# Patient Record
Sex: FEMALE | Race: WHITE | NOT HISPANIC OR LATINO | Employment: OTHER | URBAN - METROPOLITAN AREA
[De-identification: names, ages, dates, MRNs, and addresses within clinical notes are randomized per-mention and may not be internally consistent; named-entity substitution may affect disease eponyms.]

---

## 2017-01-13 ENCOUNTER — HOSPITAL ENCOUNTER (OUTPATIENT)
Dept: LAB | Facility: MEDICAL CENTER | Age: 57
End: 2017-01-13
Attending: NURSE PRACTITIONER
Payer: MEDICARE

## 2017-01-13 DIAGNOSIS — E83.119 HEMOCHROMATOSIS: ICD-10-CM

## 2017-01-13 DIAGNOSIS — I10 ESSENTIAL HYPERTENSION: ICD-10-CM

## 2017-01-13 LAB
ALBUMIN SERPL BCP-MCNC: 4.3 G/DL (ref 3.2–4.9)
ALBUMIN/GLOB SERPL: 1.6 G/DL
ALP SERPL-CCNC: 46 U/L (ref 30–99)
ALT SERPL-CCNC: 13 U/L (ref 2–50)
ANION GAP SERPL CALC-SCNC: 8 MMOL/L (ref 0–11.9)
AST SERPL-CCNC: 15 U/L (ref 12–45)
BASOPHILS # BLD AUTO: 0.08 K/UL (ref 0–0.12)
BASOPHILS NFR BLD AUTO: 1.2 % (ref 0–1.8)
BILIRUB SERPL-MCNC: 0.3 MG/DL (ref 0.1–1.5)
BUN SERPL-MCNC: 17 MG/DL (ref 8–22)
CALCIUM SERPL-MCNC: 9.7 MG/DL (ref 8.5–10.5)
CHLORIDE SERPL-SCNC: 105 MMOL/L (ref 96–112)
CHOLEST SERPL-MCNC: 229 MG/DL (ref 100–199)
CO2 SERPL-SCNC: 25 MMOL/L (ref 20–33)
CREAT SERPL-MCNC: 0.96 MG/DL (ref 0.5–1.4)
EOSINOPHIL # BLD: 0.17 K/UL (ref 0–0.51)
EOSINOPHIL NFR BLD AUTO: 2.4 % (ref 0–6.9)
ERYTHROCYTE [DISTWIDTH] IN BLOOD BY AUTOMATED COUNT: 43.3 FL (ref 35.9–50)
GLOBULIN SER CALC-MCNC: 2.7 G/DL (ref 1.9–3.5)
GLUCOSE SERPL-MCNC: 88 MG/DL (ref 65–99)
HCT VFR BLD AUTO: 44.4 % (ref 37–47)
HDLC SERPL-MCNC: 67 MG/DL
HGB BLD-MCNC: 14.7 G/DL (ref 12–16)
IMM GRANULOCYTES # BLD AUTO: 0.02 K/UL (ref 0–0.11)
IMM GRANULOCYTES NFR BLD AUTO: 0.3 % (ref 0–0.9)
LDLC SERPL CALC-MCNC: 146 MG/DL
LYMPHOCYTES # BLD: 2.03 K/UL (ref 1–4.8)
LYMPHOCYTES NFR BLD AUTO: 29.3 % (ref 22–41)
MCH RBC QN AUTO: 30.1 PG (ref 27–33)
MCHC RBC AUTO-ENTMCNC: 33.1 G/DL (ref 33.6–35)
MCV RBC AUTO: 91 FL (ref 81.4–97.8)
MONOCYTES # BLD: 0.47 K/UL (ref 0–0.85)
MONOCYTES NFR BLD AUTO: 6.8 % (ref 0–13.4)
NEUTROPHILS # BLD: 4.17 K/UL (ref 2–7.15)
NEUTROPHILS NFR BLD AUTO: 60 % (ref 44–72)
NRBC # BLD AUTO: 0 K/UL
NRBC BLD-RTO: 0 /100 WBC
PLATELET # BLD AUTO: 411 K/UL (ref 164–446)
PMV BLD AUTO: 8.7 FL (ref 9–12.9)
POTASSIUM SERPL-SCNC: 3.5 MMOL/L (ref 3.6–5.5)
PROT SERPL-MCNC: 7 G/DL (ref 6–8.2)
RBC # BLD AUTO: 4.88 M/UL (ref 4.2–5.4)
SODIUM SERPL-SCNC: 138 MMOL/L (ref 135–145)
TRIGL SERPL-MCNC: 78 MG/DL (ref 0–149)
TSH SERPL DL<=0.005 MIU/L-ACNC: 1.69 UIU/ML (ref 0.3–3.7)
WBC # BLD AUTO: 6.9 K/UL (ref 4.8–10.8)

## 2017-01-13 PROCEDURE — 80053 COMPREHEN METABOLIC PANEL: CPT

## 2017-01-13 PROCEDURE — 80061 LIPID PANEL: CPT

## 2017-01-13 PROCEDURE — 36415 COLL VENOUS BLD VENIPUNCTURE: CPT

## 2017-01-13 PROCEDURE — 84443 ASSAY THYROID STIM HORMONE: CPT

## 2017-01-13 PROCEDURE — 85025 COMPLETE CBC W/AUTO DIFF WBC: CPT

## 2017-01-17 ENCOUNTER — OFFICE VISIT (OUTPATIENT)
Dept: MEDICAL GROUP | Facility: MEDICAL CENTER | Age: 57
End: 2017-01-17
Payer: MEDICARE

## 2017-01-17 VITALS
TEMPERATURE: 97.6 F | OXYGEN SATURATION: 96 % | DIASTOLIC BLOOD PRESSURE: 70 MMHG | HEIGHT: 63 IN | BODY MASS INDEX: 27.82 KG/M2 | HEART RATE: 75 BPM | SYSTOLIC BLOOD PRESSURE: 128 MMHG | RESPIRATION RATE: 16 BRPM | WEIGHT: 157 LBS

## 2017-01-17 DIAGNOSIS — Z12.39 SCREENING FOR BREAST CANCER: ICD-10-CM

## 2017-01-17 DIAGNOSIS — M50.30 DDD (DEGENERATIVE DISC DISEASE), CERVICAL: ICD-10-CM

## 2017-01-17 DIAGNOSIS — G54.0 TOS (THORACIC OUTLET SYNDROME): ICD-10-CM

## 2017-01-17 DIAGNOSIS — I10 ESSENTIAL HYPERTENSION: ICD-10-CM

## 2017-01-17 DIAGNOSIS — E78.5 DYSLIPIDEMIA: ICD-10-CM

## 2017-01-17 DIAGNOSIS — Z98.1 S/P CERVICAL SPINAL FUSION: ICD-10-CM

## 2017-01-17 DIAGNOSIS — G47.00 INSOMNIA, UNSPECIFIED TYPE: ICD-10-CM

## 2017-01-17 DIAGNOSIS — F11.20 OPIOID TYPE DEPENDENCE, CONTINUOUS (HCC): ICD-10-CM

## 2017-01-17 DIAGNOSIS — M54.12 CERVICAL RADICULOPATHY: ICD-10-CM

## 2017-01-17 PROCEDURE — 99214 OFFICE O/P EST MOD 30 MIN: CPT | Performed by: NURSE PRACTITIONER

## 2017-01-17 RX ORDER — ZOLPIDEM TARTRATE 5 MG/1
5 TABLET ORAL NIGHTLY PRN
Qty: 30 TAB | Refills: 5 | Status: SHIPPED | OUTPATIENT
Start: 2017-01-17 | End: 2017-12-15

## 2017-01-17 RX ORDER — VALSARTAN AND HYDROCHLOROTHIAZIDE 160; 25 MG/1; MG/1
1 TABLET ORAL DAILY
Qty: 90 TAB | Refills: 2 | Status: SHIPPED | OUTPATIENT
Start: 2017-01-17 | End: 2017-09-18 | Stop reason: SDUPTHER

## 2017-01-17 RX ORDER — HYDROCODONE BITARTRATE AND ACETAMINOPHEN 10; 325 MG/1; MG/1
1 TABLET ORAL EVERY 6 HOURS PRN
Qty: 120 TAB | Refills: 0 | Status: SHIPPED | OUTPATIENT
Start: 2017-01-17 | End: 2017-04-21 | Stop reason: SDUPTHER

## 2017-01-17 RX ORDER — TRAMADOL HYDROCHLORIDE 50 MG/1
50-100 TABLET ORAL EVERY 12 HOURS
Qty: 60 TAB | Refills: 5 | Status: SHIPPED | OUTPATIENT
Start: 2017-01-17 | End: 2018-08-13 | Stop reason: SDUPTHER

## 2017-01-17 ASSESSMENT — ENCOUNTER SYMPTOMS
NECK PAIN: 1
TINGLING: 1
BACK PAIN: 1
MYALGIAS: 1
INSOMNIA: 1

## 2017-01-17 NOTE — MR AVS SNAPSHOT
"Yuki Jack Ajshakeeltraci   2017 10:40 AM   Office Visit   MRN: [id number]    Department:  [address] [place] [address]   Dept Phone:  [phone]    Description:  Female : 1960   Provider:  ANDREWS Paredes           Reason for Visit     Medication Refill           Allergies as of 2017     Allergen Noted Reactions    Penicillins 10/05/2009   Hives    Sulfa Drugs 10/05/2009   Hives      You were diagnosed with     TOS (thoracic outlet syndrome)   [845247]       S/P cervical spinal fusion   [270233]       Cervical radiculopathy   [153537]       Dyslipidemia   [435580]       Opioid type dependence, continuous (CMS-HCC)   [304.01.ICD-9-CM]       DDD (degenerative disc disease), cervical   [955488]       Insomnia, unspecified type   [1842737]       Essential hypertension   [5752269]       Screening for breast cancer   [032996]         Vital Signs     Blood Pressure Pulse Temperature Respirations Height Weight    128/70 mmHg 75 36.4 °C (97.6 °F) 16 1.6 m (5' 3\") 71.215 kg (157 lb)    Body Mass Index Oxygen Saturation Last Menstrual Period Smoking Status          27.82 kg/m2 96% 2012 Former Smoker        Basic Information     Date Of Birth Sex Race Ethnicity Preferred Language    1960 Female White Non- English      Problem List              ICD-10-CM Priority Class Noted - Resolved    DDD (degenerative disc disease), cervical M50.30   8/3/2011 - Present    Cervical radiculopathy M54.12   2011 - Present    Arm pain M79.603   2011 - Present    S/P cervical spinal fusion Z98.1   2011 - Present    Aortic regurgitation (Chronic) I35.1 High  2012 - Present    Sleep apnea G47.30   Unknown - Present    PVD (peripheral vascular disease) (CMS-HCC) (Chronic) I73.9 High  2012 - Present    Fibromyalgia M79.7   2012 - Present    Knee pain M25.569   3/11/2013 - Present    Opioid type dependence, continuous (CMS-HCC) F11.20 High  2015 - Present    TOS (thoracic outlet " syndrome) G54.0   4/14/2015 - Present    Long term prescription opiate use Z79.891   4/20/2016 - Present    Essential hypertension I10   5/20/2016 - Present    Dyslipidemia E78.5   1/17/2017 - Present      Health Maintenance        Date Due Completion Dates    IMM INFLUENZA (1) 9/1/2016 10/10/2013    MAMMOGRAM 12/1/2016 12/1/2015, 3/3/2014, 11/21/2012, 11/21/2012, 9/15/2011, 9/8/2011, 9/15/2010, 3/15/2010, 3/15/2010, 6/19/2009, 6/19/2009, 11/26/2008, 11/26/2008    PAP SMEAR 10/20/2018 10/20/2015 (Postponed)    Override on 10/20/2015: Postponed    IMM DTaP/Tdap/Td Vaccine (2 - Td) 9/24/2023 9/24/2013    COLONOSCOPY 6/23/2025 6/23/2015, 6/23/2015            Current Immunizations     Influenza TIV (IM) 10/10/2013    Tdap Vaccine 9/24/2013      Below and/or attached are the medications your provider expects you to take. Review all of your home medications and newly ordered medications with your provider and/or pharmacist. Follow medication instructions as directed by your provider and/or pharmacist. Please keep your medication list with you and share with your provider. Update the information when medications are discontinued, doses are changed, or new medications (including over-the-counter products) are added; and carry medication information at all times in the event of emergency situations     Allergies:  PENICILLINS - Hives     SULFA DRUGS - Hives               Medications  Valid as of: January 17, 2017 - 12:50 PM    Generic Name Brand Name Tablet Size Instructions for use    Acyclovir (Tab) ZOVIRAX 800 MG Take 1 Tab by mouth 2 times a day.        Hydrocodone-Acetaminophen (Tab) NORCO  MG Take 1 Tab by mouth every 6 hours as needed.        Topiramate (Tab) TOPAMAX 50 MG Take 2 Tabs by mouth 2 times a day.        TraMADol HCl (Tab) ULTRAM 50 MG Take 1-2 Tabs by mouth every 12 hours.        Valsartan-Hydrochlorothiazide (Tab) DIOVAN--25 MG Take 1 Tab by mouth every day.        Zolpidem Tartrate (Tab)  AMBIEN 5 MG Take 1 Tab by mouth at bedtime as needed for Sleep.        .                 Medicines prescribed today were sent to:     [id number] - [place], [place] - [address] [facility]. [address]    [address] [facility]. [address] [place] [address]    Phone: [phone] Fax: [phone]    Open 24 Hours?: No      Medication refill instructions:       If your prescription bottle indicates you have medication refills left, it is not necessary to call your provider’s office. Please contact your pharmacy and they will refill your medication.    If your prescription bottle indicates you do not have any refills left, you may request refills at any time through one of the following ways: The online [employer] system (except Urgent Care), by calling your provider’s office, or by asking your pharmacy to contact your provider’s office with a refill request. Medication refills are processed only during regular business hours and may not be available until the next business day. Your provider may request additional information or to have a follow-up visit with you prior to refilling your medication.   *Please Note: Medication refills are assigned a new Rx number when refilled electronically. Your pharmacy may indicate that no refills were authorized even though a new prescription for the same medication is available at the pharmacy. Please request the medicine by name with the pharmacy before contacting your provider for a refill.        Referral     A referral request has been sent to our patient care coordination department. Please allow 3-5 business days for us to process this request and contact you either by phone or mail. If you do not hear from us by the 5th business day, please call us at [phone].        Other Notes About Your Plan     This appointment is 4-11-16. It is the beginning of the year and will require all chronic conditions to be assessed and documented in conjunction with any other identified concerns during the  visit:    I73.9 PVD  F11.20 Opioid Dependency  D69.9 Thrombocytopenia    Query: Hx of depression, chronic use of anti-depressants, in your medical opinion could this be Major (Chronic) Depression, recurrent, mild F33.0                 MyChart Access Code: Activation code not generated  Current Avingert Status: Active

## 2017-01-17 NOTE — PROGRESS NOTES
Subjective:      Yuki Angelo is a 56 y.o. female who presents with Medication Refill            HPI Yuki Angelo is here today to discuss a number of issues and for refill on medicines and to review lab work.      1. TOS (thoracic outlet syndrome)  Patient gives history of thoracic outlet syndrome and is wondering what more can be done for this. She has previously been to pain management for this as well as cervical radiculopathy. Pain starts in her left shoulder area and radiates down her arm to her hands. She has been to Dr. Lee in the past for cervical spine surgery and would like to go back to him.    2. S/P cervical spinal fusion  Patient had previous surgery but still has neck issues.    3. Cervical radiculopathy  Patient reports pain that radiates into the left arm on occasion and is unsure if this is related to her cervical spine or thoracic outlet syndrome.    4. Dyslipidemia  Patient's recent cholesterol comes back showing elevated LDL at 146 with a good HDL of 67. She is not on a statin.    5. Opioid type dependence, continuous (CMS-HCC)  Patient would like refills on her tramadol which she uses twice a day for pain. She will occasionally take Norco for breakthrough pain and a prescription typically last for 6 months.    6. DDD (degenerative disc disease), cervical  Patient does not currently follow with any specialist.    7. Insomnia, unspecified type  Patient was using Xanax for sleep at night and has been using this for years. She would like to continue on something for sleep if she can no longer use Xanax because of the recent black box warnings.    8. Essential hypertension  Blood pressure remains well controlled on her Diovan with HCT and lab work was normal except for slightly low potassium.    9. Screening for breast cancer  Patient due for yearly screening.      Current Outpatient Prescriptions   Medication Sig Dispense Refill   • tramadol (ULTRAM) 50 MG Tab Take 1-2 Tabs by  "mouth every 12 hours. 60 Tab 5   • hydrocodone/acetaminophen (NORCO)  MG Tab Take 1 Tab by mouth every 6 hours as needed. 120 Tab 0   • zolpidem (AMBIEN) 5 MG Tab Take 1 Tab by mouth at bedtime as needed for Sleep. 30 Tab 5   • valsartan-hydrochlorothiazide (DIOVAN-HCT) 160-25 MG per tablet Take 1 Tab by mouth every day. 90 Tab 2   • topiramate (TOPAMAX) 50 MG tablet Take 2 Tabs by mouth 2 times a day. 120 Tab 11   • acyclovir (ZOVIRAX) 800 MG Tab Take 1 Tab by mouth 2 times a day. 20 Tab 12     No current facility-administered medications for this visit.     Social History   Substance Use Topics   • Smoking status: Former Smoker -- 35 years     Types: Cigarettes     Quit date: 03/04/2013   • Smokeless tobacco: Former User      Comment: .5 ppd for 30 years    • Alcohol Use: 0.6 oz/week     1 Glasses of wine, 0 Standard drinks or equivalent per week      Comment: rarely     Past Medical History   Diagnosis Date   • Snoring    • DDD (degenerative disc disease), cervical 8/3/2011   • History of echocardiogram 2/21/2012   • Fatigue 2/21/2012   • PVD (peripheral vascular disease) (CMS-[facility]) 2/21/2012   • Aortic regurgitation 2/21/2012     Dr. Gutiérrez, cardiologist   • Polycystic kidney disease      \"told as child\"   • Sleep apnea      Uses CPAP at [facility]   • Other specified disorder of intestines      constipation   • Hypertension 2015     well controlled on meds   • Arthritis      Generalized   • Fibromyalgia    • Pain      left arm and neck, thoracic area \"allover\"   • CHEST PAIN 2/21/2012   • Backpain    • Psychiatric problem      depression, anxiety   • S/P tooth extraction 10/27/2015   • Multilevel degenerative disc disease    • Thoracic outlet syndrome      Family History   Problem Relation Age of Onset   • Adopted: Yes   • Hypertension Maternal Aunt        Review of Systems   Musculoskeletal: Positive for myalgias, back pain and neck pain.   Neurological: Positive for tingling.   Psychiatric/Behavioral: The " "patient has insomnia.    All other systems reviewed and are negative.         Objective:     /70 mmHg  Pulse 75  Temp(Src) 36.4 °C (97.6 °F)  Resp 16  Ht 1.6 m (5' 3\")  Wt 71.215 kg (157 lb)  BMI 27.82 kg/m2  SpO2 96%  LMP 06/01/2012     Physical Exam   Constitutional: She is oriented to person, place, and time. She appears well-developed and well-nourished. No distress.   HENT:   Head: Normocephalic and atraumatic.   Right Ear: External ear normal.   Left Ear: External ear normal.   Nose: Nose normal.   Eyes: Right eye exhibits no discharge. Left eye exhibits no discharge.   Neck: Normal range of motion. Neck supple. No thyromegaly present.   Cardiovascular: Normal rate, regular rhythm and normal heart sounds.  Exam reveals no gallop and no friction rub.    No murmur heard.  Pulmonary/Chest: Effort normal and breath sounds normal. She has no wheezes. She has no rales.   Musculoskeletal: She exhibits no edema or tenderness.   Pain in the left neck, shoulder and arm area. Reports of tingling and pain of the fingers of the left hand.   Neurological: She is alert and oriented to person, place, and time. She displays normal reflexes.   Skin: Skin is warm and dry. No rash noted. She is not diaphoretic.   Psychiatric: She has a normal mood and affect. Her behavior is normal. Judgment and thought content normal.   Nursing note and vitals reviewed.         Component      Latest Ref Rng 1/13/2017 1/13/2017 1/13/2017 1/13/2017          10:21 AM 10:21 AM 10:21 AM 10:21 AM   WBC      4.8 - 10.8 K/uL  6.9     RBC      4.20 - 5.40 M/uL  4.88     Hemoglobin      12.0 - 16.0 g/dL  14.7     Hematocrit      37.0 - 47.0 %  44.4     MCV      81.4 - 97.8 fL  91.0     MCH      27.0 - 33.0 pg  30.1     MCHC      33.6 - 35.0 g/dL  33.1 (L)     RDW      35.9 - 50.0 fL  43.3     Platelet Count      164 - 446 K/uL  411     MPV      9.0 - 12.9 fL  8.7 (L)     Neutrophils-Polys      44.00 - 72.00 %  60.00     Lymphocytes      " 22.00 - 41.00 %  29.30     Monocytes      0.00 - 13.40 %  6.80     Eosinophils      0.00 - 6.90 %  2.40     Basophils      0.00 - 1.80 %  1.20     Immature Granulocytes      0.00 - 0.90 %  0.30     Nucleated RBC        0.00     Neutrophils (Absolute)      2.00 - 7.15 K/uL  4.17     Lymphs (Absolute)      1.00 - 4.80 K/uL  2.03     Monos (Absolute)      0.00 - 0.85 K/uL  0.47     Eos (Absolute)      0.00 - 0.51 K/uL  0.17     Baso (Absolute)      0.00 - 0.12 K/uL  0.08     Immature Granulocytes (abs)      0.00 - 0.11 K/uL  0.02     NRBC (Absolute)        0.00     Sodium      135 - 145 mmol/L       Potassium      3.6 - 5.5 mmol/L       Chloride      96 - 112 mmol/L       Co2      20 - 33 mmol/L       Anion Gap      0.0 - 11.9       Glucose      65 - 99 mg/dL       Bun      8 - 22 mg/dL       Creatinine      0.50 - 1.40 mg/dL       Calcium      8.5 - 10.5 mg/dL       AST(SGOT)      12 - 45 U/L       ALT(SGPT)      2 - 50 U/L       Alkaline Phosphatase      30 - 99 U/L       Total Bilirubin      0.1 - 1.5 mg/dL       Albumin      3.2 - 4.9 g/dL       Total Protein      6.0 - 8.2 g/dL       Globulin      1.9 - 3.5 g/dL       A-G Ratio             Cholesterol,Tot      100 - 199 mg/dL    229 (H)   Triglycerides      0 - 149 mg/dL    78   HDL      >=40 mg/dL    67   LDL      <100 mg/dL    146 (H)   GFR If African American      >60 mL/min/1.73 m 2 >60      GFR If Non African American      >60 mL/min/1.73 m 2 60      TSH      0.300 - 3.700 uIU/mL   1.690      Component      Latest Ref Rn 1/13/2017          10:21 AM   WBC      4.8 - 10.8 K/uL    RBC      4.20 - 5.40 M/uL    Hemoglobin      12.0 - 16.0 g/dL    Hematocrit      37.0 - 47.0 %    MCV      81.4 - 97.8 fL    MCH      27.0 - 33.0 pg    MCHC      33.6 - 35.0 g/dL    RDW      35.9 - 50.0 fL    Platelet Count      164 - 446 K/uL    MPV      9.0 - 12.9 fL    Neutrophils-Polys      44.00 - 72.00 %    Lymphocytes      22.00 - 41.00 %    Monocytes      0.00 - 13.40 %     Eosinophils      0.00 - 6.90 %    Basophils      0.00 - 1.80 %    Immature Granulocytes      0.00 - 0.90 %    Nucleated RBC          Neutrophils (Absolute)      2.00 - 7.15 K/uL    Lymphs (Absolute)      1.00 - 4.80 K/uL    Monos (Absolute)      0.00 - 0.85 K/uL    Eos (Absolute)      0.00 - 0.51 K/uL    Baso (Absolute)      0.00 - 0.12 K/uL    Immature Granulocytes (abs)      0.00 - 0.11 K/uL    NRBC (Absolute)          Sodium      135 - 145 mmol/L 138   Potassium      3.6 - 5.5 mmol/L 3.5 (L)   Chloride      96 - 112 mmol/L 105   Co2      20 - 33 mmol/L 25   Anion Gap      0.0 - 11.9 8.0   Glucose      65 - 99 mg/dL 88   Bun      8 - 22 mg/dL 17   Creatinine      0.50 - 1.40 mg/dL 0.96   Calcium      8.5 - 10.5 mg/dL 9.7   AST(SGOT)      12 - 45 U/L 15   ALT(SGPT)      2 - 50 U/L 13   Alkaline Phosphatase      30 - 99 U/L 46   Total Bilirubin      0.1 - 1.5 mg/dL 0.3   Albumin      3.2 - 4.9 g/dL 4.3   Total Protein      6.0 - 8.2 g/dL 7.0   Globulin      1.9 - 3.5 g/dL 2.7   A-G Ratio       1.6   Cholesterol,Tot      100 - 199 mg/dL    Triglycerides      0 - 149 mg/dL    HDL      >=40 mg/dL    LDL      <100 mg/dL    GFR If African American      >60 mL/min/1.73 m 2    GFR If Non African American      >60 mL/min/1.73 m 2    TSH      0.300 - 3.700 uIU/mL         Assessment/Plan:     1. TOS (thoracic outlet syndrome)  Patient will be referred back to neurosurgery to see if there is anything more that can be done for her thoracic outlet syndrome.  - REFERRAL TO NEUROSURGERY    2. S/P cervical spinal fusion  Patient will follow up with her neurosurgeon.  - REFERRAL TO NEUROSURGERY    3. Cervical radiculopathy  Patient's medications refilled.  - tramadol (ULTRAM) 50 MG Tab; Take 1-2 Tabs by mouth every 12 hours.  Dispense: 60 Tab; Refill: 5  - hydrocodone/acetaminophen (NORCO)  MG Tab; Take 1 Tab by mouth every 6 hours as needed.  Dispense: 120 Tab; Refill: 0    4. Dyslipidemia  Spoke with patient about dietary  changes and rechecking levels in 6-12 months. If her LDL goes higher she may be a candidate for statin .    5. Opioid type dependence, continuous (CMS-HCC)  Patient has signed previous drug contract and had urine drug screening. I have refilled her tramadol which he uses regularly and her hydrocodone should last her for 6 months.  - tramadol (ULTRAM) 50 MG Tab; Take 1-2 Tabs by mouth every 12 hours.  Dispense: 60 Tab; Refill: 5  - hydrocodone/acetaminophen (NORCO)  MG Tab; Take 1 Tab by mouth every 6 hours as needed.  Dispense: 120 Tab; Refill: 0    6. DDD (degenerative disc disease), cervical  Medications refilled for the next 6 months.  - tramadol (ULTRAM) 50 MG Tab; Take 1-2 Tabs by mouth every 12 hours.  Dispense: 60 Tab; Refill: 5  - hydrocodone/acetaminophen (NORCO)  MG Tab; Take 1 Tab by mouth every 6 hours as needed.  Dispense: 120 Tab; Refill: 0    7. Insomnia, unspecified type  I reviewed with her the black box warnings with Xanax and her pain pills. She does not feel she can come off all sleep medication so I will put her on low-dose Ambien with warnings. I told her to try to wean off all medication or at least not use the sleep medicine daily to prevent tolerance.  - zolpidem (AMBIEN) 5 MG Tab; Take 1 Tab by mouth at bedtime as needed for Sleep.  Dispense: 30 Tab; Refill: 5    8. Essential hypertension  Medications refilled and patient told to eat more potassium rich foods.  - valsartan-hydrochlorothiazide (DIOVAN-HCT) 160-25 MG per tablet; Take 1 Tab by mouth every day.  Dispense: 90 Tab; Refill: 2    9. Screening for breast cancer    - MA-SCREEN MAMMO W/CAD-BILAT

## 2017-01-25 ENCOUNTER — PATIENT OUTREACH (OUTPATIENT)
Dept: HEALTH INFORMATION MANAGEMENT | Facility: OTHER | Age: 57
End: 2017-01-25

## 2017-01-25 ENCOUNTER — PATIENT MESSAGE (OUTPATIENT)
Dept: HEALTH INFORMATION MANAGEMENT | Facility: OTHER | Age: 57
End: 2017-01-25

## 2017-01-26 NOTE — PROGRESS NOTES
01/25/17 SCHEDULED AWV AND FLU VACCINE    Health Maintenance Due   Topic Date Due   • IMM INFLUENZA (1) SCHEDULED    • MAMMOGRAM  DID NOT HAVE A CHANCE TO ASK, SEEMED RUSHED. SENT A [facility] MESSAGE WITH THE DOT PHRASE FOR MAMMOGRAM BEING DUE

## 2017-02-22 ENCOUNTER — TELEPHONE (OUTPATIENT)
Dept: MEDICAL GROUP | Facility: MEDICAL CENTER | Age: 57
End: 2017-02-22

## 2017-02-22 NOTE — TELEPHONE ENCOUNTER
Future Appointments      Provider Department Center   2/24/2017 3:00 PM [facility]ared[facility] [place] [address]       PRE-VISIT PLANNING   1. Review last office visit plan & assessment notes with PCP:  • No chronic diseases to review  2. Were there orders placed last visit if yes do we have Results/Consult Notes? yes   • Labs? 01/13/17  • Imaging? 01/17/17 Mammogram not completed  • Referrals? 01/17/17 Neurosurgery  3.   Patient Care Coordination Note was updated with  Diagnosis information:  yes there is a note  4.   Updated immunizations by using [employer]?: yes  · Is patient due for Shingles? Yes.  If yes, was patient alerted of copay? yes   5.   Has the patient had the flu vaccine this season? No.  6.   Has patient had the pneumococcal vaccine? No.  7.   Last office visit 01/17/17          Patient is due for these Health Maintenance Topics:   Health Maintenance Due   Topic Date Due   • IMM INFLUENZA (1) 09/01/2016   • MAMMOGRAM  12/01/2016       8.   Updated Care Team with [employer]?:  yes  9.   Who is the patients' eye doctor? PT looking for new eye dr. When was last eye exam? Few months Update in Care Teams? N\A  10. Patient has: No chronic diseases to review  11.  Were there medication refills placed? yes and no  12. Patient was informed to arrive 15 min prior to their scheduled appointment and bring in their medication bottles? Yes  13. Patient was advised: “This is a free wellness visit. The provider will screen for medical conditions to help you stay healthy. If you have other concerns to address you may be asked to discuss these at a separate visit or there may be an additional fee.”  Yes    FV from Dr Lee neurosurgery for back/neck  Referral to demermatology

## 2017-02-24 ENCOUNTER — APPOINTMENT (OUTPATIENT)
Dept: MEDICAL GROUP | Facility: MEDICAL CENTER | Age: 57
End: 2017-02-24
Payer: MEDICARE

## 2017-03-03 ENCOUNTER — HOSPITAL ENCOUNTER (OUTPATIENT)
Dept: RADIOLOGY | Facility: MEDICAL CENTER | Age: 57
End: 2017-03-03
Attending: SURGERY
Payer: MEDICARE

## 2017-03-03 VITALS
BODY MASS INDEX: 26.93 KG/M2 | WEIGHT: 152 LBS | HEART RATE: 92 BPM | TEMPERATURE: 98.4 F | RESPIRATION RATE: 16 BRPM | HEIGHT: 63 IN | DIASTOLIC BLOOD PRESSURE: 52 MMHG | OXYGEN SATURATION: 94 % | SYSTOLIC BLOOD PRESSURE: 102 MMHG

## 2017-03-03 DIAGNOSIS — M54.12 BRACHIAL NEURITIS OR RADICULITIS NOS: ICD-10-CM

## 2017-03-03 DIAGNOSIS — C72.0 MALIGNANT NEOPLASM OF SPINAL CORD (HCC): ICD-10-CM

## 2017-03-03 PROCEDURE — 700117 HCHG RX CONTRAST REV CODE 255: Performed by: SURGERY

## 2017-03-03 PROCEDURE — 72158 MRI LUMBAR SPINE W/O & W/DYE: CPT

## 2017-03-03 PROCEDURE — A9579 GAD-BASE MR CONTRAST NOS,1ML: HCPCS | Performed by: SURGERY

## 2017-03-03 PROCEDURE — 72110 X-RAY EXAM L-2 SPINE 4/>VWS: CPT

## 2017-03-03 PROCEDURE — 700101 HCHG RX REV CODE 250

## 2017-03-03 PROCEDURE — 72050 X-RAY EXAM NECK SPINE 4/5VWS: CPT

## 2017-03-03 PROCEDURE — 700111 HCHG RX REV CODE 636 W/ 250 OVERRIDE (IP)

## 2017-03-03 PROCEDURE — 72141 MRI NECK SPINE W/O DYE: CPT

## 2017-03-03 PROCEDURE — 01922 ANES N-INVAS IMG/RADJ THER: CPT

## 2017-03-03 RX ADMIN — GADODIAMIDE 15 ML: 287 INJECTION INTRAVENOUS at 12:55

## 2017-03-03 ASSESSMENT — PAIN SCALES - GENERAL
PAINLEVEL_OUTOF10: 0

## 2017-03-03 NOTE — IP AVS SNAPSHOT
" <p align=\"LEFT\"><IMG SRC=\"//[email or web address]$/[email or web address]\" alt=\"Image\" WIDTH=\"50%\" HEIGHT=\"200\" BORDER=\"\"></p>      `           Yuki Angelo   MRN: [id number]    Department:  [facility] MRI [address]   Date of Visit:              `  Discharge Instructions       MRI ADULT DISCHARGE INSTRUCTIONS    You have been medicated today for your scan. Please follow the instructions below to ensure your safe recovery. If you have any questions or problems, feel free to call us at [phone] or [phone].     1.   Have someone stay with you to assist you as needed.    2.   Do not drive or operate any mechanical devices.    3.   Do not perform any activity that requires concentration. Make no major decisions over the next 24 hours.     4.   Be careful changing positions from laying down to sitting or standing, as you may become dizzy.     5.   Do not drink alcohol for 48 hours.    6.   There are no restrictions for eating your normal meals. Drink fluids.    7.   You may continue your usual medications for pain, tranquilizers, muscle relaxants or sedatives when awake.     8.   Tomorrow, you may continue your normal daily activities.     9.   Pressure dressing on 10 - 15 minutes. If swelling or bleeding occurs when removed, continue placing direct pressure on injection site for another 5 minutes, or until bleeding stops.   Midazolam (VERSED)  What is this medicine?  You were given MIDAZOLAM (ALTAGRACIA sierra munson) for your procedure today. This medication is a benzodiazepine. It is used to cause relaxation or sleep before surgery and to block the memory of the procedure.  This medicine may be used for other purposes; ask your health care provider or pharmacist if you have questions.  What side effects may I notice from receiving this medicine?  Side effects that you should report to your doctor or health care professional as soon as possible:  • allergic reactions like skin rash, itching or hives, swelling of the face, lips, or " tongue  • breathing problems  • confusion  • dizziness or lightheadedness  • fast, irregular heartbeat  • halluninations during recovery  • numbness or tingling in the hands or feet  • pain, redness, or swelling at site where injected  • seizures  Side effects that usually do not require medical attention (report to your doctor or health care professional if they continue or are bothersome):  • coughing  • headache  • hiccups  • involuntary eye and muscle movements  • loss of memory of events just before, during, and after use  • nausea, vomiting  • speech problems  • tiredness  • trouble sleeping or nightmares  This list may not describe all possible side effects. Call your doctor for medical advice about side effects. You may report side effects to FDA at 3-068-QLP-4967.    Fentanyl  What is this medicine?  You were given FENTANYL (FEN ta nil) for your procedure today, it is a pain reliever. It is used to treat breakthrough pain that your long acting pain medicine does not control. Do not use this medicine for a pain that will go away in a few days like pain from surgery, doctor or dentist visits.   This medicine may be used for other purposes; ask your health care provider or pharmacist if you have questions.  What side effects may I notice from receiving this medicine?  Side effects that you should report to your doctor or health care professional as soon as possible:  • allergic reactions like skin rash, itching or hives, swelling of the face, lips, or tongue  • breathing problems  • changes in vision  • confusion  • dry mouth  • feeling faint, lightheaded  • hallucination  • irregular heartbeat  • mouth pain, sores  • problems with balance, talking, walking  • trouble passing urine or change in the amount of urine  • unusual bleeding or bruising  • unusually weak or tired  Side effects that usually do not require medical attention (report to your doctor or health care professional if they continue or are  bothersome):  • dizzy  • headache  • loss of appetite  • nausea, vomiting  • sweating  • tingling in mouth  This list may not describe all possible side effects. Call your doctor for medical advice about side effects. You may report side effects to FDA at 1-101-BRJ-3833.    I have been informed of and understand the above discharge instructions.      `       Diet / Nutrition:    Follow any diet instructions given to you by your doctor or the dietician, including how much salt (sodium) you are allowed each day.    If you are overweight, talk to your doctor about a weight reduction plan.    Activity:    Remain physically active following your doctor's instructions about exercise and activity.    Rest often.     Any time you become even a little tired or short of breath, SIT DOWN and rest.    Worsening Symptoms:    Report any of the following signs and symptoms to the doctor's office immediately:    *Pain of jaw, arm, or neck  *Chest pain not relieved by medication                               *Dizziness or loss of consciousness  *Difficulty breathing even when at rest   *More tired than usual                                       *Bleeding drainage or swelling of surgical site  *Swelling of feet, ankles, legs or stomach                 *Fever (>100ºF)  *Pink or blood tinged sputum  *Weight gain (3lbs/day or 5lbs /week)           *Shock from internal defibrillator (if applicable)  *Palpitations or irregular heartbeats                *Cool and/or numb extremities    Stroke Awareness    Common Risk Factors for Stroke include:    Age  Atrial Fibrillation  Carotid Artery Stenosis  Diabetes Mellitus  Excessive alcohol consumption  High blood pressure  Overweight   Physical inactivity  Smoking    Warning signs and symptoms of a stroke include:    *Sudden numbness or weakness of the face, arm or leg (especially on one side of the body).  *Sudden confusion, trouble speaking or understanding.  *Sudden trouble seeing in one or  both eyes.  *Sudden trouble walking, dizziness, loss of balance or coordination.Sudden severe headache with no known cause.    It is very important to get treatment quickly when a stroke occurs. If you experience any of the above warning signs, call 911 immediately.                    `     Quit Smoking / Tobacco Use:    I understand the use of any tobacco products increases my chance of suffering from future heart disease or stroke and could cause other illnesses which may shorten my life. Quitting the use of tobacco products is the single most important thing I can do to improve my health. For further information on smoking / tobacco cessation call a Toll Free Quit Line at 1-523.472.3914 (*[employer] [place]) or 1-758.224.5560 (American Lung Association) or you can access the web based program at www.[employer]Blue Ocean Software.org.    [place] Tobacco Users Help Line:  (938) 553-6364       Toll Free: 1-789.512.3063  Quit Tobacco Program [facility] (534)309-3552    Crisis Hotline:    [place] Crisis Hotline:  7-359-YPJPQFP or 1-167.926.4199    [place] Crisis Hotline:    1-230.617.8094 or 364-286-5021    Discharge Survey:   Thank you for choosing [facility]. We hope we did everything we could to make your hospital stay a pleasant one. You may be receiving a phone survey and we would appreciate your time and participation in answering the questions. Your input is very valuable to us in our efforts to improve our service to our patients and their families.        My signature on this form indicates that:    1. I have reviewed and understand the above information.  2. My questions regarding this information have been answered to my satisfaction.  3. I have formulated a plan with my discharge nurse to obtain my prescribed medications for home.                   `           Patient or Caregiver Signature:  ____________________________________________________________    Date:   ____________________________________________________________       `

## 2017-03-03 NOTE — DISCHARGE INSTRUCTIONS
MRI ADULT DISCHARGE INSTRUCTIONS    You have been medicated today for your scan. Please follow the instructions below to ensure your safe recovery. If you have any questions or problems, feel free to call us at [phone] or [phone].     1.   Have someone stay with you to assist you as needed.    2.   Do not drive or operate any mechanical devices.    3.   Do not perform any activity that requires concentration. Make no major decisions over the next 24 hours.     4.   Be careful changing positions from laying down to sitting or standing, as you may become dizzy.     5.   Do not drink alcohol for 48 hours.    6.   There are no restrictions for eating your normal meals. Drink fluids.    7.   You may continue your usual medications for pain, tranquilizers, muscle relaxants or sedatives when awake.     8.   Tomorrow, you may continue your normal daily activities.     9.   Pressure dressing on 10 - 15 minutes. If swelling or bleeding occurs when removed, continue placing direct pressure on injection site for another 5 minutes, or until bleeding stops.   Midazolam (VERSED)  What is this medicine?  You were given MIDAZOLAM (ALTAGRACIA munson) for your procedure today. This medication is a benzodiazepine. It is used to cause relaxation or sleep before surgery and to block the memory of the procedure.  This medicine may be used for other purposes; ask your health care provider or pharmacist if you have questions.  What side effects may I notice from receiving this medicine?  Side effects that you should report to your doctor or health care professional as soon as possible:  • allergic reactions like skin rash, itching or hives, swelling of the face, lips, or tongue  • breathing problems  • confusion  • dizziness or lightheadedness  • fast, irregular heartbeat  • halluninations during recovery  • numbness or tingling in the hands or feet  • pain, redness, or swelling at site where injected  • seizures  Side effects that usually  do not require medical attention (report to your doctor or health care professional if they continue or are bothersome):  • coughing  • headache  • hiccups  • involuntary eye and muscle movements  • loss of memory of events just before, during, and after use  • nausea, vomiting  • speech problems  • tiredness  • trouble sleeping or nightmares  This list may not describe all possible side effects. Call your doctor for medical advice about side effects. You may report side effects to FDA at 9-831-YOU-6704.    Fentanyl  What is this medicine?  You were given FENTANYL (FEN ta nil) for your procedure today, it is a pain reliever. It is used to treat breakthrough pain that your long acting pain medicine does not control. Do not use this medicine for a pain that will go away in a few days like pain from surgery, doctor or dentist visits.   This medicine may be used for other purposes; ask your health care provider or pharmacist if you have questions.  What side effects may I notice from receiving this medicine?  Side effects that you should report to your doctor or health care professional as soon as possible:  • allergic reactions like skin rash, itching or hives, swelling of the face, lips, or tongue  • breathing problems  • changes in vision  • confusion  • dry mouth  • feeling faint, lightheaded  • hallucination  • irregular heartbeat  • mouth pain, sores  • problems with balance, talking, walking  • trouble passing urine or change in the amount of urine  • unusual bleeding or bruising  • unusually weak or tired  Side effects that usually do not require medical attention (report to your doctor or health care professional if they continue or are bothersome):  • dizzy  • headache  • loss of appetite  • nausea, vomiting  • sweating  • tingling in mouth  This list may not describe all possible side effects. Call your doctor for medical advice about side effects. You may report side effects to FDA at 7-639-FDA-4451.    I  have been informed of and understand the above discharge instructions.

## 2017-03-31 ENCOUNTER — HOSPITAL ENCOUNTER (OUTPATIENT)
Dept: LAB | Facility: MEDICAL CENTER | Age: 57
End: 2017-03-31
Attending: ORTHOPAEDIC SURGERY
Payer: MEDICARE

## 2017-03-31 LAB
CRP SERPL HS-MCNC: 0.04 MG/DL (ref 0–0.75)
ERYTHROCYTE [SEDIMENTATION RATE] IN BLOOD BY WESTERGREN METHOD: 1 MM/HOUR (ref 0–30)
RHEUMATOID FACT SERPL-ACNC: <10 IU/ML (ref 0–14)
URATE SERPL-MCNC: 5 MG/DL (ref 1.9–8.2)
WBC # BLD AUTO: 8.4 K/UL (ref 4.8–10.8)

## 2017-03-31 PROCEDURE — 85652 RBC SED RATE AUTOMATED: CPT

## 2017-03-31 PROCEDURE — 86140 C-REACTIVE PROTEIN: CPT

## 2017-03-31 PROCEDURE — 86812 HLA TYPING A B OR C: CPT

## 2017-03-31 PROCEDURE — 84550 ASSAY OF BLOOD/URIC ACID: CPT

## 2017-03-31 PROCEDURE — 85048 AUTOMATED LEUKOCYTE COUNT: CPT

## 2017-03-31 PROCEDURE — 86038 ANTINUCLEAR ANTIBODIES: CPT

## 2017-03-31 PROCEDURE — 36415 COLL VENOUS BLD VENIPUNCTURE: CPT

## 2017-03-31 PROCEDURE — 86235 NUCLEAR ANTIGEN ANTIBODY: CPT

## 2017-03-31 PROCEDURE — 86431 RHEUMATOID FACTOR QUANT: CPT

## 2017-03-31 PROCEDURE — 86200 CCP ANTIBODY: CPT

## 2017-04-02 LAB
CCP IGG SERPL-ACNC: 4 UNITS (ref 0–19)
HLA-B27 QL FC: NEGATIVE
NUCLEAR IGG SER QL IA: NORMAL

## 2017-04-03 LAB
ENA SS-B IGG SER IA-ACNC: 0 AU/ML (ref 0–40)
SSA52 R0ENA AB IGG Q0420: 14 AU/ML (ref 0–40)
SSA60 R0ENA AB IGG Q0419: 2 AU/ML (ref 0–40)

## 2017-04-13 ENCOUNTER — TELEPHONE (OUTPATIENT)
Dept: MEDICAL GROUP | Facility: MEDICAL CENTER | Age: 57
End: 2017-04-13

## 2017-04-13 NOTE — TELEPHONE ENCOUNTER
ANNUAL WELLNESS VISIT PRE-VISIT PLANNING     1.  Reviewed last PCP office visit assessment and plan notes: Yes  2.  If any orders were placed last visit do we have Results/Consult Notes?        •  Labs? Yes 01/13/17       •  Imaging? Yes 03/03/17 MR lumbar/MR Cervical/DX Lumbar/DX Cervical       •  Referrals? No   3.   Patient Care Coordination Note was updated with  Diagnosis information: yes There is a note  4.   Updated immunizations by using [employer]?: yes  · Is patient due for Shingles? Yes.  If yes, was patient alerted of copay? yes   5.   Has the patient had the flu vaccine this season? No.  6.   Has patient had the pneumococcal vaccine? No.   7.   Last office visit 01/17/17          Patient is due for these Health Maintenance Topics:   Health Maintenance Due   Topic Date Due   • Annual Wellness Visit  Cancelled   • MAMMOGRAM 12/01/2016     8.    Updated Care Team with [employer] and all specialists?        •   Gait devices, O2, CPAP, etc: yes        •   Other specialists (GYN, cardiology, endo, etc): yes        •   Eye professional: [facility] When was last eye exam? 1 month            9. DPA/Advanced Directive:  Patient does not have an Advanced Directive.  A packet and workshop information was given on Advanced Directives.    10. Patient has: No chronic diseases to review    11.  Is patient in need of any refills prior to office visit? Yes       •    Separate refill encounter created?: yes  12. Patient was informed to arrive 15 min prior to their scheduled appointment and bring in their medication bottles? yes  13. Patient was advised: “This is a free wellness visit. The provider will screen for medical conditions to help you stay healthy. If you have other concerns to address you may be asked to discuss these at a separate visit or there may be an additional fee.”  Yes    FV from Dr Lee neurosurgery for back/neck  [facility]  Referral to [place] for thoracic outlet syndrom       Changed appt to establish fv

## 2017-04-13 NOTE — TELEPHONE ENCOUNTER
Left message for patient to call back regarding new patient pre-visit planning. Please transfer call to [phone].

## 2017-04-13 NOTE — TELEPHONE ENCOUNTER
Left message for patient to call back regarding AWV pre-visit planning. Please transfer call to [phone].

## 2017-04-14 DIAGNOSIS — M54.12 CERVICAL RADICULOPATHY: ICD-10-CM

## 2017-04-14 DIAGNOSIS — F11.20 OPIOID TYPE DEPENDENCE, CONTINUOUS (HCC): ICD-10-CM

## 2017-04-14 DIAGNOSIS — M50.30 DDD (DEGENERATIVE DISC DISEASE), CERVICAL: ICD-10-CM

## 2017-04-14 RX ORDER — HYDROCODONE BITARTRATE AND ACETAMINOPHEN 10; 325 MG/1; MG/1
1 TABLET ORAL EVERY 6 HOURS PRN
Qty: 120 TAB | Refills: 0 | OUTPATIENT
Start: 2017-04-14

## 2017-04-14 NOTE — TELEPHONE ENCOUNTER
Was the patient seen in the last year in this department? Yes     Does patient have an active prescription for medications requested? No     Received Request Via: Patient

## 2017-04-21 ENCOUNTER — APPOINTMENT (OUTPATIENT)
Dept: MEDICAL GROUP | Facility: MEDICAL CENTER | Age: 57
End: 2017-04-21
Payer: MEDICARE

## 2017-04-21 ENCOUNTER — OFFICE VISIT (OUTPATIENT)
Dept: MEDICAL GROUP | Facility: MEDICAL CENTER | Age: 57
End: 2017-04-21
Payer: MEDICARE

## 2017-04-21 VITALS
BODY MASS INDEX: 27.77 KG/M2 | RESPIRATION RATE: 14 BRPM | DIASTOLIC BLOOD PRESSURE: 82 MMHG | OXYGEN SATURATION: 100 % | TEMPERATURE: 97.6 F | HEART RATE: 83 BPM | SYSTOLIC BLOOD PRESSURE: 114 MMHG | WEIGHT: 156.75 LBS | HEIGHT: 63 IN

## 2017-04-21 DIAGNOSIS — M54.12 CERVICAL RADICULOPATHY: ICD-10-CM

## 2017-04-21 DIAGNOSIS — R53.83 OTHER FATIGUE: ICD-10-CM

## 2017-04-21 DIAGNOSIS — M79.7 FIBROMYALGIA: ICD-10-CM

## 2017-04-21 DIAGNOSIS — F11.20 OPIOID TYPE DEPENDENCE, CONTINUOUS (HCC): ICD-10-CM

## 2017-04-21 DIAGNOSIS — G54.0 TOS (THORACIC OUTLET SYNDROME): ICD-10-CM

## 2017-04-21 DIAGNOSIS — M50.30 DDD (DEGENERATIVE DISC DISEASE), CERVICAL: ICD-10-CM

## 2017-04-21 PROCEDURE — 3014F SCREEN MAMMO DOC REV: CPT | Performed by: NURSE PRACTITIONER

## 2017-04-21 PROCEDURE — G8432 DEP SCR NOT DOC, RNG: HCPCS | Performed by: NURSE PRACTITIONER

## 2017-04-21 PROCEDURE — 1036F TOBACCO NON-USER: CPT | Performed by: NURSE PRACTITIONER

## 2017-04-21 PROCEDURE — G8419 CALC BMI OUT NRM PARAM NOF/U: HCPCS | Performed by: NURSE PRACTITIONER

## 2017-04-21 PROCEDURE — 99213 OFFICE O/P EST LOW 20 MIN: CPT | Performed by: NURSE PRACTITIONER

## 2017-04-21 RX ORDER — HYDROCODONE BITARTRATE AND ACETAMINOPHEN 10; 325 MG/1; MG/1
1 TABLET ORAL EVERY 6 HOURS PRN
Qty: 120 TAB | Refills: 0 | Status: SHIPPED | OUTPATIENT
Start: 2017-04-21 | End: 2017-12-15

## 2017-04-21 ASSESSMENT — PATIENT HEALTH QUESTIONNAIRE - PHQ9: CLINICAL INTERPRETATION OF PHQ2 SCORE: 3

## 2017-04-21 ASSESSMENT — ENCOUNTER SYMPTOMS
NECK PAIN: 1
FATIGUE: 1

## 2017-04-21 NOTE — MR AVS SNAPSHOT
"        Yuki Jack Frankcindytraci   2017 2:40 PM   Office Visit   MRN: [id number]    Department:  [address] [place] [address]   Dept Phone:  [phone]    Description:  Female : 1960   Provider:  ANDREWS Paredes           Reason for Visit     Fatigue States has been really tired. and having nausea    Overdue Lab And Imaging Result Follow-up lab results, MRI results.    Referral Needed To DERM and to [place] for thoracis outlet syndrom      Allergies as of 2017     Allergen Noted Reactions    Penicillins 10/05/2009   Hives    Sulfa Drugs 10/05/2009   Hives      You were diagnosed with     TOS (thoracic outlet syndrome)   [177735]       Other fatigue   [3197981]       Fibromyalgia   [188261]       Opioid type dependence, continuous (CMS-HCC)   [304.01.ICD-9-CM]       DDD (degenerative disc disease), cervical   [223674]       Cervical radiculopathy   [379301]         Vital Signs     Blood Pressure Pulse Temperature Respirations Height Weight    114/82 mmHg 83 36.4 °C (97.6 °F) 14 1.6 m (5' 2.99\") 71.1 kg (156 lb 12 oz)    Body Mass Index Oxygen Saturation Last Menstrual Period Smoking Status          27.77 kg/m2 100% 2012 Former Smoker        Basic Information     Date Of Birth Sex Race Ethnicity Preferred Language    1960 Female White Non- English      Problem List              ICD-10-CM Priority Class Noted - Resolved    DDD (degenerative disc disease), cervical M50.30   8/3/2011 - Present    Cervical radiculopathy M54.12   2011 - Present    Arm pain M79.603   2011 - Present    S/P cervical spinal fusion Z98.1   2011 - Present    Aortic regurgitation (Chronic) I35.1 High  2012 - Present    Sleep apnea G47.30   Unknown - Present    PVD (peripheral vascular disease) (CMS-HCC) (Chronic) I73.9 High  2012 - Present    Fibromyalgia M79.7   2012 - Present    Knee pain M25.569   3/11/2013 - Present    Opioid type dependence, continuous (CMS-HCC) F11.20 High  2015 " - Present    TOS (thoracic outlet syndrome) G54.0   4/14/2015 - Present    Long term prescription opiate use Z79.891   4/20/2016 - Present    Essential hypertension I10   5/20/2016 - Present    Dyslipidemia E78.5   1/17/2017 - Present      Health Maintenance        Date Due Completion Dates    MAMMOGRAM 12/1/2016 12/1/2015, 3/3/2014, 11/21/2012, 9/15/2011, 9/8/2011, 9/15/2010, 3/15/2010, 3/15/2010, 6/19/2009, 6/19/2009, 11/26/2008, 11/26/2008    PAP SMEAR 10/20/2018 10/20/2015 (Postponed)    Override on 10/20/2015: Postponed    IMM DTaP/Tdap/Td Vaccine (2 - Td) 9/24/2023 9/24/2013    COLONOSCOPY 6/23/2025 6/23/2015, 6/23/2015            Current Immunizations     Influenza TIV (IM) 10/10/2013    Tdap Vaccine 9/24/2013      Below and/or attached are the medications your provider expects you to take. Review all of your home medications and newly ordered medications with your provider and/or pharmacist. Follow medication instructions as directed by your provider and/or pharmacist. Please keep your medication list with you and share with your provider. Update the information when medications are discontinued, doses are changed, or new medications (including over-the-counter products) are added; and carry medication information at all times in the event of emergency situations     Allergies:  PENICILLINS - Hives     SULFA DRUGS - Hives               Medications  Valid as of: April 21, 2017 -  3:12 PM    Generic Name Brand Name Tablet Size Instructions for use    Acyclovir (Tab) ZOVIRAX 800 MG Take 1 Tab by mouth 2 times a day.        Hydrocodone-Acetaminophen (Tab) NORCO  MG Take 1 Tab by mouth every 6 hours as needed.        Topiramate (Tab) TOPAMAX 50 MG Take 2 Tabs by mouth 2 times a day.        TraMADol HCl (Tab) ULTRAM 50 MG Take 1-2 Tabs by mouth every 12 hours.        Valsartan-Hydrochlorothiazide (Tab) DIOVAN--25 MG Take 1 Tab by mouth every day.        Zolpidem Tartrate (Tab) AMBIEN 5 MG Take 1 Tab  by mouth at bedtime as needed for Sleep.        .                 Medicines prescribed today were sent to:     [id number] - [place], [place] - [address] [facility]    [address] [facility] [place] [address]    Phone: [phone] Fax: [phone]    Open 24 Hours?: No      Medication refill instructions:       If your prescription bottle indicates you have medication refills left, it is not necessary to call your provider’s office. Please contact your pharmacy and they will refill your medication.    If your prescription bottle indicates you do not have any refills left, you may request refills at any time through one of the following ways: The online [employer] system (except Urgent Care), by calling your provider’s office, or by asking your pharmacy to contact your provider’s office with a refill request. Medication refills are processed only during regular business hours and may not be available until the next business day. Your provider may request additional information or to have a follow-up visit with you prior to refilling your medication.   *Please Note: Medication refills are assigned a new Rx number when refilled electronically. Your pharmacy may indicate that no refills were authorized even though a new prescription for the same medication is available at the pharmacy. Please request the medicine by name with the pharmacy before contacting your provider for a refill.        Other Notes About Your Plan     This appointment is 4-11-16. It is the beginning of the year and will require all chronic conditions to be assessed and documented in conjunction with any other identified concerns during the visit:    I73.9 PVD  F11.20 Opioid Dependency  D69.9 Thrombocytopenia    Query: Hx of depression, chronic use of anti-depressants, in your medical opinion could this be Major (Chronic) Depression, recurrent, mild F33.0                 [employer] Access Code: Activation code not generated  Current Mobile Media Content Status:  Active

## 2017-04-21 NOTE — PROGRESS NOTES
Subjective:      Yuki Angelo is a 57 y.o. female who presents with Fatigue; Overdue Lab And Imaging Result Follow-up; and Referral Needed            Fatigue  Associated symptoms include fatigue and neck pain.   Yuki Angelo is here today for follow-up on multiple problems.      1. TOS (thoracic outlet syndrome)  Patient recently moved to the area and has been having trouble finding someone who deals with thoracic outlet syndrome. She has been going to Dr. George at the [facility] and review of recent lab work shows a very good workup looking for lupus or Sjogren syndrome but everything was negative. Apparently she will be going back to [place] to look into other options because she has chronic pain of her left arm and swelling of her left arm and shoulder. She is currently using tramadol or hydrocodone for this.    2. Other fatigue  Patient reports that she has noticed fatigue over the past few months. However, she admits that she will take her Ambien at 7:00 at night and then wonder why she cannot sleep past 3:00 in the morning. She also notices that when she only takes the tramadol once a day she has more fatigue. She has had recent CBC showing no anemia and there is no diabetes or thyroid disease.    3. Fibromyalgia  Patient states she normally does well with her fibromyalgia.    4. Opioid type dependence, continuous (CMS-HCC)  Patient currently on Norco which usually lasts for a few months and tramadol twice a day.    5. DDD (degenerative disc disease), cervical  This also is being followed by orthopedics.    6. Cervical radiculopathy  Pain goes down her left arm which might be her thoracic outlet syndrome or related to neck issues. Her most recent MRIs appear fairly normal.    Current Outpatient Prescriptions   Medication Sig Dispense Refill   • hydrocodone/acetaminophen (NORCO)  MG Tab Take 1 Tab by mouth every 6 hours as needed. 120 Tab 0   • tramadol (ULTRAM) 50 MG Tab Take 1-2 Tabs by mouth  "every 12 hours. 60 Tab 5   • zolpidem (AMBIEN) 5 MG Tab Take 1 Tab by mouth at bedtime as needed for Sleep. 30 Tab 5   • valsartan-hydrochlorothiazide (DIOVAN-HCT) 160-25 MG per tablet Take 1 Tab by mouth every day. 90 Tab 2   • topiramate (TOPAMAX) 50 MG tablet Take 2 Tabs by mouth 2 times a day. 120 Tab 11   • acyclovir (ZOVIRAX) 800 MG Tab Take 1 Tab by mouth 2 times a day. 20 Tab 12     No current facility-administered medications for this visit.     Social History   Substance Use Topics   • Smoking status: Former Smoker -- 0.50 packs/day for 37 years     Types: Cigarettes     Quit date: 05/01/2013   • Smokeless tobacco: Never Used      Comment:  Started smoking at age 16   • Alcohol Use: 0.6 oz/week     1 Glasses of wine, 0 Standard drinks or equivalent per week      Comment: socially     Past Medical History   Diagnosis Date   • Snoring    • DDD (degenerative disc disease), cervical 8/3/2011   • History of echocardiogram 2/21/2012   • Fatigue 2/21/2012   • PVD (peripheral vascular disease) (CMS-[facility]) 2/21/2012   • Aortic regurgitation 2/21/2012     Dr. Gutiérrez, cardiologist   • Polycystic kidney disease      \"told as child\"   • Sleep apnea      Uses CPAP at [facility]   • Other specified disorder of intestines      constipation   • Hypertension 2015     well controlled on meds   • Arthritis      Generalized   • Fibromyalgia    • Pain      left arm and neck, thoracic area \"allover\"   • CHEST PAIN 2/21/2012   • Backpain    • Psychiatric problem      depression, anxiety   • S/P tooth extraction 10/27/2015   • Multilevel degenerative disc disease    • Thoracic outlet syndrome      Family History   Problem Relation Age of Onset   • Adopted: Yes   • Family history unknown: Yes       Review of Systems   Constitutional: Positive for malaise/fatigue and fatigue.   Musculoskeletal: Positive for joint pain and neck pain.   All other systems reviewed and are negative.         Objective:     /82 mmHg  Pulse 83  Temp(Src) " "36.4 °C (97.6 °F)  Resp 14  Ht 1.6 m (5' 2.99\")  Wt 71.1 kg (156 lb 12 oz)  BMI 27.77 kg/m2  SpO2 100%  LMP 06/01/2012     Physical Exam   Constitutional: She is oriented to person, place, and time. She appears well-developed and well-nourished. No distress.   HENT:   Head: Normocephalic and atraumatic.   Right Ear: External ear normal.   Left Ear: External ear normal.   Nose: Nose normal.   Eyes: Right eye exhibits no discharge. Left eye exhibits no discharge.   Neck: Normal range of motion. Neck supple. No thyromegaly present.   Cardiovascular: Normal rate, regular rhythm and normal heart sounds.  Exam reveals no gallop and no friction rub.    No murmur heard.  Pulmonary/Chest: Effort normal and breath sounds normal. She has no wheezes. She has no rales.   Musculoskeletal: She exhibits no edema or tenderness.   Neurological: She is alert and oriented to person, place, and time. She displays normal reflexes.   Mild swelling of the left arm as compared to the right. Pain and tenderness over the whole left arm.   Skin: Skin is warm and dry. No rash noted. She is not diaphoretic.   Psychiatric: She has a normal mood and affect. Her behavior is normal. Judgment and thought content normal.   Nursing note and vitals reviewed.              Assessment/Plan:     1. TOS (thoracic outlet syndrome)  Patient currently being followed by Dr. George and will also be going to [place]. Lab work looks negative.    2. Other fatigue  I advised patient her lab work appears normal and her fatigue might be related to not taking her tramadol regularly and just skipping one dose can cause fatigue symptoms. She is using her CPAP at night but she also is expecting to be able to get more than 8 hours sleep at night which I explained could be part of her issue. I explained that the body only need 6-8 hours and that if she tries to stay in bed longer, she will toss and turn.    3. Fibromyalgia  Patient will continue with same " medicine.    4. Opioid type dependence, continuous (CMS-[facility])  Medication refilled and we will continue to discuss treatment options as she goes to [place] and follows with Dr. George.  - hydrocodone/acetaminophen (NORCO)  MG Tab; Take 1 Tab by mouth every 6 hours as needed.  Dispense: 120 Tab; Refill: 0    5. DDD (degenerative disc disease), cervical    - hydrocodone/acetaminophen (NORCO)  MG Tab; Take 1 Tab by mouth every 6 hours as needed.  Dispense: 120 Tab; Refill: 0    6. Cervical radiculopathy    - hydrocodone/acetaminophen (NORCO)  MG Tab; Take 1 Tab by mouth every 6 hours as needed.  Dispense: 120 Tab; Refill: 0

## 2017-05-11 ENCOUNTER — TELEPHONE (OUTPATIENT)
Dept: MEDICAL GROUP | Facility: MEDICAL CENTER | Age: 57
End: 2017-05-11

## 2017-05-11 NOTE — TELEPHONE ENCOUNTER
If the neurosurgeon and pain specialist does not want to prescribe her chronic pain medication, I can't justify it as well. Also explain that we do not do pain management through a primary care office. I can help her for a few more months but she will need to find a different pain management if she wishes to continue on pain medicine. She may also want to look into other options such as Dr. Lee advised such as a nerve stimulator.

## 2017-05-11 NOTE — TELEPHONE ENCOUNTER
1. Caller Name: Yuki Angelo                                          Call Back Number: [phone] (home)         Patient approves a detailed voicemail message: N\A    Patient stated that she signed a medication contract so she wanted to let you know that she went to see her Neurosurgeon Dr. Lee after having an MRI and he referred patient to pain specialist Dr. Fischer,  Patient states that Dr. Fischer prescribed the medication Cymbalta 30 mg, for nerve pain and depression, Patient also stated that Dr. Fischer will only prescribe the patient Cymbalta and will not prescribe the patient any pain medications, that the patient would have to go through her PCP for any pain medications ? Please advise

## 2017-05-15 NOTE — TELEPHONE ENCOUNTER
Patient advised of the message below. She said she will be asking Dr. Fischer if he can take over her pain meds and if he doesn't she will be letting us know to place a referral to somebody else that she chooses but as of know she has 2 more months of pain meds.

## 2017-08-10 ENCOUNTER — TELEPHONE (OUTPATIENT)
Dept: CARDIOLOGY | Facility: MEDICAL CENTER | Age: 57
End: 2017-08-10

## 2017-08-10 NOTE — TELEPHONE ENCOUNTER
I called [phone] (H) and [phone] (M), left voicemail messages regarding appointment with Dr. Leavitt on 08/16/17 @ 4:15pm check-in and to see if the patient has and new labs or any other cardiac work up. I asked the patient to call me back with this information. FRANCIS.

## 2017-09-18 DIAGNOSIS — I10 ESSENTIAL HYPERTENSION: ICD-10-CM

## 2017-09-18 RX ORDER — VALSARTAN AND HYDROCHLOROTHIAZIDE 160; 25 MG/1; MG/1
TABLET ORAL
Qty: 90 TAB | Refills: 2 | Status: SHIPPED | OUTPATIENT
Start: 2017-09-18 | End: 2018-01-29

## 2017-10-13 ENCOUNTER — HOSPITAL ENCOUNTER (OUTPATIENT)
Dept: RADIOLOGY | Facility: MEDICAL CENTER | Age: 57
End: 2017-10-13
Attending: NURSE PRACTITIONER
Payer: MEDICARE

## 2017-10-16 ENCOUNTER — TELEPHONE (OUTPATIENT)
Dept: RADIOLOGY | Facility: MEDICAL CENTER | Age: 57
End: 2017-10-16

## 2017-10-16 NOTE — TELEPHONE ENCOUNTER
LM TO NOTIFY PT NO ORDER YET FROM DR GUZMAN, HOWEVER, PROVIDER [facility]LL[facility] GUTIERRES HAS ACCEPTED COSIGN/TML

## 2017-10-17 ENCOUNTER — HOSPITAL ENCOUNTER (OUTPATIENT)
Dept: RADIOLOGY | Facility: MEDICAL CENTER | Age: 57
End: 2017-10-17
Attending: NURSE PRACTITIONER
Payer: MEDICARE

## 2017-10-17 ENCOUNTER — HOSPITAL ENCOUNTER (OUTPATIENT)
Dept: RADIOLOGY | Facility: MEDICAL CENTER | Age: 57
End: 2017-10-17
Attending: SURGERY
Payer: MEDICARE

## 2017-10-17 DIAGNOSIS — N64.52 NIPPLE DISCHARGE: ICD-10-CM

## 2017-10-17 DIAGNOSIS — N63.0 BREAST LUMP: ICD-10-CM

## 2017-10-17 PROCEDURE — G0279 TOMOSYNTHESIS, MAMMO: HCPCS

## 2017-10-17 PROCEDURE — 76642 ULTRASOUND BREAST LIMITED: CPT | Mod: RT

## 2017-11-28 ENCOUNTER — PATIENT OUTREACH (OUTPATIENT)
Dept: HEALTH INFORMATION MANAGEMENT | Facility: OTHER | Age: 57
End: 2017-11-28

## 2017-11-29 NOTE — PROGRESS NOTES
Attempt #:1    WebIZ Checked & Epic Updated: Yes  · WebIZ Recommendations: FLU  · Is patient due for Tdap? NO  · Is patient due for Shingles? NO  [facility] Verified: yes  Verify PCP: yes    Communication Preference Obtained: yes     Review Care Team: yes    Annual Wellness Visit Scheduling  1. Scheduling Status:Scheduled       Care Gap Scheduling (Attempt to Schedule EACH Overdue Care Gap!)     Health Maintenance Due   Topic Date Due   • Annual Wellness Visit  04/12/2017   • IMM INFLUENZA (1) 09/01/2017        Scheduled patient for Annual Wellness Visit  Patient prefers to discuss Immunizations: FLU with PCP.         Tabtor Activation: already active  Tabtor Spring: no  Virtual Visits: no  Opt In to Text Messages: no

## 2017-12-15 ENCOUNTER — OFFICE VISIT (OUTPATIENT)
Dept: MEDICAL GROUP | Facility: MEDICAL CENTER | Age: 57
End: 2017-12-15
Payer: MEDICARE

## 2017-12-15 VITALS
RESPIRATION RATE: 16 BRPM | HEART RATE: 81 BPM | SYSTOLIC BLOOD PRESSURE: 100 MMHG | DIASTOLIC BLOOD PRESSURE: 62 MMHG | BODY MASS INDEX: 27.49 KG/M2 | OXYGEN SATURATION: 95 % | TEMPERATURE: 98.1 F | HEIGHT: 64 IN | WEIGHT: 161 LBS

## 2017-12-15 DIAGNOSIS — M54.12 CERVICAL RADICULOPATHY: ICD-10-CM

## 2017-12-15 DIAGNOSIS — G54.0 TOS (THORACIC OUTLET SYNDROME): ICD-10-CM

## 2017-12-15 DIAGNOSIS — E83.110 HEREDITARY HEMOCHROMATOSIS (HCC): ICD-10-CM

## 2017-12-15 DIAGNOSIS — H93.11 TINNITUS OF RIGHT EAR: ICD-10-CM

## 2017-12-15 DIAGNOSIS — Z00.00 MEDICARE ANNUAL WELLNESS VISIT, SUBSEQUENT: ICD-10-CM

## 2017-12-15 DIAGNOSIS — G47.30 SLEEP APNEA, UNSPECIFIED TYPE: ICD-10-CM

## 2017-12-15 DIAGNOSIS — R10.33 PERIUMBILICAL ABDOMINAL PAIN: ICD-10-CM

## 2017-12-15 DIAGNOSIS — R91.8 PULMONARY NODULES: ICD-10-CM

## 2017-12-15 DIAGNOSIS — Z98.1 S/P CERVICAL SPINAL FUSION: ICD-10-CM

## 2017-12-15 DIAGNOSIS — M50.30 DDD (DEGENERATIVE DISC DISEASE), CERVICAL: ICD-10-CM

## 2017-12-15 DIAGNOSIS — I10 ESSENTIAL HYPERTENSION: ICD-10-CM

## 2017-12-15 DIAGNOSIS — E78.5 DYSLIPIDEMIA: ICD-10-CM

## 2017-12-15 DIAGNOSIS — M79.7 FIBROMYALGIA: ICD-10-CM

## 2017-12-15 DIAGNOSIS — I73.9 PVD (PERIPHERAL VASCULAR DISEASE) (HCC): Chronic | ICD-10-CM

## 2017-12-15 DIAGNOSIS — I35.1 AORTIC VALVE INSUFFICIENCY, ETIOLOGY OF CARDIAC VALVE DISEASE UNSPECIFIED: Chronic | ICD-10-CM

## 2017-12-15 PROCEDURE — 99213 OFFICE O/P EST LOW 20 MIN: CPT | Mod: 25 | Performed by: NURSE PRACTITIONER

## 2017-12-15 PROCEDURE — G0439 PPPS, SUBSEQ VISIT: HCPCS | Mod: 25 | Performed by: NURSE PRACTITIONER

## 2017-12-15 RX ORDER — FLUTICASONE PROPIONATE 50 MCG
2 SPRAY, SUSPENSION (ML) NASAL DAILY
Qty: 16 G | Refills: 2 | Status: SHIPPED | OUTPATIENT
Start: 2017-12-15 | End: 2019-01-01 | Stop reason: CLARIF

## 2017-12-15 RX ORDER — OMEPRAZOLE 20 MG/1
20 CAPSULE, DELAYED RELEASE ORAL DAILY
Qty: 30 CAP | Refills: 3 | Status: SHIPPED | OUTPATIENT
Start: 2017-12-15 | End: 2019-01-01 | Stop reason: CLARIF

## 2017-12-15 ASSESSMENT — PAIN SCALES - GENERAL: PAINLEVEL: 8=MODERATE-SEVERE PAIN

## 2017-12-15 ASSESSMENT — PATIENT HEALTH QUESTIONNAIRE - PHQ9
5. POOR APPETITE OR OVEREATING: 3 - NEARLY EVERY DAY
CLINICAL INTERPRETATION OF PHQ2 SCORE: 3

## 2017-12-15 NOTE — PROGRESS NOTES
Chief Complaint   Patient presents with   • Annual Wellness Visit         HPI:  Yuki is a 57 y.o. here for Medicare Annual Wellness Visit As well as for issues with her thoracic outlet syndrome, pulmonary nodules as well as problems with abdominal pain and fullness in her ears.      1. Medicare annual wellness visit, subsequent  Screening performed below.    2. TOS (thoracic outlet syndrome)  Patient has been following locally with neurosurgery, orthopedics and most recently with pain management for this as well as her cervical neck pain. She was initially receiving treatment at [facility] for this. Her neurosurgeon, , did not feel any further surgery would be of benefit. A spinal cord stimulator was discussed but patient declined. Most recently she has been to pain management and they had talked about epidural injections but she does not wish to go forward with this either. She would like to go back to [facility] to discuss surgical options. She states she has chronic pain in her neck and shoulders. She has been on pain medication for years but most recently has weaned down and now is only on tramadol for pain management. She also was taken off her benzodiazepine. She would like a referral to [facility] today.    3. Hereditary hemochromatosis (CMS-HCC)  Patient reports she has a history of hemachromatosis and is due for lab work. She states she has never had a phlebotomy.    4. Essential hypertension  Patient currently on Diovan with HCTZ and blood pressure has been well controlled.    5. Periumbilical abdominal pain  Patient reports a pain which is recurring in her right mid abdomen. It only occurs after eating. She states she has had mild constipation but is moving her bowels. She denies vomiting, fever, chills, dysuria or dizziness. Symptoms are better when she does not eat. She has not used anything over-the-counter for symptoms.    6. Pulmonary nodules  Patient would like to get a repeat CT of the lungs.  It appears she had a CT of the chest, abdomen and pelvis in 2015 which showed stable pulmonary nodules compared to a CAT scan done years earlier. She was a tobacco user for many years and would like to check on this. She denies breathing issues.    7. Tinnitus of right ear  Patient states she has had a fullness mostly in her right ear now for at least a month. There is no pain involved and her  does think that her hearing is decreased. It is a constant dullness or ringing that she feels. She has minor sinus congestion but no sinus pain.    8. Dyslipidemia  Patient not currently on statin and does have a cardiologist.    9. Aortic valve insufficiency, etiology of cardiac valve disease unspecified  Patient apparently follows with cardiology through the [facility]. She is not reporting cardiac related chest pain but states she does get pain in her chest wall with movement of her left arm.    10. PVD (peripheral vascular disease) (CMS-HCC)  Patient has stopped smoking and reports no ulceration.    11. DDD (degenerative disc disease), cervical  Patient currently being followed by pain management.    12. Cervical radiculopathy  Patient followed by pain management.    13. S/P cervical spinal fusion  Patient not found to be a candidate for repeat surgery for this.    14. Fibromyalgia  This was apparently diagnosed in the past and she is on Topamax.    15. Sleep apnea, unspecified type  Patient to use CPAP.    Patient Active Problem List    Diagnosis Date Noted   • Opioid type dependence, continuous (CMS-HCC) 04/13/2015     Priority: High   • Aortic regurgitation 02/21/2012     Priority: High   • PVD (peripheral vascular disease) (CMS-HCC) 02/21/2012     Priority: High   • Dyslipidemia 01/17/2017   • Essential hypertension 05/20/2016   • Long term prescription opiate use 04/20/2016   • TOS (thoracic outlet syndrome) 04/14/2015   • Knee pain 03/11/2013   • Fibromyalgia 07/17/2012   • Sleep apnea    •  Cervical radiculopathy 09/08/2011   • Arm pain 09/08/2011   • S/P cervical spinal fusion 09/08/2011   • DDD (degenerative disc disease), cervical 08/03/2011       Current Outpatient Prescriptions   Medication Sig Dispense Refill   • valsartan-hydrochlorothiazide (DIOVAN-HCT) 160-25 MG per tablet TAKE 1 TABLET BY MOUTH DAILY -GENERIC FOR DIOVAN HCT 90 Tab 2   • tramadol (ULTRAM) 50 MG Tab Take 1-2 Tabs by mouth every 12 hours. 60 Tab 5   • topiramate (TOPAMAX) 50 MG tablet Take 2 Tabs by mouth 2 times a day. 120 Tab 11   • acyclovir (ZOVIRAX) 800 MG Tab Take 1 Tab by mouth 2 times a day. 20 Tab 12   • hydrocodone/acetaminophen (NORCO)  MG Tab Take 1 Tab by mouth every 6 hours as needed. (Patient not taking: Reported on 12/15/2017) 120 Tab 0   • zolpidem (AMBIEN) 5 MG Tab Take 1 Tab by mouth at bedtime as needed for Sleep. (Patient not taking: Reported on 12/15/2017) 30 Tab 5     No current facility-administered medications for this visit.         Patient is taking medications as noted in medication list.  Current supplements as per medication list.     Allergies: Penicillins and Sulfa drugs    Current social contact/activities: Visit with family and friends/ Walk the dog 20-30 min daily      Is patient current with [id number]? No, due for FLU. Patient is interested in receiving NONE today.    She  reports that she quit smoking about 4 years ago. Her smoking use included Cigarettes. She has a 18.50 pack-year smoking history. She has never used smokeless tobacco. She reports that she drinks about 0.6 oz of alcohol per week . She reports that she does not use drugs.  Counseling given: Not Answered        DPA/Advanced directive: Patient does not have an Advanced Directive.  A packet and workshop information was given on Advanced Directives.    ROS:    Gait: Uses no assistive device   Ostomy: no   Other tubes: no   Amputations: no   Chronic oxygen use no   Last eye exam 4 months ago   Wears hearing aids: no    : Denies any urinary leakage during the last 6 months      Depression Screening    Little interest or pleasure in doing things?  3 - nearly every day  Feeling down, depressed, or hopeless? 0 - not at all  Trouble falling or staying asleep, or sleeping too much?  3 - nearly every day  Feeling tired or having little energy?  3 - nearly every day  Poor appetite or overeating?  3 - nearly every day  Feeling bad about yourself - or that you are a failure or have let yourself or your family down? 0 - not at all  Trouble concentrating on things, such as reading the newspaper or watching television?    Moving or speaking so slowly that other people could have noticed.  Or the opposite - being so fidgety or restless that you have been moving around a lot more than usual?  0 - not at all  Thoughts that you would be better off dead, or of hurting yourself?  0 - not at all  Patient Health Questionnaire Score:        If depressive symptoms identified deferred to follow up visit unless specifically addressed in assessment and plan.    Interpretation of PHQ-9 Total Score   Score Severity   1-4 No Depression   5-9 Mild Depression   10-14 Moderate Depression   15-19 Moderately Severe Depression   20-27 Severe Depression      Screening for Cognitive Impairment    Three Minute Recall (apple, watch, digna)  3/3    Draw clock face with all 12 numbers set to the hand to show 10 minutes past 11 o'clock  1 4/5  If cognitive concerns identified, deferred for follow up unless specifically addressed in assessment and plan.    Fall Risk Assessment    Has the patient had two or more falls in the last year or any fall with injury in the last year?  Yes  If fall risk identified, deferred for follow up unless specifically addressed in assessment and plan.      Safety Assessment    Throw rugs on floor.  No  Handrails on all stairs.  Yes  Good lighting in all hallways.  Yes  Difficulty hearing.  No  Patient counseled about all safety risks that  were identified.    Functional Assessment ADLs    Are there any barriers preventing you from cooking for yourself or meeting nutritional needs?  No.    Are there any barriers preventing you from driving safely or obtaining transportation?  No.    Are there any barriers preventing you from using a telephone or calling for help?  No.    Are there any barriers preventing you from shopping?  No.    Are there any barriers preventing you from taking care of your own finances?  No.    Are there any barriers preventing you from managing your medications?  No.    Are you currently engaging any exercise or physical activity?  Yes.       Health Maintenance Summary                Annual Wellness Visit Overdue 4/12/2017      Done 4/11/2016 Visit Dx: Medicare annual wellness visit, subsequent     Patient has more history with this topic...    IMM INFLUENZA Overdue 9/1/2017      Done 10/10/2013 Imm Admin: Influenza TIV (IM)    MAMMOGRAM Next Due 10/17/2018      Done 10/17/2017 MA-MAMMO DIAGNOSTIC BILAT W/TOMOSYNTHESIS W/CAD     Patient has more history with this topic...    PAP SMEAR Next Due 10/20/2018      Postponed 10/20/2015     IMM DTaP/Tdap/Td Vaccine Next Due 9/24/2023      Done 9/24/2013 Imm Admin: Tdap Vaccine    COLONOSCOPY Next Due 6/23/2025      Done 6/23/2015 AMB REFERRAL TO GI FOR COLONOSCOPY     Patient has more history with this topic...          Patient Care Team:  ANDREWS Paredes as PCP - General (Family Medicine)  Alexei Gutiérrez M.D. (Cardiology)  Bandar Lee M.D. as Consulting Physician (Neurosurgery)  [facility] as Consulting Physician  Lit George M.D. as Consulting Physician (Orthopaedics)      Social History   Substance Use Topics   • Smoking status: Former Smoker     Packs/day: 0.50     Years: 37.00     Types: Cigarettes     Quit date: 5/1/2013   • Smokeless tobacco: Never Used      Comment:  Started smoking at age 16   • Alcohol use 0.6 oz/week     1 Glasses of wine per week       Comment: socially     Family History   Problem Relation Age of Onset   • Adopted: Yes   • Family history unknown: Yes     She  has a past medical history of Aortic regurgitation (2/21/2012); Arthritis; Backpain; CHEST PAIN (2/21/2012); DDD (degenerative disc disease), cervical (8/3/2011); Fatigue (2/21/2012); Fibromyalgia; History of echocardiogram (2/21/2012); Hypertension (2015); Multilevel degenerative disc disease; Other specified disorder of intestines; Pain; Polycystic kidney disease; Psychiatric problem; PVD (peripheral vascular disease) (CMS-[facility]) (2/21/2012); S/P tooth extraction (10/27/2015); Sleep apnea; Snoring; and Thoracic outlet syndrome.   Past Surgical History:   Procedure Laterality Date   • BUNIONECTOMY DANYA Right 11/10/2015    Procedure: BUNIONECTOMY DANYA;  Surgeon: Kermit Arroyo D.P.M.;  Location: [facility];  Service:    • OSTECTOMY  11/10/2015    Procedure: OSTECTOMY - 5TH TAILORS BUNION ;  Surgeon: Kermit Arroyo D.P.M.;  Location: [facility];  Service:    • ATHROPLASTY Right 11/10/2015    Procedure: ARTHROPLASTY - 5TH DEROTATIONAL;  Surgeon: JAMA HensonPAUBRIE;  Location: [facility];  Service:    • HAMMERTOE CORRECTION  11/10/2015    Procedure: HAMMERTOE CORRECTION - 2ND, 3RD, 4TH ;  Surgeon: Kermit Arroyo D.P.M.;  Location: [facility];  Service:    • HYSTEROSCOPY WITH VIDEO DIAGNOSTIC  3/27/2015    Performed by Johnny Jarquin M.D. at SURGERY SAME DAY [facility]   • DILATION AND CURETTAGE  3/27/2015    Performed by Johnny Jarquin M.D. at SURGERY SAME DAY [facility]   • KNEE ARTHROSCOPY  3/11/2013    Performed by Freeman Ballard M.D. at [facility]   • MEDIAL MENISCECTOMY  3/11/2013    Performed by Freeman Ballard M.D. at [facility]   • NERVE ULNAR TRANSFER  3/13/2012    Performed by KATYA NEAL at [facility]   • CARPAL TUNNEL ENDOSCOPIC  3/13/2012  "   Performed by KATYA NEAL at SURGERY [facility] ORS   • CERVICAL DISK AND FUSION ANTERIOR  6/21/2010    Performed by LAURA BAH at SURGERY [facility] ORS   • INCISION AND DRAINAGE GENERAL  10/22/2009    Performed by ABEL GUZMAN at SURGERY [facility] ORS   • MASS EXCISION GENERAL  10/6/2009    right breast Performed by ABEL GUZMAN at SURGERY [facility]   • BREAST BIOPSY  3/5/2009    right; Performed by ABEL GUZMAN at SURGERY [facility] ORS   • RIB RESECTION  2001    right for thoracic outlet syndrome   • KNEE ARTHROSCOPY  1998    left   • TUBAL COAGULATION LAPAROSCOPIC BILATERAL  1997   • CERVICAL FUSION POSTERIOR  1996   • OTHER SURGICAL PROCEDURE  11/1992    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 rate 16, height 1.626 m (5' 4\"), weight 73 kg (161 lb), last menstrual period 06/01/2012, SpO2 95 %. Body mass index is 27.64 kg/m².    Gen.: Patient is alert, talkative and in good spirits.  HEENT: There is no cerumen impaction but there is some clear effusion behind the tympanic membranes bilaterally. No bulging of the tympanic membranes or perforation.  Chest: Clear bilaterally to A&P without wheeze or rhonchi.  Heart: Regular rate and rhythm and positive murmur.  Abdomen: Patient reports tenderness in the right middle abdomen with mild tenderness to palpation.  Extremities: Warm without cyanosis or edema.  Skin: No evidence of rash or lesions.  Muscular skeletal: Pain and tenderness mostly in the left shoulder, arm and neck.    Assessment and Plan. The following treatment and monitoring plan is recommended:      1. Medicare annual wellness visit, subsequent    - Annual Wellness Visit - Includes PPPS Subsequent ()    2. TOS (thoracic outlet syndrome)  Patient would like to go back to [place] because she does not feel that any of the options offered locally help with her symptoms. I have " placed a referral although I do not know if this will be covered by insurance. I do recommend she continue with pain management and is currently finished with local neurosurgery. She is on tramadol for pain through pain management.  - REFERRAL TO NEUROSURGERY    3. Hereditary hemochromatosis (CMS-[facility])  I will check a CBC and will recommend referral to hematology if her hemoglobin or hematocrit is elevated.  - CBC WITH DIFFERENTIAL; Future    4. Essential hypertension  Blood pressure currently well controlled but she needs lab work.  - COMP METABOLIC PANEL; Future  - TSH; Future    5. Periumbilical abdominal pain  I will do an ultrasound to look for possible causes of her symptoms such as gallbladder disease. I will have her try omeprazole since this could be acid reflux related. She will go to the emergency room if symptoms worsen.  - US-ABDOMEN COMPLETE SURVEY; Future  - omeprazole (PRILOSEC) 20 MG delayed-release capsule; Take 1 Cap by mouth every day.  Dispense: 30 Cap; Refill: 3    6. Pulmonary nodules  I will check for the stability of her pulmonary nodules last seen in 2015 and were stable.  - CT-CHEST (THORAX) W/O; Future    7. Tinnitus of right ear  There is no evidence of cerumen impaction and I will have her try Flonase nasal spray but she probably needs hearing testing.  - fluticasone (FLONASE) 50 MCG/ACT nasal spray; Spray 2 Sprays in nose every day.  Dispense: 16 g; Refill: 2  - REFERRAL TO ENT    8. Dyslipidemia  Patient will do lab work.  - LIPID PROFILE; Future    9. Aortic valve insufficiency, etiology of cardiac valve disease unspecified  Patient reminded she needs to follow-up with cardiology and she states she will make an appointment.    10. PVD (peripheral vascular disease) (CMS-[facility])  No change in status.    11. DDD (degenerative disc disease), cervical  Patient follows up with pain management.    12. Cervical radiculopathy  Patient follows with pain management.    13. S/P cervical spinal  fusion  Patient not found to be repeat candidate for surgery through neurosurgery.    14. Fibromyalgia  Patient will continue on her Topamax.    15. Sleep apnea, unspecified type  Patient will use CPAP.    Services suggested: No services needed at this time  Health Care Screening recommendations as per orders if indicated.  Referrals offered: PT/OT/Nutrition counseling/Behavioral Health/Smoking cessation as per orders if indicated.    Discussion today about general wellness and lifestyle habits:    · Prevent falls and reduce trip hazards; Cautioned about securing or removing rugs.  · Have a working fire alarm and carbon monoxide detector;   · Engage in regular physical activity and social activities       Follow-up: No Follow-up on file.  Addendum: Patient sent for ultrasound of the abdomen and results show:    1.  1.3 cm oval echogenic mass in the right hepatic lobe which may represent a small cavernous hemangioma. MRI of the liver is recommended for further evaluation.    2.  Gallstone and gallbladder polyp.    I have therefore ordered a MRI with and without contrast of the liver area as recommended by radiology.

## 2018-01-05 ENCOUNTER — HOSPITAL ENCOUNTER (OUTPATIENT)
Dept: RADIOLOGY | Facility: MEDICAL CENTER | Age: 58
End: 2018-01-05
Attending: NURSE PRACTITIONER
Payer: MEDICARE

## 2018-01-05 DIAGNOSIS — R16.0 LIVER MASS: ICD-10-CM

## 2018-01-05 DIAGNOSIS — R91.8 PULMONARY NODULES: ICD-10-CM

## 2018-01-05 DIAGNOSIS — R10.33 PERIUMBILICAL ABDOMINAL PAIN: ICD-10-CM

## 2018-01-05 PROCEDURE — 76700 US EXAM ABDOM COMPLETE: CPT

## 2018-01-05 PROCEDURE — 71250 CT THORAX DX C-: CPT

## 2018-01-08 DIAGNOSIS — R16.0 LIVER MASS: ICD-10-CM

## 2018-01-16 ENCOUNTER — HOSPITAL ENCOUNTER (OUTPATIENT)
Dept: LAB | Facility: MEDICAL CENTER | Age: 58
End: 2018-01-16
Attending: NURSE PRACTITIONER
Payer: MEDICARE

## 2018-01-16 DIAGNOSIS — I10 ESSENTIAL HYPERTENSION: ICD-10-CM

## 2018-01-16 DIAGNOSIS — E83.110 HEREDITARY HEMOCHROMATOSIS (HCC): ICD-10-CM

## 2018-01-16 DIAGNOSIS — E78.5 DYSLIPIDEMIA: ICD-10-CM

## 2018-01-16 LAB
ALBUMIN SERPL BCP-MCNC: 4.4 G/DL (ref 3.2–4.9)
ALBUMIN/GLOB SERPL: 1.6 G/DL
ALP SERPL-CCNC: 46 U/L (ref 30–99)
ALT SERPL-CCNC: 13 U/L (ref 2–50)
ANION GAP SERPL CALC-SCNC: 8 MMOL/L (ref 0–11.9)
AST SERPL-CCNC: 17 U/L (ref 12–45)
BASOPHILS # BLD AUTO: 1.7 % (ref 0–1.8)
BASOPHILS # BLD: 0.08 K/UL (ref 0–0.12)
BILIRUB SERPL-MCNC: 0.4 MG/DL (ref 0.1–1.5)
BUN SERPL-MCNC: 16 MG/DL (ref 8–22)
CALCIUM SERPL-MCNC: 9.5 MG/DL (ref 8.5–10.5)
CHLORIDE SERPL-SCNC: 101 MMOL/L (ref 96–112)
CHOLEST SERPL-MCNC: 238 MG/DL (ref 100–199)
CO2 SERPL-SCNC: 29 MMOL/L (ref 20–33)
CREAT SERPL-MCNC: 0.91 MG/DL (ref 0.5–1.4)
EOSINOPHIL # BLD AUTO: 0.08 K/UL (ref 0–0.51)
EOSINOPHIL NFR BLD: 1.7 % (ref 0–6.9)
ERYTHROCYTE [DISTWIDTH] IN BLOOD BY AUTOMATED COUNT: 41.7 FL (ref 35.9–50)
GLOBULIN SER CALC-MCNC: 2.8 G/DL (ref 1.9–3.5)
GLUCOSE SERPL-MCNC: 84 MG/DL (ref 65–99)
HCT VFR BLD AUTO: 43.4 % (ref 37–47)
HDLC SERPL-MCNC: 75 MG/DL
HGB BLD-MCNC: 14.9 G/DL (ref 12–16)
IMM GRANULOCYTES # BLD AUTO: 0.01 K/UL (ref 0–0.11)
IMM GRANULOCYTES NFR BLD AUTO: 0.2 % (ref 0–0.9)
LDLC SERPL CALC-MCNC: 150 MG/DL
LYMPHOCYTES # BLD AUTO: 1.91 K/UL (ref 1–4.8)
LYMPHOCYTES NFR BLD: 41.1 % (ref 22–41)
MCH RBC QN AUTO: 31.2 PG (ref 27–33)
MCHC RBC AUTO-ENTMCNC: 34.3 G/DL (ref 33.6–35)
MCV RBC AUTO: 90.8 FL (ref 81.4–97.8)
MONOCYTES # BLD AUTO: 0.37 K/UL (ref 0–0.85)
MONOCYTES NFR BLD AUTO: 8 % (ref 0–13.4)
NEUTROPHILS # BLD AUTO: 2.2 K/UL (ref 2–7.15)
NEUTROPHILS NFR BLD: 47.3 % (ref 44–72)
NRBC # BLD AUTO: 0 K/UL
NRBC BLD-RTO: 0 /100 WBC
PLATELET # BLD AUTO: 430 K/UL (ref 164–446)
PMV BLD AUTO: 8.5 FL (ref 9–12.9)
POTASSIUM SERPL-SCNC: 2.8 MMOL/L (ref 3.6–5.5)
PROT SERPL-MCNC: 7.2 G/DL (ref 6–8.2)
RBC # BLD AUTO: 4.78 M/UL (ref 4.2–5.4)
SODIUM SERPL-SCNC: 138 MMOL/L (ref 135–145)
TRIGL SERPL-MCNC: 65 MG/DL (ref 0–149)
WBC # BLD AUTO: 4.7 K/UL (ref 4.8–10.8)

## 2018-01-16 PROCEDURE — 80061 LIPID PANEL: CPT

## 2018-01-16 PROCEDURE — 84443 ASSAY THYROID STIM HORMONE: CPT

## 2018-01-16 PROCEDURE — 80053 COMPREHEN METABOLIC PANEL: CPT

## 2018-01-16 PROCEDURE — 36415 COLL VENOUS BLD VENIPUNCTURE: CPT

## 2018-01-16 PROCEDURE — 85025 COMPLETE CBC W/AUTO DIFF WBC: CPT

## 2018-01-17 DIAGNOSIS — E87.6 HYPOKALEMIA: ICD-10-CM

## 2018-01-17 LAB — TSH SERPL DL<=0.005 MIU/L-ACNC: 1.48 UIU/ML (ref 0.38–5.33)

## 2018-01-17 RX ORDER — POTASSIUM CHLORIDE 20 MEQ/1
20 TABLET, EXTENDED RELEASE ORAL 2 TIMES DAILY
Qty: 60 TAB | Refills: 0 | Status: SHIPPED | OUTPATIENT
Start: 2018-01-17 | End: 2018-01-29

## 2018-01-25 ENCOUNTER — HOSPITAL ENCOUNTER (OUTPATIENT)
Dept: RADIOLOGY | Facility: MEDICAL CENTER | Age: 58
End: 2018-01-25
Attending: NURSE PRACTITIONER
Payer: MEDICARE

## 2018-01-25 VITALS
DIASTOLIC BLOOD PRESSURE: 70 MMHG | RESPIRATION RATE: 16 BRPM | HEART RATE: 70 BPM | HEIGHT: 63 IN | WEIGHT: 160 LBS | BODY MASS INDEX: 28.35 KG/M2 | TEMPERATURE: 97.4 F | SYSTOLIC BLOOD PRESSURE: 112 MMHG | OXYGEN SATURATION: 95 %

## 2018-01-25 DIAGNOSIS — R16.0 LIVER MASS: ICD-10-CM

## 2018-01-25 PROCEDURE — 74183 MRI ABD W/O CNTR FLWD CNTR: CPT

## 2018-01-25 PROCEDURE — A9579 GAD-BASE MR CONTRAST NOS,1ML: HCPCS | Performed by: NURSE PRACTITIONER

## 2018-01-25 PROCEDURE — 700111 HCHG RX REV CODE 636 W/ 250 OVERRIDE (IP)

## 2018-01-25 PROCEDURE — 700117 HCHG RX CONTRAST REV CODE 255: Performed by: NURSE PRACTITIONER

## 2018-01-25 PROCEDURE — 700101 HCHG RX REV CODE 250

## 2018-01-25 RX ORDER — MIDAZOLAM HYDROCHLORIDE 1 MG/ML
INJECTION INTRAMUSCULAR; INTRAVENOUS
Status: DISPENSED
Start: 2018-01-25 | End: 2018-01-25

## 2018-01-25 RX ADMIN — GADODIAMIDE 15 ML: 287 INJECTION INTRAVENOUS at 09:41

## 2018-01-25 NOTE — DISCHARGE INSTRUCTIONS
MRI ADULT DISCHARGE INSTRUCTIONS    You have been medicated today for your scan. Please follow the instructions below to ensure your safe recovery. If you have any questions or problems, feel free to call us at [phone] or [phone].     1.   Have someone stay with you to assist you as needed.    2.   Do not drive or operate any mechanical devices.    3.   Do not perform any activity that requires concentration. Make no major decisions over the next 24 hours.     4.   Be careful changing positions from laying down to sitting or standing, as you may become dizzy.     5.   Do not drink alcohol for 48 hours.    6.   There are no restrictions for eating your normal meals. Drink fluids.    7.   You may continue your usual medications for pain, tranquilizers, muscle relaxants or sedatives when awake.     8.   Tomorrow, you may continue your normal daily activities.     9.   Pressure dressing on 10 - 15 minutes. If swelling or bleeding occurs when removed, continue placing direct pressure on injection site for another 5 minutes, or until bleeding stops.     Midazolam (VERSED)  What is this medicine?  You were given MIDAZOLAM (ALTAGRACIA munson) for your procedure today. This medication is a benzodiazepine. It is used to cause relaxation or sleep before surgery and to block the memory of the procedure.  This medicine may be used for other purposes; ask your health care provider or pharmacist if you have questions.  What side effects may I notice from receiving this medicine?  Side effects that you should report to your doctor or health care professional as soon as possible:  • allergic reactions like skin rash, itching or hives, swelling of the face, lips, or tongue  • breathing problems  • confusion  • dizziness or lightheadedness  • fast, irregular heartbeat  • halluninations during recovery  • numbness or tingling in the hands or feet  • pain, redness, or swelling at site where injected  • seizures  Side effects that usually  do not require medical attention (report to your doctor or health care professional if they continue or are bothersome):  • coughing  • headache  • hiccups  • involuntary eye and muscle movements  • loss of memory of events just before, during, and after use  • nausea, vomiting  • speech problems  • tiredness  • trouble sleeping or nightmares  This list may not describe all possible side effects. Call your doctor for medical advice about side effects. You may report side effects to FDA at 6-853-QKX-7424.    Fentanyl  What is this medicine?  You were given FENTANYL (FEN ta nil) for your procedure today, it is a pain reliever. It is used to treat breakthrough pain that your long acting pain medicine does not control. Do not use this medicine for a pain that will go away in a few days like pain from surgery, doctor or dentist visits.   This medicine may be used for other purposes; ask your health care provider or pharmacist if you have questions.  What side effects may I notice from receiving this medicine?  Side effects that you should report to your doctor or health care professional as soon as possible:  • allergic reactions like skin rash, itching or hives, swelling of the face, lips, or tongue  • breathing problems  • changes in vision  • confusion  • dry mouth  • feeling faint, lightheaded  • hallucination  • irregular heartbeat  • mouth pain, sores  • problems with balance, talking, walking  • trouble passing urine or change in the amount of urine  • unusual bleeding or bruising  • unusually weak or tired  Side effects that usually do not require medical attention (report to your doctor or health care professional if they continue or are bothersome):  • dizzy  • headache  • loss of appetite  • nausea, vomiting  • sweating  • tingling in mouth  This list may not describe all possible side effects. Call your doctor for medical advice about side effects. You may report side effects to FDA at 3-793-FDA-9998.      I  have been informed of and understand the above discharge instructions.

## 2018-01-29 ENCOUNTER — OFFICE VISIT (OUTPATIENT)
Dept: MEDICAL GROUP | Facility: MEDICAL CENTER | Age: 58
End: 2018-01-29
Payer: MEDICARE

## 2018-01-29 VITALS
RESPIRATION RATE: 16 BRPM | HEIGHT: 63 IN | BODY MASS INDEX: 29.06 KG/M2 | TEMPERATURE: 97.2 F | OXYGEN SATURATION: 98 % | HEART RATE: 77 BPM | DIASTOLIC BLOOD PRESSURE: 72 MMHG | WEIGHT: 164 LBS | SYSTOLIC BLOOD PRESSURE: 136 MMHG

## 2018-01-29 DIAGNOSIS — E83.110 HEREDITARY HEMOCHROMATOSIS (HCC): ICD-10-CM

## 2018-01-29 DIAGNOSIS — E78.5 DYSLIPIDEMIA: ICD-10-CM

## 2018-01-29 DIAGNOSIS — I10 ESSENTIAL HYPERTENSION: ICD-10-CM

## 2018-01-29 DIAGNOSIS — Q24.8 PERICARDIAL CYST: ICD-10-CM

## 2018-01-29 DIAGNOSIS — F33.1 MODERATE EPISODE OF RECURRENT MAJOR DEPRESSIVE DISORDER (HCC): ICD-10-CM

## 2018-01-29 PROCEDURE — 99214 OFFICE O/P EST MOD 30 MIN: CPT | Performed by: NURSE PRACTITIONER

## 2018-01-29 RX ORDER — VALSARTAN AND HYDROCHLOROTHIAZIDE 160; 12.5 MG/1; MG/1
1 TABLET, FILM COATED ORAL DAILY
Qty: 30 TAB | Refills: 3 | Status: SHIPPED | OUTPATIENT
Start: 2018-01-29 | End: 2018-06-05 | Stop reason: SDUPTHER

## 2018-01-29 RX ORDER — AMLODIPINE BESYLATE 5 MG/1
5 TABLET ORAL DAILY
Qty: 30 TAB | Refills: 11 | Status: SHIPPED | OUTPATIENT
Start: 2018-01-29 | End: 2019-01-01 | Stop reason: CLARIF

## 2018-01-29 ASSESSMENT — ENCOUNTER SYMPTOMS: DEPRESSION: 1

## 2018-01-29 NOTE — PROGRESS NOTES
Subjective:      Yuki Angelo is a 57 y.o. female who presents with Results (labs )        CC: Patient is here today for problems with depression as well as to discuss her recent abnormal chemistry panel.    HPI Yuki Angelo      1. Pericardial cyst  Patient had an ultrasound showing liver cyst and pericardial cyst. MRI was advised for the liver cyst. MRI of the liver came back showing probable small hemangioma and no cholelithiasis. There were no suspicious enhancing lesions in the liver. The incidental right pericardial cyst was again noted. Patient states she has not been to her cardiologist in a few years and is not having chest pain.    2. Essential hypertension  Patient's blood pressure has been well controlled but her recent potassium levels came back low at 2.8. She has been on HCTZ 25 mg with her valsartan from cardiology for a while. Previous potassium levels also were slightly low. She has no history of heart failure.    3. Dyslipidemia  Patient's cholesterol levels again come back showing elevated LDL with good HDL.    4. Moderate episode of recurrent major depressive disorder (CMS-HCC)  Patient reports she has a history of depression in 2008 when her mother passed away. She states she did well on Zoloft but stopped the medicine when she felt better. She states over the past half year she again is depressed. She states she does not want to go out of her house and socialize. She tends to sleep a lot and feels down. She would like to go back on antidepressants. She states she is not suicidal or homicidal.    5. Hereditary hemochromatosis (CMS-HCC)  Patient has history of hemachromatosis but recent lab work came back showing normal hemoglobin and hematocrit.  Social History   Substance Use Topics   • Smoking status: Former Smoker     Packs/day: 0.50     Years: 37.00     Types: Cigarettes     Quit date: 5/1/2013   • Smokeless tobacco: Never Used      Comment:  Started smoking at age 16   •  "Alcohol use 0.6 oz/week     1 Glasses of wine per week      Comment: socially     Current Outpatient Prescriptions   Medication Sig Dispense Refill   • valsartan-hydrochlorothiazide (DIOVAN-HCT) 160-12.5 MG per tablet Take 1 Tab by mouth every day. 30 Tab 3   • amlodipine (NORVASC) 5 MG Tab Take 1 Tab by mouth every day. 30 Tab 11   • sertraline (ZOLOFT) 50 MG Tab Take 1 Tab by mouth every day. 30 Tab 11   • fluticasone (FLONASE) 50 MCG/ACT nasal spray Spray 2 Sprays in nose every day. 16 g 2   • omeprazole (PRILOSEC) 20 MG delayed-release capsule Take 1 Cap by mouth every day. 30 Cap 3   • tramadol (ULTRAM) 50 MG Tab Take 1-2 Tabs by mouth every 12 hours. 60 Tab 5   • topiramate (TOPAMAX) 50 MG tablet Take 2 Tabs by mouth 2 times a day. 120 Tab 11   • acyclovir (ZOVIRAX) 800 MG Tab Take 1 Tab by mouth 2 times a day. 20 Tab 12     No current facility-administered medications for this visit.      Family History   Problem Relation Age of Onset   • Adopted: Yes   • Family history unknown: Yes     Past Medical History:   Diagnosis Date   • Aortic regurgitation 2/21/2012    Dr. Gutiérrez, cardiologist   • Arthritis     Generalized   • Backpain    • CHEST PAIN 2/21/2012   • DDD (degenerative disc disease), cervical 8/3/2011   • Fatigue 2/21/2012   • Fibromyalgia    • History of echocardiogram 2/21/2012   • Hypertension 2015    well controlled on meds   • Multilevel degenerative disc disease    • Other specified disorder of intestines     constipation   • Pain     left arm and neck, thoracic area \"allover\"   • Polycystic kidney disease     \"told as child\"   • Psychiatric problem     depression, anxiety   • PVD (peripheral vascular disease) (CMS-[facility]) 2/21/2012   • S/P tooth extraction 10/27/2015   • Sleep apnea     [facility]ses CPAP at [facility]   • Snoring    • Thoracic outlet syndrome        Review of Systems   Constitutional: Positive for malaise/fatigue.   Psychiatric/Behavioral: Positive for depression. Negative for suicidal ideas. " "  All other systems reviewed and are negative.         Objective:     /72   Pulse 77   Temp 36.2 °C (97.2 °F)   Resp 16   Ht 1.6 m (5' 3\")   Wt 74.4 kg (164 lb)   LMP 06/01/2012   SpO2 98%   BMI 29.05 kg/m²      Physical Exam   Constitutional: She is oriented to person, place, and time. She appears well-developed and well-nourished. No distress.   HENT:   Head: Normocephalic and atraumatic.   Right Ear: External ear normal.   Left Ear: External ear normal.   Nose: Nose normal.   Eyes: Right eye exhibits no discharge. Left eye exhibits no discharge.   Neck: Normal range of motion. Neck supple. No thyromegaly present.   Cardiovascular: Normal rate, regular rhythm and normal heart sounds.  Exam reveals no gallop and no friction rub.    No murmur heard.  Pulmonary/Chest: Effort normal and breath sounds normal. She has no wheezes. She has no rales.   Musculoskeletal: She exhibits no edema or tenderness.   Neurological: She is alert and oriented to person, place, and time. She displays normal reflexes.   Skin: Skin is warm and dry. No rash noted. She is not diaphoretic.   Psychiatric: She has a normal mood and affect. Her behavior is normal. Judgment and thought content normal.   Nursing note and vitals reviewed.              Component      Latest Ref Rng & Units 1/16/2018 1/16/2018 1/16/2018 1/16/2018           9:37 AM  9:37 AM  9:37 AM  9:37 AM   WBC      4.8 - 10.8 K/uL 4.7 (L)      RBC      4.20 - 5.40 M/uL 4.78      Hemoglobin      12.0 - 16.0 g/dL 14.9      Hematocrit      37.0 - 47.0 % 43.4      MCV      81.4 - 97.8 fL 90.8      MCH      27.0 - 33.0 pg 31.2      MCHC      33.6 - 35.0 g/dL 34.3      RDW      35.9 - 50.0 fL 41.7      Platelet Count      164 - 446 K/uL 430      MPV      9.0 - 12.9 fL 8.5 (L)      Neutrophils-Polys      44.00 - 72.00 % 47.30      Lymphocytes      22.00 - 41.00 % 41.10 (H)      Monocytes      0.00 - 13.40 % 8.00      Eosinophils      0.00 - 6.90 % 1.70      Basophils      " 0.00 - 1.80 % 1.70      Immature Granulocytes      0.00 - 0.90 % 0.20      Nucleated RBC      /100 WBC 0.00      Neutrophils (Absolute)      2.00 - 7.15 K/uL 2.20      Lymphs (Absolute)      1.00 - 4.80 K/uL 1.91      Monos (Absolute)      0.00 - 0.85 K/uL 0.37      Eos (Absolute)      0.00 - 0.51 K/uL 0.08      Baso (Absolute)      0.00 - 0.12 K/uL 0.08      Immature Granulocytes (abs)      0.00 - 0.11 K/uL 0.01      NRBC (Absolute)      K/uL 0.00      Potassium      3.6 - 5.5 mmol/L  2.8 (L)     Sodium      135 - 145 mmol/L  138     Chloride      96 - 112 mmol/L  101     Co2      20 - 33 mmol/L  29     Anion Gap      0.0 - 11.9  8.0     Glucose      65 - 99 mg/dL  84     Bun      8 - 22 mg/dL  16     Creatinine      0.50 - 1.40 mg/dL  0.91     Calcium      8.5 - 10.5 mg/dL  9.5     AST(SGOT)      12 - 45 U/L  17     ALT(SGPT)      2 - 50 U/L  13     Alkaline Phosphatase      30 - 99 U/L  46     Total Bilirubin      0.1 - 1.5 mg/dL  0.4     Albumin      3.2 - 4.9 g/dL  4.4     Total Protein      6.0 - 8.2 g/dL  7.2     Globulin      1.9 - 3.5 g/dL  2.8     A-G Ratio      g/dL  1.6     Cholesterol,Tot      100 - 199 mg/dL   238 (H)    Triglycerides      0 - 149 mg/dL   65    HDL      >=40 mg/dL   75    LDL      <100 mg/dL   150 (H)    GFR If African American      >60 mL/min/1.73 m 2    >60   GFR If Non African American      >60 mL/min/1.73 m 2    >60   TSH      0.380 - 5.330 uIU/mL         Component      Latest Ref Rng & Units 1/16/2018           9:37 AM   WBC      4.8 - 10.8 K/uL    RBC      4.20 - 5.40 M/uL    Hemoglobin      12.0 - 16.0 g/dL    Hematocrit      37.0 - 47.0 %    MCV      81.4 - 97.8 fL    MCH      27.0 - 33.0 pg    MCHC      33.6 - 35.0 g/dL    RDW      35.9 - 50.0 fL    Platelet Count      164 - 446 K/uL    MPV      9.0 - 12.9 fL    Neutrophils-Polys      44.00 - 72.00 %    Lymphocytes      22.00 - 41.00 %    Monocytes      0.00 - 13.40 %    Eosinophils      0.00 - 6.90 %    Basophils      0.00 -  1.80 %    Immature Granulocytes      0.00 - 0.90 %    Nucleated RBC      /100 WBC    Neutrophils (Absolute)      2.00 - 7.15 K/uL    Lymphs (Absolute)      1.00 - 4.80 K/uL    Monos (Absolute)      0.00 - 0.85 K/uL    Eos (Absolute)      0.00 - 0.51 K/uL    Baso (Absolute)      0.00 - 0.12 K/uL    Immature Granulocytes (abs)      0.00 - 0.11 K/uL    NRBC (Absolute)      K/uL    Potassium      3.6 - 5.5 mmol/L    Sodium      135 - 145 mmol/L    Chloride      96 - 112 mmol/L    Co2      20 - 33 mmol/L    Anion Gap      0.0 - 11.9    Glucose      65 - 99 mg/dL    Bun      8 - 22 mg/dL    Creatinine      0.50 - 1.40 mg/dL    Calcium      8.5 - 10.5 mg/dL    AST(SGOT)      12 - 45 U/L    ALT(SGPT)      2 - 50 U/L    Alkaline Phosphatase      30 - 99 U/L    Total Bilirubin      0.1 - 1.5 mg/dL    Albumin      3.2 - 4.9 g/dL    Total Protein      6.0 - 8.2 g/dL    Globulin      1.9 - 3.5 g/dL    A-G Ratio      g/dL    Cholesterol,Tot      100 - 199 mg/dL    Triglycerides      0 - 149 mg/dL    HDL      >=40 mg/dL    LDL      <100 mg/dL    GFR If African American      >60 mL/min/1.73 m 2    GFR If Non African American      >60 mL/min/1.73 m 2    TSH      0.380 - 5.330 uIU/mL 1.480     Assessment/Plan:     There are no diagnoses linked to this encounter.    Depression Screen (PHQ-2/PHQ-9) 4/11/2016 4/21/2017 12/15/2017   PHQ-2 Total Score 0 - -   PHQ-2 Total Score - 3 3       Interpretation of PHQ-9 Total Score   Score Severity   1-4 No Depression   5-9 Mild Depression   10-14 Moderate Depression   15-19 Moderately Severe Depression   20-27 Severe Depression          1. Pericardial cyst  Patient advised to follow-up with her cardiologist to check if there is any significance of this result. She has not been to cardiology in a while anyway. I told her she needed a referral I will place it since her previous cardiologist retired but she should be able to get into the same office.    2. Essential hypertension  Patient's  potassium levels have been running low on her 25 mg HCTZ so I will decrease her down to 12.5 mg with her Diovan. I told her there then do her lab work in a week from now to see if potassium has returned to normal. She is to stop her potassium pills. She will start on amlodipine only if her blood pressure goes up above 140/90 with a lower dose HCTZ.  - valsartan-hydrochlorothiazide (DIOVAN-HCT) 160-12.5 MG per tablet; Take 1 Tab by mouth every day.  Dispense: 30 Tab; Refill: 3  - amlodipine (NORVASC) 5 MG Tab; Take 1 Tab by mouth every day.  Dispense: 30 Tab; Refill: 11    3. Dyslipidemia  I spoke with patient again about her cholesterol but she does not want statins.    4. Moderate episode of recurrent major depressive disorder (CMS-HCC)  Patient shows evidence of depression and would like to start on medication. She states she did well on Zoloft in the past so I will start her at half a tablet daily for the first week and then go to a full tablet. Side effects of medication discussed and she will stop the medicine if it makes her depression worse. I strongly recommended counseling and she states she will consider this and contact me if she decides to go forward with counseling. She was advised to go to the emergency room if symptoms worsen.  - sertraline (ZOLOFT) 50 MG Tab; Take 1 Tab by mouth every day.  Dispense: 30 Tab; Refill: 11    5. Hereditary hemochromatosis (CMS-HCC)  Hemoglobin and hematocrit are normal and her MCV, MCH, and MCHC are normal. White blood cell count was 1/10 of a point below normal. Lymphocytes also are just barely below normal.

## 2018-02-07 ENCOUNTER — OFFICE VISIT (OUTPATIENT)
Dept: CARDIOLOGY | Facility: MEDICAL CENTER | Age: 58
End: 2018-02-07
Payer: MEDICARE

## 2018-02-07 VITALS
WEIGHT: 160 LBS | HEIGHT: 63 IN | SYSTOLIC BLOOD PRESSURE: 120 MMHG | RESPIRATION RATE: 14 BRPM | DIASTOLIC BLOOD PRESSURE: 82 MMHG | BODY MASS INDEX: 28.35 KG/M2 | OXYGEN SATURATION: 93 % | HEART RATE: 80 BPM

## 2018-02-07 DIAGNOSIS — Q24.8 PERICARDIAL CYST: ICD-10-CM

## 2018-02-07 DIAGNOSIS — I25.10 CORONARY ARTERY DISEASE INVOLVING NATIVE CORONARY ARTERY OF NATIVE HEART WITHOUT ANGINA PECTORIS: ICD-10-CM

## 2018-02-07 DIAGNOSIS — I10 ESSENTIAL HYPERTENSION: ICD-10-CM

## 2018-02-07 DIAGNOSIS — R06.02 SOB (SHORTNESS OF BREATH) ON EXERTION: ICD-10-CM

## 2018-02-07 DIAGNOSIS — R07.2 PRECORDIAL PAIN: ICD-10-CM

## 2018-02-07 DIAGNOSIS — E78.5 DYSLIPIDEMIA: ICD-10-CM

## 2018-02-07 DIAGNOSIS — R91.8 PULMONARY NODULES: ICD-10-CM

## 2018-02-07 DIAGNOSIS — I25.10 CORONARY ARTERY CALCIFICATION SEEN ON CAT SCAN: ICD-10-CM

## 2018-02-07 LAB — EKG IMPRESSION: NORMAL

## 2018-02-07 PROCEDURE — 93000 ELECTROCARDIOGRAM COMPLETE: CPT | Performed by: INTERNAL MEDICINE

## 2018-02-07 PROCEDURE — 99215 OFFICE O/P EST HI 40 MIN: CPT | Mod: 25 | Performed by: INTERNAL MEDICINE

## 2018-02-07 ASSESSMENT — ENCOUNTER SYMPTOMS
NECK PAIN: 1
MYALGIAS: 0
CHILLS: 0
HEARTBURN: 1
BACK PAIN: 1
PND: 0
LOSS OF CONSCIOUSNESS: 0
PALPITATIONS: 1
NERVOUS/ANXIOUS: 1
COUGH: 1
ORTHOPNEA: 0
SHORTNESS OF BREATH: 1
HEADACHES: 1
INSOMNIA: 0
EYE PAIN: 1
FEVER: 0
NAUSEA: 1
WEAKNESS: 1
ABDOMINAL PAIN: 1
DIZZINESS: 0
BLURRED VISION: 1

## 2018-02-08 PROBLEM — R91.8 PULMONARY NODULES: Status: ACTIVE | Noted: 2018-02-08

## 2018-02-08 NOTE — PROGRESS NOTES
"Subjective:   Yuki Angelo is a 57 y.o. female who presents today referred by her PCP Guillermo BARAJAS for evaluation of chest pain.    The patient has a significant past medical history of hypertension, hyperlipidemia, pericardial cyst with a recent follow-up chest CT scan on 2018 demonstrated a stable pericardial cyst but with evidence of coronary calcifications in addition to fibromyalgia, sleep apnea on CPAP therapy, thoracic outlet syndrome with resection of her left 1st rib at [facility] 17 years ago and ulnar nerve transfer.    The patient has had various chest pain symptoms with and without activity, exertional shortness of breath without PND, orthopnea or lower extremity edema and generalized fatigue.  She had previously been followed by my former partner Dr. Alexei Gutiérrez for a aortic regurgitation however her last echocardiogram on 7/10/2017 showed normal left nuclear ejection fraction and trace aortic regurgitation and trace mitral regurgitation.    Notably, the patient was adopted but recently met her \"birth family\" and found that her biological father  of a \"heart aneurysm\" in his early 60s.  Also her biological uncle, her father's brother has atrial fibrillation at age 66.    She also complains of a persistently dry nonproductive cough.    Past Medical History:   Diagnosis Date   • Aortic regurgitation 2012    Dr. Gutiérrez, cardiologist   • Arthritis     Generalized   • Backpain    • CHEST PAIN 2012   • DDD (degenerative disc disease), cervical 8/3/2011   • Fatigue 2012   • Fibromyalgia    • History of echocardiogram 2012   • Hypertension 2015    well controlled on meds   • Multilevel degenerative disc disease    • Other specified disorder of intestines     constipation   • Pain     left arm and neck, thoracic area \"allover\"   • Polycystic kidney disease     \"told as child\"   • Psychiatric problem     depression, anxiety   • PVD (peripheral vascular disease) " (CMS-[facility]) 2/21/2012   • S/P tooth extraction 10/27/2015   • Sleep apnea     U[facility] CPAP [facility]   • Snoring    • Thoracic outlet syndrome      Past Surgical History:   Procedure Laterality Date   • BUNIONECTOMY DANYA Right 11/10/2015    Procedure: BUNIONECTOMY DANYA;  Surgeon: Kermit Arroyo D.P.M.;  Location: [facility];  Service:    • OSTECTOMY  11/10/2015    Procedure: OSTECTOMY - 5TH TAILORS BUNION ;  Surgeon: Kermit Arroyo D.P.M.;  Location: SURGERY [facility];  Service:    • ATHROPLASTY Right 11/10/2015    Procedure: ARTHROPLASTY - 5TH DEROTATIONAL;  Surgeon: Kermit Arroyo D.P.M.;  Location: [facility];  Service:    • HAMMERTOE CORRECTION  11/10/2015    Procedure: HAMMERTOE CORRECTION - 2ND, 3RD, 4TH ;  Surgeon: Kermit Arroyo D.P.M.;  Location: [facility];  Service:    • HYSTEROSCOPY WITH VIDEO DIAGNOSTIC  3/27/2015    Performed by Johnny Jarquin M.D. at SURGERY SAME DAY [facility]   • DILATION AND CURETTAGE  3/27/2015    Performed by Johnny Jarquin M.D. at SURGERY SAME DAY [facility] ORS   • KNEE ARTHROSCOPY  3/11/2013    Performed by Freeman Ballard M.D. at [facility]   • MEDIAL MENISCECTOMY  3/11/2013    Performed by Freeman Ballard M.D. at [facility]   • NERVE ULNAR TRANSFER  3/13/2012    Performed by KATYA NEAL at [facility]   • CARPAL TUNNEL ENDOSCOPIC  3/13/2012    Performed by KATYA NEAL at [facility]   • CERVICAL DISK AND FUSION ANTERIOR  6/21/2010    Performed by LAURA BAH at SURGERY [facility]   • INCISION AND DRAINAGE GENERAL  10/22/2009    Performed by ABEL GUZMAN at [facility]   • MASS EXCISION GENERAL  10/6/2009    right breast Performed by ABEL GUZMAN at [facility]   • BREAST BIOPSY  3/5/2009    right; Performed by ABEL GUZMAN at SURGERY [facility]   • RIB RESECTION  2001    right for thoracic  outlet syndrome   • KNEE ARTHROSCOPY  1998    left   • TUBAL COAGULATION LAPAROSCOPIC BILATERAL  1997   • CERVICAL FUSION POSTERIOR  1996   • OTHER SURGICAL PROCEDURE  11/1992    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 30 Tab 3   • amlodipine (NORVASC) 5 MG Tab Take 1 Tab by mouth every day. 30 Tab 11   • sertraline (ZOLOFT) 50 MG Tab Take 1 Tab by mouth every day. 30 Tab 11   • fluticasone (FLONASE) 50 MCG/ACT nasal spray Spray 2 Sprays in nose every day. 16 g 2   • omeprazole (PRILOSEC) 20 MG delayed-release capsule Take 1 Cap by mouth every day. 30 Cap 3   • tramadol (ULTRAM) 50 MG Tab Take 1-2 Tabs by mouth every 12 hours. 60 Tab 5   • topiramate (TOPAMAX) 50 MG tablet Take 2 Tabs by mouth 2 times a day. 120 Tab 11   • acyclovir (ZOVIRAX) 800 MG Tab Take 1 Tab by mouth 2 times a day. (Patient taking differently: Take 800 mg by mouth 2 times a day. PRN USE) 20 Tab 12     No facility-administered encounter medications on file as of 2/7/2018.      Review of Systems   Constitutional: Positive for malaise/fatigue. Negative for chills and fever.   HENT: Positive for hearing loss and tinnitus. Negative for congestion.    Eyes: Positive for blurred vision and pain.   Respiratory: Positive for cough and shortness of breath.    Cardiovascular: Positive for chest pain and palpitations. Negative for orthopnea, leg swelling and PND.   Gastrointestinal:  "Positive for abdominal pain, heartburn and nausea.   Genitourinary: Negative for dysuria.   Musculoskeletal: Positive for back pain, joint pain and neck pain. Negative for myalgias.   Skin: Positive for rash.   Neurological: Positive for weakness and headaches. Negative for dizziness and loss of consciousness.   Psychiatric/Behavioral: The patient is nervous/anxious. The patient does not have insomnia.         Objective:   /82   Pulse 80   Resp 14   Ht 1.6 m (5' 3\")   Wt 72.6 kg (160 lb)   LMP 06/01/2012   SpO2 93%   BMI 28.34 kg/m²     Physical Exam   Constitutional: She is oriented to person, place, and time. She appears well-nourished. No distress.   HENT:   Head: Normocephalic and atraumatic.   Eyes: Conjunctivae and EOM are normal. Pupils are equal, round, and reactive to light. No scleral icterus.   Neck: No JVD present. Carotid bruit is not present.   Normal jugular venous pressure.   Cardiovascular: Normal rate, regular rhythm, S1 normal and S2 normal.  Exam reveals no gallop and no friction rub.    No murmur heard.  Pulses:       Carotid pulses are 2+ on the right side, and 2+ on the left side.       Radial pulses are 2+ on the right side, and 2+ on the left side.        Femoral pulses are 2+ on the right side, and 2+ on the left side.       Posterior tibial pulses are 2+ on the right side, and 2+ on the left side.   No femoral bruits.   Pulmonary/Chest: Effort normal and breath sounds normal. She has no wheezes. She has no rhonchi. She has no rales.   Abdominal: Soft. Bowel sounds are normal. She exhibits no abdominal bruit, no pulsatile midline mass and no mass. There is no hepatosplenomegaly. There is no tenderness.   Musculoskeletal: She exhibits no edema.   Neurological: She is alert and oriented to person, place, and time. She has normal strength. Gait normal.   Skin: Skin is warm and dry. No cyanosis. Nails show no clubbing.   Psychiatric: She has a normal mood and affect. Her behavior " is normal.     07/10/2015 ECHOCARDIOGRAM  Normal left ventricular size, thickness, systolic function, and   diastolic function.  Trace mitral regurgitation.  Trace aortic insufficiency.  Trace tricuspid regurgitation.    01/05/2018 CHEST CT SCAN  1.  Stable bilateral pulmonary nodules.  2.  Stable cystic mass in the right cardiophrenic angle most likely representing a pericardial cyst.  3.  Atherosclerotic vascular disease.  4.  Tiny gallstones.  5.  Coronary artery calcifications are present. Right cardiophrenic angle cyst density mass is unchanged in size measuring 3.7 x 2.5    05/07/2018 EKG: Normal sinus rhythm, rate 67. Unchanged since 3/13/2015. Reviewed by myself.    Assessment:     1. Precordial pain  EKG    NM-CARDIAC STRESS TEST   2. SOB (shortness of breath) on exertion  EKG    NM-CARDIAC STRESS TEST   3. Coronary artery calcification seen on CAT scan  NM-CARDIAC STRESS TEST   4. Essential hypertension  EKG    NM-CARDIAC STRESS TEST   5. Pericardial cyst  NM-CARDIAC STRESS TEST   6. Dyslipidemia  NM-CARDIAC STRESS TEST   7. Coronary artery disease involving native coronary artery of native heart without angina pectoris  NM-CARDIAC STRESS TEST   8. Pulmonary nodules         Medical Decision Making:  Today's Assessment / Status / Plan:     I had a lengthy discussion and reviewed with the patient with regards to her various symptoms of chest pain and shortness of breath along with the new findings of coronary calcification on her CT scan in addition to the pericardial cyst which has been stable along with her recent lipid panel in which she has been recommended to start statin therapy by her PCP Guillermo BARAJAS as noted in his communication to her.    I will initially proceed with a myocardial perfusion stress test to rule out any significant obstructive coronary disease.  The patient was instructed to take an aspirin 81 mg daily because of her prior smoking history and evidence of coronary calcification  indicating coronary artery disease.  I am not sure that the pericardial cyst is playing a role in her current symptoms.  I reiterated that she should follow the recommendations of her PCP and start statin therapy for hyperlipidemia.  I reviewed with the patient her most previous echocardiogram which showed normal left ventricular function and minimal valve disease.

## 2018-02-14 DIAGNOSIS — G43.009 NONINTRACTABLE MIGRAINE, UNSPECIFIED MIGRAINE TYPE: ICD-10-CM

## 2018-02-14 RX ORDER — TOPIRAMATE 50 MG/1
TABLET, FILM COATED ORAL
Qty: 120 TAB | Refills: 11 | Status: ON HOLD | OUTPATIENT
Start: 2018-02-14 | End: 2019-01-02

## 2018-03-26 ENCOUNTER — TELEPHONE (OUTPATIENT)
Dept: MEDICAL GROUP | Facility: MEDICAL CENTER | Age: 58
End: 2018-03-26

## 2018-03-26 DIAGNOSIS — R92.8 ABNORMAL MAMMOGRAM: ICD-10-CM

## 2018-03-27 NOTE — TELEPHONE ENCOUNTER
1. Caller Name: Yuki Angelo                        Call Back Number: [phone] (home)       2. Message: pt called, she stated she needs a  6 month follow up  Mammogram.....she said the one she had done 6 months ago was suspicions and she was asked to repit it in 6 months and is due... can you please order it? Thank you    3. Patient approves office to leave a detailed voicemail/MyChart message: yes

## 2018-04-02 DIAGNOSIS — G43.009 NONINTRACTABLE MIGRAINE, UNSPECIFIED MIGRAINE TYPE: ICD-10-CM

## 2018-04-02 RX ORDER — TOPIRAMATE 50 MG/1
TABLET, FILM COATED ORAL
Qty: 120 TAB | Refills: 11 | Status: SHIPPED | OUTPATIENT
Start: 2018-04-02 | End: 2019-01-01 | Stop reason: CLARIF

## 2018-04-17 ENCOUNTER — HOSPITAL ENCOUNTER (OUTPATIENT)
Dept: LAB | Facility: MEDICAL CENTER | Age: 58
End: 2018-04-17
Attending: NURSE PRACTITIONER
Payer: MEDICARE

## 2018-04-17 DIAGNOSIS — E87.6 HYPOKALEMIA: ICD-10-CM

## 2018-04-17 LAB
ALBUMIN SERPL BCP-MCNC: 4.2 G/DL (ref 3.2–4.9)
ALBUMIN/GLOB SERPL: 1.4 G/DL
ALP SERPL-CCNC: 41 U/L (ref 30–99)
ALT SERPL-CCNC: 14 U/L (ref 2–50)
ANION GAP SERPL CALC-SCNC: 10 MMOL/L (ref 0–11.9)
AST SERPL-CCNC: 22 U/L (ref 12–45)
BILIRUB SERPL-MCNC: 0.3 MG/DL (ref 0.1–1.5)
BUN SERPL-MCNC: 16 MG/DL (ref 8–22)
CALCIUM SERPL-MCNC: 9.6 MG/DL (ref 8.5–10.5)
CHLORIDE SERPL-SCNC: 101 MMOL/L (ref 96–112)
CO2 SERPL-SCNC: 26 MMOL/L (ref 20–33)
CREAT SERPL-MCNC: 0.94 MG/DL (ref 0.5–1.4)
FASTING STATUS PATIENT QL REPORTED: NORMAL
GLOBULIN SER CALC-MCNC: 2.9 G/DL (ref 1.9–3.5)
GLUCOSE SERPL-MCNC: 91 MG/DL (ref 65–99)
POTASSIUM SERPL-SCNC: 3.1 MMOL/L (ref 3.6–5.5)
PROT SERPL-MCNC: 7.1 G/DL (ref 6–8.2)
SODIUM SERPL-SCNC: 137 MMOL/L (ref 135–145)

## 2018-04-17 PROCEDURE — 36415 COLL VENOUS BLD VENIPUNCTURE: CPT

## 2018-04-17 PROCEDURE — 80053 COMPREHEN METABOLIC PANEL: CPT

## 2018-04-18 DIAGNOSIS — E87.6 HYPOKALEMIA: ICD-10-CM

## 2018-04-18 RX ORDER — POTASSIUM CHLORIDE 750 MG/1
10 TABLET, FILM COATED, EXTENDED RELEASE ORAL DAILY
Qty: 30 TAB | Refills: 5 | Status: SHIPPED | OUTPATIENT
Start: 2018-04-18 | End: 2019-01-01 | Stop reason: CLARIF

## 2018-04-26 DIAGNOSIS — B27.90 EBV INFECTION: ICD-10-CM

## 2018-04-26 RX ORDER — ACYCLOVIR 800 MG/1
800 TABLET ORAL 2 TIMES DAILY
Qty: 60 TAB | Refills: 2 | Status: SHIPPED | OUTPATIENT
Start: 2018-04-26 | End: 2019-01-01 | Stop reason: CLARIF

## 2018-04-30 ENCOUNTER — HOSPITAL ENCOUNTER (OUTPATIENT)
Dept: RADIOLOGY | Facility: MEDICAL CENTER | Age: 58
End: 2018-04-30
Attending: NURSE PRACTITIONER
Payer: MEDICARE

## 2018-04-30 DIAGNOSIS — R92.8 ABNORMAL MAMMOGRAM: ICD-10-CM

## 2018-04-30 PROCEDURE — 76642 ULTRASOUND BREAST LIMITED: CPT | Mod: RT

## 2018-04-30 PROCEDURE — G0279 TOMOSYNTHESIS, MAMMO: HCPCS | Mod: RT

## 2018-06-05 DIAGNOSIS — I10 ESSENTIAL HYPERTENSION: ICD-10-CM

## 2018-06-05 RX ORDER — VALSARTAN AND HYDROCHLOROTHIAZIDE 160; 12.5 MG/1; MG/1
TABLET, FILM COATED ORAL
Qty: 30 TAB | Refills: 3 | Status: SHIPPED | OUTPATIENT
Start: 2018-06-05 | End: 2018-06-20 | Stop reason: SDUPTHER

## 2018-06-20 DIAGNOSIS — I10 ESSENTIAL HYPERTENSION: ICD-10-CM

## 2018-06-20 RX ORDER — VALSARTAN AND HYDROCHLOROTHIAZIDE 160; 12.5 MG/1; MG/1
1 TABLET, FILM COATED ORAL
Qty: 90 TAB | Refills: 0 | Status: SHIPPED | OUTPATIENT
Start: 2018-06-20 | End: 2018-09-14

## 2018-08-13 ENCOUNTER — OFFICE VISIT (OUTPATIENT)
Dept: MEDICAL GROUP | Facility: MEDICAL CENTER | Age: 58
End: 2018-08-13
Payer: MEDICARE

## 2018-08-13 VITALS
WEIGHT: 158 LBS | BODY MASS INDEX: 28 KG/M2 | HEIGHT: 63 IN | TEMPERATURE: 97.9 F | DIASTOLIC BLOOD PRESSURE: 80 MMHG | SYSTOLIC BLOOD PRESSURE: 130 MMHG | RESPIRATION RATE: 16 BRPM | HEART RATE: 79 BPM | OXYGEN SATURATION: 97 %

## 2018-08-13 DIAGNOSIS — F11.20 OPIOID TYPE DEPENDENCE, CONTINUOUS (HCC): ICD-10-CM

## 2018-08-13 DIAGNOSIS — Z02.89 PAIN MANAGEMENT CONTRACT AGREEMENT: ICD-10-CM

## 2018-08-13 DIAGNOSIS — G54.0 TOS (THORACIC OUTLET SYNDROME): ICD-10-CM

## 2018-08-13 DIAGNOSIS — I10 ESSENTIAL HYPERTENSION: ICD-10-CM

## 2018-08-13 DIAGNOSIS — M54.12 CERVICAL RADICULOPATHY: ICD-10-CM

## 2018-08-13 DIAGNOSIS — E87.6 HYPOKALEMIA: ICD-10-CM

## 2018-08-13 DIAGNOSIS — M50.30 DDD (DEGENERATIVE DISC DISEASE), CERVICAL: ICD-10-CM

## 2018-08-13 PROCEDURE — 99214 OFFICE O/P EST MOD 30 MIN: CPT | Performed by: INTERNAL MEDICINE

## 2018-08-13 RX ORDER — TRAMADOL HYDROCHLORIDE 50 MG/1
50-100 TABLET ORAL EVERY 12 HOURS
Qty: 60 TAB | Refills: 0 | Status: SHIPPED | OUTPATIENT
Start: 2018-08-13 | End: 2018-09-12

## 2018-08-13 NOTE — ASSESSMENT & PLAN NOTE
She has a history of thoracic outlet syndrome and has had her first rib removed at [place].  She will be getting an MRI of left brachial plexus with and without contrast as well as MRI of cervical spine with and without contrast for follow-up.

## 2018-08-13 NOTE — ASSESSMENT & PLAN NOTE
Potassium has been a little low in the past.  She is taking hydrochlorothiazide.  She would like to have a repeat check of her electrolytes.

## 2018-08-13 NOTE — ASSESSMENT & PLAN NOTE
She thinks she is getting some persistent cervical radiculopathy.  She will be getting MRI with and without contrast of both the cervical spine as well as the left brachial plexus.

## 2018-08-13 NOTE — ASSESSMENT & PLAN NOTE
She has hypertension that is fairly well controlled with valsartan and hydrochlorothiazide and amlodipine.  She denies significant lightheadedness.

## 2018-08-13 NOTE — PROGRESS NOTES
Subjective:     Chief Complaint   Patient presents with   • Follow-Up   • Orders Needed     MRI with IV Sed     Yuki Angelo is a 58 y.o. female here today for follow-up from recent surgery at [facility] for carpal tunnel as well as left ulnar nerve transposition.  She also wants pain medication.    TOS (thoracic outlet syndrome)  She has a history of thoracic outlet syndrome and has had her first rib removed at [facility].  She will be getting an MRI of left brachial plexus with and without contrast as well as MRI of cervical spine with and without contrast for follow-up.    Pain management contract agreement  She wants a refill on her tramadol.  I had her sign a pain management contract.    Essential hypertension  She has hypertension that is fairly well controlled with valsartan and hydrochlorothiazide and amlodipine.  She denies significant lightheadedness.    CERVICAL RADICULOPATHY  She thinks she is getting some persistent cervical radiculopathy.  She will be getting MRI with and without contrast of both the cervical spine as well as the left brachial plexus.    Hypokalemia  Potassium has been a little low in the past.  She is taking hydrochlorothiazide.  She would like to have a repeat check of her electrolytes.       Diagnoses of Essential hypertension, Opioid type dependence, continuous (HCC), DDD (degenerative disc disease), cervical, Cervical radiculopathy, TOS (thoracic outlet syndrome), Pain management contract agreement, and Hypokalemia were pertinent to this visit.    Allergies: Penicillins and Sulfa drugs  Current medicines (including changes today)  Current Outpatient Prescriptions   Medication Sig Dispense Refill   • tramadol (ULTRAM) 50 MG Tab Take 1-2 Tabs by mouth every 12 hours for 30 days. 60 Tab 0   • valsartan-hydrochlorothiazide (DIOVAN-HCT) 160-12.5 MG per tablet Take 1 Tab by mouth every day. 90 Tab 0   • acyclovir (ZOVIRAX) 800 MG Tab Take 1 Tab by mouth 2 times a day. PRN USE 60 Tab  "2   • potassium chloride ER (KLOR-CON) 10 MEQ tablet Take 1 Tab by mouth every day. 30 Tab 5   • topiramate (TOPAMAX) 50 MG tablet TAKE TWO TABLETS BY MOUTH TWICE A  Tab 11   • amlodipine (NORVASC) 5 MG Tab Take 1 Tab by mouth every day. 30 Tab 11   • sertraline (ZOLOFT) 50 MG Tab Take 1 Tab by mouth every day. 30 Tab 11   • omeprazole (PRILOSEC) 20 MG delayed-release capsule Take 1 Cap by mouth every day. 30 Cap 3   • topiramate (TOPAMAX) 50 MG tablet TAKE TWO TABLETS BY MOUTH TWICE A  Tab 11   • fluticasone (FLONASE) 50 MCG/ACT nasal spray Spray 2 Sprays in nose every day. 16 g 2     No current facility-administered medications for this visit.        She  has a past medical history of Aortic regurgitation (2/21/2012); Arthritis; Backpain; CHEST PAIN (2/21/2012); DDD (degenerative disc disease), cervical (8/3/2011); Fatigue (2/21/2012); Fibromyalgia; History of echocardiogram (2/21/2012); Hypertension (2015); Multilevel degenerative disc disease; Other specified disorder of intestines; Pain; Pain management contract agreement (8/13/2018); Polycystic kidney disease; Psychiatric problem; PVD (peripheral vascular disease) ([facility]) (2/21/2012); S/P tooth extraction (10/27/2015); Sleep apnea; Snoring; and Thoracic outlet syndrome.    ROS    Patient denies significant change in strength, weight or appetite.  No significant lightheadedness or headaches.  No change in vision, hearing, or swallowing.  No new dyspnea, coughing, chest pain, or palpitations.  No indigestion, abdominal pain, or change in bowel habits.  No change in urinating.  No new ankle swelling.  She reports no significant pain at the surgical sites.       Objective:     PE:  /80   Pulse 79   Temp 36.6 °C (97.9 °F)   Resp 16   Ht 1.6 m (5' 3\")   Wt 71.7 kg (158 lb)   LMP 06/01/2012   SpO2 97%   BMI 27.99 kg/m²    Neck is supple without significant lymphadenopathy or masses.  Lungs are clear with normal breath sounds without wheezes " or rales .  Cardiovascular: peripheral circulation is satisfactory, heart sounds are unchanged and unremarkable.  Abdomen is soft, without masses or tenderness, with normal bowel sounds.  Extremities are without significant edema, cyanosis or deformity.  Surgical wounds for the ulnar transposition and carpal tunnel appeared to be healing appropriately.  There is no significant redness or fluctuance or serosanguineous drainage.      Assessment and Plan:   The following treatment plan was discussed  1. Essential hypertension  BASIC METABOLIC PANEL    Continue same medications.  Follow low-salt diet.   2. Opioid type dependence, continuous (HCC)  tramadol (ULTRAM) 50 MG Tab    CONSENT FOR OPIATE PRESCRIPTION    Patient advised to minimize use of tramadol.   3. DDD (degenerative disc disease), cervical  tramadol (ULTRAM) 50 MG Tab   4. Cervical radiculopathy  tramadol (ULTRAM) 50 MG Tab    MRI is scheduled.   5. TOS (thoracic outlet syndrome)      MRI is scheduled.   6. Pain management contract agreement     7. Hypokalemia      We will check a BMP.  She was cautioned to follow low-salt diet.       Followup: She was encouraged to follow-up soon with Neo Gonzalez.  She will get the MRIs for [facility].

## 2018-08-15 DIAGNOSIS — Z01.812 PRE-PROCEDURE LAB EXAM: ICD-10-CM

## 2018-08-22 ENCOUNTER — TELEPHONE (OUTPATIENT)
Dept: MEDICAL GROUP | Facility: MEDICAL CENTER | Age: 58
End: 2018-08-22

## 2018-08-22 NOTE — TELEPHONE ENCOUNTER
1. Caller Name: Yuki Angelo                                           Call Back Number: [phone] (home)         Patient approves a detailed voicemail message: yes    Patient called stating she has a lump on her throat that seems to be getting bigger and more swollen its becoming painful. I called the patient back and left a voicemail to schedule an appointment to be seen

## 2018-08-23 ENCOUNTER — OFFICE VISIT (OUTPATIENT)
Dept: MEDICAL GROUP | Facility: PHYSICIAN GROUP | Age: 58
End: 2018-08-23
Payer: MEDICARE

## 2018-08-23 VITALS
WEIGHT: 160.2 LBS | TEMPERATURE: 96.9 F | OXYGEN SATURATION: 98 % | HEIGHT: 63 IN | DIASTOLIC BLOOD PRESSURE: 76 MMHG | SYSTOLIC BLOOD PRESSURE: 118 MMHG | RESPIRATION RATE: 14 BRPM | BODY MASS INDEX: 28.39 KG/M2 | HEART RATE: 76 BPM

## 2018-08-23 DIAGNOSIS — Z23 NEED FOR VACCINATION: ICD-10-CM

## 2018-08-23 DIAGNOSIS — L72.9 INFECTED CYST OF SKIN: ICD-10-CM

## 2018-08-23 DIAGNOSIS — L08.9 INFECTED CYST OF SKIN: ICD-10-CM

## 2018-08-23 PROCEDURE — 99214 OFFICE O/P EST MOD 30 MIN: CPT | Performed by: PHYSICIAN ASSISTANT

## 2018-08-23 RX ORDER — DOXYCYCLINE HYCLATE 100 MG
100 TABLET ORAL 2 TIMES DAILY
Qty: 20 TAB | Refills: 0 | Status: SHIPPED | OUTPATIENT
Start: 2018-08-23 | End: 2019-01-01 | Stop reason: CLARIF

## 2018-08-23 ASSESSMENT — PAIN SCALES - GENERAL: PAINLEVEL: 4=SLIGHT-MODERATE PAIN

## 2018-08-27 NOTE — PROGRESS NOTES
Chief Complaint   Patient presents with   • Nodule     left side of the next, pt states it has been there for 4 years but red painful and swelling x 4 days        HISTORY OF PRESENT ILLNESS: Yuki Angelo is an established 58 y.o. female here to discuss the evaluation and management of:    Acute.  Patient states 3-4 days ago she experienced erythema/warmth of a cyst on left lower anterior neck that has been present for 3-4 years. She tells me that mass is swollen and each day has gotten bigger in size and more painful.  Mass is semi-firm and pea-sized.  She admits that area is tender to palpation. States she has been using warm compresses with mild alleviation symptoms.  Denies discharge, fever, chills, other suspicious lesions.      Patient Active Problem List    Diagnosis Date Noted   • Opioid type dependence, continuous (CMS-[facility]) 04/13/2015     Priority: High   • Aortic regurgitation 02/21/2012     Priority: High   • PVD (peripheral vascular disease) ([facility]) 02/21/2012     Priority: High   • Pain management contract agreement 08/13/2018   • Hypokalemia 08/13/2018   • Pulmonary nodules 02/08/2018   • Pericardial cyst 02/07/2018   • Precordial pain 02/07/2018   • Coronary artery calcification seen on CAT scan 02/07/2018   • Moderate episode of recurrent major depressive disorder (HCC) 01/29/2018   • Hereditary hemochromatosis ([facility]) 12/15/2017   • Dyslipidemia 01/17/2017   • Essential hypertension 05/20/2016   • TOS (thoracic outlet syndrome) 04/14/2015   • Fibromyalgia 07/17/2012   • Sleep apnea    • Cervical radiculopathy 09/08/2011   • S/P cervical spinal fusion 09/08/2011   • DDD (degenerative disc disease), cervical 08/03/2011       Allergies:Penicillins and Sulfa drugs    Current Outpatient Prescriptions   Medication Sig Dispense Refill   • doxycycline (VIBRAMYCIN) 100 MG Tab Take 1 Tab by mouth 2 times a day. 20 Tab 0   • tramadol (ULTRAM) 50 MG Tab Take 1-2 Tabs by mouth every 12 hours for 30 days. 60  Tab 0   • valsartan-hydrochlorothiazide (DIOVAN-HCT) 160-12.5 MG per tablet Take 1 Tab by mouth every day. 90 Tab 0   • acyclovir (ZOVIRAX) 800 MG Tab Take 1 Tab by mouth 2 times a day. PRN USE 60 Tab 2   • potassium chloride ER (KLOR-CON) 10 MEQ tablet Take 1 Tab by mouth every day. 30 Tab 5   • topiramate (TOPAMAX) 50 MG tablet TAKE TWO TABLETS BY MOUTH TWICE A  Tab 11   • amlodipine (NORVASC) 5 MG Tab Take 1 Tab by mouth every day. 30 Tab 11   • sertraline (ZOLOFT) 50 MG Tab Take 1 Tab by mouth every day. 30 Tab 11   • omeprazole (PRILOSEC) 20 MG delayed-release capsule Take 1 Cap by mouth every day. 30 Cap 3   • topiramate (TOPAMAX) 50 MG tablet TAKE TWO TABLETS BY MOUTH TWICE A  Tab 11   • fluticasone (FLONASE) 50 MCG/ACT nasal spray Spray 2 Sprays in nose every day. 16 g 2     No current facility-administered medications for this visit.        Social History   Substance Use Topics   • Smoking status: Former Smoker     Packs/day: 0.50     Years: 37.00     Types: Cigarettes     Quit date: 5/1/2013   • Smokeless tobacco: Never Used      Comment:  Started smoking at age 16   • Alcohol use 0.6 oz/week     1 Glasses of wine per week      Comment: socially       Family Status   Relation Status   • Mo (Not Specified)   • Son Alive   • Osmel Alive     Family History   Problem Relation Age of Onset   • Adopted: Yes   • No Known Problems Son    • No Known Problems Daughter        ROS:  Review of Systems   Constitutional: Negative for fever, chills, weight loss and malaise/fatigue.   HENT: Negative for ear pain, nosebleeds, congestion, sore throat and neck pain.    Eyes: Negative for blurred vision.   Respiratory: Negative for cough, sputum production, shortness of breath and wheezing.    Cardiovascular: Negative for chest pain, palpitations, orthopnea and leg swelling.   Gastrointestinal: Negative for heartburn, nausea, vomiting and abdominal pain.   Genitourinary: Negative for dysuria, urgency and frequency.  "  Musculoskeletal: Negative for myalgias, back pain and joint pain.   Skin: Negative for rash and itching. + for swollen/erythematous/tender mass on left lower anterior neck.  Neurological: Negative for dizziness, tingling, tremors, sensory change, focal weakness and headaches.   Endo/Heme/Allergies: Does not bruise/bleed easily.   Psychiatric/Behavioral: Negative for depression, suicidal ideas and memory loss.  The patient is not nervous/anxious and does not have insomnia.    All other systems reviewed and are negative except as in HPI.    Exam: Blood pressure 118/76, pulse 76, temperature 36.1 °C (96.9 °F), resp. rate 14, height 1.6 m (5' 3\"), weight 72.7 kg (160 lb 3.2 oz), last menstrual period 06/01/2012, SpO2 98 %. Body mass index is 28.38 kg/m².  General: Normal appearing. No distress.  HEENT: Normocephalic. Eyes conjunctiva clear lids without ptosis, pupils equal and reactive to light accommodation, ears normal shape and contour, canals are clear bilaterally, tympanic membranes are benign, nasal mucosa benign, oropharynx is without erythema, edema or exudates.   Neck: Supple without JVD or bruit. Thyroid is not enlarged.  Pulmonary: Clear to ausculation.  Normal effort. No rales, ronchi, or wheezing.  Cardiovascular: Regular rate and rhythm without murmur. Carotid and radial pulses are intact and equal bilaterally.  Abdomen: Soft, nontender, nondistended. Normal bowel sounds. Liver and spleen are not palpable  Neurologic: Grossly nonfocal.  Cranial nerves are normal. DTR's normal and symmetric.  Lymph: No cervical, supraclavicular or axillary lymph nodes are palpable  Skin: Warm and dry.  No rashes or suspicious skin lesions.  Positive for semifirm pea-sized mass on left lower anterior neck that is tender to palpation.  Positive for erythema and mild warmth.  Negative for discharge.  Musculoskeletal: Normal gait. No extremity cyanosis, clubbing, or edema.  Psych: Normal mood and affect. Alert and oriented " x3. Judgment and insight is normal.    Medical decision-making and discussion:  1. Infected cyst of skin  Patient has been prescribed doxycycline 100 mg tab advised take 1 tab by mouth twice daily for 10 days.  Suggested taking over-the-counter probiotics while taking prescribed antibiotics due to potential gastrointestinal symptoms associated with antibiotic use.  Advised patient to avoid irritating area.  Advised patient to use warm compresses and take over-the-counter anti-inflammatories as needed.  She has been referred to neurosurgery for further evaluation due to location of mass.    - REFERRAL TO GENERAL SURGERY  - doxycycline (VIBRAMYCIN) 100 MG Tab; Take 1 Tab by mouth 2 times a day.  Dispense: 20 Tab; Refill: 0    2. Need for vaccination  Shingrix vaccination was provided to patient during today's appointment.     - Zoster Vac Recomb Adjuvanted (SHINGRIX) 50 MCG Recon Susp; 0.5 mL by Intramuscular route Once for 1 dose.  Dispense: 0.5 mL; Refill: 1      Please note that this dictation was created using voice recognition software. I have made every reasonable attempt to correct obvious errors, but I expect that there are errors of grammar and possibly content that I did not discover before finalizing the note.      Return if symptoms worsen or fail to improve.

## 2018-08-30 ENCOUNTER — TELEPHONE (OUTPATIENT)
Dept: MEDICAL GROUP | Facility: PHYSICIAN GROUP | Age: 58
End: 2018-08-30

## 2018-08-30 NOTE — TELEPHONE ENCOUNTER
Phone Number Called: [phone] (home)       Message: Pt called and stated the antibiotic she was given during her last visit, has been making her vomit.  Pt would like to know if she can get something else?    Left Message for patient to call back: N\A

## 2018-08-31 ENCOUNTER — TELEPHONE (OUTPATIENT)
Dept: MEDICAL GROUP | Facility: PHYSICIAN GROUP | Age: 58
End: 2018-08-31

## 2018-08-31 DIAGNOSIS — L08.9 INFECTED CYST OF SKIN: ICD-10-CM

## 2018-08-31 DIAGNOSIS — L72.9 INFECTED CYST OF SKIN: ICD-10-CM

## 2018-08-31 RX ORDER — CLINDAMYCIN HYDROCHLORIDE 300 MG/1
300 CAPSULE ORAL 4 TIMES DAILY
Qty: 28 CAP | Refills: 0 | Status: SHIPPED | OUTPATIENT
Start: 2018-08-31 | End: 2019-01-01 | Stop reason: CLARIF

## 2018-08-31 NOTE — TELEPHONE ENCOUNTER
I called patient to see how she was doing.  She tells me that she discontinued doxycycline 3 days ago.  States she is experiencing vomiting.  She tells me that she was not taking medication on an empty stomach.  She tells me that cyst is still swollen, painful and erythematous.  I will prescribe a different antibiotic for patient to take.  Discussed this with patient.  Advised patient that prescription will be her pharmacy.  Discussed importance of taking medication with a glass of water and not on an empty stomach.

## 2018-09-13 ENCOUNTER — HOSPITAL ENCOUNTER (OUTPATIENT)
Dept: CARDIOLOGY | Facility: MEDICAL CENTER | Age: 58
End: 2018-09-13
Attending: INTERNAL MEDICINE
Payer: MEDICARE

## 2018-09-13 ENCOUNTER — HOSPITAL ENCOUNTER (OUTPATIENT)
Dept: LAB | Facility: MEDICAL CENTER | Age: 58
End: 2018-09-13
Attending: INTERNAL MEDICINE
Payer: MEDICARE

## 2018-09-13 DIAGNOSIS — I10 ESSENTIAL HYPERTENSION: ICD-10-CM

## 2018-09-13 LAB
ANION GAP SERPL CALC-SCNC: 8 MMOL/L (ref 0–11.9)
BUN SERPL-MCNC: 12 MG/DL (ref 8–22)
CALCIUM SERPL-MCNC: 9.8 MG/DL (ref 8.5–10.5)
CHLORIDE SERPL-SCNC: 107 MMOL/L (ref 96–112)
CO2 SERPL-SCNC: 24 MMOL/L (ref 20–33)
CREAT SERPL-MCNC: 0.87 MG/DL (ref 0.5–1.4)
EKG IMPRESSION: NORMAL
GLUCOSE SERPL-MCNC: 84 MG/DL (ref 65–99)
POTASSIUM SERPL-SCNC: 3.4 MMOL/L (ref 3.6–5.5)
SODIUM SERPL-SCNC: 139 MMOL/L (ref 135–145)

## 2018-09-13 PROCEDURE — 80048 BASIC METABOLIC PNL TOTAL CA: CPT

## 2018-09-13 PROCEDURE — 93005 ELECTROCARDIOGRAM TRACING: CPT | Performed by: INTERNAL MEDICINE

## 2018-09-13 PROCEDURE — 93010 ELECTROCARDIOGRAM REPORT: CPT | Performed by: INTERNAL MEDICINE

## 2018-09-13 PROCEDURE — 36415 COLL VENOUS BLD VENIPUNCTURE: CPT

## 2018-09-14 DIAGNOSIS — I10 ESSENTIAL HYPERTENSION: ICD-10-CM

## 2018-09-14 RX ORDER — LOSARTAN POTASSIUM AND HYDROCHLOROTHIAZIDE 12.5; 1 MG/1; MG/1
1 TABLET ORAL DAILY
Qty: 90 TAB | Refills: 3 | Status: SHIPPED | OUTPATIENT
Start: 2018-09-14 | End: 2019-03-19 | Stop reason: SDUPTHER

## 2018-09-17 ENCOUNTER — HOSPITAL ENCOUNTER (OUTPATIENT)
Dept: RADIOLOGY | Facility: MEDICAL CENTER | Age: 58
End: 2018-09-17
Attending: FAMILY MEDICINE
Payer: MEDICARE

## 2018-09-17 VITALS
DIASTOLIC BLOOD PRESSURE: 74 MMHG | OXYGEN SATURATION: 98 % | HEART RATE: 74 BPM | RESPIRATION RATE: 18 BRPM | WEIGHT: 160 LBS | HEIGHT: 63 IN | BODY MASS INDEX: 28.35 KG/M2 | TEMPERATURE: 98.8 F | SYSTOLIC BLOOD PRESSURE: 127 MMHG

## 2018-09-17 DIAGNOSIS — M54.12 CERVICAL RADICULOPATHY: ICD-10-CM

## 2018-09-17 DIAGNOSIS — G54.0 THORACIC OUTLET SYNDROME: ICD-10-CM

## 2018-09-17 PROCEDURE — 01922 ANES N-INVAS IMG/RADJ THER: CPT

## 2018-09-17 PROCEDURE — 72156 MRI NECK SPINE W/O & W/DYE: CPT

## 2018-09-17 PROCEDURE — 71552 MRI CHEST W/O & W/DYE: CPT

## 2018-09-17 PROCEDURE — 700111 HCHG RX REV CODE 636 W/ 250 OVERRIDE (IP)

## 2018-09-17 PROCEDURE — 700117 HCHG RX CONTRAST REV CODE 255: Performed by: FAMILY MEDICINE

## 2018-09-17 PROCEDURE — 700101 HCHG RX REV CODE 250

## 2018-09-17 PROCEDURE — A9585 GADOBUTROL INJECTION: HCPCS | Performed by: FAMILY MEDICINE

## 2018-09-17 RX ORDER — MIDAZOLAM HYDROCHLORIDE 1 MG/ML
INJECTION INTRAMUSCULAR; INTRAVENOUS
Status: DISPENSED
Start: 2018-09-17 | End: 2018-09-17

## 2018-09-17 RX ORDER — GADOBUTROL 604.72 MG/ML
7.5 INJECTION INTRAVENOUS ONCE
Status: COMPLETED | OUTPATIENT
Start: 2018-09-17 | End: 2018-09-17

## 2018-09-17 RX ADMIN — GADOBUTROL 7.5 ML: 604.72 INJECTION INTRAVENOUS at 12:36

## 2018-09-17 ASSESSMENT — PAIN SCALES - GENERAL
PAINLEVEL_OUTOF10: 5
PAINLEVEL_OUTOF10: 7
PAINLEVEL_OUTOF10: 6

## 2018-09-17 NOTE — DISCHARGE INSTRUCTIONS
MRI ADULT DISCHARGE INSTRUCTIONS    You have been medicated today for your scan. Please follow the instructions below to ensure your safe recovery. If you have any questions or problems, feel free to call us at [phone] or [phone].     1.   Have someone stay with you to assist you as needed.    2.   Do not drive or operate any mechanical devices.    3.   Do not perform any activity that requires concentration. Make no major decisions over the next 24 hours.     4.   Be careful changing positions from laying down to sitting or standing, as you may become dizzy.     5.   Do not drink alcohol for 48 hours.    6.   There are no restrictions for eating your normal meals. Drink fluids.    7.   You may continue your usual medications for pain, tranquilizers, muscle relaxants or sedatives when awake.     8.   Tomorrow, you may continue your normal daily activities.     9.   Pressure dressing on 10 - 15 minutes. If swelling or bleeding occurs when removed, continue placing direct pressure on injection site for another 5 minutes, or until bleeding stops.     I have been informed of and understand the above discharge instructions. Midazolam (VERSED)  What is this medicine?  You were given MIDAZOLAM (ALTAGRACIA munson) for your procedure today. This medication is a benzodiazepine. It is used to cause relaxation or sleep before surgery and to block the memory of the procedure.  This medicine may be used for other purposes; ask your health care provider or pharmacist if you have questions.  What side effects may I notice from receiving this medicine?  Side effects that you should report to your doctor or health care professional as soon as possible:  • allergic reactions like skin rash, itching or hives, swelling of the face, lips, or tongue  • breathing problems  • confusion  • dizziness or lightheadedness  • fast, irregular heartbeat  • halluninations during recovery  • numbness or tingling in the hands or feet  • pain, redness,  or swelling at site where injected  • seizures  Side effects that usually do not require medical attention (report to your doctor or health care professional if they continue or are bothersome):  • coughing  • headache  • hiccups  • involuntary eye and muscle movements  • loss of memory of events just before, during, and after use  • nausea, vomiting  • speech problems  • tiredness  • trouble sleeping or nightmares  This list may not describe all possible side effects. Call your doctor for medical advice about side effects. You may report side effects to FDA at 1-684-ZPN-2275.    Fentanyl  What is this medicine?  You were given FENTANYL (FEN ta nil) for your procedure today, it is a pain reliever. It is used to treat breakthrough pain that your long acting pain medicine does not control. Do not use this medicine for a pain that will go away in a few days like pain from surgery, doctor or dentist visits.   This medicine may be used for other purposes; ask your health care provider or pharmacist if you have questions.  What side effects may I notice from receiving this medicine?  Side effects that you should report to your doctor or health care professional as soon as possible:  • allergic reactions like skin rash, itching or hives, swelling of the face, lips, or tongue  • breathing problems  • changes in vision  • confusion  • dry mouth  • feeling faint, lightheaded  • hallucination  • irregular heartbeat  • mouth pain, sores  • problems with balance, talking, walking  • trouble passing urine or change in the amount of urine  • unusual bleeding or bruising  • unusually weak or tired  Side effects that usually do not require medical attention (report to your doctor or health care professional if they continue or are bothersome):  • dizzy  • headache  • loss of appetite  • nausea, vomiting  • sweating  • tingling in mouth  This list may not describe all possible side effects. Call your doctor for medical advice about  side effects. You may report side effects to FDA at 6-490-FDA-2018.

## 2018-10-15 DIAGNOSIS — M50.30 DDD (DEGENERATIVE DISC DISEASE), CERVICAL: ICD-10-CM

## 2018-10-15 DIAGNOSIS — F11.20 OPIOID TYPE DEPENDENCE, CONTINUOUS (HCC): ICD-10-CM

## 2018-10-15 DIAGNOSIS — M54.12 CERVICAL RADICULOPATHY: ICD-10-CM

## 2018-10-15 RX ORDER — TRAMADOL HYDROCHLORIDE 50 MG/1
50-100 TABLET ORAL EVERY 12 HOURS
Qty: 60 TAB | Refills: 0 | OUTPATIENT
Start: 2018-10-15 | End: 2018-11-14

## 2018-10-16 ENCOUNTER — HOSPITAL ENCOUNTER (OUTPATIENT)
Dept: RADIOLOGY | Facility: MEDICAL CENTER | Age: 58
End: 2018-10-16
Attending: SURGERY
Payer: MEDICARE

## 2018-10-16 DIAGNOSIS — R22.1 NECK SWELLING: ICD-10-CM

## 2018-10-16 DIAGNOSIS — L72.9 SKIN CYST: ICD-10-CM

## 2018-10-16 PROCEDURE — 700117 HCHG RX CONTRAST REV CODE 255: Performed by: INTERNAL MEDICINE

## 2018-10-16 PROCEDURE — 70498 CT ANGIOGRAPHY NECK: CPT

## 2018-10-16 RX ADMIN — IOHEXOL 100 ML: 350 INJECTION, SOLUTION INTRAVENOUS at 14:45

## 2019-01-01 ENCOUNTER — HOSPITAL ENCOUNTER (OUTPATIENT)
Facility: MEDICAL CENTER | Age: 59
End: 2019-01-02
Attending: EMERGENCY MEDICINE | Admitting: HOSPITALIST
Payer: MEDICARE

## 2019-01-01 ENCOUNTER — APPOINTMENT (OUTPATIENT)
Dept: RADIOLOGY | Facility: MEDICAL CENTER | Age: 59
End: 2019-01-01
Attending: EMERGENCY MEDICINE
Payer: MEDICARE

## 2019-01-01 DIAGNOSIS — R55 SYNCOPE, UNSPECIFIED SYNCOPE TYPE: ICD-10-CM

## 2019-01-01 LAB
ALBUMIN SERPL BCP-MCNC: 4 G/DL (ref 3.2–4.9)
ALBUMIN/GLOB SERPL: 1.5 G/DL
ALP SERPL-CCNC: 50 U/L (ref 30–99)
ALT SERPL-CCNC: 12 U/L (ref 2–50)
ANION GAP SERPL CALC-SCNC: 10 MMOL/L (ref 0–11.9)
APPEARANCE UR: CLEAR
AST SERPL-CCNC: 17 U/L (ref 12–45)
BASOPHILS # BLD AUTO: 1.3 % (ref 0–1.8)
BASOPHILS # BLD: 0.09 K/UL (ref 0–0.12)
BILIRUB SERPL-MCNC: 0.3 MG/DL (ref 0.1–1.5)
BILIRUB UR QL STRIP.AUTO: NEGATIVE
BUN SERPL-MCNC: 14 MG/DL (ref 8–22)
CALCIUM SERPL-MCNC: 9.6 MG/DL (ref 8.5–10.5)
CHLORIDE SERPL-SCNC: 106 MMOL/L (ref 96–112)
CO2 SERPL-SCNC: 23 MMOL/L (ref 20–33)
COLOR UR: YELLOW
CORTIS SERPL-MCNC: 22.8 UG/DL (ref 0–23)
CREAT SERPL-MCNC: 0.86 MG/DL (ref 0.5–1.4)
EKG IMPRESSION: NORMAL
EOSINOPHIL # BLD AUTO: 0.12 K/UL (ref 0–0.51)
EOSINOPHIL NFR BLD: 1.8 % (ref 0–6.9)
ERYTHROCYTE [DISTWIDTH] IN BLOOD BY AUTOMATED COUNT: 43.9 FL (ref 35.9–50)
ETHANOL BLD-MCNC: 0.04 G/DL
FLUAV RNA SPEC QL NAA+PROBE: NEGATIVE
FLUBV RNA SPEC QL NAA+PROBE: NEGATIVE
GLOBULIN SER CALC-MCNC: 2.7 G/DL (ref 1.9–3.5)
GLUCOSE SERPL-MCNC: 90 MG/DL (ref 65–99)
GLUCOSE UR STRIP.AUTO-MCNC: NEGATIVE MG/DL
HCT VFR BLD AUTO: 39.8 % (ref 37–47)
HGB BLD-MCNC: 13.4 G/DL (ref 12–16)
IMM GRANULOCYTES # BLD AUTO: 0.02 K/UL (ref 0–0.11)
IMM GRANULOCYTES NFR BLD AUTO: 0.3 % (ref 0–0.9)
KETONES UR STRIP.AUTO-MCNC: NEGATIVE MG/DL
LACTATE BLD-SCNC: 2 MMOL/L (ref 0.5–2)
LEUKOCYTE ESTERASE UR QL STRIP.AUTO: NEGATIVE
LIPASE SERPL-CCNC: 75 U/L (ref 11–82)
LYMPHOCYTES # BLD AUTO: 2.92 K/UL (ref 1–4.8)
LYMPHOCYTES NFR BLD: 43.5 % (ref 22–41)
MAGNESIUM SERPL-MCNC: 2.1 MG/DL (ref 1.5–2.5)
MCH RBC QN AUTO: 30.9 PG (ref 27–33)
MCHC RBC AUTO-ENTMCNC: 33.7 G/DL (ref 33.6–35)
MCV RBC AUTO: 91.7 FL (ref 81.4–97.8)
MICRO URNS: NORMAL
MONOCYTES # BLD AUTO: 0.53 K/UL (ref 0–0.85)
MONOCYTES NFR BLD AUTO: 7.9 % (ref 0–13.4)
NEUTROPHILS # BLD AUTO: 3.04 K/UL (ref 2–7.15)
NEUTROPHILS NFR BLD: 45.2 % (ref 44–72)
NITRITE UR QL STRIP.AUTO: NEGATIVE
NRBC # BLD AUTO: 0 K/UL
NRBC BLD-RTO: 0 /100 WBC
PH UR STRIP.AUTO: 7 [PH]
PLATELET # BLD AUTO: 504 K/UL (ref 164–446)
PMV BLD AUTO: 8.3 FL (ref 9–12.9)
POTASSIUM SERPL-SCNC: 3 MMOL/L (ref 3.6–5.5)
PROT SERPL-MCNC: 6.7 G/DL (ref 6–8.2)
PROT UR QL STRIP: NEGATIVE MG/DL
RBC # BLD AUTO: 4.34 M/UL (ref 4.2–5.4)
RBC UR QL AUTO: NEGATIVE
SODIUM SERPL-SCNC: 139 MMOL/L (ref 135–145)
SP GR UR STRIP.AUTO: 1.01
TROPONIN I SERPL-MCNC: <0.01 NG/ML (ref 0–0.04)
TSH SERPL DL<=0.005 MIU/L-ACNC: 3.45 UIU/ML (ref 0.38–5.33)
UROBILINOGEN UR STRIP.AUTO-MCNC: 0.2 MG/DL
WBC # BLD AUTO: 6.7 K/UL (ref 4.8–10.8)

## 2019-01-01 PROCEDURE — A9270 NON-COVERED ITEM OR SERVICE: HCPCS | Performed by: HOSPITALIST

## 2019-01-01 PROCEDURE — 85025 COMPLETE CBC W/AUTO DIFF WBC: CPT

## 2019-01-01 PROCEDURE — 80053 COMPREHEN METABOLIC PANEL: CPT

## 2019-01-01 PROCEDURE — G0378 HOSPITAL OBSERVATION PER HR: HCPCS

## 2019-01-01 PROCEDURE — 84484 ASSAY OF TROPONIN QUANT: CPT

## 2019-01-01 PROCEDURE — 36415 COLL VENOUS BLD VENIPUNCTURE: CPT

## 2019-01-01 PROCEDURE — 700105 HCHG RX REV CODE 258: Performed by: EMERGENCY MEDICINE

## 2019-01-01 PROCEDURE — 71045 X-RAY EXAM CHEST 1 VIEW: CPT

## 2019-01-01 PROCEDURE — 83690 ASSAY OF LIPASE: CPT

## 2019-01-01 PROCEDURE — 700101 HCHG RX REV CODE 250: Performed by: HOSPITALIST

## 2019-01-01 PROCEDURE — 83735 ASSAY OF MAGNESIUM: CPT

## 2019-01-01 PROCEDURE — 93005 ELECTROCARDIOGRAM TRACING: CPT | Performed by: EMERGENCY MEDICINE

## 2019-01-01 PROCEDURE — 81003 URINALYSIS AUTO W/O SCOPE: CPT | Mod: XU

## 2019-01-01 PROCEDURE — 80307 DRUG TEST PRSMV CHEM ANLYZR: CPT

## 2019-01-01 PROCEDURE — 87502 INFLUENZA DNA AMP PROBE: CPT

## 2019-01-01 PROCEDURE — 84443 ASSAY THYROID STIM HORMONE: CPT

## 2019-01-01 PROCEDURE — 99220 PR INITIAL OBSERVATION CARE,LEVL III: CPT | Performed by: HOSPITALIST

## 2019-01-01 PROCEDURE — 99285 EMERGENCY DEPT VISIT HI MDM: CPT

## 2019-01-01 PROCEDURE — 700102 HCHG RX REV CODE 250 W/ 637 OVERRIDE(OP): Performed by: HOSPITALIST

## 2019-01-01 PROCEDURE — 82533 TOTAL CORTISOL: CPT

## 2019-01-01 PROCEDURE — 83605 ASSAY OF LACTIC ACID: CPT

## 2019-01-01 RX ORDER — POLYETHYLENE GLYCOL 3350 17 G/17G
1 POWDER, FOR SOLUTION ORAL
Status: DISCONTINUED | OUTPATIENT
Start: 2019-01-01 | End: 2019-01-02 | Stop reason: HOSPADM

## 2019-01-01 RX ORDER — SODIUM CHLORIDE 9 MG/ML
1000 INJECTION, SOLUTION INTRAVENOUS ONCE
Status: COMPLETED | OUTPATIENT
Start: 2019-01-01 | End: 2019-01-01

## 2019-01-01 RX ORDER — IBUPROFEN 600 MG/1
600 TABLET ORAL EVERY EVENING
COMMUNITY
End: 2019-03-04

## 2019-01-01 RX ORDER — ACETAMINOPHEN 325 MG/1
650 TABLET ORAL EVERY 6 HOURS PRN
Status: DISCONTINUED | OUTPATIENT
Start: 2019-01-01 | End: 2019-01-02 | Stop reason: HOSPADM

## 2019-01-01 RX ORDER — LOSARTAN POTASSIUM 50 MG/1
100 TABLET ORAL
Status: DISCONTINUED | OUTPATIENT
Start: 2019-01-02 | End: 2019-01-02 | Stop reason: HOSPADM

## 2019-01-01 RX ORDER — BISACODYL 10 MG
10 SUPPOSITORY, RECTAL RECTAL
Status: DISCONTINUED | OUTPATIENT
Start: 2019-01-01 | End: 2019-01-02 | Stop reason: HOSPADM

## 2019-01-01 RX ORDER — SODIUM CHLORIDE AND POTASSIUM CHLORIDE 150; 900 MG/100ML; MG/100ML
1000 INJECTION, SOLUTION INTRAVENOUS ONCE
Status: COMPLETED | OUTPATIENT
Start: 2019-01-01 | End: 2019-01-02

## 2019-01-01 RX ORDER — LOSARTAN POTASSIUM AND HYDROCHLOROTHIAZIDE 12.5; 1 MG/1; MG/1
1 TABLET ORAL DAILY
Status: DISCONTINUED | OUTPATIENT
Start: 2019-01-02 | End: 2019-01-01

## 2019-01-01 RX ORDER — POTASSIUM CHLORIDE 20 MEQ/1
40 TABLET, EXTENDED RELEASE ORAL ONCE
Status: COMPLETED | OUTPATIENT
Start: 2019-01-01 | End: 2019-01-01

## 2019-01-01 RX ORDER — TOPIRAMATE 100 MG/1
100 TABLET, FILM COATED ORAL 2 TIMES DAILY
Status: DISCONTINUED | OUTPATIENT
Start: 2019-01-01 | End: 2019-01-02 | Stop reason: HOSPADM

## 2019-01-01 RX ORDER — AMOXICILLIN 250 MG
2 CAPSULE ORAL 2 TIMES DAILY
Status: DISCONTINUED | OUTPATIENT
Start: 2019-01-01 | End: 2019-01-02 | Stop reason: HOSPADM

## 2019-01-01 RX ORDER — HYDROCHLOROTHIAZIDE 12.5 MG/1
12.5 TABLET ORAL
Status: DISCONTINUED | OUTPATIENT
Start: 2019-01-02 | End: 2019-01-02 | Stop reason: HOSPADM

## 2019-01-01 RX ADMIN — TOPIRAMATE 100 MG: 100 TABLET, FILM COATED ORAL at 22:23

## 2019-01-01 RX ADMIN — POTASSIUM CHLORIDE 40 MEQ: 1500 TABLET, EXTENDED RELEASE ORAL at 22:23

## 2019-01-01 RX ADMIN — ACETAMINOPHEN 650 MG: 325 TABLET, FILM COATED ORAL at 22:23

## 2019-01-01 RX ADMIN — SODIUM CHLORIDE 1000 ML: 9 INJECTION, SOLUTION INTRAVENOUS at 18:03

## 2019-01-01 RX ADMIN — POTASSIUM CHLORIDE AND SODIUM CHLORIDE 1000 ML: 900; 150 INJECTION, SOLUTION INTRAVENOUS at 21:44

## 2019-01-01 ASSESSMENT — LIFESTYLE VARIABLES
ALCOHOL_USE: YES
CONSUMPTION TOTAL: NEGATIVE
AVERAGE NUMBER OF DAYS PER WEEK YOU HAVE A DRINK CONTAINING ALCOHOL: 1
TOTAL SCORE: 0
HAVE YOU EVER FELT YOU SHOULD CUT DOWN ON YOUR DRINKING: NO
TOTAL SCORE: 0
ON A TYPICAL DAY WHEN YOU DRINK ALCOHOL HOW MANY DRINKS DO YOU HAVE: 1
EVER HAD A DRINK FIRST THING IN THE MORNING TO STEADY YOUR NERVES TO GET RID OF A HANGOVER: NO
EVER FELT BAD OR GUILTY ABOUT YOUR DRINKING: NO
EVER_SMOKED: NEVER
HAVE PEOPLE ANNOYED YOU BY CRITICIZING YOUR DRINKING: NO
TOTAL SCORE: 0
HOW MANY TIMES IN THE PAST YEAR HAVE YOU HAD 5 OR MORE DRINKS IN A DAY: 0

## 2019-01-01 ASSESSMENT — ENCOUNTER SYMPTOMS
HEADACHES: 0
ABDOMINAL PAIN: 0
COUGH: 0
FOCAL WEAKNESS: 0
FEVER: 0
NAUSEA: 1
TINGLING: 0
VOMITING: 1
SHORTNESS OF BREATH: 0
DIZZINESS: 0
PHOTOPHOBIA: 0
SORE THROAT: 0
MYALGIAS: 1
PALPITATIONS: 0
WHEEZING: 0
DIARRHEA: 0
DEPRESSION: 0
CHILLS: 0

## 2019-01-01 ASSESSMENT — PATIENT HEALTH QUESTIONNAIRE - PHQ9
2. FEELING DOWN, DEPRESSED, IRRITABLE, OR HOPELESS: NOT AT ALL
1. LITTLE INTEREST OR PLEASURE IN DOING THINGS: NOT AT ALL
SUM OF ALL RESPONSES TO PHQ9 QUESTIONS 1 AND 2: 0

## 2019-01-01 ASSESSMENT — PAIN SCALES - GENERAL
PAINLEVEL_OUTOF10: 3
PAINLEVEL_OUTOF10: 5

## 2019-01-02 ENCOUNTER — APPOINTMENT (OUTPATIENT)
Dept: RADIOLOGY | Facility: MEDICAL CENTER | Age: 59
End: 2019-01-02
Attending: HOSPITALIST
Payer: MEDICARE

## 2019-01-02 ENCOUNTER — APPOINTMENT (OUTPATIENT)
Dept: CARDIOLOGY | Facility: MEDICAL CENTER | Age: 59
End: 2019-01-02
Attending: HOSPITALIST
Payer: MEDICARE

## 2019-01-02 VITALS
SYSTOLIC BLOOD PRESSURE: 111 MMHG | RESPIRATION RATE: 17 BRPM | BODY MASS INDEX: 29.26 KG/M2 | HEART RATE: 69 BPM | TEMPERATURE: 97.6 F | WEIGHT: 165.12 LBS | DIASTOLIC BLOOD PRESSURE: 63 MMHG | OXYGEN SATURATION: 97 % | HEIGHT: 63 IN

## 2019-01-02 LAB
ANION GAP SERPL CALC-SCNC: 7 MMOL/L (ref 0–11.9)
BUN SERPL-MCNC: 16 MG/DL (ref 8–22)
CALCIUM SERPL-MCNC: 8.6 MG/DL (ref 8.5–10.5)
CHLORIDE SERPL-SCNC: 113 MMOL/L (ref 96–112)
CO2 SERPL-SCNC: 21 MMOL/L (ref 20–33)
CREAT SERPL-MCNC: 0.72 MG/DL (ref 0.5–1.4)
GLUCOSE SERPL-MCNC: 98 MG/DL (ref 65–99)
LV EJECT FRACT  99904: 60
LV EJECT FRACT MOD 2C 99903: 62.09
LV EJECT FRACT MOD 4C 99902: 60.08
LV EJECT FRACT MOD BP 99901: 58.4
POTASSIUM SERPL-SCNC: 3.6 MMOL/L (ref 3.6–5.5)
SODIUM SERPL-SCNC: 141 MMOL/L (ref 135–145)
TROPONIN I SERPL-MCNC: <0.01 NG/ML (ref 0–0.04)
TROPONIN I SERPL-MCNC: <0.01 NG/ML (ref 0–0.04)

## 2019-01-02 PROCEDURE — G0378 HOSPITAL OBSERVATION PER HR: HCPCS

## 2019-01-02 PROCEDURE — 93306 TTE W/DOPPLER COMPLETE: CPT | Mod: 26 | Performed by: INTERNAL MEDICINE

## 2019-01-02 PROCEDURE — 36415 COLL VENOUS BLD VENIPUNCTURE: CPT

## 2019-01-02 PROCEDURE — 700102 HCHG RX REV CODE 250 W/ 637 OVERRIDE(OP): Performed by: HOSPITALIST

## 2019-01-02 PROCEDURE — 93306 TTE W/DOPPLER COMPLETE: CPT

## 2019-01-02 PROCEDURE — 96374 THER/PROPH/DIAG INJ IV PUSH: CPT | Mod: XU

## 2019-01-02 PROCEDURE — 700102 HCHG RX REV CODE 250 W/ 637 OVERRIDE(OP): Performed by: NURSE PRACTITIONER

## 2019-01-02 PROCEDURE — A9270 NON-COVERED ITEM OR SERVICE: HCPCS | Performed by: HOSPITALIST

## 2019-01-02 PROCEDURE — A9270 NON-COVERED ITEM OR SERVICE: HCPCS | Performed by: NURSE PRACTITIONER

## 2019-01-02 PROCEDURE — 700111 HCHG RX REV CODE 636 W/ 250 OVERRIDE (IP): Performed by: HOSPITALIST

## 2019-01-02 PROCEDURE — 84484 ASSAY OF TROPONIN QUANT: CPT

## 2019-01-02 PROCEDURE — 95951 EEG: CPT | Mod: 52 | Performed by: PSYCHIATRY & NEUROLOGY

## 2019-01-02 PROCEDURE — 99217 PR OBSERVATION CARE DISCHARGE: CPT | Performed by: HOSPITALIST

## 2019-01-02 PROCEDURE — 80048 BASIC METABOLIC PNL TOTAL CA: CPT

## 2019-01-02 PROCEDURE — 93880 EXTRACRANIAL BILAT STUDY: CPT

## 2019-01-02 PROCEDURE — 95951 EEG: CPT | Mod: 26,52 | Performed by: PSYCHIATRY & NEUROLOGY

## 2019-01-02 PROCEDURE — 700101 HCHG RX REV CODE 250: Performed by: HOSPITALIST

## 2019-01-02 RX ORDER — TOPIRAMATE 100 MG/1
150 TABLET, FILM COATED ORAL 2 TIMES DAILY
Qty: 90 TAB | Refills: 1 | Status: SHIPPED | OUTPATIENT
Start: 2019-01-02 | End: 2019-03-04 | Stop reason: SDUPTHER

## 2019-01-02 RX ORDER — ONDANSETRON 2 MG/ML
4 INJECTION INTRAMUSCULAR; INTRAVENOUS EVERY 4 HOURS PRN
Status: DISCONTINUED | OUTPATIENT
Start: 2019-01-02 | End: 2019-01-02 | Stop reason: HOSPADM

## 2019-01-02 RX ORDER — LEVETIRACETAM 250 MG/1
250 TABLET ORAL 2 TIMES DAILY
Status: DISCONTINUED | OUTPATIENT
Start: 2019-01-02 | End: 2019-01-02

## 2019-01-02 RX ADMIN — LOSARTAN POTASSIUM 100 MG: 50 TABLET ORAL at 12:42

## 2019-01-02 RX ADMIN — ONDANSETRON 4 MG: 2 INJECTION INTRAMUSCULAR; INTRAVENOUS at 12:11

## 2019-01-02 RX ADMIN — ACETAMINOPHEN 650 MG: 325 TABLET, FILM COATED ORAL at 08:23

## 2019-01-02 RX ADMIN — HYDROCHLOROTHIAZIDE 12.5 MG: 12.5 TABLET ORAL at 12:42

## 2019-01-02 RX ADMIN — SERTRALINE 50 MG: 50 TABLET, FILM COATED ORAL at 05:17

## 2019-01-02 RX ADMIN — TOPIRAMATE 100 MG: 100 TABLET, FILM COATED ORAL at 05:17

## 2019-01-02 RX ADMIN — LEVETIRACETAM 250 MG: 250 TABLET ORAL at 14:39

## 2019-01-02 ASSESSMENT — PAIN SCALES - GENERAL: PAINLEVEL_OUTOF10: 5

## 2019-01-02 NOTE — ASSESSMENT & PLAN NOTE
Patient has had a month long flulike symptoms for the past month including nausea and vomiting.  This is likely due to GI loss.  I will check a magnesium and correct if needed.  In the meantime, the patient will be on IV fluids with supplemental potassium and she will also receive a dose of oral potassium.  We will repeat chemistries in the morning to ensure correction.

## 2019-01-02 NOTE — ED PROVIDER NOTES
"ED Provider Note    CHIEF COMPLAINT  Chief Complaint   Patient presents with   • Syncope       HPI  Yuki Angelo is a 58 y.o. female who presents to the emergency department complaining of a syncopal episode.  The patient states she was [place] at a casino standing in the line for a buffet had an episode where she felt hot and lightheaded and dizzy and felt like she might pass out.  She did pass out was then has some shaking activity transiently with this.  She was assisted to the ground did not hit her head.  She had no chest pain or shortness of breath.  No tearing pain in her chest or back.  She denies any other symptoms.  No other seizure activity no postictal period.  No recent syncope.  She otherwise been doing fairly well she states she has not felt well for about a month.  She been feeling diffusely achy had a bit of a mild headache which is worsened over the last week or 2.  No focal numbness tingling weakness.  No trauma.  No unintended weight loss no other aggravating leaving factors or associated complaints    REVIEW OF SYSTEMS  See HPI for further details. All other systems are negative.    PAST MEDICAL HISTORY  Past Medical History:   Diagnosis Date   • Aortic regurgitation 2/21/2012    Dr. Gutiérrez, cardiologist   • Arthritis     Generalized   • Backpain    • CHEST PAIN 2/21/2012   • DDD (degenerative disc disease), cervical 8/3/2011   • Fatigue 2/21/2012   • Fibromyalgia    • History of echocardiogram 2/21/2012   • Hypertension 2015    well controlled on meds   • Multilevel degenerative disc disease    • Other specified disorder of intestines     constipation   • Pain     left arm and neck, thoracic area \"allover\"   • Pain management contract agreement 8/13/2018   • Polycystic kidney disease     \"told as child\"   • Psychiatric problem     depression, anxiety   • PVD (peripheral vascular disease) ([facility]) 2/21/2012   • S/P tooth extraction 10/27/2015   • Sleep apnea     Uses CPAP at [facility]   • Snoring  "   • Thoracic outlet syndrome        FAMILY HISTORY  Family History   Problem Relation Age of Onset   • Adopted: Yes   • No Known Problems Son    • No Known Problems Daughter        SOCIAL HISTORY  Social History     Social History   • Marital status:      Spouse name: N/A   • Number of children: N/A   • Years of education: N/A     Social History Main Topics   • Smoking status: Former Smoker     Packs/day: 0.50     Years: 37.00     Types: Cigarettes     Quit date: 5/1/2013   • Smokeless tobacco: Never Used      Comment:  Started smoking at age 16   • Alcohol use 0.6 oz/week     1 Glasses of wine per week      Comment: socially   • Drug use: No   • Sexual activity: Yes     Partners: Male     Birth control/ protection: Post-Menopausal      Comment: .      Other Topics Concern   • Not on file     Social History Narrative   • No narrative on file       SURGICAL HISTORY  Past Surgical History:   Procedure Laterality Date   • BUNIONECTOMY DANYA Right 11/10/2015    Procedure: BUNIONECTOMY DANYA;  Surgeon: Kermit Arroyo D.P.M.;  Location: [facility];  Service:    • OSTECTOMY  11/10/2015    Procedure: OSTECTOMY - 5TH TAILORS BUNION ;  Surgeon: Kermit Arroyo D.P.M.;  Location: SURGERY [facility];  Service:    • ATHROPLASTY Right 11/10/2015    Procedure: ARTHROPLASTY - 5TH DEROTATIONAL;  Surgeon: Kermit Arroyo D.P.M.;  Location: [facility];  Service:    • HAMMERTOE CORRECTION  11/10/2015    Procedure: HAMMERTOE CORRECTION - 2ND, 3RD, 4TH ;  Surgeon: Kermit Arroyo D.P.M.;  Location: [facility];  Service:    • HYSTEROSCOPY WITH VIDEO DIAGNOSTIC  3/27/2015    Performed by Johnny Jarquin M.D. at SURGERY SAME DAY [facility]S   • DILATION AND CURETTAGE  3/27/2015    Performed by Johnny Jarquin M.D. at SURGERY SAME DAY [facility]   • KNEE ARTHROSCOPY  3/11/2013    Performed by Freeman Ballard M.D. at [facility]   • MEDIAL  "MENISCECTOMY  3/11/2013    Performed by Freeman Ballard M.D. at SURGERY [facility] ORS   • NERVE ULNAR TRANSFER  3/13/2012    Performed by KATYA NEAL at SURGERY [facility]   • CARPAL TUNNEL ENDOSCOPIC  3/13/2012    Performed by KATYA NEAL at SURGERY [facility]   • CERVICAL DISK AND FUSION ANTERIOR  6/21/2010    Performed by LAURA BAH at SURGERY [facility] ORS   • INCISION AND DRAINAGE GENERAL  10/22/2009    Performed by ABEL GUZMAN at SURGERY [facility]   • MASS EXCISION GENERAL  10/6/2009    right breast Performed by ABEL GUZMAN at SURGERY 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   HENT: Normocephalic, Atraumatic, Bilateral external ears normal, Oropharynx moist, No oral exudates, Nose normal.   Eyes: PERRL, EOMI, Conjunctiva normal, No discharge.   Neck: Normal range of motion, No tenderness, Supple, No stridor.   Lymphatic: No lymphadenopathy noted.   Cardiovascular: Normal heart rate, Normal rhythm, No murmurs, No rubs, No gallops.   Thorax & Lungs: Normal breath sounds, No respiratory distress, No wheezing,  Abdomen: Bowel sounds normal, " Soft, No tenderness,  Skin: Warm, Dry, No erythema, No rash.   Back: No tenderness, No CVA tenderness.   Musculoskeletal: Good range of motion in all major joints.      Neurologic: Alert,  No focal deficits noted.   Psychiatric: Affect normal,      Results for orders placed or performed during the hospital encounter of 01/01/19   CBC WITH DIFFERENTIAL   Result Value Ref Range    WBC 6.7 4.8 - 10.8 K/uL    RBC 4.34 4.20 - 5.40 M/uL    Hemoglobin 13.4 12.0 - 16.0 g/dL    Hematocrit 39.8 37.0 - 47.0 %    MCV 91.7 81.4 - 97.8 fL    MCH 30.9 27.0 - 33.0 pg    MCHC 33.7 33.6 - 35.0 g/dL    RDW 43.9 35.9 - 50.0 fL    Platelet Count 504 (H) 164 - 446 K/uL    MPV 8.3 (L) 9.0 - 12.9 fL    Neutrophils-Polys 45.20 44.00 - 72.00 %    Lymphocytes 43.50 (H) 22.00 - 41.00 %    Monocytes 7.90 0.00 - 13.40 %    Eosinophils 1.80 0.00 - 6.90 %    Basophils 1.30 0.00 - 1.80 %    Immature Granulocytes 0.30 0.00 - 0.90 %    Nucleated RBC 0.00 /100 WBC    Neutrophils (Absolute) 3.04 2.00 - 7.15 K/uL    Lymphs (Absolute) 2.92 1.00 - 4.80 K/uL    Monos (Absolute) 0.53 0.00 - 0.85 K/uL    Eos (Absolute) 0.12 0.00 - 0.51 K/uL    Baso (Absolute) 0.09 0.00 - 0.12 K/uL    Immature Granulocytes (abs) 0.02 0.00 - 0.11 K/uL    NRBC (Absolute) 0.00 K/uL   COMP METABOLIC PANEL   Result Value Ref Range    Sodium 139 135 - 145 mmol/L    Potassium 3.0 (L) 3.6 - 5.5 mmol/L    Chloride 106 96 - 112 mmol/L    Co2 23 20 - 33 mmol/L    Anion Gap 10.0 0.0 - 11.9    Glucose 90 65 - 99 mg/dL    Bun 14 8 - 22 mg/dL    Creatinine 0.86 0.50 - 1.40 mg/dL    Calcium 9.6 8.5 - 10.5 mg/dL    AST(SGOT) 17 12 - 45 U/L    ALT(SGPT) 12 2 - 50 U/L    Alkaline Phosphatase 50 30 - 99 U/L    Total Bilirubin 0.3 0.1 - 1.5 mg/dL    Albumin 4.0 3.2 - 4.9 g/dL    Total Protein 6.7 6.0 - 8.2 g/dL    Globulin 2.7 1.9 - 3.5 g/dL    A-G Ratio 1.5 g/dL   LIPASE   Result Value Ref Range    Lipase 75 11 - 82 U/L   TROPONIN   Result Value Ref Range    Troponin I <0.01 0.00 - 0.04 ng/mL    LACTIC ACID   Result Value Ref Range    Lactic Acid 2.0 0.5 - 2.0 mmol/L   DIAGNOSTIC ALCOHOL   Result Value Ref Range    Diagnostic Alcohol 0.04 (H) 0.00 g/dL   ESTIMATED GFR   Result Value Ref Range    GFR If African American >60 >60 mL/min/1.73 m 2    GFR If Non African American >60 >60 mL/min/1.73 m 2   EKG (NOW)   Result Value Ref Range    Report       [facility] Emergency Dept.    Test Date:  2019  Pt Name:    IRAIS HANDLEY               Department: ER  MRN:        [id number]                      Room:        03  Gender:     Female                       Technician: [id number]  :        1960                   Requested By:ER TRIAGE PROTOCOL  Order #:    [id number]                    Reading MD: VENICE BALLARD. [facility]    Measurements  Intervals                                Axis  Rate:       63                           P:          56  HI:         192                          QRS:        -16  QRSD:       98                           T:          22  QT:         440  QTc:        451    Interpretive Statements  SINUS RHYTHM  BORDERLINE LEFT AXIS DEVIATION  RSR' IN V1 OR V2, PROBABLY NORMAL VARIANT  BORDERLINE T ABNORMALITIES, ANTERIOR LEADS  BASELINE WANDER IN LEAD(S) I,II,aVR  Compared to ECG 2018 11:44:52  T-wave abnormality now present  Right ventricular hypertrophy no longer present    Electronically  Signed On 2019 20:00:33 PST by VENICE BALLARD. [facility]          RADIOLOGY/PROCEDURES  DX-CHEST-PORTABLE (1 VIEW)   Final Result      Hypoinflation without other evidence for acute cardiopulmonary disease.      EC-ECHOCARDIOGRAM COMPLETE W/O CONT    (Results Pending)   US-CAROTID DOPPLER BILAT    (Results Pending)         COURSE & MEDICAL DECISION MAKING  Pertinent Labs & Imaging studies reviewed. (See chart for details)    The patient presents emergency department with like an acute syncopal episode.  Broad official diagnosis was considered for syncope including but not limited  to vasovagal syncope dehydration likely abnormally cardiac etiology to name a few.    EKG is nonspecific.  CBC CMP and chest x-ray are also unremarkable per    I patient was given IV fluid hydration because she likely has some dehydration playing a role in her syncopal episode.    HYDRATION: Based on the patient's presentation of Dehydration the patient was given IV fluids. IV Hydration was used because oral hydration was not adequate alone. Upon recheck following hydration, the patient was Improved.      At this point I do not have a clear-cut etiology for her syncope.  She requires admission for ongoing monitoring and treatment.  She is admitted to the hospitalist.  Care transferred to Dr. Miranda.      FINAL IMPRESSION  1. Syncope, unspecified syncope type        2.   3.         Electronically signed by: Arturo Buenrostro, 1/1/2019 5:46 PM

## 2019-01-02 NOTE — ED TRIAGE NOTES
"Pt bib ems from [facility] where pt had syncopal episode . Per pt she has been ill for over a month \"with the flu\" pt states she has had sore throat body aches headache chills. Pt states she has not seen doctor for this yet \"i was planning on going soon\" pt states she did have \"two drinks\" today of alcohol. Nad. Vss. Denies c/p or sob  "

## 2019-01-02 NOTE — PROCEDURES
EEG 01/02/19 11:35 AM    VIDEO ELECTROENCEPHALOGRAM / EPILEPSY MONITORING UNIT REPORT      Referring provider: Dr Brown    DOS:   1/2/2019      INDICATION:  Yuki Angelo 58 y.o. female presenting with 57yo F admitted 1/1/19 for a [place]. pt felt hot, lightheaded and dizzy then pt passed out and had some shaking activity. no other sz activity and no postictal period. pt does admit to drinking 2 alcoholic beverages.   pmh- htn, depression, heart regurgitation p/ pt  EEG- sz    CURRENT ANTIEPILEPTIC REGIMEN: Topamax     DURATION: 24 minutes      TECHNIQUE: A 30-channel video electroencephalogram (VEEG) was performed in accordance with the international 10-20 system. This digital study was reviewed in bipolar and referential montages. The recording examined the patient during wakeful, drowsy and sleep states.         DESCRIPTION OF THE RECORD:  During the awake state, background shows symmetrical 10-11 Hz alpha activity posteriorly with amplitude of 70 mV.  There was reactivity with eye opening/closure.  Normal anterior-posterior gradient was noted with faster beta frequencies seen anteriorly.  During drowsiness, high-amplitude delta frequencies were seen. There are few left temporal sharp delta bursts    During the sleep state, background shows diffuse high-amplitude 4-5 Hz delta activity.  Symmetrical high-amplitude sleep spindles and vertex sharp activities were seen in the central leads.    ACTIVATION PROCEDURES:   Hyperventilation was not performed by the patient.   Intermittent Photic stimulation was performed in a stepwise fashion from 1 to 30 Hz and elicited a normal response (photic driving), most noticeable in the posterior leads.      ICTAL AND/OR INTERICTAL FINDINGS:   No focal or generalized epileptiform activity was noted. There are few left temporal sharp delta bursts. No seizures were recorded during the study.     EVENT(S):  NONE    EKG: sampling review of EKG recording  demonstrated regular rhythm      INTERPRETATION:       ________________________________________________________________________    This is mildly abnormal routine video EEG recording in the awake and drowsy/sleep state(s).    This scalp EEG denotes      1. Mild focal cortical dysfunction /irritability over left --this patten could increase the risk of seizure - advise clinical correlation regarding management       EEG 01/02/19 11:43 AM    ________________________________________________________________________  ________________________________________________________________________      REFERENCE    EEG is described as 'abnormal' (that is 'not normal' brain activity) does not 'prove' that the person has epilepsy and does not mean the patient needs treatment. ... Also, many people who do have epilepsy will only have 'abnormal' activity on the EEG if they have a seizure at the time the test is happening.     ________________________________________________________________________

## 2019-01-02 NOTE — PROGRESS NOTES
Admission assessment complete.  Slight headache reported.  Spoke with Dr Barger and received order for prn tylenol.  Neuro checks negative.  Educated pt on plan for the day.  Bed in low position.  Call light in reach.  Will monitor pt

## 2019-01-02 NOTE — DISCHARGE PLANNING
Care Transition Team Assessment    Spoke with patient at bedside. Anticipate no needs @ present time. Spouse will be ride @ D/C.    Information Source  Orientation : Oriented x 4  Information Given By: Patient    Readmission Evaluation  Is this a readmission?: No    Interdisciplinary Discharge Planning  Does Admitting Nurse Feel This Could be a Complex Discharge?: No  Primary Care Physician: Lisa  Lives with - Patient's Self Care Capacity: Spouse  Patient or legal guardian wants to designate a caregiver (see row info): No  Support Systems: Spouse / Significant Other  Do You Take your Prescribed Medications Regularly: Yes  Able to Return to Previous ADL's: Yes  Mobility Issues: No  Prior Services: None  Patient Expects to be Discharged to:: Home  Assistance Needed: No  Durable Medical Equipment: Not Applicable    Discharge Preparedness  What are your discharge supports?: Spouse  Prior Functional Level: Ambulatory  Difficulity with ADLs: None    Functional Assesment  Prior Functional Level: Ambulatory    Finances  Prescription Coverage: Yes    Anticipated Discharge Information  Anticipated discharge disposition: Home  Discharge Address: [address] [place] [address]  Discharge Contact Phone Number: [phone]

## 2019-01-02 NOTE — ASSESSMENT & PLAN NOTE
Echocardiogram from 2015 shows trace mitral, tricuspid, and aortic insufficiency.  Witnesses also reports some twitching episodes.  We will monitor her closely overnight on telemetry for any evidence of cardiac dysrhythmias, I will trend troponin levels and obtain serial EKGs.  I will check a TSH.  She will be on q. 4-hour neurology checks.  I will give her gentle IV fluids and correct her hypokalemia.  I will also check orthostatic blood pressures.  An echocardiogram has been ordered to assess for any worsening of valvular disease, carotid ultrasounds to rule out arterial stenosis and an EEG in light of witnessed abnormal movements during this episode.

## 2019-01-02 NOTE — CARE PLAN
Problem: Cardiac:  Goal: Will show no evidence of cardiac arrhythmias  Outcome: PROGRESSING AS EXPECTED      Problem: Physical Regulation:  Goal: Ability to maintain vital signs within normal range will improve  Outcome: PROGRESSING AS EXPECTED      Problem: Medication:  Goal: Risk for medication side effects will decrease  Outcome: PROGRESSING AS EXPECTED      Problem: Knowledge Deficit:  Goal: Knowledge of disease process/condition, treatment plan, diagnostic tests, and medications will improve  Outcome: PROGRESSING AS EXPECTED

## 2019-01-02 NOTE — DISCHARGE INSTRUCTIONS
Discharge Instructions    Discharged to home by car with relative. Discharged via walking, hospital escort: Yes.  Special equipment needed: Not Applicable    Be sure to schedule a follow-up appointment with your primary care doctor or any specialists as instructed.     Discharge Plan:   Diet Plan: Discussed  Activity Level: Discussed  Confirmed Follow up Appointment: Appointment Scheduled  Confirmed Symptoms Management: Discussed  Medication Reconciliation Updated: Yes  Influenza Vaccine Indication: Patient Refuses    I understand that a diet low in cholesterol, fat, and sodium is recommended for good health. Unless I have been given specific instructions below for another diet, I accept this instruction as my diet prescription.   Other diet: low fat    Special Instructions: None    · Is patient discharged on Warfarin / Coumadin?   No     Depression / Suicide Risk    As you are discharged from this [facility] facility, it is important to learn how to keep safe from harming yourself.    Recognize the warning signs:  · Abrupt changes in personality, positive or negative- including increase in energy   · Giving away possessions  · Change in eating patterns- significant weight changes-  positive or negative  · Change in sleeping patterns- unable to sleep or sleeping all the time   · Unwillingness or inability to communicate  · Depression  · Unusual sadness, discouragement and loneliness  · Talk of wanting to die  · Neglect of personal appearance   · Rebelliousness- reckless behavior  · Withdrawal from people/activities they love  · Confusion- inability to concentrate     If you or a loved one observes any of these behaviors or has concerns about self-harm, here's what you can do:  · Talk about it- your feelings and reasons for harming yourself  · Remove any means that you might use to hurt yourself (examples: pills, rope, extension cords, firearm)  · Get professional help from the community (Mental Health, Substance  Abuse, psychological counseling)  · Do not be alone:Call your Safe Contact- someone whom you trust who will be there for you.  · Call your local CRISIS HOTLINE 098-2860 or 523-926-5658  · Call your local Children's Mobile Crisis Response Team [place] (504) 572-5836 or www.[employer]  · Call the toll free National Suicide Prevention Hotlines   · National Suicide Prevention Lifeline 581-237-VRDC (3999)  · National Hope Line Network 800-SUICIDE (512-8324)

## 2019-01-02 NOTE — DISCHARGE SUMMARY
"Discharge Summary    CHIEF COMPLAINT ON ADMISSION  Chief Complaint   Patient presents with   • Syncope     Reason for Admission  Syncope    Admission Date  1/1/2019    CODE STATUS  Full Code    HPI & HOSPITAL COURSE  Mrs. Angelo is a 58 y.o. female here with syncope.  She reports that she has been feeling ill over the last month where she was experiencing myalgias, nausea, and vomiting.  Just prior to the episode she was standing in line at a casino and she suddenly felt lightheaded, that her throat was \"closing up\", and began to feel hot.  She indicated that she felt like she was going to pass out, and subsequently lost consciousness.  Her  was next to her and was able to catch her and lowered her to the ground, preventing any head trauma.  She does not remember passing out, had no loss of bowel or bladder function, but did feel \"wiped out\" after the episode.  She has had an episode similar to this before but has not actually passed out.  Her  did note some twitching movements in both of her arms.  She was brought to the emergency department where workup was completed and revealed an unremarkable CBC and CMP outside of a low potassium which was repleted.  Her urinalysis was negative, cortisol normal, thyroid function normal, and she was negative for influenza.  Troponins, carotid duplex, and echocardiogram were also unremarkable.  An EEG was done and revealed left-sided cortical irritability but no seizures during the time of the exam.  This finding was discussed with neurology who recommended increasing her home topamax dose and outpatient neurology follow-up.  Both  PCP and neurology follow-up appointments were arranged for her prior to discharge.  Her vital signs remained stable and she feels comfortable with discharging home today.     Therefore, she is discharged in good and stable condition to home with close outpatient follow-up.    Discharge Date  1/2/2019    FOLLOW UP ITEMS POST " DISCHARGE  PCP in 1-2 weeks.   Neurology as scheduled below.    DISCHARGE DIAGNOSES  Principal Problem:    Syncope POA: Yes  Active Problems:    Hypokalemia POA: Yes    Essential hypertension POA: Yes  Resolved Problems:    * No resolved hospital problems. *    FOLLOW UP  Future Appointments  Date Time Provider Department Center   1/4/2019 8:20 AM [facility] MANAGER [address]   1/9/2019 11:00 AM Jesusita Givens M.D. [address]   3/4/2019 9:40 AM Karyna Baker M.D. [facility] None     MEDICATIONS ON DISCHARGE     Medication List      CHANGE how you take these medications      Instructions   topiramate 100 MG Tabs  What changed:  · medication strength  · how much to take  Commonly known as:  TOPAMAX   Take 1.5 Tabs by mouth 2 times a day.  Dose:  150 mg        CONTINUE taking these medications      Instructions   ibuprofen 600 MG Tabs  Commonly known as:  MOTRIN   Take 600 mg by mouth every evening.  Dose:  600 mg     losartan-hydrochlorothiazide 100-12.5 MG per tablet  Commonly known as:  HYZAAR   Doctor's comments:  This is to replace valsartan with HCTZ.  Take 1 Tab by mouth every day.  Dose:  1 Tab     sertraline 50 MG Tabs  Commonly known as:  ZOLOFT   Doctor's comments:  Taker 1/2 pill daily for first week.  Take 1 Tab by mouth every day.  Dose:  50 mg          Allergies  Allergies   Allergen Reactions   • Penicillins Hives   • Sulfa Drugs Hives     DIET  Orders Placed This Encounter   Procedures   • Diet Order Regular     Standing Status:   Standing     Number of Occurrences:   1     Order Specific Question:   Diet:     Answer:   Regular [1]     ACTIVITY  As tolerated.  Exercise encouraged.    CONSULTATIONS  Neurology curbsided    PROCEDURES  None    LABORATORY  Lab Results   Component Value Date    SODIUM 141 01/02/2019    POTASSIUM 3.6 01/02/2019    CHLORIDE 113 (H) 01/02/2019    CO2 21 01/02/2019    GLUCOSE 98 01/02/2019    BUN 16 01/02/2019    CREATININE 0.72 01/02/2019    CREATININE 0.9  06/19/2006      Lab Results   Component Value Date    WBC 6.7 01/01/2019    HEMOGLOBIN 13.4 01/01/2019    HEMATOCRIT 39.8 01/01/2019    PLATELETCT 504 (H) 01/01/2019      Total time of the discharge process exceeds 45 minutes.

## 2019-01-02 NOTE — PROGRESS NOTES
"Pt sleeping in bed.  Wakes easily.  No distress noted.  Neuro checks negative.  Pt states she feels like she is in a \"fog\".  Will monitor  "

## 2019-01-04 ENCOUNTER — PATIENT OUTREACH (OUTPATIENT)
Dept: HEALTH INFORMATION MANAGEMENT | Facility: OTHER | Age: 59
End: 2019-01-04

## 2019-01-04 NOTE — PROGRESS NOTES
1/4/19 8:45am:  CM post discharge outreach call. CM spoke to pt. Pt reports she continues to have ongoing headache.  Patient reports ibuprofen has not helped with headache. Reports she has been nauseated at time.  Pt reports she did have the headache in the hospital and did inform her provider. CM discussed if ongoing headache/nausea return to ER for evaluation. CM scheduled patient a follow-up with PCP for Monday 1/7/19. Update to PCP.

## 2019-01-07 ENCOUNTER — OFFICE VISIT (OUTPATIENT)
Dept: MEDICAL GROUP | Facility: MEDICAL CENTER | Age: 59
End: 2019-01-07
Payer: MEDICARE

## 2019-01-07 VITALS
WEIGHT: 163 LBS | DIASTOLIC BLOOD PRESSURE: 72 MMHG | HEIGHT: 63 IN | BODY MASS INDEX: 28.88 KG/M2 | HEART RATE: 83 BPM | SYSTOLIC BLOOD PRESSURE: 118 MMHG | TEMPERATURE: 98.3 F | OXYGEN SATURATION: 96 % | RESPIRATION RATE: 16 BRPM

## 2019-01-07 DIAGNOSIS — R55 SYNCOPE, UNSPECIFIED SYNCOPE TYPE: ICD-10-CM

## 2019-01-07 DIAGNOSIS — R53.83 FATIGUE, UNSPECIFIED TYPE: ICD-10-CM

## 2019-01-07 DIAGNOSIS — E83.110 HEREDITARY HEMOCHROMATOSIS (HCC): ICD-10-CM

## 2019-01-07 DIAGNOSIS — F11.20 OPIOID TYPE DEPENDENCE, CONTINUOUS (HCC): ICD-10-CM

## 2019-01-07 DIAGNOSIS — D75.839 THROMBOCYTOSIS: ICD-10-CM

## 2019-01-07 DIAGNOSIS — F33.1 MODERATE EPISODE OF RECURRENT MAJOR DEPRESSIVE DISORDER (HCC): ICD-10-CM

## 2019-01-07 DIAGNOSIS — G56.22 ULNAR NEUROPATHY OF LEFT UPPER EXTREMITY: ICD-10-CM

## 2019-01-07 DIAGNOSIS — G54.0 TOS (THORACIC OUTLET SYNDROME): ICD-10-CM

## 2019-01-07 PROCEDURE — 99496 TRANSJ CARE MGMT HIGH F2F 7D: CPT | Performed by: NURSE PRACTITIONER

## 2019-01-07 RX ORDER — HYDROCODONE BITARTRATE AND ACETAMINOPHEN 7.5; 325 MG/1; MG/1
1 TABLET ORAL EVERY 8 HOURS PRN
Qty: 21 TAB | Refills: 0 | Status: SHIPPED | OUTPATIENT
Start: 2019-01-07 | End: 2019-01-14

## 2019-01-07 ASSESSMENT — ENCOUNTER SYMPTOMS
GENERAL WELL-BEING: FAIR
NECK PAIN: 1

## 2019-01-07 ASSESSMENT — ACTIVITIES OF DAILY LIVING (ADL): BATHING_REQUIRES_ASSISTANCE: 0

## 2019-01-07 ASSESSMENT — PATIENT HEALTH QUESTIONNAIRE - PHQ9: CLINICAL INTERPRETATION OF PHQ2 SCORE: 0

## 2019-01-07 NOTE — PROGRESS NOTES
"Subjective:      Yuki Angelo is a 58 y.o. female who presents with Hospital Follow-up (seizure, headache, neck pain) and Medication Refill (tramadol)          POST DISCHARGE CALL:  Discharge Date:1/2/2019   Date of Outreach Call: 1/4/2019  8:34 AM  Now that you're home, how are you doing? Fair  Comment:Pt reports she continues to have a headache.  Reports she had the headache in the hospital and reports it  is not getting better. CM discussed if heachache continues  or any worsening symptoms return to ER for evaluation.  Do you have questions about your medications? *    Did you fill your medications? Yes    Do you have a follow-up appointment scheduled?Yes  Comment:CM assisted patient scheduling a sooner appt  with her PCP.    Discharging Department: Clinical Decision Unit    Number of Attempts: 1  Current or previous attempts completed within two business days of discharge? Yes  Provided education regarding treatment plan, medication, self-management, ADLs? Yes  Has patient completed Advance Directive? If yes, advise them to bring to appointment. No  Care Manager phone number provided? Yes  Is there anything else I can help you with? No      CC: Patient here today accompanied by her  for follow-up ER visit for syncope as well as issues with chronic pain and fatigue.    HPI Yuki Angelo      1. Syncope, unspecified syncope type  Patient apparently had not been feeling well for a few weeks prior to the ER visit when she was standing and [place] local [place] and felt lightheaded and had a possible syncopal episode.  Her  caught her before she hit her head on the ground.  She had no loss of bowel or bladder function.  At the emergency room lab work was fairly normal except for mildly elevated platelet count and low potassium which is corrected.  Cardiac workup was negative.  She did have an EEG study which showed \"cortical irritability\" which was discussed with neurology.  Her Topamax " dosage was increased and she has next available appointment with neurology in March.  She reports no syncope since then.    2. TOS (thoracic outlet syndrome)  Patient was followed in [place] where she was diagnosed with thoracic outlet syndrome and cervical radiculopathy.  She had previously been seen by local neurosurgery and pain management.  She would like to follow back with her neurologist in [place] where she was happy with their care.  Her main concern is she continues to have pain mostly in her neck region.  She reports she was unable to find relief through her local pain management or neurosurgery.  She is wanting refills on tramadol which she takes occasionally for the pain.    3. Ulnar neuropathy of left upper extremity  This was also a finding at [place] and she did receive a redo of ulnar nerve decompression and anterior subcutaneous transposition as well as a redo of her left carpal tunnel release.    4. Opioid type dependence, continuous (CMS-HCC)  Patient previously was using tramadol but has not had a prescription since August.  She would like something to have available to use for pain management.  As mentioned above, she was going to local pain management but did not want to have epidural injections or implantable pain pump.    5. Fatigue, unspecified type  Patient also reports problems with fatigue and does have history of fibromyalgia by history.  Her lab work in the hospital did not show any cause for this although her TSH was just barely higher than I would like at 3.4.    6. Thrombocytosis (HCC)  Platelet count was mildly elevated at the hospital at 504 with her CBC from January normal.  She had no leukopenia or leukocytosis.  There was no anemia.    7.  Hereditary hemochromatosis  Patient due for repeat lab work and reports no problems.    8.  Major depressive disorder:    Patient continues on Zoloft which she finds helpful.  Social History   Substance Use Topics   • Smoking status:  "Former Smoker     Packs/day: 0.50     Years: 37.00     Types: Cigarettes     Quit date: 5/1/2013   • Smokeless tobacco: Never Used      Comment:  Started smoking at age 16   • Alcohol use 0.6 oz/week     1 Glasses of wine per week      Comment: socially     Current Outpatient Prescriptions   Medication Sig Dispense Refill   • HYDROcodone-acetaminophen (NORCO) 7.5-325 MG per tablet Take 1 Tab by mouth every 8 hours as needed for up to 7 days. 21 Tab 0   • topiramate (TOPAMAX) 100 MG Tab Take 1.5 Tabs by mouth 2 times a day. 90 Tab 1   • ibuprofen (MOTRIN) 600 MG Tab Take 600 mg by mouth every evening.     • losartan-hydrochlorothiazide (HYZAAR) 100-12.5 MG per tablet Take 1 Tab by mouth every day. 90 Tab 3   • sertraline (ZOLOFT) 50 MG Tab Take 1 Tab by mouth every day. 30 Tab 11     No current facility-administered medications for this visit.      Family History   Problem Relation Age of Onset   • Adopted: Yes   • No Known Problems Son    • No Known Problems Daughter      Past Medical History:   Diagnosis Date   • Aortic regurgitation 2/21/2012    Dr. Gutiérrez, cardiologist   • Arthritis     Generalized   • Backpain    • CHEST PAIN 2/21/2012   • DDD (degenerative disc disease), cervical 8/3/2011   • Fatigue 2/21/2012   • Fibromyalgia    • History of echocardiogram 2/21/2012   • Hypertension 2015    well controlled on meds   • Multilevel degenerative disc disease    • Other specified disorder of intestines     constipation   • Pain     left arm and neck, thoracic area \"allover\"   • Pain management contract agreement 8/13/2018   • Polycystic kidney disease     \"told as child\"   • Psychiatric problem     depression, anxiety   • PVD (peripheral vascular disease) ([facility]) 2/21/2012   • S/P tooth extraction 10/27/2015   • Sleep apnea     [facility]ses CPAP at [facility]   • Snoring    • Thoracic outlet syndrome        Review of Systems   Constitutional: Positive for malaise/fatigue.   Musculoskeletal: Positive for neck pain.   All other " "systems reviewed and are negative.         Objective:     /72 (BP Location: Right arm, Patient Position: Sitting, BP Cuff Size: Adult)   Pulse 83   Temp 36.8 °C (98.3 °F) (Temporal)   Resp 16   Ht 1.6 m (5' 3\")   Wt 73.9 kg (163 lb)   LMP 06/01/2012   SpO2 96%   BMI 28.87 kg/m²      Physical Exam   Constitutional: She is oriented to person, place, and time. She appears well-developed and well-nourished. No distress.   HENT:   Head: Normocephalic and atraumatic.   Right Ear: External ear normal.   Left Ear: External ear normal.   Nose: Nose normal.   Eyes: Right eye exhibits no discharge. Left eye exhibits no discharge.   Neck: Normal range of motion. Neck supple. No thyromegaly present.   Cardiovascular: Normal rate, regular rhythm and normal heart sounds.  Exam reveals no gallop and no friction rub.    No murmur heard.  Pulmonary/Chest: Effort normal and breath sounds normal. She has no wheezes. She has no rales.   Musculoskeletal: She exhibits no edema or tenderness.        Cervical back: She exhibits decreased range of motion and pain.   Neurological: She is alert and oriented to person, place, and time. She displays normal reflexes.   Skin: Skin is warm and dry. No rash noted. She is not diaphoretic.   Psychiatric: She has a normal mood and affect. Her behavior is normal. Judgment and thought content normal.   Nursing note and vitals reviewed.              Assessment/Plan:     1. Syncope, unspecified syncope type  Patient has had no further syncope but does need to follow-up with neurology regarding her cortical irritability found on EEG.  She has had no further episodes of syncope and is taking the higher dose of Topamax which may be contributing to her fatigue.  I advised against any further tramadol because of the possibility of lowering her seizure threshold.  She is already on Zoloft.    2. TOS (thoracic outlet syndrome)  Patient felt she received very good care at [place] and would like to " follow back with them.  If she is unable to get regular care through them she may need to establish with a different pain management group in [facility] since she did not feel [facility] [place] was helpful.  - REFERRAL TO NEUROSURGERY  - HYDROcodone-acetaminophen (NORCO) 7.5-325 MG per tablet; Take 1 Tab by mouth every 8 hours as needed for up to 7 days.  Dispense: 21 Tab; Refill: 0    3. Ulnar neuropathy of left upper extremity    - REFERRAL TO NEUROSURGERY  - HYDROcodone-acetaminophen (NORCO) 7.5-325 MG per tablet; Take 1 Tab by mouth every 8 hours as needed for up to 7 days.  Dispense: 21 Tab; Refill: 0    4. Opioid type dependence, continuous (CMS-HCC)  Patient will stop tramadol but I explained I cannot provide chronic pain medication and she would need to see a specialist for this.  I will provide her a short course of 1 week Norco and she will keep this available for when the pain is severe.  Her  shows she has not had any controlled substances for a few months and she has signed drug contract and her opiate risk tool shows low addiction potential.  - Consent for Opiate Prescription  - Controlled Substance Treatment Agreement    5. Fatigue, unspecified type  I will check to be sure she is not subclinical hypothyroid as well as to be sure she does not have anemia.  - CBC WITH DIFFERENTIAL; Future  - TSH; Future    6. Thrombocytosis (HCC)  It is unclear why her platelet count was elevated but I will recheck this.  - CBC WITH DIFFERENTIAL; Future    7.  Hereditary hemochromatosis.  Patient will continue to do lab work and follow-up with specialty care as needed.    8.  Major depressive disorder:  Patient will continue on Zoloft which she finds helpful and reports no side effects.

## 2019-01-28 ENCOUNTER — HOSPITAL ENCOUNTER (OUTPATIENT)
Dept: LAB | Facility: MEDICAL CENTER | Age: 59
End: 2019-01-28
Attending: NURSE PRACTITIONER
Payer: MEDICARE

## 2019-01-28 DIAGNOSIS — D75.839 THROMBOCYTOSIS: ICD-10-CM

## 2019-01-28 DIAGNOSIS — R53.83 FATIGUE, UNSPECIFIED TYPE: ICD-10-CM

## 2019-01-28 LAB
BASOPHILS # BLD AUTO: 1.4 % (ref 0–1.8)
BASOPHILS # BLD: 0.07 K/UL (ref 0–0.12)
EOSINOPHIL # BLD AUTO: 0.05 K/UL (ref 0–0.51)
EOSINOPHIL NFR BLD: 1 % (ref 0–6.9)
ERYTHROCYTE [DISTWIDTH] IN BLOOD BY AUTOMATED COUNT: 44.8 FL (ref 35.9–50)
HCT VFR BLD AUTO: 43.7 % (ref 37–47)
HGB BLD-MCNC: 14.6 G/DL (ref 12–16)
IMM GRANULOCYTES # BLD AUTO: 0.01 K/UL (ref 0–0.11)
IMM GRANULOCYTES NFR BLD AUTO: 0.2 % (ref 0–0.9)
LYMPHOCYTES # BLD AUTO: 2.08 K/UL (ref 1–4.8)
LYMPHOCYTES NFR BLD: 40.2 % (ref 22–41)
MCH RBC QN AUTO: 31.2 PG (ref 27–33)
MCHC RBC AUTO-ENTMCNC: 33.4 G/DL (ref 33.6–35)
MCV RBC AUTO: 93.4 FL (ref 81.4–97.8)
MONOCYTES # BLD AUTO: 0.43 K/UL (ref 0–0.85)
MONOCYTES NFR BLD AUTO: 8.3 % (ref 0–13.4)
NEUTROPHILS # BLD AUTO: 2.54 K/UL (ref 2–7.15)
NEUTROPHILS NFR BLD: 48.9 % (ref 44–72)
NRBC # BLD AUTO: 0 K/UL
NRBC BLD-RTO: 0 /100 WBC
PLATELET # BLD AUTO: 407 K/UL (ref 164–446)
PMV BLD AUTO: 8.5 FL (ref 9–12.9)
RBC # BLD AUTO: 4.68 M/UL (ref 4.2–5.4)
TSH SERPL DL<=0.005 MIU/L-ACNC: 1.39 UIU/ML (ref 0.38–5.33)
WBC # BLD AUTO: 5.2 K/UL (ref 4.8–10.8)

## 2019-01-28 PROCEDURE — 36415 COLL VENOUS BLD VENIPUNCTURE: CPT

## 2019-01-28 PROCEDURE — 84443 ASSAY THYROID STIM HORMONE: CPT

## 2019-01-28 PROCEDURE — 85025 COMPLETE CBC W/AUTO DIFF WBC: CPT

## 2019-02-16 DIAGNOSIS — F33.1 MODERATE EPISODE OF RECURRENT MAJOR DEPRESSIVE DISORDER (HCC): ICD-10-CM

## 2019-02-20 DIAGNOSIS — F33.1 MODERATE EPISODE OF RECURRENT MAJOR DEPRESSIVE DISORDER (HCC): ICD-10-CM

## 2019-03-04 ENCOUNTER — OFFICE VISIT (OUTPATIENT)
Dept: NEUROLOGY | Facility: MEDICAL CENTER | Age: 59
End: 2019-03-04
Payer: MEDICARE

## 2019-03-04 VITALS
OXYGEN SATURATION: 98 % | HEIGHT: 63 IN | RESPIRATION RATE: 16 BRPM | HEART RATE: 74 BPM | TEMPERATURE: 98.2 F | SYSTOLIC BLOOD PRESSURE: 118 MMHG | BODY MASS INDEX: 28.1 KG/M2 | DIASTOLIC BLOOD PRESSURE: 76 MMHG | WEIGHT: 158.6 LBS

## 2019-03-04 DIAGNOSIS — G40.909 SEIZURE CEREBRAL (HCC): ICD-10-CM

## 2019-03-04 DIAGNOSIS — G43.019 INTRACTABLE MIGRAINE WITHOUT AURA AND WITHOUT STATUS MIGRAINOSUS: ICD-10-CM

## 2019-03-04 PROBLEM — R55 SYNCOPE: Status: RESOLVED | Noted: 2019-01-01 | Resolved: 2019-03-04

## 2019-03-04 PROCEDURE — 99204 OFFICE O/P NEW MOD 45 MIN: CPT | Performed by: PSYCHIATRY & NEUROLOGY

## 2019-03-04 RX ORDER — SENNOSIDES A AND B 8.6 MG/1
1 TABLET, FILM COATED ORAL
COMMUNITY
Start: 2018-07-21 | End: 2019-03-04

## 2019-03-04 RX ORDER — TOPIRAMATE 50 MG/1
50 TABLET, FILM COATED ORAL 2 TIMES DAILY
Qty: 180 TAB | Refills: 1 | Status: SHIPPED | OUTPATIENT
Start: 2019-03-04 | End: 2021-02-02

## 2019-03-04 RX ORDER — TRAMADOL HYDROCHLORIDE 50 MG/1
50-100 TABLET ORAL
COMMUNITY
Start: 2017-01-17 | End: 2019-03-04

## 2019-03-04 RX ORDER — POTASSIUM CHLORIDE 750 MG/1
10 TABLET, FILM COATED, EXTENDED RELEASE ORAL
COMMUNITY
Start: 2018-04-18 | End: 2019-03-04

## 2019-03-04 RX ORDER — VALSARTAN AND HYDROCHLOROTHIAZIDE 160; 12.5 MG/1; MG/1
TABLET, FILM COATED ORAL
COMMUNITY
Start: 2018-06-20 | End: 2019-03-04

## 2019-03-04 RX ORDER — HYDROCODONE BITARTRATE AND ACETAMINOPHEN 7.5; 325 MG/1; MG/1
TABLET ORAL
COMMUNITY
Start: 2019-01-14 | End: 2019-03-04

## 2019-03-04 RX ORDER — DIVALPROEX SODIUM 250 MG/1
250 TABLET, DELAYED RELEASE ORAL
Qty: 90 TAB | Refills: 1 | Status: SHIPPED | OUTPATIENT
Start: 2019-03-04 | End: 2019-12-20

## 2019-03-04 RX ORDER — ACETAMINOPHEN 325 MG/1
650 TABLET ORAL
COMMUNITY
Start: 2018-07-20 | End: 2019-03-04

## 2019-03-04 NOTE — PROGRESS NOTES
"NEUROLOGY NOTE    Referring Physician  ILIA Paredes      CHIEF COMPLAINT:  [place] developing seizure Jan 1st 2019  Chief Complaint   Patient presents with   • Establish Care     Seizure       PRESENT ILLNESS:   Starred developing seizure Jan 1st 2019  Before seizure, the patient felt tired, post ictal exhaustion  Headache, confused, lasting for hours  She was brought to [facility] for these seizures      PAST MEDICAL HISTORY:  Past Medical History:   Diagnosis Date   • Aortic regurgitation 2/21/2012    Dr. Gutiérrez, cardiologist   • Arthritis     Generalized   • Backpain    • CHEST PAIN 2/21/2012   • DDD (degenerative disc disease), cervical 8/3/2011   • Fatigue 2/21/2012   • Fibromyalgia    • History of echocardiogram 2/21/2012   • Hypertension 2015    well controlled on meds   • Multilevel degenerative disc disease    • Other specified disorder of intestines     constipation   • Pain     left arm and neck, thoracic area \"allover\"   • Pain management contract agreement 8/13/2018   • Polycystic kidney disease     \"told as child\"   • Psychiatric problem     depression, anxiety   • PVD (peripheral vascular disease) ([facility] 2/21/2012   • S/P tooth extraction 10/27/2015   • Sleep apnea     Uses CPAP at [facility]   • Snoring    • Thoracic outlet syndrome        PAST SURGICAL HISTORY:  Past Surgical History:   Procedure Laterality Date   • BUNIONECTOMY DANYA Right 11/10/2015    Procedure: BUNIONECTOMY DANYA;  Surgeon: JAMA HensonPAUBRIE;  Location: SURGERY SURGICAL [facility] ORS;  Service:    • OSTECTOMY  11/10/2015    Procedure: OSTECTOMY - 5TH TAILORS BUNION ;  Surgeon: Kermit Arroyo D.P.M.;  Location: SURGERY SURGICAL [facility] ORS;  Service:    • ATHROPLASTY Right 11/10/2015    Procedure: ARTHROPLASTY - 5TH DEROTATIONAL;  Surgeon: Kermit Arroyo D.P.M.;  Location: SURGERY SURGICAL [facility] ORS;  Service:    • HAMMERTOE CORRECTION  11/10/2015    Procedure: HAMMERTOE CORRECTION - 2ND, 3RD, 4TH ;  Surgeon: " Kermit Arroyo D.P.M.;  Location: [facility];  Service:    • HYSTEROSCOPY WITH VIDEO DIAGNOSTIC  3/27/2015    Performed by Johnny Jarquin M.D. at SURGERY SAME DAY [facility]   • DILATION AND CURETTAGE  3/27/2015    Performed by Johnny Jarquin M.D. at SURGERY SAME DAY [facility] ORS   • KNEE ARTHROSCOPY  3/11/2013    Performed by Freeman Ballard M.D. at [facility]   • MEDIAL MENISCECTOMY  3/11/2013    Performed by Freeman Ballard M.D. at [facility]   • NERVE ULNAR TRANSFER  3/13/2012    Performed by KATYA NEAL at [facility]   • CARPAL TUNNEL ENDOSCOPIC  3/13/2012    Performed by KATYA NEAL at [facility]   • CERVICAL DISK AND FUSION ANTERIOR  6/21/2010    Performed by LAURA BAH at [facility]   • INCISION AND DRAINAGE GENERAL  10/22/2009    Performed by ABEL GUZMAN at [facility]   • MASS EXCISION GENERAL  10/6/2009    right breast Performed by ABEL GUZMAN at [facility]   • BREAST BIOPSY  3/5/2009    right; Performed by ABEL GUZMAN at [facility]   • RIB RESECTION  2001    right for thoracic outlet syndrome   • KNEE ARTHROSCOPY  1998    left   • TUBAL COAGULATION LAPAROSCOPIC BILATERAL  1997   • CERVICAL FUSION POSTERIOR  1996   • OTHER SURGICAL PROCEDURE  11/1992    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     Social History Main Topics   • Smoking status: Former Smoker     Packs/day: 0.50     Years: 37.00     Types: Cigarettes     Quit date: 5/1/2013   • Smokeless tobacco: Never Used      Comment:  Started  smoking at age 16   • Alcohol use 0.6 oz/week     1 Glasses of wine per week      Comment: socially   • Drug use: No   • Sexual activity: Yes     Partners: Male     Birth control/ protection: Post-Menopausal      Comment: .      Other Topics Concern   • Not on file     Social History Narrative   • No narrative on file     ALLERGIES:  Allergies   Allergen Reactions   • Penicillin G Rash   • Penicillins Hives   • Sulfa Drugs Hives and Rash     TOBHX  History   Smoking Status   • Former Smoker   • Packs/day: 0.50   • Years: 37.00   • Types: Cigarettes   • Quit date: 5/1/2013   Smokeless Tobacco   • Never Used     Comment:  Started smoking at age 16     ALCHX  History   Alcohol Use   • 0.6 oz/week   • 1 Glasses of wine per week     Comment: socially     DRUGHX  History   Drug Use No           MEDICATIONS:  Current Outpatient Prescriptions   Medication   • acetaminophen (TYLENOL) 325 MG Tab   • potassium chloride ER (KLOR-CON) 10 MEQ tablet   • sennosides (SENOKOT) 8.6 MG Tab   • tramadol (ULTRAM) 50 MG Tab   • valsartan-hydrochlorothiazide (DIOVAN-HCT) 160-12.5 MG per tablet   • sertraline (ZOLOFT) 50 MG Tab   • topiramate (TOPAMAX) 100 MG Tab   • ibuprofen (MOTRIN) 600 MG Tab   • losartan-hydrochlorothiazide (HYZAAR) 100-12.5 MG per tablet   • HYDROcodone-acetaminophen (NORCO) 7.5-325 MG per tablet     No current facility-administered medications for this visit.        REVIEW OF SYSTEM:    Constitutional: Denies fevers, Denies weight changes   Eyes: Denies changes in vision, no eye pain   Ears/Nose/Throat/Mouth: Denies nasal congestion or sore throat   Cardiovascular: Denies chest pain or palpitations   Respiratory: Denies SOB.   Gastrointestinal/Hepatic: Denies abdominal pain, nausea, vomiting, diarrhea, constipation or GI bleeding   Genitourinary: Denies bladder dysfunction, dysuria or frequency   Musculoskeletal/Rheum: Denies joint pain and swelling   Skin/Breast: Denies rash, denies breast lumps or discharge  "  Neurological: Denies headache, confusion, memory loss or focal weakness/parasthesias   Psychiatric: denies mood disorder   Endocrine: denies hx of diabetes or thyroid dysfunction   Heme/Oncology/Lymph Nodes: Denies enlarged lymph nodes, denies brusing or known bleeding disorder   Allergic/Immunologic: Denies hx of allergies   All other systems were reviewed and are negative (AMA/CMS criteria)       PHYSICAL AND NEUROLOGICAL EXMAINATIONS:  VITAL SIGNS: /76 (BP Location: Right arm, Patient Position: Sitting, BP Cuff Size: Adult)   Pulse 74   Temp 36.8 °C (98.2 °F) (Temporal)   Resp 16   Ht 1.6 m (5' 2.99\")   Wt 71.9 kg (158 lb 9.6 oz)   LMP 06/01/2012   SpO2 98%   BMI 28.10 kg/m²   CURRENT WEIGHT: [unfilled]  BMI: Body mass index is 28.1 kg/m².  PREVIOUS WEIGHTS:  Wt Readings from Last 25 Encounters:   03/04/19 71.9 kg (158 lb 9.6 oz)   01/07/19 73.9 kg (163 lb)   01/01/19 74.9 kg (165 lb 2 oz)   09/17/18 72.6 kg (160 lb)   08/23/18 72.7 kg (160 lb 3.2 oz)   08/13/18 71.7 kg (158 lb)   02/07/18 72.6 kg (160 lb)   01/29/18 74.4 kg (164 lb)   01/25/18 72.6 kg (160 lb)   12/15/17 73 kg (161 lb)   04/21/17 71.1 kg (156 lb 12 oz)   03/03/17 68.9 kg (152 lb)   01/17/17 71.2 kg (157 lb)   05/20/16 72.6 kg (160 lb)   04/11/16 70.8 kg (156 lb)   11/10/15 69.7 kg (153 lb 10.6 oz)   10/20/15 68.5 kg (151 lb)   08/31/15 69.8 kg (153 lb 12.8 oz)   06/11/15 71.2 kg (157 lb)   06/10/15 71.7 kg (158 lb)   04/14/15 72.6 kg (160 lb)   03/13/15 72 kg (158 lb 11.7 oz)   02/17/15 73.5 kg (162 lb)   01/15/15 73.5 kg (162 lb)   12/17/14 73.5 kg (162 lb)       General appearance of patient: WDWN(+) NAD(+)    EYES  o Fundus : Papilledem(-) Exudates(-) Hemorrhage(-)  Nervous System  Orientation to time, place and person(+)  Memory normal(+)  Language: aphasia(-)  Knowledge: past(+) Current(+)  Attention(+)  Cranial Nerves  • Nerve 2: intact  • Nerve 3,4,6: intact  • Nerve 5 : intact  • Nerve 7: intact  • Nerve 8: intact  • Nerve 9 " & 10: intact  • Nerve 11: intact  • Nerve 12: intact  Muscle Power and muscle tone: symmetric, normal in upper and lower  Sensory System: Pin sensation intact(+)  Reflexes: symmetric throughout  Cerebellar Function FNP normal   Gait : Steady(+) TandemGait steady(+)  Heart and Vascular  Peripheral Vasucular system : Edema (-) Swelling(-)  RHB, Breathing sound clear  abdomen bowel sound normoactive  Extremities freely moveable  Joints no contracture       NEUROIMAGING: I reviewed the MRI/CT of brain       LAB:      2017 MRI of lumbar  Impression       1.  Postoperative change of the lumbar spine consistent with multilevel laminectomy.  2.  No evidence for recurrent tumor.  3.  No significant change from prior exam.           IMPRESSION:    1. Seizure like event Jan 2019 with mildly abnormal EEG  2. 1994, lumbar spinal cord tumor status post--- numbness and tingling over right limbs Schwannoma?,   3. Felt exhausted and cognitive decline after Topamax was increased to 150mg two times per day ( the patient was taking topamax 50mg bid for headache prevention)    PLAN/RECOMMENDATIONS:    We will offer MRI of brain to investigate the cause of seizures--- claustrophobia, and need IV sedation  In order to reduce the side effects of AED, let us reduce the Topamax to 50mg two times per day  Wait for one week  This time, we will offer Depakote ER 250mg before sleep for better headache and spells prevention  The patient could stop Depakote if any side effects, could notify us if any side effects  Could notify us if any more spells of confusion    SIGNATURE:  Karyna Baker      CC:  Guillermo Gonzalez [email or web address]    INTERPRETATION:         ________________________________________________________________________     This is mildly abnormal routine video EEG recording in the awake and drowsy/sleep state(s).     This scalp EEG denotes                  1. Mild focal cortical dysfunction /irritability over left --this patten could increase the  risk of seizure - advise clinical correlation regarding management         EEG 01/02/19 11:43 AM     ________________________________________________________________________  ________________________________________________________________________        REFERENCE     EEG is described as 'abnormal' (that is 'not normal' brain activity) does not 'prove' that the person has epilepsy and does not mean the patient needs treatment. ... Also, many people who do have epilepsy will only have 'abnormal' activity on the EEG if they have a seizure at the time the test is happening.      ________________________________________________________________________

## 2019-03-04 NOTE — PATIENT INSTRUCTIONS
IMPRESSION:    1. Seizure like event Jan 2019 with mildly abnormal EEG  2. 1994, lumbar spinal cord tumor status post--- numbness and tingling over right limbs Schwannoma?,   3. Felt exhausted and cognitive decline after Topamax was increased to 150mg two times per day ( the patient was taking topamax 50mg bid for headache prevention)    PLAN/RECOMMENDATIONS:    We will offer MRI of brain to investigate the cause of seizures--- claustrophobia, and need IV sedation  In order to reduce the side effects of AED, let us reduce the Topamax to 50mg two times per day  Wait for one week  This time, we will offer Depakote ER 250mg before sleep for better headache and spells prevention  The patient could stop Depakote if any side effects, could notify us if any side effects  Could notify us if any more spells of confusion    SIGNATURE:  Karyna Baker      CC:  ANDREWS Paredes    INTERPRETATION:         ________________________________________________________________________     This is mildly abnormal routine video EEG recording in the awake and drowsy/sleep state(s).     This scalp EEG denotes                  1. Mild focal cortical dysfunction /irritability over left --this patten could increase the risk of seizure - advise clinical correlation regarding management         EEG 01/02/19 11:43 AM     ________________________________________________________________________  ________________________________________________________________________        REFERENCE     EEG is described as 'abnormal' (that is 'not normal' brain activity) does not 'prove' that the person has epilepsy and does not mean the patient needs treatment. ... Also, many people who do have epilepsy will only have 'abnormal' activity on the EEG if they have a seizure at the time the test is happening.      ________________________________________________________________________

## 2019-03-11 ENCOUNTER — TELEPHONE (OUTPATIENT)
Dept: NEUROLOGY | Facility: MEDICAL CENTER | Age: 59
End: 2019-03-11

## 2019-03-11 NOTE — TELEPHONE ENCOUNTER
Pt called and stated the new medication Depakote gave her a UTI. She is asking if there is another medication she can try.     Please advise.

## 2019-03-12 NOTE — TELEPHONE ENCOUNTER
I am not sure there is a relationship between depakote and UTI though   Advise the patient to stop depakote for now     Wait for 2 weeks, it is reasonable to retry depakote after 2 weeks   Alternatively, could contact us for different medication 2 weeks later       It is not a good idea to switch to new medication when the patient just had UTI       Called pt and advised above medication, she will call back to update.

## 2019-03-19 DIAGNOSIS — I10 ESSENTIAL HYPERTENSION: ICD-10-CM

## 2019-03-19 RX ORDER — LOSARTAN POTASSIUM AND HYDROCHLOROTHIAZIDE 12.5; 1 MG/1; MG/1
1 TABLET ORAL DAILY
Qty: 100 TAB | Refills: 3 | Status: SHIPPED
Start: 2019-03-19 | End: 2020-01-22

## 2019-04-05 ENCOUNTER — ANESTHESIA EVENT (OUTPATIENT)
Dept: RADIOLOGY | Facility: MEDICAL CENTER | Age: 59
End: 2019-04-05
Payer: MEDICARE

## 2019-04-08 ENCOUNTER — ANESTHESIA (OUTPATIENT)
Dept: RADIOLOGY | Facility: MEDICAL CENTER | Age: 59
End: 2019-04-08
Payer: MEDICARE

## 2019-04-08 ENCOUNTER — HOSPITAL ENCOUNTER (OUTPATIENT)
Dept: RADIOLOGY | Facility: MEDICAL CENTER | Age: 59
End: 2019-04-08
Attending: PSYCHIATRY & NEUROLOGY
Payer: MEDICARE

## 2019-04-08 VITALS
OXYGEN SATURATION: 94 % | WEIGHT: 161.38 LBS | HEART RATE: 80 BPM | BODY MASS INDEX: 28.59 KG/M2 | HEIGHT: 63 IN | TEMPERATURE: 98.9 F | RESPIRATION RATE: 18 BRPM

## 2019-04-08 DIAGNOSIS — G40.909 SEIZURE CEREBRAL (HCC): ICD-10-CM

## 2019-04-08 DIAGNOSIS — G43.019 INTRACTABLE MIGRAINE WITHOUT AURA AND WITHOUT STATUS MIGRAINOSUS: ICD-10-CM

## 2019-04-08 PROCEDURE — 700101 HCHG RX REV CODE 250: Performed by: ANESTHESIOLOGY

## 2019-04-08 PROCEDURE — 700105 HCHG RX REV CODE 258: Performed by: ANESTHESIOLOGY

## 2019-04-08 PROCEDURE — 70551 MRI BRAIN STEM W/O DYE: CPT

## 2019-04-08 PROCEDURE — 700111 HCHG RX REV CODE 636 W/ 250 OVERRIDE (IP): Performed by: ANESTHESIOLOGY

## 2019-04-08 RX ORDER — DEXAMETHASONE SODIUM PHOSPHATE 4 MG/ML
INJECTION, SOLUTION INTRA-ARTICULAR; INTRALESIONAL; INTRAMUSCULAR; INTRAVENOUS; SOFT TISSUE PRN
Status: DISCONTINUED | OUTPATIENT
Start: 2019-04-08 | End: 2019-04-08 | Stop reason: SURG

## 2019-04-08 RX ORDER — SODIUM CHLORIDE, SODIUM LACTATE, POTASSIUM CHLORIDE, AND CALCIUM CHLORIDE .6; .31; .03; .02 G/100ML; G/100ML; G/100ML; G/100ML
500 INJECTION, SOLUTION INTRAVENOUS ONCE
Status: DISCONTINUED | OUTPATIENT
Start: 2019-04-08 | End: 2019-04-09 | Stop reason: HOSPADM

## 2019-04-08 RX ORDER — ONDANSETRON 2 MG/ML
4 INJECTION INTRAMUSCULAR; INTRAVENOUS
Status: DISCONTINUED | OUTPATIENT
Start: 2019-04-08 | End: 2019-04-09 | Stop reason: HOSPADM

## 2019-04-08 RX ORDER — SODIUM CHLORIDE, SODIUM LACTATE, POTASSIUM CHLORIDE, CALCIUM CHLORIDE 600; 310; 30; 20 MG/100ML; MG/100ML; MG/100ML; MG/100ML
INJECTION, SOLUTION INTRAVENOUS
Status: DISCONTINUED | OUTPATIENT
Start: 2019-04-08 | End: 2019-04-08 | Stop reason: SURG

## 2019-04-08 RX ADMIN — LIDOCAINE HYDROCHLORIDE 100 MG: 20 INJECTION, SOLUTION INTRAVENOUS at 11:52

## 2019-04-08 RX ADMIN — PROPOFOL 150 MG: 10 INJECTION, EMULSION INTRAVENOUS at 11:52

## 2019-04-08 RX ADMIN — DEXAMETHASONE SODIUM PHOSPHATE 4 MG: 4 INJECTION, SOLUTION INTRA-ARTICULAR; INTRALESIONAL; INTRAMUSCULAR; INTRAVENOUS; SOFT TISSUE at 11:53

## 2019-04-08 RX ADMIN — EPHEDRINE SULFATE 50 MG: 50 INJECTION INTRAMUSCULAR; INTRAVENOUS; SUBCUTANEOUS at 11:52

## 2019-04-08 RX ADMIN — SODIUM CHLORIDE, POTASSIUM CHLORIDE, SODIUM LACTATE AND CALCIUM CHLORIDE: 600; 310; 30; 20 INJECTION, SOLUTION INTRAVENOUS at 11:47

## 2019-04-08 ASSESSMENT — PAIN SCALES - GENERAL: PAIN_LEVEL: 0

## 2019-04-08 NOTE — ANESTHESIA PROCEDURE NOTES
Airway  Date/Time: 4/8/2019 11:55 AM  Performed by: MITCH RUIZ  Authorized by: MITCH RUIZ     Location:  OR  Urgency:  Elective  Indications for Airway Management:  Anesthesia  Spontaneous Ventilation: absent    Sedation Level:  Deep  Preoxygenated: Yes    Patient Position:  Sniffing  Mask Difficulty Assessment:  0 - not attempted  Final Airway Type:  Supraglottic airway  Final Supraglottic Airway:  Standard LMA  SGA Size:  4  Airway Seal Pressure (cm H2O):  20  Number of Attempts at Approach:  1

## 2019-04-08 NOTE — ANESTHESIA POSTPROCEDURE EVALUATION
Patient: Yuki Angelo    Procedure Summary     Date:  04/08/19 Room / Location:  [address]    Anesthesia Start:  1147 Anesthesia Stop:  1231    Procedure:  MR-BRAIN-W/O Diagnosis:       Intractable migraine without aura and without status migrainosus      Seizure cerebral    Scheduled Providers:   Responsible Provider:  Katerin Jauregui M.D.    Anesthesia Type:  general ASA Status:  3          Final Anesthesia Type: general  Last vitals  BP     119/68   Temp     98.9 F   Pulse    82   Resp     16   SpO2     94%     Anesthesia Post Evaluation    Patient location during evaluation: bedside  Patient participation: complete - patient participated  Level of consciousness: awake and alert  Pain score: 0    Airway patency: patent  Anesthetic complications: no  Cardiovascular status: adequate and hemodynamically stable  Respiratory status: acceptable, nonlabored ventilation and room air  Hydration status: euvolemic    PONV: none

## 2019-04-08 NOTE — ANESTHESIA QCDR
2019 [facility] Clinical Data Registry (for Quality Improvement)     Postoperative nausea/vomiting risk protocol (Adult = 18 yrs and Pediatric 3-17 yrs)- (430 and 463)  General inhalation anesthetic (NOT TIVA) with PONV risk factors: Yes  Provision of anti-emetic therapy with at least 2 different classes of agents: Yes   Patient DID NOT receive anti-emetic therapy and reason is documented in Medical Record:  N/A    Multimodal Pain Management- (AQI59)  Patient undergoing Elective Surgery (i.e. Outpatient, or ASC, or Prescheduled Surgery prior to Hospital Admission): No  Use of Multimodal Pain Management, two or more drugs and/or interventions, NOT including systemic opioids: N/A  Exception: Documented allergy to multiple classes of analgesics: N/A    PACU assessment of acute postoperative pain prior to Anesthesia Care End- Applies to Patients Age = 18- (ABG7)  Initial PACU pain score is which of the following: < 7/10  Patient unable to report pain score: N/A    Post-anesthetic transfer of care checklist/protocol to PACU/ICU- (426 and 427)  Upon conclusion of case, patient transferred to which of the following locations: PACU/Non-ICU  Use of transfer checklist/protocol: Yes  Exclusion: Service Performed in Patient Hospital Room (and thus did not require transfer): N/A    PACU Reintubation- (AQI31)  General anesthesia requiring endotracheal intubation (ETT) along with subsequent extubation in OR or PACU: No  Required reintubation in the PACU: N/A  Extubation was a planned trial documented in the medical record prior to removal of the original airway device: N/A    Unplanned admission to ICU related to anesthesia service up through end of PACU care- (MD51)  Unplanned admission to ICU (not initially anticipated at anesthesia start time): No

## 2019-04-08 NOTE — ANESTHESIA TIME REPORT
Anesthesia Start and Stop Event Times     Date Time Event    4/8/2019 1147 Anesthesia Start     1231 Anesthesia Stop        Responsible Staff  04/08/19    Name Role Begin End    Katerin Jauregui M.D. Anesth 1147 1231        Preop Diagnosis (Free Text):  Pre-op Diagnosis             Preop Diagnosis (Codes):    Post op Diagnosis  Seizure cerebral      Premium Reason  Non-Premium    Comments:

## 2019-04-08 NOTE — ANESTHESIA PREPROCEDURE EVALUATION
"58yo F w/ syncopal episodes for brain MRI    Allergies   Allergen Reactions   • Penicillin G Rash   • Penicillins Hives   • Sulfa Drugs Hives and Rash      Relevant Problems   (+) Aortic regurgitation   (+) Cervical radiculopathy   (+) Coronary artery calcification seen on CAT scan   (+) Essential hypertension   (+) Fibromyalgia   (+) Hereditary hemochromatosis (HCC)   (+) Opioid type dependence, continuous (CMS-HCC)   (+) PVD (peripheral vascular disease) ([facility])   (+) Pulmonary nodules   (+) S/P cervical spinal fusion   (+) Seizure cerebral   (+) Sleep apnea      Pertinent Negatives   (-) History of anesthesia complications     Past Medical History:   Diagnosis Date   • Aortic regurgitation 2/21/2012    Dr. Gutiérrez, cardiologist   • Arthritis     Generalized   • Backpain    • CHEST PAIN 2/21/2012   • DDD (degenerative disc disease), cervical 8/3/2011   • Fatigue 2/21/2012   • Fibromyalgia    • History of echocardiogram 2/21/2012   • Hypertension 2015    well controlled on meds   • Multilevel degenerative disc disease    • Other specified disorder of intestines     constipation   • Pain     left arm and neck, thoracic area \"allover\"   • Pain management contract agreement 8/13/2018   • Polycystic kidney disease     \"told as child\"   • Psychiatric problem     depression, anxiety   • PVD (peripheral vascular disease) ([facility]) 2/21/2012   • S/P tooth extraction 10/27/2015   • Sleep apnea     Uses CPAP at [facility]   • Snoring    • Thoracic outlet syndrome       Current Outpatient Prescriptions on File Prior to Encounter   Medication Sig Dispense Refill   • losartan-hydrochlorothiazide (HYZAAR) 100-12.5 MG per tablet Take 1 Tab by mouth every day. 100 Tab 3   • topiramate (TOPAMAX) 50 MG tablet Take 1 Tab by mouth 2 times a day. 180 Tab 1   • divalproex (DEPAKOTE) 250 MG Tablet Delayed Response Take 1 Tab by mouth every bedtime. 90 Tab 1   • sertraline (ZOLOFT) 50 MG Tab TAKE ONE-HALF TABLET BY MOUTH DAILY FOR THE FIRST WEEK, " THEN TAKE 1 TABLET BY MOUTH DAILY THEREAFTER 30 Tab 11     No current facility-administered medications on file prior to encounter.       Past Surgical History:   Procedure Laterality Date   • BUNIONECTOMY DANYA Right 11/10/2015    Procedure: BUNIONECTOMY DANYA;  Surgeon: Kermit Arroyo D.P.M.;  Location: [facility];  Service:    • OSTECTOMY  11/10/2015    Procedure: OSTECTOMY - 5TH TAILORS BUNION ;  Surgeon: Kermit Arroyo D.P.M.;  Location: [facility];  Service:    • ATHROPLASTY Right 11/10/2015    Procedure: ARTHROPLASTY - 5TH DEROTATIONAL;  Surgeon: Kermit Arroyo D.P.M.;  Location: [facility];  Service:    • HAMMERTOE CORRECTION  11/10/2015    Procedure: HAMMERTOE CORRECTION - 2ND, 3RD, 4TH ;  Surgeon: Kermit Arroyo D.P.M.;  Location: [facility];  Service:    • HYSTEROSCOPY WITH VIDEO DIAGNOSTIC  3/27/2015    Performed by Johnny Jarquin M.D. at SURGERY SAME DAY [facility] ORS   • DILATION AND CURETTAGE  3/27/2015    Performed by Johnny Jarquin M.D. at SURGERY SAME DAY [facility] ORS   • KNEE ARTHROSCOPY  3/11/2013    Performed by Freeman Ballard M.D. at [facility]   • MEDIAL MENISCECTOMY  3/11/2013    Performed by Freeman Ballard M.D. at [facility]   • NERVE ULNAR TRANSFER  3/13/2012    Performed by KATYA NEAL at [facility]   • CARPAL TUNNEL ENDOSCOPIC  3/13/2012    Performed by KATYA NEAL at [facility]   • CERVICAL DISK AND FUSION ANTERIOR  6/21/2010    Performed by LAURA BAH at SURGERY [facility] ORS   • INCISION AND DRAINAGE GENERAL  10/22/2009    Performed by ABEL GUZMAN at SURGERY [facility] ORS   • MASS EXCISION GENERAL  10/6/2009    right breast Performed by ABEL GUZMAN at [facility]   • BREAST BIOPSY  3/5/2009    right; Performed by ABEL GUZMAN at [facility]   • RIB RESECTION  2001    right for thoracic  outlet syndrome   • KNEE ARTHROSCOPY  1998    left   • TUBAL COAGULATION LAPAROSCOPIC BILATERAL  1997   • CERVICAL FUSION POSTERIOR  1996   • OTHER SURGICAL PROCEDURE  11/1992    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

## 2019-04-08 NOTE — DISCHARGE INSTRUCTIONS
MRI ADULT DISCHARGE INSTRUCTIONS    You have been medicated today for your scan. Please follow the instructions below to ensure your safe recovery. If you have any questions or problems, feel free to call us at [phone] or [phone].     1.   Have someone stay with you to assist you as needed.    2.   Do not drive or operate any mechanical devices.    3.   Do not perform any activity that requires concentration. Make no major decisions over the next 24 hours.     4.   Be careful changing positions from laying down to sitting or standing, as you may become dizzy.     5.   Do not drink alcohol for 48 hours.    6.   There are no restrictions for eating your normal meals. Drink fluids.    7.   You may continue your usual medications for pain, tranquilizers, muscle relaxants or sedatives when awake.     8.   Tomorrow, you may continue your normal daily activities.     9.   Pressure dressing on 10 - 15 minutes. If swelling or bleeding occurs when removed, continue placing direct pressure on injection site for another 5 minutes, or until bleeding stops.     I have been informed of and understand the above discharge instructions.

## 2019-04-10 ENCOUNTER — TELEPHONE (OUTPATIENT)
Dept: NEUROLOGY | Facility: MEDICAL CENTER | Age: 59
End: 2019-04-10

## 2019-04-10 NOTE — TELEPHONE ENCOUNTER
Received staff message from Cheyanne she stated that pt is very anxious and would like to be seen sooner for an appt because she had seen her MRI results on [facility] and is very concerned. Please review MRI results completed on 4/8/19 and advise if pt needs [facility] appt, thank you.

## 2019-04-11 NOTE — TELEPHONE ENCOUNTER
"Spoke with pt and advised her on documentation below:    \"The so-called \"incidental\" means not related with seizure   The radiologist saw it by accident   Observation on that tiny cavernous angioma is fine     I mean incidental chronic microhemorrhage or a small cavernous angioma           Impression       1.  There is punctate T2 hypointense area in the left cerebellum. The differential diagnosis includes an incidental chronic microhemorrhage or a small cavernous angioma.   2.  There is no hippocampal sclerosis.   3.  No acute abnormality.\"    Pt stated she is still concerned and would like to be referred to [facility] for a second opinion (she does know Dr. Baker is leaving). Pt states more symptoms including balance issues, headaches, and weakness. Please advise thank you.  "

## 2019-04-12 DIAGNOSIS — R56.9 SEIZURE (HCC): ICD-10-CM

## 2019-06-09 DIAGNOSIS — G43.009 NONINTRACTABLE MIGRAINE, UNSPECIFIED MIGRAINE TYPE: ICD-10-CM

## 2019-06-10 RX ORDER — TOPIRAMATE 50 MG/1
TABLET, FILM COATED ORAL
Qty: 120 TAB | Refills: 11 | Status: SHIPPED
Start: 2019-06-10 | End: 2019-12-20

## 2019-12-09 ENCOUNTER — APPOINTMENT (OUTPATIENT)
Dept: RADIOLOGY | Facility: MEDICAL CENTER | Age: 59
End: 2019-12-09
Attending: EMERGENCY MEDICINE
Payer: MEDICARE

## 2019-12-09 ENCOUNTER — HOSPITAL ENCOUNTER (EMERGENCY)
Facility: MEDICAL CENTER | Age: 59
End: 2019-12-09
Attending: EMERGENCY MEDICINE
Payer: MEDICARE

## 2019-12-09 VITALS
WEIGHT: 159.83 LBS | SYSTOLIC BLOOD PRESSURE: 119 MMHG | BODY MASS INDEX: 28.32 KG/M2 | HEART RATE: 67 BPM | HEIGHT: 63 IN | OXYGEN SATURATION: 100 % | DIASTOLIC BLOOD PRESSURE: 74 MMHG | TEMPERATURE: 97.9 F | RESPIRATION RATE: 15 BRPM

## 2019-12-09 DIAGNOSIS — S20.212A RIB CONTUSION, LEFT, INITIAL ENCOUNTER: ICD-10-CM

## 2019-12-09 DIAGNOSIS — S80.01XA CONTUSION OF RIGHT KNEE, INITIAL ENCOUNTER: ICD-10-CM

## 2019-12-09 DIAGNOSIS — S09.90XA CLOSED HEAD INJURY, INITIAL ENCOUNTER: ICD-10-CM

## 2019-12-09 PROCEDURE — 99283 EMERGENCY DEPT VISIT LOW MDM: CPT

## 2019-12-09 PROCEDURE — 73564 X-RAY EXAM KNEE 4 OR MORE: CPT | Mod: RT

## 2019-12-09 PROCEDURE — 70450 CT HEAD/BRAIN W/O DYE: CPT

## 2019-12-09 PROCEDURE — 71101 X-RAY EXAM UNILAT RIBS/CHEST: CPT | Mod: LT

## 2019-12-09 RX ORDER — CYCLOBENZAPRINE HCL 10 MG
10 TABLET ORAL 3 TIMES DAILY PRN
Qty: 30 TAB | Refills: 0 | Status: SHIPPED | OUTPATIENT
Start: 2019-12-09 | End: 2019-12-20

## 2019-12-09 RX ORDER — OXYCODONE HYDROCHLORIDE AND ACETAMINOPHEN 5; 325 MG/1; MG/1
1 TABLET ORAL EVERY 4 HOURS PRN
Qty: 20 TAB | Refills: 0 | Status: SHIPPED | OUTPATIENT
Start: 2019-12-09 | End: 2019-12-14

## 2019-12-09 ASSESSMENT — PAIN SCALES - WONG BAKER: WONGBAKER_NUMERICALRESPONSE: HURTS EVEN MORE

## 2019-12-09 NOTE — ED TRIAGE NOTES
Chief Complaint   Patient presents with   • T-5000 FALL     fell friday after she slipped and fell landed on left side of rib/left neck/left upper back. denies numbness/tingling.   • Headache     right side of temporal area. states she also hit the door. denies loc.     Denies syncopal event. Denies taking blood thinner. Aaox4. Able to speak in full sentences. Respiration unlabored. Skin pwd. Educated on triage process. Instructed to notify staff for any worsening symptoms. Ambulatory in triage.

## 2019-12-09 NOTE — ED PROVIDER NOTES
"ED Provider Note    CHIEF COMPLAINT  Chief Complaint   Patient presents with   • T-5000 FALL     fell friday after she slipped and fell landed on left side of rib/left neck/left upper back. denies numbness/tingling.   • Headache     right side of temporal area. states she also hit the door. denies loc.       HPI  Yuki Angelo is a 59 y.o. female who presents for evaluation of numerous complaints after trauma.  The patient reports that 2 days ago she slipped [place]  She reports striking the right aspect of her head right knee and fell onto her left lateral chest wall.  She does not report any loss of consciousness.  She is consistent that this was not a syncopal event.  She waited 2 days but comes in due to persistent headache, pain with inspiration and pain in the right knee.  She does not take any anticoagulants or blood thinners.  No focal numbness weakness tingling to the arms legs or face she does report ongoing headache, light sensitivity and mild confusion    REVIEW OF SYSTEMS  See HPI for further details.  No loss of consciousness numbness tingling weakness all other systems are negative.     PAST MEDICAL HISTORY  Past Medical History:   Diagnosis Date   • Pain management contract agreement 8/13/2018   • S/P tooth extraction 10/27/2015   • Hypertension 2015    well controlled on meds   • History of echocardiogram 2/21/2012   • Fatigue 2/21/2012   • PVD (peripheral vascular disease) ([facility]) 2/21/2012   • Aortic regurgitation 2/21/2012    Dr. Gutiérrez, cardiologist   • CHEST PAIN 2/21/2012   • DDD (degenerative disc disease), cervical 8/3/2011   • Arthritis     Generalized   • Backpain    • Fibromyalgia    • Multilevel degenerative disc disease    • Other specified disorder of intestines     constipation   • Pain     left arm and neck, thoracic area \"allover\"   • Polycystic kidney disease     \"told as child\"   • Psychiatric problem     depression, anxiety   • Sleep " apnea     Uses CPAP at [facility]   • Snoring    • Thoracic outlet syndrome        FAMILY HISTORY  Noncontributory    SOCIAL HISTORY  Social History     Socioeconomic History   • Marital status:      Spouse name: Not on file   • Number of children: Not on file   • Years of education: Not on file   • Highest education level: Not on file   Occupational History   • Not on file   Social Needs   • Financial resource strain: Not on file   • Food insecurity:     Worry: Not on file     Inability: Not on file   • Transportation needs:     Medical: Not on file     Non-medical: Not on file   Tobacco Use   • Smoking status: Former Smoker     Packs/day: 0.50     Years: 37.00     Pack years: 18.50     Types: Cigarettes     Last attempt to quit: 2013     Years since quittin.6   • Smokeless tobacco: Never Used   • Tobacco comment:  Started smoking at age 16   Substance and Sexual Activity   • Alcohol use: Yes     Alcohol/week: 0.6 oz     Types: 1 Glasses of wine per week     Comment: socially   • Drug use: No   • Sexual activity: Yes     Partners: Male     Birth control/protection: Post-Menopausal     Comment: .    Lifestyle   • Physical activity:     Days per week: Not on file     Minutes per session: Not on file   • Stress: Not on file   Relationships   • Social connections:     Talks on phone: Not on file     Gets together: Not on file     Attends [employer] service: Not on file     Active member of club or organization: Not on file     Attends meetings of clubs or organizations: Not on file     Relationship status: Not on file   • Intimate partner violence:     Fear of current or ex partner: Not on file     Emotionally abused: Not on file     Physically abused: Not on file     Forced sexual activity: Not on file   Other Topics Concern   • Not on file   Social History Narrative   • Not on file       SURGICAL HISTORY  Past Surgical History:   Procedure Laterality Date   • BUNIONECTOMY DANYA Right 11/10/2015     Procedure: BUNIONECTOMY DANYA;  Surgeon: Kermit Arroyo D.P.M.;  Location: [facility];  Service:    • OSTECTOMY  11/10/2015    Procedure: OSTECTOMY - 5TH TAILORS ALEKS ;  Surgeon: Kermit Arroyo D.P.M.;  Location: [facility];  Service:    • ATHROPLASTY Right 11/10/2015    Procedure: ARTHROPLASTY - 5TH DEROTATIONAL;  Surgeon: Kermit Arroyo D.P.M.;  Location: [facility];  Service:    • HAMMERTOE CORRECTION  11/10/2015    Procedure: HAMMERTOE CORRECTION - 2ND, 3RD, 4TH ;  Surgeon: Kermit Arroyo D.P.M.;  Location: [facility];  Service:    • HYSTEROSCOPY WITH VIDEO DIAGNOSTIC  3/27/2015    Performed by Johnny Jarquin M.D. at SURGERY SAME DAY [facility]   • DILATION AND CURETTAGE  3/27/2015    Performed by Johnny Jarquin M.D. at SURGERY SAME DAY [facility] ORS   • KNEE ARTHROSCOPY  3/11/2013    Performed by Freeman Ballard M.D. at [facility]   • MEDIAL MENISCECTOMY  3/11/2013    Performed by Freeman Ballard M.D. at [facility]   • NERVE ULNAR TRANSFER  3/13/2012    Performed by KATYA NEAL at [facility]   • CARPAL TUNNEL ENDOSCOPIC  3/13/2012    Performed by KATYA NEAL at [facility]   • CERVICAL DISK AND FUSION ANTERIOR  6/21/2010    Performed by LAURA BAH at SURGERY [facility] ORS   • INCISION AND DRAINAGE GENERAL  10/22/2009    Performed by ABEL GUZMAN at SURGERY [facility]   • MASS EXCISION GENERAL  10/6/2009    right breast Performed by ABEL GUZMAN at [facility]   • BREAST BIOPSY  3/5/2009    right; Performed by ABEL GUZMAN at SURGERY [facility]   • RIB RESECTION  2001    right for thoracic outlet syndrome   • KNEE ARTHROSCOPY  1998    left   • TUBAL COAGULATION LAPAROSCOPIC BILATERAL  1997   • CERVICAL FUSION POSTERIOR  1996   • OTHER SURGICAL PROCEDURE  11/1992    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 (Registered Nurse) on 12/09/19 at 1144  Med List Status: <None>   Medication Last Dose Status   divalproex (DEPAKOTE) 250 MG Tablet Delayed Response  Active   losartan-hydrochlorothiazide (HYZAAR) 100-12.5 MG per tablet  Active   sertraline (ZOLOFT) 50 MG Tab  Active   topiramate (TOPAMAX) 50 MG tablet  Active   topiramate (TOPAMAX) 50 MG tablet  Active                ALLERGIES  Allergies   Allergen Reactions   • Penicillin G Rash   • Penicillins Hives   • Sulfa Drugs Hives and Rash       PHYSICAL EXAM  VITAL SIGNS: /86   Pulse 80   Temp 36.6 °C (97.9 °F) (Oral)   Resp 16   Ht 1.6 m (5' 3\")   Wt 72.5 kg (159 lb 13.3 oz)   LMP 06/01/2012   SpO2 98%   BMI 28.31 kg/m²  Room air O2: 98    Constitutional: Well developed, Well nourished, No acute distress, Non-toxic appearance.   HENT: Normocephalic tenderness noted over the right temple region without step-off or crepitus, Bilateral external ears normal, Oropharynx moist, No oral exudates, Nose normal.   Eyes: PERRLA pupils 4-2 bilaterally no hemotympanum, EOMI, Conjunctiva normal, No discharge.   Neck: Normal range of motion, No tenderness, Supple, No stridor.   Cardiovascular: Normal heart rate, Normal rhythm, No murmurs, No rubs, No gallops.   Thorax & Lungs: Normal breath sounds, No respiratory distress, No wheezing, left lateral chest wall without flail chest or crepitus   abdomen: Bowel sounds normal, Soft, No tenderness, No masses, No pulsatile masses.   Skin: Warm, Dry, No erythema, No rash.   Back: No tenderness, No CVA tenderness.   Extremities: I Bony tenderness noted over the anterior aspect of the right patella.  Patella has normal position and lie  Neurologic: Alert & oriented x 3, Normal motor function, Normal sensory function, No focal deficits noted.  Cranial nerves II through XII grossly intact finger-nose testing is normal pupils are equal round and " symmetric  Psychiatric: Anxious      [id number] (WITH 1-VIEW CXR) LEFT   Final Result      1.  No evidence for acute intrathoracic injury.   2.  No acute LEFT rib findings.      DX-KNEE COMPLETE 4+ RIGHT   Final Result      1.  No fracture or dislocation of RIGHT knee.   2.  Minimal degenerative change.      CT-HEAD W/O   Final Result      No acute intracranial abnormality.          COURSE & MEDICAL DECISION MAKING  Pertinent Labs & Imaging studies reviewed. (See chart for details)  Patient is 2 days removed from her injury and CT scan of the head is negative.  Rib series on the left side is negative for intrathoracic injury or pneumothorax and there is no evidence of patellar fracture or dislocation.  I counseled the patient that it is very unlikely that she has nondisplaced rib fractures but this is a possibility.  With that being said there is no evidence of hypoxia, pneumothorax etc.  He is unlikely that she would develop a delayed intracranial hemorrhage as she is not on any anticoagulants or blood thinners.  I counseled her to return as needed for new or worsening symptoms.  She will be given a small prescription of narcotic pain medication.  She was consented regarding [place] statute for opioid distribution and determined to be low risk using an opioid risk assessment tool.    FINAL IMPRESSION  1.   1. Closed head injury, initial encounter     2. Contusion of right knee, initial encounter  oxyCODONE-acetaminophen (PERCOCET) 5-325 MG Tab   3. Rib contusion, left, initial encounter  oxyCODONE-acetaminophen (PERCOCET) 5-325 MG Tab       Electronically signed by: Arturo Nails, 12/9/2019 12:02 PM

## 2019-12-11 ENCOUNTER — PATIENT OUTREACH (OUTPATIENT)
Dept: HEALTH INFORMATION MANAGEMENT | Facility: OTHER | Age: 59
End: 2019-12-11

## 2019-12-11 NOTE — PROGRESS NOTES
1. [facility] Verified: yes    2. Verify PCP: yes    3. Review and add  to Care Team: yes    4. Reviewed/Updated the following with patient:       •   Communication Preference Obtained? YES  • MyChart Activation: already active       •   E-Mail Address Obtained? YES       •   Appointment Day and Time Preferences? YES       •   Preferred Pharmacy? YES       •   Preferred Lab? YES    6. Care Gap Scheduling (Attempt to Schedule EACH Overdue Care Gap!)    Scheduled patient for Follow-Up for after visit to the ER.      Called pt and introduced SCP  program/ ER FU/ Scheduled/ Pt requested a call back in January to schedule care gaps/ sent letter by mail.

## 2019-12-20 ENCOUNTER — OFFICE VISIT (OUTPATIENT)
Dept: MEDICAL GROUP | Facility: MEDICAL CENTER | Age: 59
End: 2019-12-20
Payer: MEDICARE

## 2019-12-20 VITALS
HEART RATE: 82 BPM | DIASTOLIC BLOOD PRESSURE: 76 MMHG | OXYGEN SATURATION: 96 % | BODY MASS INDEX: 28.88 KG/M2 | SYSTOLIC BLOOD PRESSURE: 130 MMHG | WEIGHT: 163 LBS | RESPIRATION RATE: 16 BRPM | HEIGHT: 63 IN

## 2019-12-20 DIAGNOSIS — M54.12 CERVICAL RADICULOPATHY: ICD-10-CM

## 2019-12-20 DIAGNOSIS — Z12.11 SCREEN FOR COLON CANCER: ICD-10-CM

## 2019-12-20 DIAGNOSIS — Z12.39 SCREENING FOR BREAST CANCER: ICD-10-CM

## 2019-12-20 DIAGNOSIS — R73.01 IMPAIRED FASTING GLUCOSE: ICD-10-CM

## 2019-12-20 DIAGNOSIS — I10 ESSENTIAL HYPERTENSION: ICD-10-CM

## 2019-12-20 DIAGNOSIS — E78.5 DYSLIPIDEMIA: ICD-10-CM

## 2019-12-20 DIAGNOSIS — Z11.59 NEED FOR HEPATITIS C SCREENING TEST: ICD-10-CM

## 2019-12-20 DIAGNOSIS — K59.00 CONSTIPATION, UNSPECIFIED CONSTIPATION TYPE: ICD-10-CM

## 2019-12-20 DIAGNOSIS — F33.1 MODERATE EPISODE OF RECURRENT MAJOR DEPRESSIVE DISORDER (HCC): ICD-10-CM

## 2019-12-20 DIAGNOSIS — R07.81 RIB PAIN ON LEFT SIDE: ICD-10-CM

## 2019-12-20 PROBLEM — Z02.89 PAIN MANAGEMENT CONTRACT AGREEMENT: Status: RESOLVED | Noted: 2018-08-13 | Resolved: 2019-12-20

## 2019-12-20 PROCEDURE — 99214 OFFICE O/P EST MOD 30 MIN: CPT | Performed by: NURSE PRACTITIONER

## 2019-12-20 RX ORDER — DOCUSATE SODIUM 100 MG/1
100 CAPSULE, LIQUID FILLED ORAL 2 TIMES DAILY
Qty: 60 CAP | Refills: 0 | Status: SHIPPED | OUTPATIENT
Start: 2019-12-20 | End: 2021-02-02

## 2019-12-20 RX ORDER — SERTRALINE HYDROCHLORIDE 100 MG/1
100 TABLET, FILM COATED ORAL DAILY
Qty: 30 TAB | Refills: 11 | Status: SHIPPED | OUTPATIENT
Start: 2019-12-20 | End: 2021-03-01

## 2019-12-20 ASSESSMENT — ENCOUNTER SYMPTOMS
BACK PAIN: 1
CONSTIPATION: 1
FALLS: 1
NECK PAIN: 1
MYALGIAS: 1
DEPRESSION: 1
DIZZINESS: 1

## 2019-12-20 NOTE — PROGRESS NOTES
Subjective:      Yuki Angelo is a 59 y.o. female who presents with Hospital Follow-up        CC: Patient is here today for hospital follow-up where she was seen for a fall on December 9 but also for issues including postural hypertension depression, constipation and chronic pain.  I have not seen patient since January of this year.    HPI       1. Rib pain on left side  Patient excellently slipped and fell on December 9 falling onto her left side.  She was seen at the emergency room and work-up including CT of the head, knee and rib x-ray which were all negative.  She is still having some pain in the left rib area with coughing and sneezing but otherwise feels she is improving and has had no syncope, nausea or shortness of breath.    2. Essential hypertension  Patient has been on combination of losartan with HCTZ for quite a while but has been having some issues with postural dizziness and presyncope as well as delayed orthostatic hypotension for which she most recently has been following at [facility].  Note review shows they were going to recommend blood pressure medication changes but no mention was given of which medicine is to change to and patient is not sure of this as well.    3. Dyslipidemia  Patient due in January for yearly cholesterol testing with previous readings showing elevated LDL.    4. Impaired fasting glucose  Patient's most recent blood sugar is mildly elevated and she has no history of diabetes but has not had a recent hemoglobin A1c.    5. Moderate episode of recurrent major depressive disorder (HCC)  Patient currently on Zoloft 50 mg but feels it is not working as well as it used to and would like to go to a higher dose.  She is not suicidal or homicidal    6. Constipation, unspecified constipation type  Patient states she has been having problems with constipation for quite a while and she states sometimes she will go 5 days without a bowel movement.  She is off all opiates  "and the only medicine that may be contributing to her constipation is HCTZ.  Review of diet however shows she eats very little in the way of fiber.    7. Cervical radiculopathy  Patient has history of chronic back pain for which she has been treated with various methods in the past.  She also has history of cervical spine fusion, degenerative disc disease and fibromyalgia and would like to get back into pain management to look at treatment options.    8. Need for hepatitis C screening test  Patient due for screening    9. Screening for breast cancer  Patient due for mammogram  - [id number]  - [id number]    10. Screen for colon cancer  Patient states she has not had a colonoscopy in over 10 years.  Past Medical History:   Diagnosis Date   • Aortic regurgitation 2/21/2012    Dr. Gutiérrez, cardiologist   • Arthritis     Generalized   • Backpain    • CHEST PAIN 2/21/2012   • DDD (degenerative disc disease), cervical 8/3/2011   • Fatigue 2/21/2012   • Fibromyalgia    • History of echocardiogram 2/21/2012   • Hypertension 2015    well controlled on meds   • Multilevel degenerative disc disease    • Other specified disorder of intestines     constipation   • Pain     left arm and neck, thoracic area \"allover\"   • Pain management contract agreement 8/13/2018   • Polycystic kidney disease     \"told as child\"   • Psychiatric problem     depression, anxiety   • PVD (peripheral vascular disease) ([facility]) 2/21/2012   • S/P tooth extraction 10/27/2015   • Sleep apnea     [facility]AP at [facility]   • Snoring    • Thoracic outlet syndrome      Social History     Socioeconomic History   • Marital status:      Spouse name: Not on file   • Number of children: Not on file   • Years of education: Not on file   • Highest education level: Not on file   Occupational History   • Not on file   Social Needs   • Financial resource strain: Not on file   • Food insecurity:     Worry: Not on file     Inability: Not on file   • " Transportation needs:     Medical: Not on file     Non-medical: Not on file   Tobacco Use   • Smoking status: Former Smoker     Packs/day: 0.50     Years: 37.00     Pack years: 18.50     Types: Cigarettes     Last attempt to quit: 2013     Years since quittin.6   • Smokeless tobacco: Never Used   • Tobacco comment:  Started smoking at age 16   Substance and Sexual Activity   • Alcohol use: Yes     Alcohol/week: 0.6 oz     Types: 1 Glasses of wine per week     Comment: socially   • Drug use: No   • Sexual activity: Yes     Partners: Male     Birth control/protection: Post-Menopausal     Comment: .    Lifestyle   • Physical activity:     Days per week: Not on file     Minutes per session: Not on file   • Stress: Not on file   Relationships   • Social connections:     Talks on phone: Not on file     Gets together: Not on file     Attends [employer] service: Not on file     Active member of club or organization: Not on file     Attends meetings of clubs or organizations: Not on file     Relationship status: Not on file   • Intimate partner violence:     Fear of current or ex partner: Not on file     Emotionally abused: Not on file     Physically abused: Not on file     Forced sexual activity: Not on file   Other Topics Concern   • Not on file   Social History Narrative   • Not on file     Current Outpatient Medications   Medication Sig Dispense Refill   • sertraline (ZOLOFT) 100 MG Tab Take 1 Tab by mouth every day. 30 Tab 11   • docusate sodium (COLACE) 100 MG Cap Take 1 Cap by mouth 2 times a day. 60 Cap 0   • losartan-hydrochlorothiazide (HYZAAR) 100-12.5 MG per tablet Take 1 Tab by mouth every day. 100 Tab 3   • topiramate (TOPAMAX) 50 MG tablet Take 1 Tab by mouth 2 times a day. 180 Tab 1     No current facility-administered medications for this visit.      Family History   Adopted: Yes   Problem Relation Age of Onset   • No Known Problems Son    • No Known Problems Daughter          Review of Systems  "  Gastrointestinal: Positive for constipation.   Musculoskeletal: Positive for back pain, falls, myalgias and neck pain.   Neurological: Positive for dizziness.   Psychiatric/Behavioral: Positive for depression.   All other systems reviewed and are negative.         Objective:     /76 (BP Location: Right arm, Patient Position: Sitting, BP Cuff Size: Adult)   Pulse 82   Resp 16   Ht 1.6 m (5' 3\")   Wt 73.9 kg (163 lb)   LMP 06/01/2012   SpO2 96%   BMI 28.87 kg/m²      Physical Exam  Vitals signs and nursing note reviewed.   Constitutional:       General: She is not in acute distress.     Appearance: She is well-developed. She is not diaphoretic.   HENT:      Head: Normocephalic and atraumatic.      Right Ear: External ear normal.      Left Ear: External ear normal.      Nose: Nose normal.   Eyes:      General:         Right eye: No discharge.         Left eye: No discharge.   Neck:      Musculoskeletal: Normal range of motion and neck supple.      Thyroid: No thyromegaly.   Cardiovascular:      Rate and Rhythm: Normal rate and regular rhythm.      Heart sounds: Normal heart sounds. No murmur. No friction rub. No gallop.    Pulmonary:      Effort: Pulmonary effort is normal.      Breath sounds: Normal breath sounds. No wheezing or rales.   Musculoskeletal:         General: No tenderness.      Comments: Pain to palpation and deep breaths in the left rib area.  Pain also present in lower back.   Skin:     General: Skin is warm and dry.      Findings: No rash.   Neurological:      Mental Status: She is alert and oriented to person, place, and time.      Deep Tendon Reflexes: Reflexes normal.   Psychiatric:         Mood and Affect: Mood is depressed.         Behavior: Behavior normal.         Thought Content: Thought content normal.         Judgment: Judgment normal.                 Assessment/Plan:       1. Rib pain on left side  Patient had work-up in the emergency room which showed no evidence of rib " fracture or brain injury and appears to be doing better and I reminded her to try to take deep breaths to prevent pneumonia and she has some medicine from the ER for pain.    Patient will continue to follow-up with [facility] for issues with orthostatic hypotension.    2. Essential hypertension  I advised patient I will switch her to whatever medication they recommend at neurology and apparently she is waiting for the information or I can just go ahead and switch her to a low-dose amlodipine or something similar and then monitor her blood pressure in the future.  - CBC WITH DIFFERENTIAL; Future  - TSH; Future    3. Dyslipidemia  Patient will be due next week for blood work to assess statin needs.  - Lipid Profile; Future  - TSH; Future    4. Impaired fasting glucose  Blood sugar mildly elevated so we will try to get a hemoglobin A1c to check for possibility of type 2 diabetes.  - Comp Metabolic Panel; Future  - HEMOGLOBIN A1C; Future  - CBC WITH DIFFERENTIAL; Future  - TSH; Future    5. Moderate episode of recurrent major depressive disorder (HCC)  Patient declined referral to psychiatry but states she is having increasing depression for which she would like to go a higher dose of Zoloft which I will provide her today.  I reminded her on potential side effects with the change  - TSH; Future  - sertraline (ZOLOFT) 100 MG Tab; Take 1 Tab by mouth every day.  Dispense: 30 Tab; Refill: 11    6. Constipation, unspecified constipation type  I reviewed with patient her diet and it is very low in fiber.  I explained that she needs fiber to have regular soft bowel movements.  She will also increase her fluids.  I advised her to start on Metamucil daily and try some MiraLAX powder for a day or 2 to get started.  She was also given a stool softener.  - docusate sodium (COLACE) 100 MG Cap; Take 1 Cap by mouth 2 times a day.  Dispense: 60 Cap; Refill: 0    7. Cervical radiculopathy  Patient would like to go to a pain clinic to  discuss treatment options for her chronic pain situation which has been present for years.  - REFERRAL TO PAIN CLINIC    8. Need for hepatitis C screening test    - HEP C VIRUS ANTIBODY; Future    9. Screening for breast cancer    - [id number]; Standing  - [id number]    10. Screen for colon cancer  Patient states she has not had a colonoscopy in over 10 years.  - REFERRAL TO GASTROENTEROLOGY

## 2020-01-13 ENCOUNTER — TELEPHONE (OUTPATIENT)
Dept: MEDICAL GROUP | Facility: MEDICAL CENTER | Age: 60
End: 2020-01-13

## 2020-01-16 NOTE — PROGRESS NOTES
Attempt #:Final  [facility] Verified: yes  Verify PCP: yes    Communication Preference Obtained: yes     Review Care Team: yes    Annual Wellness Visit Scheduling  1. Scheduling Status:Scheduled     Care Gap Scheduling (Attempt to Schedule EACH Overdue Care Gap!)  Health Maintenance Due   Topic Date Due   • HEPATITIS C SCREENING  1960   • PAP SMEAR  10/20/2018   • Annual Wellness Visit  12/16/2018   • MAMMOGRAM  04/30/2019       - Patient plans to schedule appointment for Pap.    MyChart Activation: already active

## 2020-01-22 DIAGNOSIS — I10 ESSENTIAL HYPERTENSION: ICD-10-CM

## 2020-01-22 RX ORDER — HYDROCHLOROTHIAZIDE 12.5 MG/1
12.5 CAPSULE, GELATIN COATED ORAL DAILY
Qty: 90 CAP | Refills: 2 | Status: ON HOLD | OUTPATIENT
Start: 2020-01-22 | End: 2021-02-19

## 2020-01-22 RX ORDER — LOSARTAN POTASSIUM 100 MG/1
100 TABLET ORAL DAILY
Qty: 90 TAB | Refills: 2 | Status: SHIPPED | OUTPATIENT
Start: 2020-01-22 | End: 2020-08-28

## 2020-02-10 ENCOUNTER — HOSPITAL ENCOUNTER (OUTPATIENT)
Dept: RADIOLOGY | Facility: MEDICAL CENTER | Age: 60
End: 2020-02-10
Attending: NURSE PRACTITIONER
Payer: MEDICARE

## 2020-02-10 PROCEDURE — 77067 SCR MAMMO BI INCL CAD: CPT

## 2020-03-09 ENCOUNTER — HOSPITAL ENCOUNTER (OUTPATIENT)
Dept: LAB | Facility: MEDICAL CENTER | Age: 60
End: 2020-03-09
Attending: NURSE PRACTITIONER
Payer: MEDICARE

## 2020-03-09 DIAGNOSIS — E78.5 DYSLIPIDEMIA: ICD-10-CM

## 2020-03-09 DIAGNOSIS — R73.01 IMPAIRED FASTING GLUCOSE: ICD-10-CM

## 2020-03-09 DIAGNOSIS — F33.1 MODERATE EPISODE OF RECURRENT MAJOR DEPRESSIVE DISORDER (HCC): ICD-10-CM

## 2020-03-09 DIAGNOSIS — Z11.59 NEED FOR HEPATITIS C SCREENING TEST: ICD-10-CM

## 2020-03-09 DIAGNOSIS — I10 ESSENTIAL HYPERTENSION: ICD-10-CM

## 2020-03-09 LAB
BASOPHILS # BLD AUTO: 1.6 % (ref 0–1.8)
BASOPHILS # BLD: 0.09 K/UL (ref 0–0.12)
EOSINOPHIL # BLD AUTO: 0.11 K/UL (ref 0–0.51)
EOSINOPHIL NFR BLD: 2 % (ref 0–6.9)
ERYTHROCYTE [DISTWIDTH] IN BLOOD BY AUTOMATED COUNT: 44.1 FL (ref 35.9–50)
EST. AVERAGE GLUCOSE BLD GHB EST-MCNC: 114 MG/DL
HBA1C MFR BLD: 5.6 % (ref 0–5.6)
HCT VFR BLD AUTO: 46.7 % (ref 37–47)
HCV AB SER QL: NEGATIVE
HGB BLD-MCNC: 15.8 G/DL (ref 12–16)
IMM GRANULOCYTES # BLD AUTO: 0.02 K/UL (ref 0–0.11)
IMM GRANULOCYTES NFR BLD AUTO: 0.4 % (ref 0–0.9)
LYMPHOCYTES # BLD AUTO: 2.02 K/UL (ref 1–4.8)
LYMPHOCYTES NFR BLD: 36.6 % (ref 22–41)
MCH RBC QN AUTO: 31 PG (ref 27–33)
MCHC RBC AUTO-ENTMCNC: 33.8 G/DL (ref 33.6–35)
MCV RBC AUTO: 91.6 FL (ref 81.4–97.8)
MONOCYTES # BLD AUTO: 0.46 K/UL (ref 0–0.85)
MONOCYTES NFR BLD AUTO: 8.3 % (ref 0–13.4)
NEUTROPHILS # BLD AUTO: 2.82 K/UL (ref 2–7.15)
NEUTROPHILS NFR BLD: 51.1 % (ref 44–72)
NRBC # BLD AUTO: 0 K/UL
NRBC BLD-RTO: 0 /100 WBC
PLATELET # BLD AUTO: 437 K/UL (ref 164–446)
PMV BLD AUTO: 8.5 FL (ref 9–12.9)
RBC # BLD AUTO: 5.1 M/UL (ref 4.2–5.4)
TSH SERPL DL<=0.005 MIU/L-ACNC: 1.96 UIU/ML (ref 0.38–5.33)
WBC # BLD AUTO: 5.5 K/UL (ref 4.8–10.8)

## 2020-03-09 PROCEDURE — 36415 COLL VENOUS BLD VENIPUNCTURE: CPT

## 2020-03-09 PROCEDURE — 80053 COMPREHEN METABOLIC PANEL: CPT

## 2020-03-09 PROCEDURE — 86803 HEPATITIS C AB TEST: CPT

## 2020-03-09 PROCEDURE — 80061 LIPID PANEL: CPT

## 2020-03-09 PROCEDURE — 83036 HEMOGLOBIN GLYCOSYLATED A1C: CPT

## 2020-03-09 PROCEDURE — 85025 COMPLETE CBC W/AUTO DIFF WBC: CPT

## 2020-03-09 PROCEDURE — 84443 ASSAY THYROID STIM HORMONE: CPT

## 2020-03-10 LAB
ALBUMIN SERPL BCP-MCNC: 4.3 G/DL (ref 3.2–4.9)
ALBUMIN/GLOB SERPL: 1.3 G/DL
ALP SERPL-CCNC: 46 U/L (ref 30–99)
ALT SERPL-CCNC: 15 U/L (ref 2–50)
ANION GAP SERPL CALC-SCNC: 11 MMOL/L (ref 0–11.9)
AST SERPL-CCNC: 18 U/L (ref 12–45)
BILIRUB SERPL-MCNC: 0.7 MG/DL (ref 0.1–1.5)
BUN SERPL-MCNC: 20 MG/DL (ref 8–22)
CALCIUM SERPL-MCNC: 10.3 MG/DL (ref 8.5–10.5)
CHLORIDE SERPL-SCNC: 104 MMOL/L (ref 96–112)
CHOLEST SERPL-MCNC: 226 MG/DL (ref 100–199)
CO2 SERPL-SCNC: 26 MMOL/L (ref 20–33)
CREAT SERPL-MCNC: 0.96 MG/DL (ref 0.5–1.4)
FASTING STATUS PATIENT QL REPORTED: NORMAL
GLOBULIN SER CALC-MCNC: 3.3 G/DL (ref 1.9–3.5)
GLUCOSE SERPL-MCNC: 76 MG/DL (ref 65–99)
HDLC SERPL-MCNC: 64 MG/DL
LDLC SERPL CALC-MCNC: 133 MG/DL
POTASSIUM SERPL-SCNC: 3.8 MMOL/L (ref 3.6–5.5)
PROT SERPL-MCNC: 7.6 G/DL (ref 6–8.2)
SODIUM SERPL-SCNC: 141 MMOL/L (ref 135–145)
TRIGL SERPL-MCNC: 143 MG/DL (ref 0–149)

## 2020-03-23 ENCOUNTER — TELEPHONE (OUTPATIENT)
Dept: HEALTH INFORMATION MANAGEMENT | Facility: OTHER | Age: 60
End: 2020-03-23

## 2020-03-23 DIAGNOSIS — R05.9 COUGH: ICD-10-CM

## 2020-03-23 RX ORDER — BENZONATATE 100 MG/1
100 CAPSULE ORAL 3 TIMES DAILY PRN
Qty: 60 CAP | Refills: 0 | Status: SHIPPED
Start: 2020-03-23 | End: 2021-02-02

## 2020-03-23 NOTE — TELEPHONE ENCOUNTER
"1. Caller Name: Yuki Angelo                      Call Back Number: [phone]  [facility] PCP or Specialty Provider: Yes Guillermo Gonzalez        2.  Does patient have any active symptoms of respiratory illness (fever OR cough OR shortness of breath OR sore throat)? Yes, the patient reports the following respiratory symptoms: cough that has persisted x 2 weeks    3.  Does patient have any comoribidities? None     4.  Has the patient traveled in the last 14 days OR had any known contact with someone who is suspected or confirmed to have COVID-19?  No. She's concerned that she had exposure to son-in-law who traveled from [place] 3 wks ago (he was ill w/ pneumonia, but not tested)    5. Disposition: Advised to perform self care, monitor for worsening symptoms and to call back in 3 days if no improvement    Note routed to [facility] Provider: Provider action needed: Developed symptoms 2 wks ago of \"the worst cough she's ever had\", which continues with no relief from OTC meds, She had fever, sore throat at onset, but those sx have subsided. She does c/o BURROWS. She was tested for COVID19 on Saturday and is awaiting the result. She is requesting a call from PCP, hoping to get rx cough medicine called into the pharmacy. Advised to continue self care/isolate from home & to call back if symptoms worsen. Also given teledoc contact.  "

## 2020-03-23 NOTE — TELEPHONE ENCOUNTER
Advise patient that I have sent in a prescription for cough pill to her pharmacy.  She should not use it too much because we do want her to expectorate phlegm..

## 2020-04-06 RX ORDER — TOPIRAMATE 100 MG/1
TABLET, FILM COATED ORAL
Qty: 90 TAB | Refills: 2 | Status: SHIPPED | OUTPATIENT
Start: 2020-04-06 | End: 2021-03-09

## 2020-08-28 DIAGNOSIS — I10 ESSENTIAL HYPERTENSION: ICD-10-CM

## 2020-08-28 RX ORDER — LOSARTAN POTASSIUM 100 MG/1
TABLET ORAL
Qty: 90 EACH | Refills: 0 | Status: SHIPPED | OUTPATIENT
Start: 2020-08-28 | End: 2020-12-21 | Stop reason: SDUPTHER

## 2020-11-03 ENCOUNTER — PATIENT MESSAGE (OUTPATIENT)
Dept: HEALTH INFORMATION MANAGEMENT | Facility: OTHER | Age: 60
End: 2020-11-03

## 2020-11-11 ENCOUNTER — HOSPITAL ENCOUNTER (OUTPATIENT)
Dept: LAB | Facility: MEDICAL CENTER | Age: 60
End: 2020-11-11
Attending: PSYCHIATRY & NEUROLOGY
Payer: MEDICARE

## 2020-11-11 LAB
FOLATE SERPL-MCNC: 5.9 NG/ML
VIT B12 SERPL-MCNC: 734 PG/ML (ref 211–911)

## 2020-11-11 PROCEDURE — 82746 ASSAY OF FOLIC ACID SERUM: CPT

## 2020-11-11 PROCEDURE — 83520 IMMUNOASSAY QUANT NOS NONAB: CPT

## 2020-11-11 PROCEDURE — 36415 COLL VENOUS BLD VENIPUNCTURE: CPT

## 2020-11-11 PROCEDURE — 82607 VITAMIN B-12: CPT

## 2020-11-11 PROCEDURE — 84165 PROTEIN E-PHORESIS SERUM: CPT

## 2020-11-11 PROCEDURE — 84155 ASSAY OF PROTEIN SERUM: CPT

## 2020-11-11 PROCEDURE — 86334 IMMUNOFIX E-PHORESIS SERUM: CPT

## 2020-11-11 PROCEDURE — 86235 NUCLEAR ANTIGEN ANTIBODY: CPT

## 2020-11-13 LAB
ENA SS-B IGG SER IA-ACNC: 0 AU/ML (ref 0–40)
SSA52 R0ENA AB IGG Q0420: 4 AU/ML (ref 0–40)
SSA60 R0ENA AB IGG Q0419: 1 AU/ML (ref 0–40)

## 2020-11-14 LAB
KAPPA LC FREE SER-MCNC: 19.84 MG/L (ref 3.3–19.4)
KAPPA LC FREE/LAMBDA FREE SER NEPH: 1.02 {RATIO} (ref 0.26–1.65)
LAMBDA LC FREE SERPL-MCNC: 19.51 MG/L (ref 5.71–26.3)

## 2020-11-15 LAB
ALBUMIN SERPL ELPH-MCNC: 4.42 G/DL (ref 3.75–5.01)
ALPHA1 GLOB SERPL ELPH-MCNC: 0.27 G/DL (ref 0.19–0.46)
ALPHA2 GLOB SERPL ELPH-MCNC: 0.68 G/DL (ref 0.48–1.05)
B-GLOBULIN SERPL ELPH-MCNC: 0.75 G/DL (ref 0.48–1.1)
GAMMA GLOB SERPL ELPH-MCNC: 0.98 G/DL (ref 0.62–1.51)
INTERPRETATION SERPL IFE-IMP: NORMAL
INTERPRETATION SERPL IFE-IMP: NORMAL
PATHOLOGY STUDY: NORMAL
PROT SERPL-MCNC: 7.1 G/DL (ref 6.3–8.2)

## 2020-12-21 ENCOUNTER — OFFICE VISIT (OUTPATIENT)
Dept: CARDIOLOGY | Facility: MEDICAL CENTER | Age: 60
End: 2020-12-21
Payer: MEDICARE

## 2020-12-21 VITALS
BODY MASS INDEX: 30.3 KG/M2 | HEART RATE: 90 BPM | DIASTOLIC BLOOD PRESSURE: 90 MMHG | OXYGEN SATURATION: 95 % | SYSTOLIC BLOOD PRESSURE: 140 MMHG | WEIGHT: 171 LBS | HEIGHT: 63 IN

## 2020-12-21 DIAGNOSIS — I10 ESSENTIAL HYPERTENSION: ICD-10-CM

## 2020-12-21 DIAGNOSIS — E78.5 DYSLIPIDEMIA: ICD-10-CM

## 2020-12-21 DIAGNOSIS — I25.10 CORONARY ARTERY CALCIFICATION SEEN ON CAT SCAN: ICD-10-CM

## 2020-12-21 DIAGNOSIS — R07.9 CHEST PAIN, UNSPECIFIED TYPE: ICD-10-CM

## 2020-12-21 DIAGNOSIS — Q24.8 PERICARDIAL CYST: ICD-10-CM

## 2020-12-21 PROCEDURE — 99214 OFFICE O/P EST MOD 30 MIN: CPT | Performed by: INTERNAL MEDICINE

## 2020-12-21 RX ORDER — LOSARTAN POTASSIUM 100 MG/1
100 TABLET ORAL
Qty: 30 EACH | Refills: 0 | Status: SHIPPED | OUTPATIENT
Start: 2020-12-21 | End: 2021-01-25 | Stop reason: SDUPTHER

## 2020-12-21 RX ORDER — VENLAFAXINE HYDROCHLORIDE 37.5 MG/1
37.5 CAPSULE, EXTENDED RELEASE ORAL DAILY
COMMUNITY
End: 2021-02-02

## 2020-12-21 ASSESSMENT — ENCOUNTER SYMPTOMS
MYALGIAS: 0
WEAKNESS: 1
HEARTBURN: 1
BLURRED VISION: 1
COUGH: 1
SHORTNESS OF BREATH: 1
LOSS OF CONSCIOUSNESS: 0
FEVER: 0
NAUSEA: 1
DIZZINESS: 0
HEADACHES: 1
EYE PAIN: 1
PALPITATIONS: 1
BACK PAIN: 1
NECK PAIN: 1
NERVOUS/ANXIOUS: 1
PND: 0
ABDOMINAL PAIN: 1
INSOMNIA: 0
ORTHOPNEA: 0
CHILLS: 0

## 2020-12-21 ASSESSMENT — FIBROSIS 4 INDEX: FIB4 SCORE: 0.66

## 2020-12-21 NOTE — TELEPHONE ENCOUNTER
Instruct patient that she has not been seen since December of last year so I will refill her medicine for 1 month but further refills will require a visit.

## 2020-12-21 NOTE — TELEPHONE ENCOUNTER
Patient notified via [employer] message that she needs an appointment for further refills of her medicaitons.

## 2020-12-21 NOTE — TELEPHONE ENCOUNTER
Received request via: Pharmacy    Was the patient seen in the last year in this department? Yes    Does the patient have an active prescription (recently filled or refills available) for medication(s) requested? No     Requested Prescriptions     Pending Prescriptions Disp Refills   • losartan (COZAAR) 100 MG Tab 90 Each 0     Sig: Take 1 Tab by mouth every day.

## 2020-12-21 NOTE — PROGRESS NOTES
"Subjective:   Yuki Angelo is a 57 y.o. female who presents today self-referred for chest pain and a history of syncope.    I last saw the patient in 2/2018 for chest pain which I recommended a stress MPI, follow-up with PCP for initiation of statin therapy for hypercholesterolemia for coronary calcification and follow-up for right cardiophrenic cyst but the patient did not follow-up.    In 1/2019 seen at [facility] for syncope.  She went to [place] and saw what sounds like a general physician and had a tilt table test that was negative.  She saw [place] neurology for chronic headaches and had a \"heat test\".  Recently switched from Topamax to Effexor for her headaches.    She has had no further syncope.  She states when she stands too long she feels somewhat lightheaded.  Additionally she reports some nonexertional right anterior nonradiating mild to moderate transient chest pain with no aggravation of exertion.  Complains of chronic fatigue.    The patient has a significant past medical history multiple medical problems of hypertension, hyperlipidemia, right cardiophrenic cyst, coronary calcifications, fibromyalgia, sleep apnea on CPAP therapy, thoracic outlet syndrome with resection of her left 1st rib at [facility] 17 years ago, left ulnar nerve transposition, chronic headaches, C-spine fusion x2 last 2010, multilevel degenerative disc disease, prior tobacco use currently abstinent, dyslipidemia, depression and anxiety.    The patient has had various chest pain symptoms with and without activity, exertional shortness of breath without PND, orthopnea or lower extremity edema and generalized fatigue.  She had previously been followed by my former partner Dr. Alexei Gutiérrez for a aortic regurgitation however her last echocardiogram on 7/10/2017 showed normal left nuclear ejection fraction and trace aortic regurgitation and trace mitral regurgitation.    Family history.  The patient was adopted but " "has met her \"birth family\" and found that her biological father  of a \"heart aneurysm\" in his early 60s.  Also her biological uncle, her father's brother has atrial fibrillation at age 66.    Past Medical History:   Diagnosis Date   • Aortic regurgitation 2012    Dr. Gutiérrez, cardiologist   • Arthritis     Generalized   • Backpain    • CHEST PAIN 2012   • DDD (degenerative disc disease), cervical 8/3/2011   • Fatigue 2012   • Fibromyalgia    • History of echocardiogram 2012   • Hypertension 2015    well controlled on meds   • Multilevel degenerative disc disease    • Other specified disorder of intestines     constipation   • Pain     left arm and neck, thoracic area \"allover\"   • Pain management contract agreement 2018   • Polycystic kidney disease     \"told as child\"   • Psychiatric problem     depression, anxiety   • PVD (peripheral vascular disease) ([facility]) 2012   • S/P tooth extraction 10/27/2015   • Sleep apnea     Uses CPAP at [facility]   • Snoring    • Thoracic outlet syndrome      Past Surgical History:   Procedure Laterality Date   • BUNIONECTOMY DANYA Right 11/10/2015    Procedure: BUNIONECTOMY DANYA;  Surgeon: JAMA HensonPAUBRIE;  Location: SURGERY [facility];  Service:    • OSTECTOMY  11/10/2015    Procedure: OSTECTOMY - 5TH TAILORS BUNION ;  Surgeon: JAMA HensonPAUBRIE;  Location: SURGERY [facility];  Service:    • ARTHROPLASTY Right 11/10/2015    Procedure: ARTHROPLASTY - 5TH DEROTATIONAL;  Surgeon: JAMA HensonPDipeshMDipesh;  Location: SURGERY [facility];  Service:    • HAMMERTOE CORRECTION  11/10/2015    Procedure: HAMMERTOE CORRECTION - 2ND, 3RD, 4TH ;  Surgeon: JAMA HensonPDipeshMDipesh;  Location: SURGERY [facility];  Service:    • HYSTEROSCOPY WITH VIDEO DIAGNOSTIC  3/27/2015    Performed by Johnny Jarquin M.D. at SURGERY SAME DAY [facility] ORS   • DILATION AND CURETTAGE  3/27/2015    Performed by Johnny Jarquin M.D. at " SURGERY SAME DAY [facility]   • KNEE ARTHROSCOPY  3/11/2013    Performed by Freeman Ballard M.D. at SURGERY [facility]   • MEDIAL MENISCECTOMY  3/11/2013    Performed by Freeman Ballard M.D. at SURGERY [facility]   • NERVE ULNAR TRANSFER  3/13/2012    Performed by KATYA NEAL at SURGERY [facility]   • CARPAL TUNNEL ENDOSCOPIC  3/13/2012    Performed by KATYA NEAL at SURGERY [facility]   • CERVICAL DISK AND FUSION ANTERIOR  2010    Performed by LAURA BAH at SURGERY [facility]   • INCISION AND DRAINAGE GENERAL  10/22/2009    Performed by ABEL GUZMAN at SURGERY [facility]   • MASS EXCISION GENERAL  10/6/2009    right breast Performed by ABEL GUZMAN at SURGERY [facility]   • BREAST BIOPSY  3/5/2009    right; Performed by ABEL GUZMAN at SURGERY [facility]   • RIB RESECTION      right for thoracic outlet syndrome   • KNEE ARTHROSCOPY      left   • TUBAL COAGULATION LAPAROSCOPIC BILATERAL     • CERVICAL FUSION POSTERIOR     • OTHER SURGICAL PROCEDURE  1992    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     Family History   Adopted: Yes   Problem Relation Age of Onset   • No Known Problems Son    • No Known Problems Daughter      Social History     Tobacco Use   Smoking Status Former Smoker   • Packs/day: 0.50   • Years: 37.00   • Pack years: 18.50   • Types: Cigarettes   • Quit date: 2013   • Years since quittin.6   Smokeless Tobacco Never Used   Tobacco Comment     Started smoking at age 16     Allergies   Allergen Reactions   • Penicillin G Rash   • Penicillins Hives   • Sulfa Drugs Hives and Rash     Outpatient Encounter Medications as of 2020   Medication Sig Dispense Refill   • losartan (COZAAR) 100 MG Tab Take 1 Tab by mouth every day. 30 Each 0   • venlafaxine XR (EFFEXOR XR) 37.5 MG CAPSULE SR 24 HR Take 37.5 mg by mouth every day.     • [DISCONTINUED] losartan (COZAAR) 100 MG Tab TAKE ONE  "TABLET BY MOUTH EVERY DAY 90 Each 0   • topiramate (TOPAMAX) 100 MG Tab TAKE ONE AND ONE-HALF TABLET(S) BY MOUTH TWO TIMES A DAY (Patient not taking: Reported on 12/21/2020) 90 Tab 2   • benzonatate (TESSALON) 100 MG Cap Take 1 Cap by mouth 3 times a day as needed for Cough. (Patient not taking: Reported on 12/21/2020) 60 Cap 0   • hydrochlorothiazide (MICROZIDE) 12.5 MG capsule Take 1 Cap by mouth every day. 90 Cap 2   • sertraline (ZOLOFT) 100 MG Tab Take 1 Tab by mouth every day. (Patient not taking: Reported on 12/21/2020) 30 Tab 11   • docusate sodium (COLACE) 100 MG Cap Take 1 Cap by mouth 2 times a day. (Patient not taking: Reported on 12/21/2020) 60 Cap 0   • topiramate (TOPAMAX) 50 MG tablet Take 1 Tab by mouth 2 times a day. (Patient not taking: Reported on 12/21/2020) 180 Tab 1     No facility-administered encounter medications on file as of 12/21/2020.      Review of Systems   Constitutional: Positive for malaise/fatigue. Negative for chills and fever.   HENT: Positive for hearing loss and tinnitus. Negative for congestion.    Eyes: Positive for blurred vision and pain.   Respiratory: Positive for cough and shortness of breath.    Cardiovascular: Positive for chest pain and palpitations. Negative for orthopnea, leg swelling and PND.   Gastrointestinal: Positive for abdominal pain, heartburn and nausea.   Genitourinary: Negative for dysuria.   Musculoskeletal: Positive for back pain, joint pain and neck pain. Negative for myalgias.   Skin: Positive for rash.   Neurological: Positive for weakness and headaches. Negative for dizziness and loss of consciousness.   Psychiatric/Behavioral: The patient is nervous/anxious. The patient does not have insomnia.         Objective:   /90 (BP Location: Left arm, Patient Position: Sitting, BP Cuff Size: Adult)   Pulse 90   Ht 1.6 m (5' 3\")   Wt 77.6 kg (171 lb)   LMP 06/01/2012   SpO2 95%   BMI 30.29 kg/m²     Physical Exam   Constitutional: She is oriented " to person, place, and time. She appears well-nourished. No distress.   HENT:   Head: Normocephalic and atraumatic.   Eyes: Pupils are equal, round, and reactive to light. Conjunctivae and EOM are normal. No scleral icterus.   Neck: No JVD present. Carotid bruit is not present.   Normal jugular venous pressure.   Cardiovascular: Normal rate, regular rhythm, S1 normal and S2 normal. Exam reveals no gallop and no friction rub.   No murmur heard.  Pulses:       Carotid pulses are 2+ on the right side and 2+ on the left side.       Radial pulses are 2+ on the right side and 2+ on the left side.        Femoral pulses are 2+ on the right side and 2+ on the left side.       Posterior tibial pulses are 2+ on the right side and 2+ on the left side.   No femoral bruits.   Pulmonary/Chest: Effort normal and breath sounds normal. She has no wheezes. She has no rhonchi. She has no rales.   Abdominal: Soft. Bowel sounds are normal. She exhibits no abdominal bruit, no pulsatile midline mass and no mass. There is no hepatosplenomegaly. There is no abdominal tenderness.   Musculoskeletal:         General: No edema.   Neurological: She is alert and oriented to person, place, and time. She has normal strength. Gait normal.   Skin: Skin is warm and dry. No cyanosis. Nails show no clubbing.   Psychiatric: She has a normal mood and affect. Her behavior is normal.     07/10/2015 ECHOCARDIOGRAM  Normal left ventricular size, thickness, systolic function, and   diastolic function.  Trace mitral regurgitation.  Trace aortic insufficiency.  Trace tricuspid regurgitation.    01/05/2018 CHEST CT SCAN  1.  Stable bilateral pulmonary nodules.  2.  Stable cystic mass in the right cardiophrenic angle most likely representing a pericardial cyst.  3.  Atherosclerotic vascular disease.  4.  Tiny gallstones.  5.  Coronary artery calcifications are present. Right cardiophrenic angle cyst density mass is unchanged in size measuring 3.7 x 2.5    05/07/2018  EKG: Normal sinus rhythm, rate 67. Unchanged since 3/13/2015. Reviewed by myself.    Assessment:     1. Chest pain, unspecified type  CT-CTA CHEST WITH & W/O-POST PROCESS    Treadmill Stress   2. Pericardial cyst     3. Essential hypertension     4. Dyslipidemia     5. Coronary artery calcification seen on CAT scan         Medical Decision Making:  Today's Assessment / Status / Plan:     Assessment  1.  Chest pain.  2.  Coronary calcification on chest CT scan.  3.  Cardiophrenic cyst right side.  4.  Hypertension.  5.  Dyslipidemia.  6.  Thoracic outlet syndrome, S/P left cervical rib resection.  7.  Fibromyalgia.  8.  Headaches, chronic.  9.  LIZETTE on CPAP therapy.  10.  C-spine fusion x2 last 2010.  11.  Ulnar nerve transposition, left side with left-sided carpal tunnel surgery.  12.  Chronic fatigue.  13.  H/O syncope, no recurrence.  Negative tilt table.    Recommendation Discussion  1.  Treadmill stress test to evaluate the patient's chest pain that is atypical but with coronary risk factors.  2.  Unclear as the cause of her syncope but her lightheaded spells are probably due to venous pooling when standing too long and would recommend support stockings.  3.  Follow-up with PCP for statin therapy for dyslipidemia and continued hypertension management.  4.  Phrenic cyst has been stable from [id number] but will get a follow-up chest CT scan.  5.  Further recommendations based on treadmill stress test and chest CT scan.  6.  RTC arranged.  RTC scheduled

## 2020-12-21 NOTE — PROGRESS NOTES
"Chief Complaint   Patient presents with   • HTN (Controlled)       Subjective:   Yuki Angelo is a 60 y.o. female who presents today     Past Medical History:   Diagnosis Date   • Aortic regurgitation 2/21/2012    Dr. Gutiérrez, cardiologist   • Arthritis     Generalized   • Backpain    • CHEST PAIN 2/21/2012   • DDD (degenerative disc disease), cervical 8/3/2011   • Fatigue 2/21/2012   • Fibromyalgia    • History of echocardiogram 2/21/2012   • Hypertension 2015    well controlled on meds   • Multilevel degenerative disc disease    • Other specified disorder of intestines     constipation   • Pain     left arm and neck, thoracic area \"allover\"   • Pain management contract agreement 8/13/2018   • Polycystic kidney disease     \"told as child\"   • Psychiatric problem     depression, anxiety   • PVD (peripheral vascular disease) ([facility]) 2/21/2012   • S/P tooth extraction 10/27/2015   • Sleep apnea     Uses CPAP at [facility]   • Snoring    • Thoracic outlet syndrome      Past Surgical History:   Procedure Laterality Date   • BUNIONECTOMY DANYA Right 11/10/2015    Procedure: BUNIONECTOMY DANYA;  Surgeon: Kermit Arroyo D.P.M.;  Location: [facility];  Service:    • OSTECTOMY  11/10/2015    Procedure: OSTECTOMY - 5TH TAILORS BUNION ;  Surgeon: JAMA HensonPAUBRIE;  Location: [facility];  Service:    • ARTHROPLASTY Right 11/10/2015    Procedure: ARTHROPLASTY - 5TH DEROTATIONAL;  Surgeon: JAMA HensonPAUBRIE;  Location: [facility];  Service:    • HAMMERTOE CORRECTION  11/10/2015    Procedure: HAMMERTOE CORRECTION - 2ND, 3RD, 4TH ;  Surgeon: JAMA HensonPAUBRIE;  Location: [facility];  Service:    • HYSTEROSCOPY WITH VIDEO DIAGNOSTIC  3/27/2015    Performed by Johnny Jarquin M.D. at SURGERY SAME DAY [facility]   • DILATION AND CURETTAGE  3/27/2015    Performed by Jhonny Jarquin M.D. at SURGERY SAME DAY [facility]S   • KNEE ARTHROSCOPY  " 3/11/2013    Performed by Freeman Ballard M.D. at SURGERY [facility]   • MEDIAL MENISCECTOMY  3/11/2013    Performed by Freeman Ballard M.D. at SURGERY [facility]   • NERVE ULNAR TRANSFER  3/13/2012    Performed by KATYA NEAL at SURGERY [facility]   • CARPAL TUNNEL ENDOSCOPIC  3/13/2012    Performed by KATYA NEAL at SURGERY [facility]   • CERVICAL DISK AND FUSION ANTERIOR  2010    Performed by LAURA BAH at SURGERY [facility]   • INCISION AND DRAINAGE GENERAL  10/22/2009    Performed by ABEL GUZMAN at SURGERY [facility]   • MASS EXCISION GENERAL  10/6/2009    right breast Performed by ABEL GUZMAN at SURGERY [facility]   • BREAST BIOPSY  3/5/2009    right; Performed by ABEL GUZMAN at SURGERY [facility]   • RIB RESECTION      right for thoracic outlet syndrome   • KNEE ARTHROSCOPY      left   • TUBAL COAGULATION LAPAROSCOPIC BILATERAL     • CERVICAL FUSION POSTERIOR     • OTHER SURGICAL PROCEDURE  1992    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     Family History   Adopted: Yes   Problem Relation Age of Onset   • No Known Problems Son    • No Known Problems Daughter      Social History     Socioeconomic History   • Marital status:      Spouse name: Not on file   • Number of children: Not on file   • Years of education: Not on file   • Highest education level: Not on file   Occupational History   • Not on file   Social Needs   • Financial resource strain: Not on file   • Food insecurity     Worry: Not on file     Inability: Not on file   • Transportation needs     Medical: Not on file     Non-medical: Not on file   Tobacco Use   • Smoking status: Former Smoker     Packs/day: 0.50     Years: 37.00     Pack years: 18.50     Types: Cigarettes     Quit date: 2013     Years since quittin.6   • Smokeless tobacco: Never Used   • Tobacco comment:  Started smoking at age 16   Substance and Sexual  Activity   • Alcohol use: Yes     Alcohol/week: 0.6 oz     Types: 1 Glasses of wine per week     Comment: socially   • Drug use: No   • Sexual activity: Yes     Partners: Male     Birth control/protection: Post-Menopausal     Comment: .    Lifestyle   • Physical activity     Days per week: Not on file     Minutes per session: Not on file   • Stress: Not on file   Relationships   • Social connections     Talks on phone: Not on file     Gets together: Not on file     Attends Oriental orthodox service: Not on file     Active member of club or organization: Not on file     Attends meetings of clubs or organizations: Not on file     Relationship status: Not on file   • Intimate partner violence     Fear of current or ex partner: Not on file     Emotionally abused: Not on file     Physically abused: Not on file     Forced sexual activity: Not on file   Other Topics Concern   • Not on file   Social History Narrative   • Not on file     Allergies   Allergen Reactions   • Penicillin G Rash   • Penicillins Hives   • Sulfa Drugs Hives and Rash     Outpatient Encounter Medications as of 12/21/2020   Medication Sig Dispense Refill   • losartan (COZAAR) 100 MG Tab Take 1 Tab by mouth every day. 30 Each 0   • venlafaxine XR (EFFEXOR XR) 37.5 MG CAPSULE SR 24 HR Take 37.5 mg by mouth every day.     • [DISCONTINUED] losartan (COZAAR) 100 MG Tab TAKE ONE TABLET BY MOUTH EVERY DAY 90 Each 0   • topiramate (TOPAMAX) 100 MG Tab TAKE ONE AND ONE-HALF TABLET(S) BY MOUTH TWO TIMES A DAY (Patient not taking: Reported on 12/21/2020) 90 Tab 2   • benzonatate (TESSALON) 100 MG Cap Take 1 Cap by mouth 3 times a day as needed for Cough. (Patient not taking: Reported on 12/21/2020) 60 Cap 0   • hydrochlorothiazide (MICROZIDE) 12.5 MG capsule Take 1 Cap by mouth every day. 90 Cap 2   • sertraline (ZOLOFT) 100 MG Tab Take 1 Tab by mouth every day. (Patient not taking: Reported on 12/21/2020) 30 Tab 11   • docusate sodium (COLACE) 100 MG Cap Take 1  "Cap by mouth 2 times a day. (Patient not taking: Reported on 12/21/2020) 60 Cap 0   • topiramate (TOPAMAX) 50 MG tablet Take 1 Tab by mouth 2 times a day. (Patient not taking: Reported on 12/21/2020) 180 Tab 1     No facility-administered encounter medications on file as of 12/21/2020.      ROS     Objective:   /90 (BP Location: Left arm, Patient Position: Sitting, BP Cuff Size: Adult)   Pulse 90   Ht 1.6 m (5' 3\")   Wt 77.6 kg (171 lb)   LMP 06/01/2012   SpO2 95%   BMI 30.29 kg/m²     Physical Exam    Assessment:   No diagnosis found.    Medical Decision Making:  Today's Assessment / Status / Plan:     "

## 2021-01-07 ENCOUNTER — TELEPHONE (OUTPATIENT)
Dept: CARDIOLOGY | Facility: MEDICAL CENTER | Age: 61
End: 2021-01-07

## 2021-01-07 DIAGNOSIS — I25.10 CORONARY ARTERY CALCIFICATION SEEN ON CAT SCAN: ICD-10-CM

## 2021-01-07 DIAGNOSIS — R07.9 CHEST PAIN, UNSPECIFIED TYPE: ICD-10-CM

## 2021-01-07 DIAGNOSIS — I10 ESSENTIAL HYPERTENSION: ICD-10-CM

## 2021-01-07 NOTE — TELEPHONE ENCOUNTER
CRISTOFER    NM: Clarissa   HOSP: [facility] CT Department   PH: [phone]   PT NM: AjcurryKyree theodore    : 56   RE: Needs a Creatinine lab order put  into [facility], so patient can have ct scan  with contrast. Patient is scheduled on       --------------------------------------  Message History  Account: [id number]  Taken:  Thu 2021  2:02p TO  Serial#: [id number]

## 2021-01-08 NOTE — TELEPHONE ENCOUNTER
CRISTOFER Romo From [facility] is calling in regards to a lab order put into epic for Creatinine Blood test for kidney function.  Phone: [phone]

## 2021-01-09 DIAGNOSIS — I10 ESSENTIAL HYPERTENSION: ICD-10-CM

## 2021-01-12 ENCOUNTER — HOSPITAL ENCOUNTER (OUTPATIENT)
Dept: RADIOLOGY | Facility: MEDICAL CENTER | Age: 61
End: 2021-01-12
Attending: INTERNAL MEDICINE
Payer: MEDICARE

## 2021-01-12 DIAGNOSIS — R07.9 CHEST PAIN, UNSPECIFIED TYPE: ICD-10-CM

## 2021-01-12 LAB — CREAT BLD-MCNC: 0.9 MG/DL (ref 0.5–1.4)

## 2021-01-12 PROCEDURE — 71275 CT ANGIOGRAPHY CHEST: CPT

## 2021-01-12 PROCEDURE — 82565 ASSAY OF CREATININE: CPT

## 2021-01-12 PROCEDURE — 700117 HCHG RX CONTRAST REV CODE 255: Performed by: INTERNAL MEDICINE

## 2021-01-12 RX ADMIN — IOHEXOL 100 ML: 350 INJECTION, SOLUTION INTRAVENOUS at 09:20

## 2021-01-14 ENCOUNTER — TELEPHONE (OUTPATIENT)
Dept: CARDIOLOGY | Facility: MEDICAL CENTER | Age: 61
End: 2021-01-14

## 2021-01-14 DIAGNOSIS — Q24.8 PERICARDIAL CYST: ICD-10-CM

## 2021-01-14 DIAGNOSIS — R07.9 CHEST PAIN, UNSPECIFIED TYPE: ICD-10-CM

## 2021-01-14 DIAGNOSIS — R94.31 ABNORMAL EKG: ICD-10-CM

## 2021-01-14 DIAGNOSIS — E78.5 DYSLIPIDEMIA: ICD-10-CM

## 2021-01-14 DIAGNOSIS — I25.10 CORONARY ARTERY CALCIFICATION SEEN ON CAT SCAN: ICD-10-CM

## 2021-01-14 RX ORDER — ROSUVASTATIN CALCIUM 10 MG/1
10 TABLET, COATED ORAL EVERY EVENING
Qty: 90 TAB | Refills: 3 | Status: SHIPPED | OUTPATIENT
Start: 2021-01-14 | End: 2021-07-23

## 2021-01-15 ENCOUNTER — PATIENT MESSAGE (OUTPATIENT)
Dept: CARDIOLOGY | Facility: MEDICAL CENTER | Age: 61
End: 2021-01-15

## 2021-01-15 NOTE — PROGRESS NOTES
Called and spoke with patient concerning results of chest CT scan.  Chest CT scan shows enlarging right sided cardiophrenic cyst.  Having increasing right sided chest pain.  Also has extensive coronary calcification.  MPI due to abnormal EKG and will cancel standard treadmill.  Start Crestor 10 mg daily.  Follow-up liver and lipid panel.  Explained in detail the above plan and answer questions.

## 2021-01-18 ENCOUNTER — TELEPHONE (OUTPATIENT)
Dept: CARDIOLOGY | Facility: MEDICAL CENTER | Age: 61
End: 2021-01-18

## 2021-01-18 NOTE — TELEPHONE ENCOUNTER
Called and spoke to patient.  She stated she Called Dr. Ganser office to schedule appt for removal of cyst and they asked what other testing she needs to have done and she listed Angio, and treadmill stress test. She stated Dr. Ganser doesn't want to set up appt until all these tests are done.   She asked about Angiogram and I advised I do not see anything in his notes right now about that, she verbalized understanding.   SW order for cardiac stress test is ordered routine, per notes needs to be done more urgently as symptoms are increasing as well as increasing cyst size.   Notifed DL PAR to see if we can schedule her sooner.

## 2021-01-19 ENCOUNTER — HOSPITAL ENCOUNTER (OUTPATIENT)
Dept: RADIOLOGY | Facility: MEDICAL CENTER | Age: 61
End: 2021-01-19
Attending: INTERNAL MEDICINE
Payer: MEDICARE

## 2021-01-19 DIAGNOSIS — I25.10 CORONARY ARTERY CALCIFICATION SEEN ON CAT SCAN: ICD-10-CM

## 2021-01-19 DIAGNOSIS — R07.9 CHEST PAIN, UNSPECIFIED TYPE: ICD-10-CM

## 2021-01-19 DIAGNOSIS — E78.5 DYSLIPIDEMIA: ICD-10-CM

## 2021-01-19 DIAGNOSIS — R94.31 ABNORMAL EKG: ICD-10-CM

## 2021-01-19 PROCEDURE — 78452 HT MUSCLE IMAGE SPECT MULT: CPT | Mod: 26 | Performed by: INTERNAL MEDICINE

## 2021-01-19 PROCEDURE — A9502 TC99M TETROFOSMIN: HCPCS

## 2021-01-19 PROCEDURE — 93018 CV STRESS TEST I&R ONLY: CPT | Performed by: INTERNAL MEDICINE

## 2021-01-19 PROCEDURE — 700111 HCHG RX REV CODE 636 W/ 250 OVERRIDE (IP)

## 2021-01-19 RX ORDER — REGADENOSON 0.08 MG/ML
0.4 INJECTION, SOLUTION INTRAVENOUS ONCE
Status: COMPLETED | OUTPATIENT
Start: 2021-01-19 | End: 2021-01-19

## 2021-01-19 RX ORDER — REGADENOSON 0.08 MG/ML
INJECTION, SOLUTION INTRAVENOUS
Status: COMPLETED
Start: 2021-01-19 | End: 2021-01-19

## 2021-01-19 RX ADMIN — REGADENOSON 0.4 MG: 0.08 INJECTION, SOLUTION INTRAVENOUS at 12:33

## 2021-01-19 NOTE — TELEPHONE ENCOUNTER
Clarified order with  BEAR CAMACHO and she was unable to find order.  Called imaging authorizations to clarify order and disassociate the treadmill stress test appointment with the [place] cardiac stress test.  Notified Bear CAMACHO and she was able to schedule patient for tomorrow at 10am.  Reviewed instructions and location, answered all questions and concerns.  Very appreciative of call.

## 2021-01-20 ENCOUNTER — TELEPHONE (OUTPATIENT)
Dept: CARDIOLOGY | Facility: MEDICAL CENTER | Age: 61
End: 2021-01-20

## 2021-01-20 NOTE — TELEPHONE ENCOUNTER
Called and spoke to the patient and relayed results.  As per previous note, Dr. Ganser office was waiting for the results of testing and wound not schedule appt until all testing is done.  Advised her to call their office and ask for next available appt now that testing is done and normal. She verbalized understanding and is calling their office and will keep us updated.  Appreciative for call.   Routed to  to update.

## 2021-01-25 ENCOUNTER — TELEPHONE (OUTPATIENT)
Dept: MEDICAL GROUP | Facility: MEDICAL CENTER | Age: 61
End: 2021-01-25

## 2021-01-25 DIAGNOSIS — I10 ESSENTIAL HYPERTENSION: ICD-10-CM

## 2021-01-25 RX ORDER — LOSARTAN POTASSIUM 100 MG/1
100 TABLET ORAL
Qty: 30 EACH | Refills: 1 | Status: SHIPPED | OUTPATIENT
Start: 2021-01-25 | End: 2021-04-19

## 2021-01-25 NOTE — TELEPHONE ENCOUNTER
Advise patient that I hope your surgery goes well and that I will refill her medicine for 30 days with 1 refill which will give her 2 months to recuperate and have either a telemedicine or in office visit.

## 2021-01-25 NOTE — TELEPHONE ENCOUNTER
Spoke with pt re: Refill for Losartan as she is out.    Busy with Tests and Pre Op for Heart Surgery for a Cyst that has doubled in Size     Will be seeing Dr Ganser this Thursday     Requesting a refill as she does not drive, Surgery is Going to be soon and she is out of medication (Losartan)

## 2021-02-01 ENCOUNTER — PRE-ADMISSION TESTING (OUTPATIENT)
Dept: ADMISSIONS | Facility: MEDICAL CENTER | Age: 61
DRG: 272 | End: 2021-02-01
Attending: SURGERY
Payer: MEDICARE

## 2021-02-01 DIAGNOSIS — Z01.812 PRE-OPERATIVE LABORATORY EXAMINATION: ICD-10-CM

## 2021-02-01 LAB
COVID ORDER STATUS COVID19: NORMAL
SARS-COV-2 RNA RESP QL NAA+PROBE: NOTDETECTED
SPECIMEN SOURCE: NORMAL

## 2021-02-01 PROCEDURE — U0003 INFECTIOUS AGENT DETECTION BY NUCLEIC ACID (DNA OR RNA); SEVERE ACUTE RESPIRATORY SYNDROME CORONAVIRUS 2 (SARS-COV-2) (CORONAVIRUS DISEASE [COVID-19]), AMPLIFIED PROBE TECHNIQUE, MAKING USE OF HIGH THROUGHPUT TECHNOLOGIES AS DESCRIBED BY CMS-2020-01-R: HCPCS

## 2021-02-01 PROCEDURE — U0005 INFEC AGEN DETEC AMPLI PROBE: HCPCS

## 2021-02-01 PROCEDURE — C9803 HOPD COVID-19 SPEC COLLECT: HCPCS

## 2021-02-02 ENCOUNTER — PRE-ADMISSION TESTING (OUTPATIENT)
Dept: ADMISSIONS | Facility: MEDICAL CENTER | Age: 61
DRG: 272 | End: 2021-02-02
Attending: SURGERY
Payer: MEDICARE

## 2021-02-02 ENCOUNTER — ANESTHESIA EVENT (OUTPATIENT)
Dept: SURGERY | Facility: MEDICAL CENTER | Age: 61
DRG: 272 | End: 2021-02-02
Payer: MEDICARE

## 2021-02-02 DIAGNOSIS — Z01.812 PRE-OPERATIVE LABORATORY EXAMINATION: ICD-10-CM

## 2021-02-02 DIAGNOSIS — Z01.810 PRE-OPERATIVE CARDIOVASCULAR EXAMINATION: ICD-10-CM

## 2021-02-02 LAB
ANION GAP SERPL CALC-SCNC: 8 MMOL/L (ref 7–16)
BUN SERPL-MCNC: 15 MG/DL (ref 8–22)
CALCIUM SERPL-MCNC: 9.2 MG/DL (ref 8.5–10.5)
CHLORIDE SERPL-SCNC: 101 MMOL/L (ref 96–112)
CO2 SERPL-SCNC: 26 MMOL/L (ref 20–33)
CREAT SERPL-MCNC: 0.93 MG/DL (ref 0.5–1.4)
EKG IMPRESSION: NORMAL
ERYTHROCYTE [DISTWIDTH] IN BLOOD BY AUTOMATED COUNT: 42.5 FL (ref 35.9–50)
GLUCOSE SERPL-MCNC: 96 MG/DL (ref 65–99)
HCT VFR BLD AUTO: 43.1 % (ref 37–47)
HGB BLD-MCNC: 14.7 G/DL (ref 12–16)
MCH RBC QN AUTO: 31.5 PG (ref 27–33)
MCHC RBC AUTO-ENTMCNC: 34.1 G/DL (ref 33.6–35)
MCV RBC AUTO: 92.3 FL (ref 81.4–97.8)
PLATELET # BLD AUTO: 355 K/UL (ref 164–446)
PMV BLD AUTO: 8.4 FL (ref 9–12.9)
POTASSIUM SERPL-SCNC: 3.6 MMOL/L (ref 3.6–5.5)
RBC # BLD AUTO: 4.67 M/UL (ref 4.2–5.4)
SODIUM SERPL-SCNC: 135 MMOL/L (ref 135–145)
WBC # BLD AUTO: 6.8 K/UL (ref 4.8–10.8)

## 2021-02-02 PROCEDURE — 36415 COLL VENOUS BLD VENIPUNCTURE: CPT

## 2021-02-02 PROCEDURE — 93005 ELECTROCARDIOGRAM TRACING: CPT

## 2021-02-02 PROCEDURE — 85027 COMPLETE CBC AUTOMATED: CPT

## 2021-02-02 PROCEDURE — 93010 ELECTROCARDIOGRAM REPORT: CPT | Performed by: INTERNAL MEDICINE

## 2021-02-02 PROCEDURE — 80048 BASIC METABOLIC PNL TOTAL CA: CPT

## 2021-02-02 ASSESSMENT — FIBROSIS 4 INDEX: FIB4 SCORE: 0.66

## 2021-02-03 ENCOUNTER — HOSPITAL ENCOUNTER (INPATIENT)
Facility: MEDICAL CENTER | Age: 61
LOS: 1 days | DRG: 272 | End: 2021-02-04
Attending: SURGERY | Admitting: SURGERY
Payer: MEDICARE

## 2021-02-03 ENCOUNTER — APPOINTMENT (OUTPATIENT)
Dept: RADIOLOGY | Facility: MEDICAL CENTER | Age: 61
DRG: 272 | End: 2021-02-03
Attending: PHYSICIAN ASSISTANT
Payer: MEDICARE

## 2021-02-03 ENCOUNTER — ANESTHESIA (OUTPATIENT)
Dept: SURGERY | Facility: MEDICAL CENTER | Age: 61
DRG: 272 | End: 2021-02-03
Payer: MEDICARE

## 2021-02-03 DIAGNOSIS — G89.18 POSTOPERATIVE PAIN: ICD-10-CM

## 2021-02-03 LAB
ABO GROUP BLD: NORMAL
BLD GP AB SCN SERPL QL: NORMAL
PATHOLOGY CONSULT NOTE: NORMAL
RH BLD: NORMAL

## 2021-02-03 PROCEDURE — 160036 HCHG PACU - EA ADDL 30 MINS PHASE I: Performed by: SURGERY

## 2021-02-03 PROCEDURE — A9270 NON-COVERED ITEM OR SERVICE: HCPCS | Performed by: ANESTHESIOLOGY

## 2021-02-03 PROCEDURE — 700101 HCHG RX REV CODE 250: Performed by: ANESTHESIOLOGY

## 2021-02-03 PROCEDURE — A9270 NON-COVERED ITEM OR SERVICE: HCPCS | Performed by: PHYSICIAN ASSISTANT

## 2021-02-03 PROCEDURE — 88304 TISSUE EXAM BY PATHOLOGIST: CPT

## 2021-02-03 PROCEDURE — 86900 BLOOD TYPING SEROLOGIC ABO: CPT

## 2021-02-03 PROCEDURE — 160002 HCHG RECOVERY MINUTES (STAT): Performed by: SURGERY

## 2021-02-03 PROCEDURE — 700111 HCHG RX REV CODE 636 W/ 250 OVERRIDE (IP): Performed by: ANESTHESIOLOGY

## 2021-02-03 PROCEDURE — 86901 BLOOD TYPING SEROLOGIC RH(D): CPT

## 2021-02-03 PROCEDURE — 700101 HCHG RX REV CODE 250: Performed by: PHYSICIAN ASSISTANT

## 2021-02-03 PROCEDURE — 160029 HCHG SURGERY MINUTES - 1ST 30 MINS LEVEL 4: Performed by: SURGERY

## 2021-02-03 PROCEDURE — 160009 HCHG ANES TIME/MIN: Performed by: SURGERY

## 2021-02-03 PROCEDURE — A9270 NON-COVERED ITEM OR SERVICE: HCPCS | Performed by: SURGERY

## 2021-02-03 PROCEDURE — 770001 HCHG ROOM/CARE - MED/SURG/GYN PRIV*

## 2021-02-03 PROCEDURE — 501399 HCHG SPECIMAN BAG, ENDO CATC: Performed by: SURGERY

## 2021-02-03 PROCEDURE — 700111 HCHG RX REV CODE 636 W/ 250 OVERRIDE (IP): Performed by: PHYSICIAN ASSISTANT

## 2021-02-03 PROCEDURE — 501581 HCHG TROCAR: Performed by: SURGERY

## 2021-02-03 PROCEDURE — 02BN4ZZ EXCISION OF PERICARDIUM, PERCUTANEOUS ENDOSCOPIC APPROACH: ICD-10-PCS | Performed by: SURGERY

## 2021-02-03 PROCEDURE — 700101 HCHG RX REV CODE 250: Performed by: SURGERY

## 2021-02-03 PROCEDURE — 502571 HCHG PACK, LAP CHOLE: Performed by: SURGERY

## 2021-02-03 PROCEDURE — 700102 HCHG RX REV CODE 250 W/ 637 OVERRIDE(OP): Performed by: PHYSICIAN ASSISTANT

## 2021-02-03 PROCEDURE — 160035 HCHG PACU - 1ST 60 MINS PHASE I: Performed by: SURGERY

## 2021-02-03 PROCEDURE — 700105 HCHG RX REV CODE 258: Performed by: PHYSICIAN ASSISTANT

## 2021-02-03 PROCEDURE — 700105 HCHG RX REV CODE 258: Performed by: SURGERY

## 2021-02-03 PROCEDURE — 700102 HCHG RX REV CODE 250 W/ 637 OVERRIDE(OP): Performed by: ANESTHESIOLOGY

## 2021-02-03 PROCEDURE — 160041 HCHG SURGERY MINUTES - EA ADDL 1 MIN LEVEL 4: Performed by: SURGERY

## 2021-02-03 PROCEDURE — 160048 HCHG OR STATISTICAL LEVEL 1-5: Performed by: SURGERY

## 2021-02-03 PROCEDURE — 501838 HCHG SUTURE GENERAL: Performed by: SURGERY

## 2021-02-03 PROCEDURE — 71045 X-RAY EXAM CHEST 1 VIEW: CPT

## 2021-02-03 PROCEDURE — 86850 RBC ANTIBODY SCREEN: CPT

## 2021-02-03 RX ORDER — BUPIVACAINE HYDROCHLORIDE AND EPINEPHRINE 5; 5 MG/ML; UG/ML
INJECTION, SOLUTION EPIDURAL; INTRACAUDAL; PERINEURAL
Status: DISCONTINUED | OUTPATIENT
Start: 2021-02-03 | End: 2021-02-03 | Stop reason: HOSPADM

## 2021-02-03 RX ORDER — MEPERIDINE HYDROCHLORIDE 25 MG/ML
12.5 INJECTION INTRAMUSCULAR; INTRAVENOUS; SUBCUTANEOUS
Status: DISCONTINUED | OUTPATIENT
Start: 2021-02-03 | End: 2021-02-03 | Stop reason: HOSPADM

## 2021-02-03 RX ORDER — DIPHENHYDRAMINE HYDROCHLORIDE 50 MG/ML
25 INJECTION INTRAMUSCULAR; INTRAVENOUS EVERY 6 HOURS PRN
Status: DISCONTINUED | OUTPATIENT
Start: 2021-02-03 | End: 2021-02-04 | Stop reason: HOSPADM

## 2021-02-03 RX ORDER — FAMOTIDINE 20 MG/1
20 TABLET, FILM COATED ORAL 2 TIMES DAILY
Status: DISCONTINUED | OUTPATIENT
Start: 2021-02-03 | End: 2021-02-04 | Stop reason: HOSPADM

## 2021-02-03 RX ORDER — HYDROCODONE BITARTRATE AND ACETAMINOPHEN 5; 325 MG/1; MG/1
1-2 TABLET ORAL EVERY 4 HOURS PRN
Status: DISCONTINUED | OUTPATIENT
Start: 2021-02-03 | End: 2021-02-04

## 2021-02-03 RX ORDER — HYDROMORPHONE HYDROCHLORIDE 1 MG/ML
0.2 INJECTION, SOLUTION INTRAMUSCULAR; INTRAVENOUS; SUBCUTANEOUS
Status: DISCONTINUED | OUTPATIENT
Start: 2021-02-03 | End: 2021-02-03 | Stop reason: HOSPADM

## 2021-02-03 RX ORDER — TOPIRAMATE 100 MG/1
100 TABLET, FILM COATED ORAL EVERY MORNING
Status: DISCONTINUED | OUTPATIENT
Start: 2021-02-03 | End: 2021-02-04 | Stop reason: HOSPADM

## 2021-02-03 RX ORDER — SODIUM CHLORIDE, SODIUM LACTATE, POTASSIUM CHLORIDE, CALCIUM CHLORIDE 600; 310; 30; 20 MG/100ML; MG/100ML; MG/100ML; MG/100ML
1000 INJECTION, SOLUTION INTRAVENOUS CONTINUOUS
Status: ACTIVE | OUTPATIENT
Start: 2021-02-03 | End: 2021-02-04

## 2021-02-03 RX ORDER — ONDANSETRON 2 MG/ML
INJECTION INTRAMUSCULAR; INTRAVENOUS PRN
Status: DISCONTINUED | OUTPATIENT
Start: 2021-02-03 | End: 2021-02-03 | Stop reason: HOSPADM

## 2021-02-03 RX ORDER — SODIUM CHLORIDE, SODIUM LACTATE, POTASSIUM CHLORIDE, CALCIUM CHLORIDE 600; 310; 30; 20 MG/100ML; MG/100ML; MG/100ML; MG/100ML
INJECTION, SOLUTION INTRAVENOUS CONTINUOUS
Status: DISCONTINUED | OUTPATIENT
Start: 2021-02-03 | End: 2021-02-03

## 2021-02-03 RX ORDER — CEFAZOLIN SODIUM 1 G/3ML
INJECTION, POWDER, FOR SOLUTION INTRAMUSCULAR; INTRAVENOUS PRN
Status: DISCONTINUED | OUTPATIENT
Start: 2021-02-03 | End: 2021-02-03 | Stop reason: SURG

## 2021-02-03 RX ORDER — DIPHENHYDRAMINE HYDROCHLORIDE 50 MG/ML
12.5 INJECTION INTRAMUSCULAR; INTRAVENOUS
Status: DISCONTINUED | OUTPATIENT
Start: 2021-02-03 | End: 2021-02-03 | Stop reason: HOSPADM

## 2021-02-03 RX ORDER — HYDROMORPHONE HYDROCHLORIDE 1 MG/ML
0.4 INJECTION, SOLUTION INTRAMUSCULAR; INTRAVENOUS; SUBCUTANEOUS
Status: DISCONTINUED | OUTPATIENT
Start: 2021-02-03 | End: 2021-02-03 | Stop reason: HOSPADM

## 2021-02-03 RX ORDER — SODIUM CHLORIDE, SODIUM LACTATE, POTASSIUM CHLORIDE, CALCIUM CHLORIDE 600; 310; 30; 20 MG/100ML; MG/100ML; MG/100ML; MG/100ML
INJECTION, SOLUTION INTRAVENOUS CONTINUOUS
Status: DISCONTINUED | OUTPATIENT
Start: 2021-02-03 | End: 2021-02-03 | Stop reason: HOSPADM

## 2021-02-03 RX ORDER — HYDROMORPHONE HYDROCHLORIDE 1 MG/ML
0.1 INJECTION, SOLUTION INTRAMUSCULAR; INTRAVENOUS; SUBCUTANEOUS
Status: DISCONTINUED | OUTPATIENT
Start: 2021-02-03 | End: 2021-02-03 | Stop reason: HOSPADM

## 2021-02-03 RX ORDER — ACETAMINOPHEN 325 MG/1
650 TABLET ORAL EVERY 6 HOURS
Status: DISCONTINUED | OUTPATIENT
Start: 2021-02-03 | End: 2021-02-04 | Stop reason: HOSPADM

## 2021-02-03 RX ORDER — DEXMEDETOMIDINE HYDROCHLORIDE 100 UG/ML
INJECTION, SOLUTION INTRAVENOUS PRN
Status: DISCONTINUED | OUTPATIENT
Start: 2021-02-03 | End: 2021-02-03 | Stop reason: SURG

## 2021-02-03 RX ORDER — ACETAMINOPHEN 500 MG
1000 TABLET ORAL ONCE
Status: COMPLETED | OUTPATIENT
Start: 2021-02-03 | End: 2021-02-03

## 2021-02-03 RX ORDER — LORAZEPAM 2 MG/ML
.5-1 INJECTION INTRAMUSCULAR EVERY 4 HOURS PRN
Status: DISCONTINUED | OUTPATIENT
Start: 2021-02-03 | End: 2021-02-04 | Stop reason: HOSPADM

## 2021-02-03 RX ORDER — LABETALOL HYDROCHLORIDE 5 MG/ML
5 INJECTION, SOLUTION INTRAVENOUS
Status: DISCONTINUED | OUTPATIENT
Start: 2021-02-03 | End: 2021-02-03 | Stop reason: HOSPADM

## 2021-02-03 RX ORDER — LIDOCAINE HYDROCHLORIDE 20 MG/ML
INJECTION, SOLUTION EPIDURAL; INFILTRATION; INTRACAUDAL; PERINEURAL PRN
Status: DISCONTINUED | OUTPATIENT
Start: 2021-02-03 | End: 2021-02-03 | Stop reason: SURG

## 2021-02-03 RX ORDER — LOSARTAN POTASSIUM 50 MG/1
100 TABLET ORAL
Status: DISCONTINUED | OUTPATIENT
Start: 2021-02-04 | End: 2021-02-04 | Stop reason: HOSPADM

## 2021-02-03 RX ORDER — OXYCODONE HCL 5 MG/5 ML
10 SOLUTION, ORAL ORAL
Status: COMPLETED | OUTPATIENT
Start: 2021-02-03 | End: 2021-02-03

## 2021-02-03 RX ORDER — HYDRALAZINE HYDROCHLORIDE 20 MG/ML
5 INJECTION INTRAMUSCULAR; INTRAVENOUS
Status: DISCONTINUED | OUTPATIENT
Start: 2021-02-03 | End: 2021-02-03 | Stop reason: HOSPADM

## 2021-02-03 RX ORDER — LABETALOL HYDROCHLORIDE 5 MG/ML
10 INJECTION, SOLUTION INTRAVENOUS EVERY 6 HOURS PRN
Status: DISCONTINUED | OUTPATIENT
Start: 2021-02-03 | End: 2021-02-04 | Stop reason: HOSPADM

## 2021-02-03 RX ORDER — KETOROLAC TROMETHAMINE 30 MG/ML
30 INJECTION, SOLUTION INTRAMUSCULAR; INTRAVENOUS EVERY 6 HOURS PRN
Status: DISCONTINUED | OUTPATIENT
Start: 2021-02-03 | End: 2021-02-04 | Stop reason: HOSPADM

## 2021-02-03 RX ORDER — ONDANSETRON 4 MG/1
4 TABLET, ORALLY DISINTEGRATING ORAL EVERY 4 HOURS PRN
Status: DISCONTINUED | OUTPATIENT
Start: 2021-02-03 | End: 2021-02-04 | Stop reason: HOSPADM

## 2021-02-03 RX ORDER — MORPHINE SULFATE 4 MG/ML
1-5 INJECTION, SOLUTION INTRAMUSCULAR; INTRAVENOUS
Status: DISCONTINUED | OUTPATIENT
Start: 2021-02-03 | End: 2021-02-04 | Stop reason: HOSPADM

## 2021-02-03 RX ORDER — OXYCODONE HCL 5 MG/5 ML
5 SOLUTION, ORAL ORAL
Status: COMPLETED | OUTPATIENT
Start: 2021-02-03 | End: 2021-02-03

## 2021-02-03 RX ORDER — IBUPROFEN 200 MG
200 TABLET ORAL
COMMUNITY
End: 2021-02-18

## 2021-02-03 RX ORDER — DEXAMETHASONE SODIUM PHOSPHATE 4 MG/ML
INJECTION, SOLUTION INTRA-ARTICULAR; INTRALESIONAL; INTRAMUSCULAR; INTRAVENOUS; SOFT TISSUE PRN
Status: DISCONTINUED | OUTPATIENT
Start: 2021-02-03 | End: 2021-02-03 | Stop reason: SURG

## 2021-02-03 RX ORDER — ONDANSETRON 2 MG/ML
4 INJECTION INTRAMUSCULAR; INTRAVENOUS EVERY 4 HOURS PRN
Status: DISCONTINUED | OUTPATIENT
Start: 2021-02-03 | End: 2021-02-04 | Stop reason: HOSPADM

## 2021-02-03 RX ORDER — ONDANSETRON 2 MG/ML
4 INJECTION INTRAMUSCULAR; INTRAVENOUS
Status: DISCONTINUED | OUTPATIENT
Start: 2021-02-03 | End: 2021-02-03 | Stop reason: HOSPADM

## 2021-02-03 RX ORDER — DIPHENHYDRAMINE HCL 25 MG
25 TABLET ORAL EVERY 6 HOURS PRN
Status: DISCONTINUED | OUTPATIENT
Start: 2021-02-03 | End: 2021-02-04 | Stop reason: HOSPADM

## 2021-02-03 RX ADMIN — FENTANYL CITRATE 50 MCG: 50 INJECTION, SOLUTION INTRAMUSCULAR; INTRAVENOUS at 12:45

## 2021-02-03 RX ADMIN — DEXAMETHASONE SODIUM PHOSPHATE 8 MG: 4 INJECTION, SOLUTION INTRA-ARTICULAR; INTRALESIONAL; INTRAMUSCULAR; INTRAVENOUS; SOFT TISSUE at 12:24

## 2021-02-03 RX ADMIN — MIDAZOLAM 2 MG: 1 INJECTION INTRAMUSCULAR; INTRAVENOUS at 12:10

## 2021-02-03 RX ADMIN — ROCURONIUM BROMIDE 50 MG: 10 INJECTION, SOLUTION INTRAVENOUS at 12:15

## 2021-02-03 RX ADMIN — LIDOCAINE HYDROCHLORIDE 0.5 ML: 10 INJECTION, SOLUTION EPIDURAL; INFILTRATION; INTRACAUDAL; PERINEURAL at 10:00

## 2021-02-03 RX ADMIN — FENTANYL CITRATE 50 MCG: 50 INJECTION, SOLUTION INTRAMUSCULAR; INTRAVENOUS at 13:19

## 2021-02-03 RX ADMIN — ACETAMINOPHEN 1000 MG: 500 TABLET ORAL at 11:01

## 2021-02-03 RX ADMIN — FENTANYL CITRATE 50 MCG: 50 INJECTION, SOLUTION INTRAMUSCULAR; INTRAVENOUS at 12:13

## 2021-02-03 RX ADMIN — CEFAZOLIN 2 G: 330 INJECTION, POWDER, FOR SOLUTION INTRAMUSCULAR; INTRAVENOUS at 12:15

## 2021-02-03 RX ADMIN — FAMOTIDINE 20 MG: 20 TABLET ORAL at 17:03

## 2021-02-03 RX ADMIN — POVIDONE IODINE 15 ML: 100 SOLUTION TOPICAL at 11:03

## 2021-02-03 RX ADMIN — SODIUM CHLORIDE, POTASSIUM CHLORIDE, SODIUM LACTATE AND CALCIUM CHLORIDE: 600; 310; 30; 20 INJECTION, SOLUTION INTRAVENOUS at 13:05

## 2021-02-03 RX ADMIN — ONDANSETRON 4 MG: 2 INJECTION INTRAMUSCULAR; INTRAVENOUS at 12:45

## 2021-02-03 RX ADMIN — DEXMEDETOMIDINE HYDROCHLORIDE 40 MCG: 100 INJECTION, SOLUTION INTRAVENOUS at 12:30

## 2021-02-03 RX ADMIN — MORPHINE SULFATE 4 MG: 4 INJECTION INTRAVENOUS at 15:13

## 2021-02-03 RX ADMIN — OXYCODONE HYDROCHLORIDE 10 MG: 5 SOLUTION ORAL at 13:19

## 2021-02-03 RX ADMIN — SODIUM CHLORIDE, POTASSIUM CHLORIDE, SODIUM LACTATE AND CALCIUM CHLORIDE 1000 ML: 600; 310; 30; 20 INJECTION, SOLUTION INTRAVENOUS at 15:13

## 2021-02-03 RX ADMIN — KETOROLAC TROMETHAMINE 30 MG: 30 INJECTION, SOLUTION INTRAMUSCULAR at 13:57

## 2021-02-03 RX ADMIN — FENTANYL CITRATE 25 MCG: 50 INJECTION, SOLUTION INTRAMUSCULAR; INTRAVENOUS at 13:45

## 2021-02-03 RX ADMIN — SODIUM CHLORIDE, POTASSIUM CHLORIDE, SODIUM LACTATE AND CALCIUM CHLORIDE: 600; 310; 30; 20 INJECTION, SOLUTION INTRAVENOUS at 11:24

## 2021-02-03 RX ADMIN — FENTANYL CITRATE 25 MCG: 50 INJECTION, SOLUTION INTRAMUSCULAR; INTRAVENOUS at 13:49

## 2021-02-03 RX ADMIN — HYDROCODONE BITARTRATE AND ACETAMINOPHEN 2 TABLET: 5; 325 TABLET ORAL at 17:03

## 2021-02-03 RX ADMIN — TOPIRAMATE 100 MG: 100 TABLET, FILM COATED ORAL at 16:20

## 2021-02-03 RX ADMIN — MORPHINE SULFATE 4 MG: 4 INJECTION INTRAVENOUS at 18:46

## 2021-02-03 RX ADMIN — ACETAMINOPHEN 650 MG: 325 TABLET ORAL at 15:13

## 2021-02-03 RX ADMIN — MORPHINE SULFATE 4 MG: 4 INJECTION INTRAVENOUS at 21:11

## 2021-02-03 RX ADMIN — PROPOFOL 200 MG: 10 INJECTION, EMULSION INTRAVENOUS at 12:15

## 2021-02-03 RX ADMIN — SUGAMMADEX 200 MG: 100 INJECTION, SOLUTION INTRAVENOUS at 12:45

## 2021-02-03 RX ADMIN — ACETAMINOPHEN 650 MG: 325 TABLET ORAL at 23:43

## 2021-02-03 RX ADMIN — LIDOCAINE HYDROCHLORIDE 80 MG: 20 INJECTION, SOLUTION EPIDURAL; INFILTRATION; INTRACAUDAL at 12:15

## 2021-02-03 RX ADMIN — HYDROMORPHONE HYDROCHLORIDE 0.4 MG: 1 INJECTION, SOLUTION INTRAMUSCULAR; INTRAVENOUS; SUBCUTANEOUS at 14:07

## 2021-02-03 SDOH — HEALTH STABILITY: MENTAL HEALTH: HOW OFTEN DO YOU HAVE A DRINK CONTAINING ALCOHOL?: 2-4 TIMES A MONTH

## 2021-02-03 SDOH — HEALTH STABILITY: MENTAL HEALTH: HOW MANY STANDARD DRINKS CONTAINING ALCOHOL DO YOU HAVE ON A TYPICAL DAY?: 1 OR 2

## 2021-02-03 SDOH — HEALTH STABILITY: MENTAL HEALTH: HOW OFTEN DO YOU HAVE 6 OR MORE DRINKS ON ONE OCCASION?: NEVER

## 2021-02-03 ASSESSMENT — LIFESTYLE VARIABLES
EVER FELT BAD OR GUILTY ABOUT YOUR DRINKING: NO
ALCOHOL_USE: YES
AVERAGE NUMBER OF DAYS PER WEEK YOU HAVE A DRINK CONTAINING ALCOHOL: 2
TOTAL SCORE: 0
HAVE PEOPLE ANNOYED YOU BY CRITICIZING YOUR DRINKING: NO
HOW MANY TIMES IN THE PAST YEAR HAVE YOU HAD 5 OR MORE DRINKS IN A DAY: 0
HAVE YOU EVER FELT YOU SHOULD CUT DOWN ON YOUR DRINKING: NO
TOTAL SCORE: 0
ON A TYPICAL DAY WHEN YOU DRINK ALCOHOL HOW MANY DRINKS DO YOU HAVE: 2
CONSUMPTION TOTAL: NEGATIVE
EVER HAD A DRINK FIRST THING IN THE MORNING TO STEADY YOUR NERVES TO GET RID OF A HANGOVER: NO
TOTAL SCORE: 0

## 2021-02-03 ASSESSMENT — PAIN DESCRIPTION - PAIN TYPE
TYPE: SURGICAL PAIN
TYPE: CHRONIC PAIN
TYPE: SURGICAL PAIN

## 2021-02-03 ASSESSMENT — COGNITIVE AND FUNCTIONAL STATUS - GENERAL
STANDING UP FROM CHAIR USING ARMS: A LITTLE
DAILY ACTIVITIY SCORE: 18
SUGGESTED CMS G CODE MODIFIER MOBILITY: CK
CLIMB 3 TO 5 STEPS WITH RAILING: A LITTLE
MOVING FROM LYING ON BACK TO SITTING ON SIDE OF FLAT BED: A LITTLE
HELP NEEDED FOR BATHING: A LITTLE
TOILETING: A LITTLE
MOBILITY SCORE: 18
MOVING TO AND FROM BED TO CHAIR: A LITTLE
PERSONAL GROOMING: A LITTLE
DRESSING REGULAR LOWER BODY CLOTHING: A LITTLE
DRESSING REGULAR UPPER BODY CLOTHING: A LITTLE
WALKING IN HOSPITAL ROOM: A LITTLE
TURNING FROM BACK TO SIDE WHILE IN FLAT BAD: A LITTLE
EATING MEALS: A LITTLE
SUGGESTED CMS G CODE MODIFIER DAILY ACTIVITY: CK

## 2021-02-03 ASSESSMENT — PAIN SCALES - GENERAL: PAIN_LEVEL: 4

## 2021-02-03 ASSESSMENT — FIBROSIS 4 INDEX: FIB4 SCORE: 0.81

## 2021-02-03 NOTE — ANESTHESIA PREPROCEDURE EVALUATION
Relevant Problems   ANESTHESIA   (+) Sleep apnea      NEURO   (+) Seizure cerebral (HCC)      CARDIAC   (+) Aortic regurgitation   (+) Essential hypertension   (+) Intractable migraine without aura and without status migrainosus      Other   (+) Cervical radiculopathy   (+) DDD (degenerative disc disease), cervical   (+) Fibromyalgia   (+) Hereditary hemochromatosis (HCC)   (+) Moderate episode of recurrent major depressive disorder (HCC)   (+) PVD (peripheral vascular disease) (HCC)   (+) S/P cervical spinal fusion   (+) TOS (thoracic outlet syndrome)       Physical Exam    Airway   Mallampati: II  TM distance: >3 FB  Neck ROM: full       Cardiovascular - normal exam  Rhythm: regular  Rate: normal  (-) murmur     Dental - normal exam           Pulmonary - normal exam  Breath sounds clear to auscultation     Abdominal    Neurological - normal exam                 Anesthesia Plan    ASA 2       Plan - general       Airway plan will be ETT        Induction: intravenous    Postoperative Plan: Postoperative administration of opioids is intended.    Pertinent diagnostic labs and testing reviewed    Informed Consent:    Anesthetic plan and risks discussed with patient.    Use of blood products discussed with: patient whom consented to blood products.

## 2021-02-03 NOTE — PROGRESS NOTES
2 RN skin check completed with SHLOMO Brenner.   Devices in place: , PIV, SCDs, nasal cannula   Skin assessed under devices:     X 3 lap sites with dressing in place to (R) side of chest. Scant drainage noted.   Scratch noted over sacrum   Skin intact over other bony prominences.     Confirmed pressure ulcers found on: N/A  New potential pressure ulcers noted on N/A. Wound consult placed N/A.    The following interventions in place: pt encouraged to reposition while in bed, educated regarding importance of ambulation, silicone NC with grey foam in use, pillows in place to help with repositioning

## 2021-02-03 NOTE — OR NURSING
1304- Pt arrives to PACU from OR on 5L of oxygen via mask, resting comfortably upon arrival. Report received, dressing to R rib cage CDI, 3 sites with gauze and tegaderm.     1319- Pt states pain 8/10, medicated per MAR.     1344- Attempt to call daughter Donna for update, no answer at this time.     1345- CXR at bedside.     1419- Report called to Nadine RN on GSU.     1435- Bedside handoff given to Nadine RN in .

## 2021-02-03 NOTE — OR NURSING
COVID-19 Pre-surgery screenin. Do you have an undiagnosed respiratory illness or symptoms such as coughing or sneezing? NO  a. Onset of Sx NA  b. Acute vs. chronic respiratory illness NA    2. Do you have an unexplained fever greater than 100.4 degrees Fahrenheit or 38 degrees Celsius?                     NO    3. Have you had direct exposure to a patient who tested positive for Covid-19?                          NO    4. Have you had any loss of your sense of taste or smell? Have you had N/V or sore throat? NO    Patient has been informed of visitor policy and asked to wear a mask upon entering the hospital    YES

## 2021-02-03 NOTE — ANESTHESIA TIME REPORT
Anesthesia Start and Stop Event Times     Date Time Event    2/3/2021 1159 Ready for Procedure     1209 Anesthesia Start        Responsible Staff  02/03/21    Name Role Begin End    Nhung Asif M.D. Anesth 1209         Preop Diagnosis (Free Text):  Pre-op Diagnosis     PERICARDIAL CYST        Preop Diagnosis (Codes):    Post op Diagnosis  Pericardial cyst      Premium Reason  Non-Premium    Comments:

## 2021-02-03 NOTE — OP REPORT
DATE OF SERVICE:  02/03/2021     PREOPERATIVE DIAGNOSIS:  Right pericardial cyst.     POSTOPERATIVE DIAGNOSIS:  Right pericardial cyst.     OPERATION:  Right thoracoscopy with excision of pericardial cyst.     SURGEON:  John H. Ganser, MD     ASSISTANT:  Jonny Serrano PA-C     ANESTHESIA:  General.     ANESTHESIOLOGIST:  Nhung Asif MD     INDICATIONS:  The patient is a 60-year-old female being evaluated for chest   discomfort and cough and was found to have an approximately 4 cm right   anterior pericardial cyst.  We discussed risks, benefits and alternatives to   thoracoscopic excision and she wished to proceed.     FINDINGS:  What appeared to be a simple cyst with a communication to the   pericardium on the right anterolateral aspect inferiorly.  An assistant   required for running the camera and assistant with closure.     DESCRIPTION OF PROCEDURE:  The patient was identified and general anesthetic   administered.  A double lumen endotracheal tube was placed.  She was placed in   the left lateral decubitus position and her right chest was prepped and   draped in the usual sterile fashion.  Local anesthesia 0.5% Marcaine with   epinephrine was injected prior to skin incision.  Small incision was made in   the midaxillary line approximately at the seventh intercostal space and a 5 mm   blunt trocar and 5 mm 30-degree scope inserted.  Two additional anterior 5 mm   trocars were placed to triangulate towards the visible cyst.  There was good   lung isolation.  This appeared to be a simple cyst with thin wall coming down   to a narrow stalk.  Adhesions to the pericardial fat were divided with   cautery.  The base of the cyst was then transected with cautery and the cyst   removed and sent for permanent histology.  There was a small communication   with the pericardium.  Hemostasis was assured.     The chest was insufflated with low pressure using carbon dioxide gas during   the procedure.  The lung was  re-expanded and all the gas removed as the   trocars were withdrawn.  The incisions were closed with Vicryl.  Sterile   dressings were applied and the patient returned to recovery room in stable   condition.        ______________________________  JOHN H. GANSER, MD [facility]/MAN    DD:  02/03/2021 12:51  DT:  02/03/2021 13:47    Job#:  [id number]    CC:MD HOLLIE TOVAR APN

## 2021-02-03 NOTE — OR SURGEON
Immediate Post OP Note    PreOp Diagnosis: Pericardial cyst    PostOp Diagnosis: Same    Procedure(s):  RIGHT THORACOSCOPY - Wound Class: Clean  EXCISION, CYST-PERICARDIAL CYST - Wound Class: Clean    Surgeon(s):  John H Ganser, M.D.    Anesthesiologist/Type of Anesthesia:  Anesthesiologist: Nhung Asif M.D.  Anesthesia Technician: Kristopher Frances/* No anesthesia type entered *    Surgical Staff:  Assistant: JANINE Cao  Circulator: Lola Lamb R.N.  Scrub Person: Jacky Duke; Yesenia Christianson  Anesthesia Assistant: Laci Armijo    Specimens removed if any:  ID Type Source Tests Collected by Time Destination   A : Pericardial Cyst  Other Other PATHOLOGY SPECIMEN John H Ganser, M.D. 2/3/2021 12:45 PM        Estimated Blood Loss: 0    Findings: 0    Complications: 0        2/3/2021 12:47 PM John H Ganser, M.D.

## 2021-02-03 NOTE — ANESTHESIA PROCEDURE NOTES
Airway    Date/Time: 2/3/2021 12:17 PM  Performed by: Nhung Asif M.D.  Authorized by: Nhung Asif M.D.     Location:  OR  Urgency:  Elective  Difficult Airway: No    Indications for Airway Management:  Anesthesia      Spontaneous Ventilation: absent    Sedation Level:  Deep  Preoxygenated: Yes    Patient Position:  Sniffing  Mask Difficulty Assessment:  1 - vent by mask  Final Airway Type:  Endotracheal airway  Final Endotracheal Airway:  ETT - double lumen left with ONE LUNG VENTILATION  Cuffed: Yes    Technique Used for Successful ETT Placement:  Flexible bronchoscopy  Devices/Methods Used in Placement:  Anterior pressure/BURP    Insertion Site:  Oral  Blade Type:  John  Laryngoscope Blade/Videolaryngoscope Blade Size:  3  ETT Double Lumen (fr):  35  Measured from:  Teeth  ETT to Teeth (cm):  27  Placement Verified by: auscultation and capnometry    Number of Attempts at Approach:  1

## 2021-02-03 NOTE — PROGRESS NOTES
Patient in pre-op, assessment completed, patient and daughter Donna updated on plan of care, all questions answered, no further needs at this time, call light within reach.

## 2021-02-04 VITALS
HEART RATE: 69 BPM | OXYGEN SATURATION: 93 % | SYSTOLIC BLOOD PRESSURE: 121 MMHG | HEIGHT: 63 IN | DIASTOLIC BLOOD PRESSURE: 73 MMHG | TEMPERATURE: 97.2 F | WEIGHT: 172.62 LBS | RESPIRATION RATE: 17 BRPM | BODY MASS INDEX: 30.59 KG/M2

## 2021-02-04 PROCEDURE — 700101 HCHG RX REV CODE 250: Performed by: PHYSICIAN ASSISTANT

## 2021-02-04 PROCEDURE — A9270 NON-COVERED ITEM OR SERVICE: HCPCS | Performed by: PHYSICIAN ASSISTANT

## 2021-02-04 PROCEDURE — 700102 HCHG RX REV CODE 250 W/ 637 OVERRIDE(OP): Performed by: PHYSICIAN ASSISTANT

## 2021-02-04 PROCEDURE — 700111 HCHG RX REV CODE 636 W/ 250 OVERRIDE (IP): Performed by: PHYSICIAN ASSISTANT

## 2021-02-04 RX ORDER — OXYCODONE HYDROCHLORIDE 5 MG/1
5-10 TABLET ORAL EVERY 4 HOURS PRN
Qty: 20 TAB | Refills: 0 | Status: SHIPPED | OUTPATIENT
Start: 2021-02-04 | End: 2021-02-08

## 2021-02-04 RX ORDER — OXYCODONE HYDROCHLORIDE 5 MG/1
5-10 TABLET ORAL EVERY 4 HOURS PRN
Status: DISCONTINUED | OUTPATIENT
Start: 2021-02-04 | End: 2021-02-04 | Stop reason: HOSPADM

## 2021-02-04 RX ORDER — OXYCODONE HYDROCHLORIDE 10 MG/1
10 TABLET ORAL EVERY 4 HOURS PRN
Status: DISCONTINUED | OUTPATIENT
Start: 2021-02-04 | End: 2021-02-04 | Stop reason: HOSPADM

## 2021-02-04 RX ADMIN — TOPIRAMATE 100 MG: 100 TABLET, FILM COATED ORAL at 05:23

## 2021-02-04 RX ADMIN — ACETAMINOPHEN 650 MG: 325 TABLET ORAL at 05:23

## 2021-02-04 RX ADMIN — FAMOTIDINE 20 MG: 20 TABLET ORAL at 05:23

## 2021-02-04 RX ADMIN — MORPHINE SULFATE 4 MG: 4 INJECTION INTRAVENOUS at 11:26

## 2021-02-04 RX ADMIN — HYDROCODONE BITARTRATE AND ACETAMINOPHEN 2 TABLET: 5; 325 TABLET ORAL at 03:24

## 2021-02-04 RX ADMIN — MORPHINE SULFATE 4 MG: 4 INJECTION INTRAVENOUS at 04:48

## 2021-02-04 RX ADMIN — ACETAMINOPHEN 650 MG: 325 TABLET ORAL at 11:26

## 2021-02-04 RX ADMIN — OXYCODONE 10 MG: 5 TABLET ORAL at 09:50

## 2021-02-04 RX ADMIN — ENOXAPARIN SODIUM 40 MG: 40 INJECTION SUBCUTANEOUS at 05:23

## 2021-02-04 ASSESSMENT — PAIN DESCRIPTION - PAIN TYPE
TYPE: SURGICAL PAIN

## 2021-02-04 ASSESSMENT — ENCOUNTER SYMPTOMS
FEVER: 0
SHORTNESS OF BREATH: 0
NAUSEA: 0
CHILLS: 0
VOMITING: 0

## 2021-02-04 NOTE — DISCHARGE INSTRUCTIONS
Discharge home in PM today when alert, comfortable, ambulatory, and tolerating PO well  Pt counseled re: diet , activity, wound care, I.S., and home med's  May shower tomorrow over Tegaderms.  Remove Tegaderms in 2 days  No baths, hot tubs, soaks for 14 days.  No lifting >15 lbs for 1-2 wks  No driving for 4-5 days   F/U with Dr. Ganser in 1-2 weeks  Await path.    Discharge Instructions    Discharged to home by car with relative. Discharged via wheelchair, hospital escort: Yes.  Special equipment needed: Not Applicable    Be sure to schedule a follow-up appointment with your primary care doctor or any specialists as instructed.     Discharge Plan:   Diet Plan: Discussed  Activity Level: Discussed  Confirmed Follow up Appointment: Patient to Call and Schedule Appointment  Confirmed Symptoms Management: Discussed  Medication Reconciliation Updated: Yes  Influenza Vaccine Indication: Indicated: 9 to 64 years of age(pt would like it on day of discharge)    I understand that a diet low in cholesterol, fat, and sodium is recommended for good health. Unless I have been given specific instructions below for another diet, I accept this instruction as my diet prescription.   Other diet: Regular diet     Special Instructions:    Thoracoscopy, Care After  This sheet gives you information about how to care for yourself after your procedure. Your health care provider may also give you more specific instructions. If you have problems or questions, contact your health care provider.  What can I expect after the procedure?  After the procedure, it is common to have pain and soreness in the surgical area.  Follow these instructions at home:  Incision care    · Follow instructions from your health care provider about how to take care of your incision. Make sure you:  ? Wash your hands with soap and water before you change your bandage (dressing). If soap and water are not available, use hand .  ? Change your dressing as  told by your health care provider.  ? Leave stitches (sutures), skin glue, or adhesive strips in place. These skin closures may need to stay in place for 2 weeks or longer. If adhesive strip edges start to loosen and curl up, you may trim the loose edges. Do not remove adhesive strips completely unless your health care provider tells you to do that.  · Check your incision areas every day for signs of infection. Check for:  ? Redness, swelling, or pain.  ? Fluid or blood.  ? Warmth.  ? Pus or a bad smell.  · Do not take baths, swim, or use a hot tub until your health care provider approves. You may take showers.  Medicines  · Take over-the-counter and prescription medicines only as told by your health care provider.  · If you were prescribed an antibiotic medicine, take it as told by your health care provider. Do not stop taking the antibiotic even if you start to feel better.  · Do not drive or use heavy machinery while taking prescription pain medicine.  · If you are taking prescription pain medicine, take actions to prevent or treat constipation. Your health care provider may recommend that you:  ? Drink enough fluid to keep your urine pale yellow.  ? Eat foods that are high in fiber, such as fresh fruits and vegetables, whole grains, and beans.  ? Limit foods that are high in fat and processed sugars, such as fried and sweet foods.  ? Take an over-the-counter or prescription medicine for constipation.  Managing pain, stiffness, and swelling    · If directed, put ice on the affected area:  ? Put ice in a plastic bag.  ? Place a towel between your skin and the bag.  ? Leave the ice on for 20 minutes, 2-3 times a day.  Preventing lung infection  · To prevent pneumonia and to keep your lungs healthy:  ? Try to cough often. If it hurts to cough, hold a pillow against your chest as you cough.  ? Take deep breaths or do breathing exercises as instructed by your health care provider.  ? If you were given an incentive  spirometer, use it as directed by your health care provider.  General instructions  · Do not lift anything that is heavier than 10 lb (4.5 kg), or the limit that you are told, until your health care provider says that it is safe.  · Do not use any products that contain nicotine or tobacco, such as cigarettes and e-cigarettes. These can delay healing after surgery. If you need help quitting, ask your health care provider.  · Avoid driving until your health care provider approves.  · If you have a chest drainage tube, care for it as instructed by your health care provider. Do not travel by airplane after the chest drainage tube is removed until your health care provider approves.  · Keep all follow-up visits as told by your health care provider. This is important.  Contact a health care provider if:  · You have a fever.  · Pain medicines do not ease your pain.  · You have redness, swelling, or increasing pain in your incision area.  · You develop a cough that does not go away, or you are coughing up mucus that is yellow or green.  Get help right away if:  · You have fluid, blood, or pus coming from your incision.  · There is a bad smell coming from your incision or dressing.  · You develop a rash.  · You cough up blood.  · You develop light-headedness, or you feel faint.  · You have difficulty breathing.  · You develop chest pain.  · Your heartbeat feels irregular or very fast.  These symptoms may represent a serious problem that is an emergency. Do not wait to see if the symptoms will go away. Get medical help right away. Call your local emergency services (911 in the [place] Do not drive yourself to the hospital.  Summary  · Follow instructions from your health care provider about how to take care of your incision.  · Do not drive or use heavy machinery while taking prescription pain medicine.  · Leave stitches (sutures), skin glue, or adhesive strips in place.  · Check your incision areas every day for signs of  infection.  This information is not intended to replace advice given to you by your health care provider. Make sure you discuss any questions you have with your health care provider.  Document Released: 07/07/2006 Document Revised: 11/30/2018 Document Reviewed: 11/27/2018  Elsevier Patient Education © 2020 [employer]        Pain Medicine Instructions  You may need pain medicine after an injury or illness. Two common types of pain medicine are:  · Opioid pain medicine. These may be called opioids.  · Non-opioid pain medicine. This includes NSAIDs.  It is important to follow your doctor's instructions when you are taking pain medicine. Doing this can keep yourself and others safe.  How can pain medicine affect me?  Pain medicine may not make all of your pain go away. It should make you comfortable enough to:  · Move.  · Breathe.  · Do normal activities.  Opioids can cause side effects, such as:  · Trouble pooping (constipation).  · Feeling sick to your stomach (nausea).  · Throwing up (vomiting).  · Feeling very sleepy.  · Confusion.  · Taking the medicine for nonmedical reasons even though taking it hurts your health and well-being (opioid use disorder).  · Trouble breathing (respiratory depression).  Taking opioids for longer than 3 days raises your risk of these side effects.  Taking opioids for a long time can affect how well you can do daily tasks. Taking them for a long time also puts you at risk for:  · Car crashes.  · Depression.  · Suicide.  · Heart attack.  · Taking too much of the medicine (overdose). This can lead to death.  What should I do to stay safe while taking pain medicine?  Take your medicine as told  · Take pain medicine exactly as told by your doctor. Take it only when you need it.  · Write down the times when you take your pain medicine. Look at the times before you take your next dose.  · Take other over-the-counter or prescription medicines only as told by your doctor.  ? If your pain  medicine has acetaminophen in it, do not take any other acetaminophen while you are taking this medicine. Too much can damage the liver.  · Get pain medicine prescriptions from only one doctor.  Avoid certain activities  While you are taking prescription pain medicine, and for 8 hours after your last dose:  · Do not drive.  · Do not use machinery.  · Do not use power tools.  · Do not sign legal documents.  · Do not drink alcohol.  · Do not take sleeping pills.  · Do not take care of children by yourself.  · Do not do any activities that involve climbing or being in high places.  · Do not go into any body of water unless there is an adult nearby who can watch you and help you if needed. This includes:  ? Lakes.  ? Rivers.  ? Oceans.  ? Spas.  ? Swimming pools.    Keep others safe  · Store your medicine as told by your doctor. Keep it where children and pets cannot reach it.  · Do not share your pain medicine with anyone.  · Do not save any leftover pills. If you have leftover pills, you can:  ? Bring them to a take-back program.  ? Bring them to a pharmacy that has a drug disposal container.  ? Throw them in the trash. Check the medicine label or package insert to see if it is safe to throw it out. If it is safe, take the medicine out of the container. Mix it with something that makes it unusable, such as pet waste. Then put the medicine in the trash.  General instructions  · Talk with your doctor about other ways to manage your pain.  · If you have trouble pooping:  ? Drink enough fluid to keep your pee (urine) pale yellow.  ? Use a poop (stool) softener as told by your doctor.  ? Eat more fruits and vegetables.  · Keep all follow-up visits as told by your doctor. This is important.  Contact a doctor if:  · Your medicine is not helping with your pain.  · You have a rash.  · You feel depressed.  Get help right away if:  Seek medical care right away if you are taking pain medicines and you (or people close to you)  notice any of the following:  · Trouble breathing.  · Breathing that is shorter than normal.  · Breathing that is more shallow than normal.  · Confusion.  · Sleepiness.  · Trouble staying awake.  · Feeling sick to your stomach.  · Throwing up.  · Your skin or lips turning pale or bluish in color.  · Tongue swelling.  If you ever feel like you may hurt yourself or others, or have thoughts about taking your own life, get help right away. Go to your nearest emergency department or call:  · Your local emergency services (911 in the [place]  · A suicide crisis helpline, such as the National Suicide Prevention Lifeline at 1-169.940.7673. This is open 24 hours a day.  Summary  · Take your pain medicine exactly as told by your doctor.  · Pain medicine can help lower your pain. It may also cause side effects.  · Talk with your doctor about other ways to manage your pain.  · Follow your doctor's instructions about how to take your pain medicine and keep others safe. Ask what activities you should avoid while taking pain medicine.  This information is not intended to replace advice given to you by your health care provider. Make sure you discuss any questions you have with your health care provider.  Document Released: 06/05/2009 Document Revised: 11/30/2018 Document Reviewed: 07/30/2018  [employer] Patient Education © 2020 [employer]      · Is patient discharged on Warfarin / Coumadin?   No     Depression / Suicide Risk    As you are discharged from this [facility] facility, it is important to learn how to keep safe from harming yourself.    Recognize the warning signs:  · Abrupt changes in personality, positive or negative- including increase in energy   · Giving away possessions  · Change in eating patterns- significant weight changes-  positive or negative  · Change in sleeping patterns- unable to sleep or sleeping all the time   · Unwillingness or inability to communicate  · Depression  · Unusual sadness,  discouragement and loneliness  · Talk of wanting to die  · Neglect of personal appearance   · Rebelliousness- reckless behavior  · Withdrawal from people/activities they love  · Confusion- inability to concentrate     If you or a loved one observes any of these behaviors or has concerns about self-harm, here's what you can do:  · Talk about it- your feelings and reasons for harming yourself  · Remove any means that you might use to hurt yourself (examples: pills, rope, extension cords, firearm)  · Get professional help from the community (Mental Health, Substance Abuse, psychological counseling)  · Do not be alone:Call your Safe Contact- someone whom you trust who will be there for you.  · Call your local CRISIS HOTLINE 527-8425 or 343-983-2882  · Call your local Children's Mobile Crisis Response Team [place] (140) 042-0053 or www.[employer]  · Call the toll free National Suicide Prevention Hotlines   · National Suicide Prevention Lifeline 952-941-KRLB (3955)  · National Hope Line Network 800-SUICIDE (877-1063)

## 2021-02-04 NOTE — PROGRESS NOTES
PIV was d/c prior to pt coming to discharge lounge. Discharge paperwork discussed with the patient, signed copy placed in the chart. Consent for controlled substance also placed in the chart. Pt taken out to [facility] at this time to meet her ride.

## 2021-02-04 NOTE — PROGRESS NOTES
Report received from day shift RN, assumed Care.   Patient is AOx4, responds appropriately.      Pain controlled at this time.  Patient is tolerating regular diet, denies nausea/vomiting. .   Up stand by assist with steady gait.     Plan of care discussed, all questions answered.    Educated on use of call light and importance of calling before getting out of bed. Pt verbalizes understanding.    Call light and belongings within reach, treaded slipper socks on, bed in lowest locked position.  All needs met at this time.

## 2021-02-04 NOTE — PROGRESS NOTES
Chart reviewed. Assessment completed.   Pt is A&Ox4   Reports pain to (R) chest, medicated per MNOTY BOLIVAR, CMS intact, denies numbness and tingling   Ambulatory with SBA. Educated to call for assistance.   On 1L O2 NC, denies SOB or chest pain; achieves 500 on IS.   Hypoactive BS x 4. Tolerating diet. Denies N&V   - flatus - BM   + void   (R) chest with x 3 lap sites, dressing CDI   PIV running IVF  Updated on POC. Belongings and call light within reach. All needs met at this time.

## 2021-02-04 NOTE — CARE PLAN
Problem: Venous Thromboembolism (VTW)/Deep Vein Thrombosis (DVT) Prevention:  Goal: Patient will participate in Venous Thrombosis (VTE)/Deep Vein Thrombosis (DVT)Prevention Measures  Outcome: PROGRESSING AS EXPECTED  Pt educated to mobilize and ambulate     Problem: Pain Management  Goal: Pain level will decrease to patient's comfort goal  Outcome: PROGRESSING AS EXPECTED  Pain managed with PRN oxycodone, IV Morphine   Ice packs also in use to alleviate pain

## 2021-02-04 NOTE — PROGRESS NOTES
"Progress Note:    S: Doing better  Pain improved    O:  Recent Labs     02/02/21  1618   WBC 6.8   RBC 4.67   HEMOGLOBIN 14.7   HEMATOCRIT 43.1   MCV 92.3   MCH 31.5   MCHC 34.1   RDW 42.5   PLATELETCT 355   MPV 8.4*     Recent Labs     02/02/21  1618   SODIUM 135   POTASSIUM 3.6   CHLORIDE 101   CO2 26   GLUCOSE 96   BUN 15   CREATININE 0.93   CALCIUM 9.2         Current Facility-Administered Medications   Medication Dose   • oxyCODONE immediate-release (ROXICODONE) tablet 5-10 mg  5-10 mg    Or   • oxyCODONE immediate release (ROXICODONE) tablet 10 mg  10 mg   • losartan (COZAAR) tablet 100 mg  100 mg   • topiramate (TOPAMAX) tablet 100 mg  100 mg   • enoxaparin (LOVENOX) inj 40 mg  40 mg   • Pharmacy Consult Request ...Pain Management Review 1 Each  1 Each   • ondansetron (ZOFRAN) syringe/vial injection 4 mg  4 mg   • diphenhydrAMINE (BENADRYL) injection 25 mg  25 mg   • diphenhydrAMINE (BENADRYL) tablet/capsule 25 mg  25 mg    Or   • diphenhydrAMINE (BENADRYL) injection 25 mg  25 mg   • labetalol (NORMODYNE/TRANDATE) injection 10 mg  10 mg   • famotidine (PEPCID) tablet 20 mg  20 mg    Or   • famotidine (PEPCID) injection 20 mg  20 mg   • LORazepam (ATIVAN) injection 0.5-1 mg  0.5-1 mg   • morphine (pf) 4 mg/mL injection 1-5 mg  1-5 mg   • ondansetron (ZOFRAN ODT) dispertab 4 mg  4 mg   • acetaminophen (Tylenol) tablet 650 mg  650 mg   • ketorolac (TORADOL) injection 30 mg  30 mg   • influenza vaccine quad injection 0.5 mL  0.5 mL       PE:  /73   Pulse 69   Temp 36.2 °C (97.2 °F) (Temporal)   Resp 17   Ht 1.6 m (5' 3\")   Wt 78.3 kg (172 lb 9.9 oz)   SpO2 93%     Intake/Output Summary (Last 24 hours) at 2/4/2021 1232  Last data filed at 2/4/2021 0500  Gross per 24 hour   Intake 2080 ml   Output 10 ml   Net 2070 ml       Wounds: small amount blood under tegaderm    Rads:  DX-CHEST-LIMITED (1 VIEW)   Final Result   Addendum 1 of 1   Findings were discussed with ALLYSON STOCKTON on 2/3/2021 2:35 PM.    "   Final          A: There are no active hospital problems to display for this patient.        P: Discharge    John Ganser M.D.  [facility]

## 2021-02-04 NOTE — PROGRESS NOTES
Surgical Progress Note    Author: JANINE Cao Date & Time created: 2021   8:53 AM     Interval Events:  S/p Right thoracoscopy with excision of pericardial cyst - POD#1, doing well; pt tearful and had poor pain control earlier - now doing much better with improved spirits and pain control; fady po well; ambulatory; using IS; wants to go home     Review of Systems   Constitutional: Negative for chills and fever.   Respiratory: Negative for shortness of breath.    Gastrointestinal: Negative for nausea and vomiting.   Skin: Negative for itching and rash.     Hemodynamics:  Temp (24hrs), Av.2 °C (97.1 °F), Min:35.9 °C (96.6 °F), Max:36.7 °C (98 °F)  Temperature: 36.2 °C (97.2 °F)  Pulse  Av.9  Min: 63  Max: 79   Blood Pressure: 108/55     Respiratory:    Respiration: 17, Pulse Oximetry: 92 %           Neuro:  GCS       Fluids:    Intake/Output Summary (Last 24 hours) at 2021 0853  Last data filed at 2021 0500  Gross per 24 hour   Intake 2080 ml   Output 10 ml   Net 2070 ml     Weight: 78.3 kg (172 lb 9.9 oz)  Current Diet Order   Procedures   • Diet Order Diet: Regular     Physical Exam  Vitals signs and nursing note reviewed.   Constitutional:       General: She is not in acute distress.  Cardiovascular:      Rate and Rhythm: Normal rate.   Pulmonary:      Effort: Pulmonary effort is normal. No respiratory distress.      Comments: Tegaderms c/d/i  Skin:     General: Skin is warm and dry.   Neurological:      Mental Status: She is alert and oriented to person, place, and time.   Psychiatric:         Mood and Affect: Mood normal.       Labs:  Recent Results (from the past 24 hour(s))   COD (Adult)    Collection Time: 21 11:21 AM   Result Value Ref Range    ABO Grouping Only O     Rh Grouping Only POS     Antibody Screen-Cod NEG    Histology Request    Collection Time: 21  2:18 PM   Result Value Ref Range    Pathology Request Sent to Histo      Medical Decision Making, by  Problem:  There are no active hospital problems to display for this patient.    Plan:  Discharge home in PM today when alert, comfortable, ambulatory, and tolerating PO well  Pt counseled re: diet , activity, wound care, I.S., and home med's  May shower tomorrow over Tegaderms.  Remove Tegaderms on 02/07/21.  No baths, hot tubs, soaks for 14 days.  No lifting >15 lbs for 1-2 wks  No driving for 4-5 days   F/U with Dr. Ganser in 1-2 weeks  Await path.    Quality Measures:  Quality-Core Measures   Reviewed items::  Medications reviewed  Buckley catheter::  No Buckley  DVT prophylaxis pharmacological::  Enoxaparin (Lovenox)  DVT prophylaxis - mechanical:  SCDs  Ulcer Prophylaxis::  Yes      Discussed patient condition with Family, RN, Patient and Dr. Ganser

## 2021-02-04 NOTE — PROGRESS NOTES
Chart reviewed. Assessment completed.   Pt is A&Ox4   Reports (R) sided chest pain (from incision), medicated per MONTY BOLIVAR, CMS intact, denies numbness and tingling   Ambulatory with SBA. Pt calls for assistance.   Weaned to RA, mild SOB; achieves 500 on IS, + non-productive cough   Hypoactive BS x 4. Tolerating diet. Denies N&V   + flatus, - BM   + void   X 3 lap sites to (R) chest, dressing in place with mild drainage, dressing reinforced.   PIV SL   Updated on POC. Belongings and call light within reach. All needs met at this time.

## 2021-02-04 NOTE — ANESTHESIA POSTPROCEDURE EVALUATION
Patient: Yuki Angelo    Procedure Summary     Date: 02/03/21 Room / Location: [place] [address] / SURGERY [facility]    Anesthesia Start: 1209 Anesthesia Stop: 1305    Procedures:       THORACOSCOPY (Right Chest)      EXCISION, CYST-PERICARDIAL CYST (Right Chest) Diagnosis: (PERICARDIAL CYST)    Surgeons: John H Ganser, M.D. Responsible Provider: Nhung Asif M.D.    Anesthesia Type: general ASA Status: 2          Final Anesthesia Type: general  Last vitals  BP   Blood Pressure: 106/69    Temp   35.9 °C (96.7 °F)    Pulse   Pulse: 67   Resp   15    SpO2   96 %      Anesthesia Post Evaluation    Patient location during evaluation: PACU  Patient participation: complete - patient participated  Level of consciousness: awake and alert  Pain score: 4    Airway patency: patent  Anesthetic complications: no  Cardiovascular status: hemodynamically stable  Respiratory status: acceptable  Hydration status: euvolemic    PONV: none           Nurse Pain Score: 4 (NPRS)

## 2021-02-14 ENCOUNTER — HOSPITAL ENCOUNTER (EMERGENCY)
Facility: MEDICAL CENTER | Age: 61
End: 2021-02-14
Attending: EMERGENCY MEDICINE
Payer: MEDICARE

## 2021-02-14 ENCOUNTER — APPOINTMENT (OUTPATIENT)
Dept: RADIOLOGY | Facility: MEDICAL CENTER | Age: 61
End: 2021-02-14
Attending: EMERGENCY MEDICINE
Payer: MEDICARE

## 2021-02-14 VITALS
BODY MASS INDEX: 30.48 KG/M2 | TEMPERATURE: 97.4 F | OXYGEN SATURATION: 93 % | WEIGHT: 172 LBS | RESPIRATION RATE: 18 BRPM | HEART RATE: 88 BPM | DIASTOLIC BLOOD PRESSURE: 77 MMHG | SYSTOLIC BLOOD PRESSURE: 121 MMHG | HEIGHT: 63 IN

## 2021-02-14 DIAGNOSIS — R06.00 DYSPNEA, UNSPECIFIED TYPE: ICD-10-CM

## 2021-02-14 DIAGNOSIS — Z87.74: ICD-10-CM

## 2021-02-14 DIAGNOSIS — R07.81 PLEURITIC CHEST PAIN: ICD-10-CM

## 2021-02-14 DIAGNOSIS — G89.18 POSTOPERATIVE PAIN: ICD-10-CM

## 2021-02-14 LAB
ALBUMIN SERPL BCP-MCNC: 4.1 G/DL (ref 3.2–4.9)
ALBUMIN/GLOB SERPL: 1.5 G/DL
ALP SERPL-CCNC: 63 U/L (ref 30–99)
ALT SERPL-CCNC: 14 U/L (ref 2–50)
ANION GAP SERPL CALC-SCNC: 9 MMOL/L (ref 7–16)
APTT PPP: 31.5 SEC (ref 24.7–36)
AST SERPL-CCNC: 14 U/L (ref 12–45)
BASOPHILS # BLD AUTO: 0.5 % (ref 0–1.8)
BASOPHILS # BLD: 0.07 K/UL (ref 0–0.12)
BILIRUB SERPL-MCNC: 0.2 MG/DL (ref 0.1–1.5)
BUN SERPL-MCNC: 16 MG/DL (ref 8–22)
CALCIUM SERPL-MCNC: 9.5 MG/DL (ref 8.5–10.5)
CHLORIDE SERPL-SCNC: 105 MMOL/L (ref 96–112)
CO2 SERPL-SCNC: 24 MMOL/L (ref 20–33)
CREAT SERPL-MCNC: 0.89 MG/DL (ref 0.5–1.4)
EKG IMPRESSION: NORMAL
EOSINOPHIL # BLD AUTO: 0.24 K/UL (ref 0–0.51)
EOSINOPHIL NFR BLD: 1.8 % (ref 0–6.9)
ERYTHROCYTE [DISTWIDTH] IN BLOOD BY AUTOMATED COUNT: 45.1 FL (ref 35.9–50)
GLOBULIN SER CALC-MCNC: 2.7 G/DL (ref 1.9–3.5)
GLUCOSE SERPL-MCNC: 92 MG/DL (ref 65–99)
HCT VFR BLD AUTO: 45.4 % (ref 37–47)
HGB BLD-MCNC: 14.7 G/DL (ref 12–16)
IMM GRANULOCYTES # BLD AUTO: 0.07 K/UL (ref 0–0.11)
IMM GRANULOCYTES NFR BLD AUTO: 0.5 % (ref 0–0.9)
INR PPP: 0.84 (ref 0.87–1.13)
LIPASE SERPL-CCNC: 62 U/L (ref 11–82)
LYMPHOCYTES # BLD AUTO: 1.55 K/UL (ref 1–4.8)
LYMPHOCYTES NFR BLD: 11.7 % (ref 22–41)
MCH RBC QN AUTO: 31.2 PG (ref 27–33)
MCHC RBC AUTO-ENTMCNC: 32.4 G/DL (ref 33.6–35)
MCV RBC AUTO: 96.4 FL (ref 81.4–97.8)
MONOCYTES # BLD AUTO: 0.82 K/UL (ref 0–0.85)
MONOCYTES NFR BLD AUTO: 6.2 % (ref 0–13.4)
NEUTROPHILS # BLD AUTO: 10.47 K/UL (ref 2–7.15)
NEUTROPHILS NFR BLD: 79.3 % (ref 44–72)
NRBC # BLD AUTO: 0 K/UL
NRBC BLD-RTO: 0 /100 WBC
PLATELET # BLD AUTO: 405 K/UL (ref 164–446)
PMV BLD AUTO: 8.3 FL (ref 9–12.9)
POTASSIUM SERPL-SCNC: 3.6 MMOL/L (ref 3.6–5.5)
PROT SERPL-MCNC: 6.8 G/DL (ref 6–8.2)
PROTHROMBIN TIME: 11.8 SEC (ref 12–14.6)
RBC # BLD AUTO: 4.71 M/UL (ref 4.2–5.4)
SODIUM SERPL-SCNC: 138 MMOL/L (ref 135–145)
TROPONIN T SERPL-MCNC: 6 NG/L (ref 6–19)
WBC # BLD AUTO: 13.2 K/UL (ref 4.8–10.8)

## 2021-02-14 PROCEDURE — 71275 CT ANGIOGRAPHY CHEST: CPT | Mod: ME

## 2021-02-14 PROCEDURE — 76705 ECHO EXAM OF ABDOMEN: CPT

## 2021-02-14 PROCEDURE — 700102 HCHG RX REV CODE 250 W/ 637 OVERRIDE(OP): Performed by: EMERGENCY MEDICINE

## 2021-02-14 PROCEDURE — 700111 HCHG RX REV CODE 636 W/ 250 OVERRIDE (IP): Performed by: EMERGENCY MEDICINE

## 2021-02-14 PROCEDURE — 700117 HCHG RX CONTRAST REV CODE 255: Performed by: EMERGENCY MEDICINE

## 2021-02-14 PROCEDURE — 84484 ASSAY OF TROPONIN QUANT: CPT

## 2021-02-14 PROCEDURE — 93005 ELECTROCARDIOGRAM TRACING: CPT | Performed by: EMERGENCY MEDICINE

## 2021-02-14 PROCEDURE — 93005 ELECTROCARDIOGRAM TRACING: CPT

## 2021-02-14 PROCEDURE — 85610 PROTHROMBIN TIME: CPT

## 2021-02-14 PROCEDURE — 94760 N-INVAS EAR/PLS OXIMETRY 1: CPT

## 2021-02-14 PROCEDURE — 80053 COMPREHEN METABOLIC PANEL: CPT

## 2021-02-14 PROCEDURE — 71045 X-RAY EXAM CHEST 1 VIEW: CPT

## 2021-02-14 PROCEDURE — 99285 EMERGENCY DEPT VISIT HI MDM: CPT

## 2021-02-14 PROCEDURE — 83690 ASSAY OF LIPASE: CPT

## 2021-02-14 PROCEDURE — 36415 COLL VENOUS BLD VENIPUNCTURE: CPT

## 2021-02-14 PROCEDURE — 96375 TX/PRO/DX INJ NEW DRUG ADDON: CPT | Mod: XU

## 2021-02-14 PROCEDURE — A9270 NON-COVERED ITEM OR SERVICE: HCPCS | Performed by: EMERGENCY MEDICINE

## 2021-02-14 PROCEDURE — 85730 THROMBOPLASTIN TIME PARTIAL: CPT

## 2021-02-14 PROCEDURE — 85025 COMPLETE CBC W/AUTO DIFF WBC: CPT

## 2021-02-14 PROCEDURE — 96374 THER/PROPH/DIAG INJ IV PUSH: CPT | Mod: XU

## 2021-02-14 RX ORDER — OXYCODONE HYDROCHLORIDE 5 MG/1
5 TABLET ORAL ONCE
Status: COMPLETED | OUTPATIENT
Start: 2021-02-14 | End: 2021-02-14

## 2021-02-14 RX ORDER — ONDANSETRON 2 MG/ML
4 INJECTION INTRAMUSCULAR; INTRAVENOUS ONCE
Status: COMPLETED | OUTPATIENT
Start: 2021-02-14 | End: 2021-02-14

## 2021-02-14 RX ORDER — MORPHINE SULFATE 4 MG/ML
4 INJECTION, SOLUTION INTRAMUSCULAR; INTRAVENOUS ONCE
Status: COMPLETED | OUTPATIENT
Start: 2021-02-14 | End: 2021-02-14

## 2021-02-14 RX ORDER — OXYCODONE HYDROCHLORIDE 5 MG/1
5 TABLET ORAL EVERY 4 HOURS PRN
Qty: 20 TABLET | Refills: 0 | Status: ON HOLD | OUTPATIENT
Start: 2021-02-14 | End: 2021-02-19

## 2021-02-14 RX ADMIN — IOHEXOL 53 ML: 350 INJECTION, SOLUTION INTRAVENOUS at 09:09

## 2021-02-14 RX ADMIN — FENTANYL CITRATE 50 MCG: 50 INJECTION, SOLUTION INTRAMUSCULAR; INTRAVENOUS at 10:12

## 2021-02-14 RX ADMIN — MORPHINE SULFATE 4 MG: 4 INJECTION INTRAVENOUS at 07:51

## 2021-02-14 RX ADMIN — OXYCODONE 5 MG: 5 TABLET ORAL at 10:12

## 2021-02-14 RX ADMIN — ONDANSETRON 4 MG: 2 INJECTION INTRAMUSCULAR; INTRAVENOUS at 07:51

## 2021-02-14 ASSESSMENT — FIBROSIS 4 INDEX: FIB4 SCORE: 0.82

## 2021-02-14 ASSESSMENT — PAIN DESCRIPTION - PAIN TYPE: TYPE: ACUTE PAIN

## 2021-02-14 NOTE — ED NOTES
Patient discharged home per ERP.  Discharge teaching and education discussed with patient. POC discussed.   Patient verbalized understanding of discharge teaching and education. No other questions at this time.     RX x 1 sent to pharmacy by ERP.  PIV removed.     VSS. Patient alert and oriented. Patient arranged ride for self -  here to take patient home. Able to ambulate off unit safely with steady gait. This RN took patient out to car and assisted patient into private vehicle.

## 2021-02-14 NOTE — ED TRIAGE NOTES
.  Chief Complaint   Patient presents with   • Chest Pain     1.5 weeks ago had a pericardial cyst removed on R side, CP radiates across chest to L shoulder and worse with deep breathes     Pt had a pericardial cyst removed on the right side approximately 1.5 weeks ago. Since then she has had chest pain and it has gotten increasingly worse. Today it woke her up with 10/10 chest pain that originates in her right ribs radiates across her chest in to her left shoulder. This is associated with SOB that is worse with inspiration. Pt feels she can't get a full deep breath. She also endorses constipation. She has been taking pain medication after her procedure as rx. En route she was given 50mg IV fentanyl x 2. Her CP is now 4/10.     .  Chief Complaint   Patient presents with   • Chest Pain     1.5 weeks ago had a pericardial cyst removed on R side, CP radiates across chest to L shoulder and worse with deep breathes

## 2021-02-14 NOTE — ED PROVIDER NOTES
"ED Provider Note    CHIEF COMPLAINT  Chief Complaint   Patient presents with   • Chest Pain     1.5 weeks ago had a pericardial cyst removed on R side, CP radiates across chest to L shoulder and worse with deep breathes       HPI  Yuki Angelo is a 61 y.o. female who presents to the emergency department by ambulance from home for chest pain and shortness of breath.  Patient had pericardial cyst removed by Dr. Ganser 11 days ago.  She discharged home shortly thereafter.  She states she initially required oxycodone, but is done quite well earlier in the week and stopped taking pain medicine \"they told me I should not need it so much so I tried to stop and I did not want a run out.\"  Patient now describes increased chest pain since last night.  Nearly intractable upon awaking this morning.  Pleuritic in nature, worse with inspiration as well as movement.  Nausea without vomiting.  No diaphoresis.  No palpitations, syncope.  No back pain.  Somewhat recurrent discomfort in the right upper quadrant, she knows she has history of gallstones but does not believe that is related to this.  Denies fever or chills.    REVIEW OF SYSTEMS  See HPI for further details. All other systems are negative.     PAST MEDICAL HISTORY   has a past medical history of Aortic regurgitation, Breath shortness, CHEST PAIN, DDD (degenerative disc disease), cervical (8/3/2011), Fatigue (2/21/2012), Fibromyalgia, GERD (gastroesophageal reflux disease), High cholesterol, History of echocardiogram (2/21/2012), Hypertension (2015), Multilevel degenerative disc disease, Other specified disorder of intestines, Pain (02/02/2021), Pain management contract agreement (8/13/2018), Polycystic kidney disease, Psychiatric problem, PVD (peripheral vascular disease) ([facility]) (2/21/2012), S/P tooth extraction (10/27/2015), Sleep apnea, Snoring, and Thoracic outlet syndrome.    SOCIAL HISTORY  Social History     Tobacco Use   • Smoking status: Former Smoker     " "Packs/day: 0.50     Years: 37.00     Pack years: 18.50     Types: Cigarettes     Quit date: 2013     Years since quittin.7   • Smokeless tobacco: Never Used   • Tobacco comment:  Started smoking at age 16   Substance and Sexual Activity   • Alcohol use: Yes     Alcohol/week: 0.6 oz     Types: 1 Glasses of wine per week   • Drug use: Not Currently   • Sexual activity: Yes     Partners: Male     Birth control/protection: Post-Menopausal     Comment: .        SURGICAL HISTORY   has a past surgical history that includes other surgical procedure (1992); cervical fusion posterior (); rib resection (); mass excision general (10/6/2009); incision and drainage general (10/22/2009); cervical disk and fusion anterior (2010); breast biopsy (3/5/2009); tubal coagulation laparoscopic bilateral (); nerve ulnar transfer (3/13/2012); hysteroscopy with video diagnostic (3/27/2015); dilation and curettage (3/27/2015); bunionectomy werner (Right, 11/10/2015); ostectomy (11/10/2015); arthroplasty (Right, 11/10/2015); hammertoe correction (11/10/2015); nerve ulnar transfer (Left, ); knee arthroscopy (); carpal tunnel endoscopic (3/13/2012); knee arthroscopy (3/11/2013); medial meniscectomy (3/11/2013); thoracoscopy,dx no bx (Right, 2/3/2021); and cyst excision (Right, 2/3/2021).    CURRENT MEDICATIONS  Home Medications    **Home medications have not yet been reviewed for this encounter**         ALLERGIES  Allergies   Allergen Reactions   • Penicillins Hives   • Sulfa Drugs Hives and Rash       PHYSICAL EXAM  VITAL SIGNS: BP (!) 173/97   Pulse 71   Temp 36.1 °C (97 °F) (Temporal)   Resp 18   Ht 1.6 m (5' 3\")   Wt 78 kg (172 lb)   LMP  (LMP Unknown)   SpO2 96%   BMI 30.47 kg/m²   Pulse ox interpretation: I interpret this pulse ox as normal.  Constitutional: Alert in no apparent distress.  HENT: Normocephalic, atraumatic. Bilateral external ears normal, Nose normal. Moist mucous membranes. "    Eyes: Pupils are equal and reactive, Conjunctiva normal.   Neck: Normal range of motion, Supple.  No JVD.  Lymphatic: No lymphadenopathy noted.   Cardiovascular: Regular rate and rhythm, no murmurs. Distal pulses intact.  No peripheral edema.  Thorax & Lungs: Diminished due to pain with inspiration otherwise normal breath sounds.  No wheezing/rales/ronchi. No increased work of breathing, clipped speech or retractions.  Right chest thorascopic wounds are healing well, clean, dry and intact.  No dehiscence, erythema or weeping.  Abdomen: Soft, non-distended.  Mild discomfort across the upper abdomen without any rebound, guarding or peritonitis.  No specific Pineda point tenderness.  Skin: Warm, Dry, No erythema, No rash.   Musculoskeletal: Good range of motion in all major joints.   Neurologic: Alert and oriented x4.  Speech clear and cohesive.  Moves her extremities spontaneously.  Psychiatric: Affect normal, Judgment normal, Mood normal.       DIAGNOSTIC STUDIES / PROCEDURES    LABS  Results for orders placed or performed during the hospital encounter of 02/14/21   CBC w/ Differential   Result Value Ref Range    WBC 13.2 (H) 4.8 - 10.8 K/uL    RBC 4.71 4.20 - 5.40 M/uL    Hemoglobin 14.7 12.0 - 16.0 g/dL    Hematocrit 45.4 37.0 - 47.0 %    MCV 96.4 81.4 - 97.8 fL    MCH 31.2 27.0 - 33.0 pg    MCHC 32.4 (L) 33.6 - 35.0 g/dL    RDW 45.1 35.9 - 50.0 fL    Platelet Count 405 164 - 446 K/uL    MPV 8.3 (L) 9.0 - 12.9 fL    Neutrophils-Polys 79.30 (H) 44.00 - 72.00 %    Lymphocytes 11.70 (L) 22.00 - 41.00 %    Monocytes 6.20 0.00 - 13.40 %    Eosinophils 1.80 0.00 - 6.90 %    Basophils 0.50 0.00 - 1.80 %    Immature Granulocytes 0.50 0.00 - 0.90 %    Nucleated RBC 0.00 /100 WBC    Neutrophils (Absolute) 10.47 (H) 2.00 - 7.15 K/uL    Lymphs (Absolute) 1.55 1.00 - 4.80 K/uL    Monos (Absolute) 0.82 0.00 - 0.85 K/uL    Eos (Absolute) 0.24 0.00 - 0.51 K/uL    Baso (Absolute) 0.07 0.00 - 0.12 K/uL    Immature Granulocytes  (abs) 0.07 0.00 - 0.11 K/uL    NRBC (Absolute) 0.00 K/uL   Complete Metabolic Panel (CMP)   Result Value Ref Range    Sodium 138 135 - 145 mmol/L    Potassium 3.6 3.6 - 5.5 mmol/L    Chloride 105 96 - 112 mmol/L    Co2 24 20 - 33 mmol/L    Anion Gap 9.0 7.0 - 16.0    Glucose 92 65 - 99 mg/dL    Bun 16 8 - 22 mg/dL    Creatinine 0.89 0.50 - 1.40 mg/dL    Calcium 9.5 8.5 - 10.5 mg/dL    AST(SGOT) 14 12 - 45 U/L    ALT(SGPT) 14 2 - 50 U/L    Alkaline Phosphatase 63 30 - 99 U/L    Total Bilirubin 0.2 0.1 - 1.5 mg/dL    Albumin 4.1 3.2 - 4.9 g/dL    Total Protein 6.8 6.0 - 8.2 g/dL    Globulin 2.7 1.9 - 3.5 g/dL    A-G Ratio 1.5 g/dL   Troponin STAT   Result Value Ref Range    Troponin T 6 6 - 19 ng/L   Lipase   Result Value Ref Range    Lipase 62 11 - 82 U/L   APTT   Result Value Ref Range    APTT 31.5 24.7 - 36.0 sec   PT/INR   Result Value Ref Range    PT 11.8 (L) 12.0 - 14.6 sec    INR 0.84 (L) 0.87 - 1.13   ESTIMATED GFR   Result Value Ref Range    GFR If African American >60 >60 mL/min/1.73 m 2    GFR If Non African American >60 >60 mL/min/1.73 m 2   EKG   Result Value Ref Range    Report       [facility] Emergency Dept.    Test Date:  2021  Pt Name:    IRAIS HANDLEY               Department: ER  MRN:        [id number]                      Room:       RD 05  Gender:     Female                       Technician: [id number]  :        1960                   Requested By:ER TRIAGE PROTOCOL  Order #:    [id number]                    Naomi MD: JORDAN FRIEDMAN, DO    Measurements  Intervals                                Axis  Rate:       70                           P:          40  HI:         184                          QRS:        -14  QRSD:       92                           T:          5  QT:         416  QTc:        449    Interpretive Statements  SINUS RHYTHM  RSR' IN V1 OR V2, RIGHT VCD OR RVH  Compared to ECG 2021 16:20:55  Right ventricular hypertrophy now present  RSR' in V1  or V2 now present  Electronically Signed On 2- 9:28:51 PST by JORDAN FRIEDMAN,        RADIOLOGY  CT-CTA CHEST PULMONARY ARTERY W/ RECONS   Final Result      1.  Negative for pulmonary embolism      2.  Small right pleural effusion and mild atelectasis      3.  Interval resection of pericardial cyst      4.  Aortic and coronary atherosclerotic plaque      5.  5 mm noncalcified right upper lobe pulmonary nodule      6.  Hepatomegaly      7.  Cholelithiasis      Fleischner Society pulmonary nodule recommendations:   Low Risk: No routine follow-up      High Risk: Optional CT at 12 months      Comments: Nodules less than 6 mm do not require routine follow-up, but certain patients at high risk with suspicious nodule morphology, upper lobe location, or both may warrant 12-month follow-up.      Low Risk - Minimal or absent history of smoking and of other known risk factors.      High Risk - History of smoking or of other known risk factors.      Note: These recommendations do not apply to lung cancer screening, patients with immunosuppression, or patients with known primary cancer.      Fleischner Society 2017 Guidelines for Management of Incidentally Detected Pulmonary Nodules in Adults               US-RUQ   Final Result      1.  Cholelithiasis without evidence for biliary obstruction      2.  Previously identified echogenic mass which is probably hemangioma not identified on the current study      DX-CHEST-PORTABLE (1 VIEW)   Final Result      Hypoventilatory change with bibasilar atelectasis.          COURSE & MEDICAL DECISION MAKING  Nursing notes and vital signs were reviewed. (See chart for details)  The patients records were reviewed, history was obtained from the patient;     ED evaluation for chest pain, mostly pleuritic in nature, shortness of breath as well, is revealing.  Suspect postoperative pain, poor pain control as patient has been saving her tapering her medications.  Pain is controlled with  morphine and fentanyl in the emergency department.  Already oxycodone was given as well.  She is hemodynamically stable, no fever, tachycardia, hypotension or hypoxia.  Work-up is unremarkable.  Nonspecific mild leukocytosis, WBC 13.2 with mild leftward shift, no bandemia.  No electrolyte derangement.  Normal troponin.  EKG is within normal limits, without ischemia and unchanged from previous.  No STEMI.  Symptomatology is atypical for ACS.  CT without evidence for PE, pneumonia or other acute changes.  Small right pleural effusion may contribute to some pleurisy but is unlikely the cause of her overwhelming pain.  Atherosclerotic changes, patient states Dr. Gonzalez is aware of this, and plans to evaluate further with cath at a later date.  Pain ongoing since last night, troponin remains normal, less likely ACS.  Right upper quadrant ultrasound, history of cholelithiasis, consistent with the same without cholecystitis or choledocholithiasis.  Again symptomatology today is atypical for symptomatic cholelithiasis or developing cholecystitis, although she is encouraged to follow-up with general surgery for this as well.    Suspect poor postoperative pain control, will discuss with thoracic surgery.    1033 -Dr. Tang is aware of patient presentation and work-up, he has reviewed her images.  Agrees with no gross abnormalities, denies other concerns at this time.  Okay with discharge with additional pain control follow-up with Dr. Ganser next week.  Patient and her  aware of the findings and agreeable to the disposition plan.    Patient is stable for discharge at this time, anticipatory guidance provided, additional oxycodone provided, close follow-up is encouraged, and strict ED return instructions have been detailed. Patient is agreeable to the disposition and plan.    Patient's blood pressure was elevated in the emergency department, and has been referred to primary care for close monitoring.      reviewed, no pattern for concern.  In prescribing controlled substances to this patient, I certify that I have obtained and reviewed the medical history of Yuki Angelo. I have also made a good bebeto effort to obtain applicable records from other providers who have treated the patient and records did not demonstrate any increased risk of substance abuse that would prevent me from prescribing controlled substances.     I have conducted a physical exam and documented it. I have reviewed Ms. Angelo’s prescription history as maintained by the [place] Prescription Monitoring Program.     I have assessed the patient’s risk for abuse, dependency, and addiction using the validated Opioid Risk Tool available at https://www.mdcalc.com/xivcbn-zazh-sdxt-ort-narcotic-abuse.     Given the above, I believe the benefits of controlled substance therapy outweigh the risks. The reasons for prescribing controlled substances include non-narcotic, oral analgesic alternatives have been inadequate for pain control. Accordingly, I have discussed the risk and benefits, treatment plan, and alternative therapies with the patient.     FINAL IMPRESSION  (R07.81) Pleuritic chest pain  (R06.00) Dyspnea, unspecified type  (Z87.74) Status post pericardial cyst excision  (G89.18) Postoperative pain      Electronically signed by: Shona Cardozo D.O., 2/14/2021 10:53 AM      This dictation was created using voice recognition software. The accuracy of the dictation is limited to the abilities of the software. I expect there may be some errors of grammar and possibly content. The nursing notes were reviewed and certain aspects of this information were incorporated into this note.

## 2021-02-14 NOTE — DISCHARGE INSTRUCTIONS
Follow-up with Dr.Ganser next week for reevaluation.    Continue home medications as previously indicated.  Refill of oxycodone has been provided.  Continue 1 to 2 tablets every 4-6 hours as needed for breakthrough pain.  Alternate with Motrin as tolerated.    Activity as previously prescribed.    Return to the emergency department for persistent worsening chest pain, shortness of breath, syncope, fever or other new concerns.

## 2021-02-18 ENCOUNTER — PRE-ADMISSION TESTING (OUTPATIENT)
Dept: ADMISSIONS | Facility: MEDICAL CENTER | Age: 61
End: 2021-02-18
Attending: SURGERY
Payer: MEDICARE

## 2021-02-18 NOTE — OR NURSING
COVID-19 Pre-surgery screenin. Do you have an undiagnosed respiratory illness or symptoms such as coughing or sneezing? No (Yes/No)  a. Onset of Sx No  b. Acute vs. chronic respiratory illness No    2. Do you have an unexplained fever greater than 100.4 degrees Fahrenheit or 38 degrees Celsius?                     No (Yes/No)    3. Have you had direct exposure to a patient who tested positive for Covid-19?                          No (Yes/No)    4. Have you had any loss of your sense of taste or smell? Have you had N/V or sore throat? No    Patient has been informed of visitor policy and asked to wear a mask upon entering the hospital   YES (Yes/No)

## 2021-02-18 NOTE — OR NURSING
"This RN spoke to pt over the phone in regards to surgery tomorrow 2/19/2021.  Tele PAT obtained, npo instructions/ medication instructions given per anesthesia and directions given.  Patient reports that she is in pain d/t her gallbladder. Patient reports that she feels \"a little short of breath\" too and is unsure if she is sob d/t the pain or d/t a previous surgery she had earlier this month.  Pt instructed to seek medical care should problems persist or worsen.  Patient is home with her  at this time.  Pt reports that she has been in contact with her MD and told him that she may go to the ER today if she can't wait until surgery tomorrow.  Pt in 'too much pain' to come to [facility] for covid and will check in 3 hours prior to surgery dos.  This RN called Dr. Ganser's office and left  to update on the above information 2/18/2021 @ 0905.  "

## 2021-02-19 ENCOUNTER — ANESTHESIA (OUTPATIENT)
Dept: SURGERY | Facility: MEDICAL CENTER | Age: 61
End: 2021-02-19
Payer: MEDICARE

## 2021-02-19 ENCOUNTER — ANESTHESIA EVENT (OUTPATIENT)
Dept: SURGERY | Facility: MEDICAL CENTER | Age: 61
End: 2021-02-19
Payer: MEDICARE

## 2021-02-19 ENCOUNTER — HOSPITAL ENCOUNTER (OUTPATIENT)
Facility: MEDICAL CENTER | Age: 61
End: 2021-02-19
Attending: SURGERY | Admitting: SURGERY
Payer: MEDICARE

## 2021-02-19 VITALS
WEIGHT: 174.16 LBS | TEMPERATURE: 97.2 F | BODY MASS INDEX: 30.86 KG/M2 | HEIGHT: 63 IN | SYSTOLIC BLOOD PRESSURE: 128 MMHG | OXYGEN SATURATION: 94 % | DIASTOLIC BLOOD PRESSURE: 82 MMHG | RESPIRATION RATE: 16 BRPM | HEART RATE: 74 BPM

## 2021-02-19 DIAGNOSIS — G89.18 POSTOPERATIVE PAIN: ICD-10-CM

## 2021-02-19 LAB
PATHOLOGY CONSULT NOTE: NORMAL
SARS-COV+SARS-COV-2 AG RESP QL IA.RAPID: NOTDETECTED
SARS-COV-2 RNA RESP QL NAA+PROBE: NOTDETECTED
SPECIMEN SOURCE: NORMAL
SPECIMEN SOURCE: NORMAL

## 2021-02-19 PROCEDURE — U0005 INFEC AGEN DETEC AMPLI PROBE: HCPCS

## 2021-02-19 PROCEDURE — 501838 HCHG SUTURE GENERAL: Performed by: SURGERY

## 2021-02-19 PROCEDURE — 160048 HCHG OR STATISTICAL LEVEL 1-5: Performed by: SURGERY

## 2021-02-19 PROCEDURE — 160035 HCHG PACU - 1ST 60 MINS PHASE I: Performed by: SURGERY

## 2021-02-19 PROCEDURE — 501583 HCHG TROCAR, THRD CAN&SEAL 5X100: Performed by: SURGERY

## 2021-02-19 PROCEDURE — 700101 HCHG RX REV CODE 250: Performed by: ANESTHESIOLOGY

## 2021-02-19 PROCEDURE — 160046 HCHG PACU - 1ST 60 MINS PHASE II: Performed by: SURGERY

## 2021-02-19 PROCEDURE — 160002 HCHG RECOVERY MINUTES (STAT): Performed by: SURGERY

## 2021-02-19 PROCEDURE — 160025 RECOVERY II MINUTES (STATS): Performed by: SURGERY

## 2021-02-19 PROCEDURE — 501570 HCHG TROCAR, SEPARATOR: Performed by: SURGERY

## 2021-02-19 PROCEDURE — 160041 HCHG SURGERY MINUTES - EA ADDL 1 MIN LEVEL 4: Performed by: SURGERY

## 2021-02-19 PROCEDURE — A9270 NON-COVERED ITEM OR SERVICE: HCPCS | Performed by: ANESTHESIOLOGY

## 2021-02-19 PROCEDURE — 160009 HCHG ANES TIME/MIN: Performed by: SURGERY

## 2021-02-19 PROCEDURE — 700102 HCHG RX REV CODE 250 W/ 637 OVERRIDE(OP): Performed by: ANESTHESIOLOGY

## 2021-02-19 PROCEDURE — 700101 HCHG RX REV CODE 250: Performed by: SURGERY

## 2021-02-19 PROCEDURE — 500868 HCHG NEEDLE, SURGI(VARES): Performed by: SURGERY

## 2021-02-19 PROCEDURE — U0003 INFECTIOUS AGENT DETECTION BY NUCLEIC ACID (DNA OR RNA); SEVERE ACUTE RESPIRATORY SYNDROME CORONAVIRUS 2 (SARS-COV-2) (CORONAVIRUS DISEASE [COVID-19]), AMPLIFIED PROBE TECHNIQUE, MAKING USE OF HIGH THROUGHPUT TECHNOLOGIES AS DESCRIBED BY CMS-2020-01-R: HCPCS

## 2021-02-19 PROCEDURE — 700111 HCHG RX REV CODE 636 W/ 250 OVERRIDE (IP): Performed by: ANESTHESIOLOGY

## 2021-02-19 PROCEDURE — 501399 HCHG SPECIMAN BAG, ENDO CATC: Performed by: SURGERY

## 2021-02-19 PROCEDURE — 700105 HCHG RX REV CODE 258: Performed by: SURGERY

## 2021-02-19 PROCEDURE — 160029 HCHG SURGERY MINUTES - 1ST 30 MINS LEVEL 4: Performed by: SURGERY

## 2021-02-19 PROCEDURE — 160047 HCHG PACU  - EA ADDL 30 MINS PHASE II: Performed by: SURGERY

## 2021-02-19 PROCEDURE — 88304 TISSUE EXAM BY PATHOLOGIST: CPT

## 2021-02-19 PROCEDURE — 501577 HCHG TROCAR, STEP 11MM: Performed by: SURGERY

## 2021-02-19 PROCEDURE — 87426 SARSCOV CORONAVIRUS AG IA: CPT

## 2021-02-19 PROCEDURE — A9270 NON-COVERED ITEM OR SERVICE: HCPCS | Performed by: SURGERY

## 2021-02-19 RX ORDER — OXYCODONE HCL 5 MG/5 ML
10 SOLUTION, ORAL ORAL
Status: COMPLETED | OUTPATIENT
Start: 2021-02-19 | End: 2021-02-19

## 2021-02-19 RX ORDER — SUCCINYLCHOLINE/SOD CL,ISO/PF 200MG/10ML
SYRINGE (ML) INTRAVENOUS PRN
Status: DISCONTINUED | OUTPATIENT
Start: 2021-02-19 | End: 2021-02-19 | Stop reason: SURG

## 2021-02-19 RX ORDER — MEPERIDINE HYDROCHLORIDE 25 MG/ML
6.25 INJECTION INTRAMUSCULAR; INTRAVENOUS; SUBCUTANEOUS
Status: DISCONTINUED | OUTPATIENT
Start: 2021-02-19 | End: 2021-02-19

## 2021-02-19 RX ORDER — HALOPERIDOL 5 MG/ML
1 INJECTION INTRAMUSCULAR
Status: DISCONTINUED | OUTPATIENT
Start: 2021-02-19 | End: 2021-02-19

## 2021-02-19 RX ORDER — BUPIVACAINE HYDROCHLORIDE AND EPINEPHRINE 5; 5 MG/ML; UG/ML
INJECTION, SOLUTION EPIDURAL; INTRACAUDAL; PERINEURAL
Status: DISCONTINUED | OUTPATIENT
Start: 2021-02-19 | End: 2021-02-19 | Stop reason: HOSPADM

## 2021-02-19 RX ORDER — CEFAZOLIN SODIUM 1 G/3ML
INJECTION, POWDER, FOR SOLUTION INTRAMUSCULAR; INTRAVENOUS PRN
Status: DISCONTINUED | OUTPATIENT
Start: 2021-02-19 | End: 2021-02-19 | Stop reason: SURG

## 2021-02-19 RX ORDER — HYDROCODONE BITARTRATE AND ACETAMINOPHEN 5; 325 MG/1; MG/1
1-2 TABLET ORAL EVERY 6 HOURS PRN
Qty: 20 TABLET | Refills: 0 | Status: SHIPPED | OUTPATIENT
Start: 2021-02-19 | End: 2021-02-23

## 2021-02-19 RX ORDER — OXYCODONE HCL 5 MG/5 ML
5 SOLUTION, ORAL ORAL
Status: COMPLETED | OUTPATIENT
Start: 2021-02-19 | End: 2021-02-19

## 2021-02-19 RX ORDER — PHENYLEPHRINE HYDROCHLORIDE 10 MG/ML
INJECTION, SOLUTION INTRAMUSCULAR; INTRAVENOUS; SUBCUTANEOUS PRN
Status: DISCONTINUED | OUTPATIENT
Start: 2021-02-19 | End: 2021-02-19 | Stop reason: SURG

## 2021-02-19 RX ORDER — DEXAMETHASONE SODIUM PHOSPHATE 4 MG/ML
INJECTION, SOLUTION INTRA-ARTICULAR; INTRALESIONAL; INTRAMUSCULAR; INTRAVENOUS; SOFT TISSUE PRN
Status: DISCONTINUED | OUTPATIENT
Start: 2021-02-19 | End: 2021-02-19 | Stop reason: SURG

## 2021-02-19 RX ORDER — ROCURONIUM BROMIDE 10 MG/ML
INJECTION, SOLUTION INTRAVENOUS PRN
Status: DISCONTINUED | OUTPATIENT
Start: 2021-02-19 | End: 2021-02-19 | Stop reason: SURG

## 2021-02-19 RX ORDER — SODIUM CHLORIDE, SODIUM LACTATE, POTASSIUM CHLORIDE, CALCIUM CHLORIDE 600; 310; 30; 20 MG/100ML; MG/100ML; MG/100ML; MG/100ML
INJECTION, SOLUTION INTRAVENOUS CONTINUOUS
Status: DISCONTINUED | OUTPATIENT
Start: 2021-02-19 | End: 2021-02-19

## 2021-02-19 RX ORDER — ONDANSETRON 2 MG/ML
4 INJECTION INTRAMUSCULAR; INTRAVENOUS
Status: DISCONTINUED | OUTPATIENT
Start: 2021-02-19 | End: 2021-02-19

## 2021-02-19 RX ORDER — HYDROMORPHONE HYDROCHLORIDE 1 MG/ML
0.2 INJECTION, SOLUTION INTRAMUSCULAR; INTRAVENOUS; SUBCUTANEOUS
Status: DISCONTINUED | OUTPATIENT
Start: 2021-02-19 | End: 2021-02-19

## 2021-02-19 RX ORDER — HYDROCODONE BITARTRATE AND ACETAMINOPHEN 5; 325 MG/1; MG/1
1-2 TABLET ORAL EVERY 6 HOURS PRN
Status: DISCONTINUED | OUTPATIENT
Start: 2021-02-19 | End: 2021-02-19 | Stop reason: HOSPADM

## 2021-02-19 RX ORDER — HYDROMORPHONE HYDROCHLORIDE 1 MG/ML
0.1 INJECTION, SOLUTION INTRAMUSCULAR; INTRAVENOUS; SUBCUTANEOUS
Status: DISCONTINUED | OUTPATIENT
Start: 2021-02-19 | End: 2021-02-19

## 2021-02-19 RX ORDER — ONDANSETRON 2 MG/ML
INJECTION INTRAMUSCULAR; INTRAVENOUS PRN
Status: DISCONTINUED | OUTPATIENT
Start: 2021-02-19 | End: 2021-02-19 | Stop reason: SURG

## 2021-02-19 RX ORDER — DIPHENHYDRAMINE HYDROCHLORIDE 50 MG/ML
12.5 INJECTION INTRAMUSCULAR; INTRAVENOUS
Status: DISCONTINUED | OUTPATIENT
Start: 2021-02-19 | End: 2021-02-19

## 2021-02-19 RX ORDER — KETOROLAC TROMETHAMINE 30 MG/ML
INJECTION, SOLUTION INTRAMUSCULAR; INTRAVENOUS PRN
Status: DISCONTINUED | OUTPATIENT
Start: 2021-02-19 | End: 2021-02-19 | Stop reason: SURG

## 2021-02-19 RX ORDER — BUPIVACAINE HYDROCHLORIDE AND EPINEPHRINE 5; 5 MG/ML; UG/ML
INJECTION, SOLUTION EPIDURAL; INTRACAUDAL; PERINEURAL
Status: DISCONTINUED
Start: 2021-02-19 | End: 2021-02-19 | Stop reason: HOSPADM

## 2021-02-19 RX ORDER — HYDROMORPHONE HYDROCHLORIDE 1 MG/ML
0.4 INJECTION, SOLUTION INTRAMUSCULAR; INTRAVENOUS; SUBCUTANEOUS
Status: DISCONTINUED | OUTPATIENT
Start: 2021-02-19 | End: 2021-02-19

## 2021-02-19 RX ORDER — LIDOCAINE HYDROCHLORIDE 40 MG/ML
SOLUTION TOPICAL PRN
Status: DISCONTINUED | OUTPATIENT
Start: 2021-02-19 | End: 2021-02-19 | Stop reason: SURG

## 2021-02-19 RX ADMIN — FENTANYL CITRATE 50 MCG: 50 INJECTION, SOLUTION INTRAMUSCULAR; INTRAVENOUS at 10:18

## 2021-02-19 RX ADMIN — DEXAMETHASONE SODIUM PHOSPHATE 4 MG: 4 INJECTION, SOLUTION INTRA-ARTICULAR; INTRALESIONAL; INTRAMUSCULAR; INTRAVENOUS; SOFT TISSUE at 09:03

## 2021-02-19 RX ADMIN — FENTANYL CITRATE 50 MCG: 50 INJECTION, SOLUTION INTRAMUSCULAR; INTRAVENOUS at 09:49

## 2021-02-19 RX ADMIN — CEFAZOLIN 2 G: 330 INJECTION, POWDER, FOR SOLUTION INTRAMUSCULAR; INTRAVENOUS at 09:01

## 2021-02-19 RX ADMIN — OXYCODONE HYDROCHLORIDE 10 MG: 5 SOLUTION ORAL at 09:50

## 2021-02-19 RX ADMIN — HYDROMORPHONE HYDROCHLORIDE 0.2 MG: 1 INJECTION, SOLUTION INTRAMUSCULAR; INTRAVENOUS; SUBCUTANEOUS at 11:23

## 2021-02-19 RX ADMIN — PROPOFOL 200 MG: 10 INJECTION, EMULSION INTRAVENOUS at 08:58

## 2021-02-19 RX ADMIN — ROCURONIUM BROMIDE 25 MG: 10 INJECTION, SOLUTION INTRAVENOUS at 09:03

## 2021-02-19 RX ADMIN — FENTANYL CITRATE 100 MCG: 50 INJECTION, SOLUTION INTRAMUSCULAR; INTRAVENOUS at 08:59

## 2021-02-19 RX ADMIN — ONDANSETRON 4 MG: 2 INJECTION INTRAMUSCULAR; INTRAVENOUS at 09:03

## 2021-02-19 RX ADMIN — LIDOCAINE HYDROCHLORIDE 4 ML: 40 SOLUTION TOPICAL at 09:00

## 2021-02-19 RX ADMIN — HYDROMORPHONE HYDROCHLORIDE 0.2 MG: 1 INJECTION, SOLUTION INTRAMUSCULAR; INTRAVENOUS; SUBCUTANEOUS at 11:06

## 2021-02-19 RX ADMIN — SUGAMMADEX 200 MG: 100 INJECTION, SOLUTION INTRAVENOUS at 09:21

## 2021-02-19 RX ADMIN — KETOROLAC TROMETHAMINE 30 MG: 30 INJECTION, SOLUTION INTRAMUSCULAR at 09:19

## 2021-02-19 RX ADMIN — PHENYLEPHRINE HYDROCHLORIDE 100 MCG: 10 INJECTION INTRAVENOUS at 09:13

## 2021-02-19 RX ADMIN — ROCURONIUM BROMIDE 5 MG: 10 INJECTION, SOLUTION INTRAVENOUS at 08:58

## 2021-02-19 RX ADMIN — Medication 100 MG: at 08:58

## 2021-02-19 RX ADMIN — POVIDONE IODINE 15 ML: 100 SOLUTION TOPICAL at 07:47

## 2021-02-19 RX ADMIN — SODIUM CHLORIDE, POTASSIUM CHLORIDE, SODIUM LACTATE AND CALCIUM CHLORIDE: 600; 310; 30; 20 INJECTION, SOLUTION INTRAVENOUS at 07:47

## 2021-02-19 ASSESSMENT — PAIN DESCRIPTION - PAIN TYPE
TYPE: SURGICAL PAIN

## 2021-02-19 ASSESSMENT — FIBROSIS 4 INDEX: FIB4 SCORE: 0.58

## 2021-02-19 ASSESSMENT — PAIN SCALES - GENERAL: PAIN_LEVEL: 6

## 2021-02-19 NOTE — OP REPORT
DATE OF SERVICE:  02/19/2021     PREOPERATIVE DIAGNOSIS:  Chronic cholecystitis with cholelithiasis.     POSTOPERATIVE DIAGNOSIS:  Chronic cholecystitis with cholelithiasis.     PROCEDURE:  Laparoscopic cholecystectomy.     SURGEON:  John H. Ganser, MD     ASSISTANT:  EM Jennings     ANESTHESIA:  General.     ANESTHESIOLOGIST:  Santo Hassan MD     INDICATIONS:  The patient is a 61-year-old female who developed severe right   upper quadrant abdominal pain and went to the ER last weekend.  She has had a   thoracoscopic procedure the week prior for removal of a simple pericardial   cyst.  On examination, she was found to have right subcostal pain in the ER, a   CT and ultrasound showed stones and her liver functions were normal.  She was   sent home and seen in the office and still had tenderness and abdominal pain.    We discussed risks, benefits, and alternatives to laparoscopic   cholecystectomy and she wished to proceed.     DESCRIPTION OF PROCEDURE:  The patient identified, general anesthetic   administered.  Her abdomen was prepped and draped in the usual sterile   fashion.  Local anesthesia 0.5% Marcaine with epinephrine was injected prior   to skin incision.  Small incision was made in the superior aspect of the   umbilicus and the Veress needle passed.  The abdomen was insufflated with   carbon dioxide without incident and a 5 mm blunt trocar and 5 mm 30-degree   scope inserted.  In the epigastric an 11 and 2 right subcostal 5 mm trocars   were placed.  The gallbladder was grasped and retracted upwards.  There was   some mild edema in the wall of the gallbladder.  Fatty tissue and peritoneum   around the neck of the gallbladder was elevated with a combination of blunt   and cautery dissection.  The [place] dissector was used to circumferentially   dissect the cystic artery and cystic duct and the anatomy was clearly defined.    Cystic duct was quite small.  Clips were placed proximally and  distally on   both structures and they were transected.  The gallbladder was removed from   the liver surface with electrocautery, placed in an endoscopic bag and   withdrawn through the epigastric trocar site.     The abdomen was irrigated, hemostasis assured.  Inspection of the rest of the   abdomen showed no obvious superficial abnormalities.  Trocars were withdrawn.    The abdomen deflated and incisions closed with Vicryl.  Sterile dressings   were applied and the patient returned to recovery in stable condition.        ______________________________  JOHN H. GANSER, MD [facility]    DD:  02/19/2021 09:30  DT:  02/19/2021 10:02    Job#:  [id number]    CC:JENN MITCHELL

## 2021-02-19 NOTE — ANESTHESIA POSTPROCEDURE EVALUATION
Patient: Yuki Angelo    Procedure Summary     Date: 02/19/21 Room / Location: [facility] ROOM 23 / SURGERY SAME DAY [facility]    Anesthesia Start: 0854 Anesthesia Stop: 0932    Procedure: CHOLECYSTECTOMY, LAPAROSCOPIC (Abdomen) Diagnosis: (CHOLECYSTITIS WITH CHOLELITHIASIS)    Surgeons: John H Ganser, M.D. Responsible Provider: Santo Hassan M.D.    Anesthesia Type: general ASA Status: 3          Final Anesthesia Type: general  Last vitals  BP   Blood Pressure: 127/75    Temp   36.2 °C (97.2 °F)    Pulse   71   Resp   20    SpO2   99 %      Anesthesia Post Evaluation    Patient location during evaluation: PACU  Patient participation: complete - patient participated  Level of consciousness: awake and alert  Pain score: 6    Airway patency: patent  Anesthetic complications: no  Cardiovascular status: hemodynamically stable  Respiratory status: acceptable  Hydration status: euvolemic    PONV: none          No complications documented.     Nurse Pain Score: 7 (NPRS)

## 2021-02-19 NOTE — OR NURSING
0931  RECEIVED PATIENT FROM OR.  REPORT FROM DR. KUMAR.  ORAL AIRWAY IN PLACE.  RESPIRATIONS ARE EVEN AND UNLABORED.  4 LAP STABS TO ABDOMEN COVERED WITH GAUZE AND TEGADERM ARE CDI.      0942  ORAL AIRWAY DC'D WITHOUT DIFFICULTY.      0949  MEDICATED WITH IV FENTANYL FOR C/O ABDOMINAL PAIN 8.    0950  MEDICATED WITH PO OXYCODONE.      1018  MEDICATED WITH IV FENTANYL FOR C/O ABDOMINAL PAIN 7.    1025  DAUGHTER ERIKA CALLED AND UPDATED.    1030  PHASE 2.      1106  MEDICATED WITH IV DILAUDID FOR C/O ABDOMINAL PAIN 6.    1123  MEDICATED WITH IV DILAUDID.      1252  DISCHARGED.  DISCHARGE INSTRUCTIONS GIVEN TO PATIENT AND HER DAUGHTER.  A VERBAL UNDERSTANDING OF ALL INSTRUCTIONS WAS STATED.  PATIENT TAKING PO AND AMBULATING WITHOUT DIFFICULTY.  PATIENT STATES SHE IS READY TO GO HOME.

## 2021-02-19 NOTE — ANESTHESIA PREPROCEDURE EVALUATION
"    Relevant Problems   ANESTHESIA   (+) Sleep apnea      NEURO   (+) Seizure cerebral (HCC)      CARDIAC   (+) Aortic regurgitation   (+) Coronary artery calcification seen on CAT scan   (+) Essential hypertension   (+) Intractable migraine without aura and without status migrainosus     /87   Pulse 82   Temp 36.3 °C (97.4 °F) (Temporal)   Resp 18   Ht 1.6 m (5' 3\")   Wt 79 kg (174 lb 2.6 oz)   LMP  (LMP Unknown)   SpO2 100%   BMI 30.85 kg/m²       Physical Exam    Airway   Mallampati: II  TM distance: >3 FB  Neck ROM: full       Cardiovascular - normal exam  Rhythm: regular  Rate: normal  (-) murmur     Dental - normal exam           Pulmonary - normal exam  Breath sounds clear to auscultation     Abdominal    Neurological - normal exam                 Anesthesia Plan    ASA 3       Plan - general       Airway plan will be ETT          Induction: intravenous    Postoperative Plan: Postoperative administration of opioids is intended.    Pertinent diagnostic labs and testing reviewed    Informed Consent:    Anesthetic plan and risks discussed with patient.    Use of blood products discussed with: patient whom consented to blood products.         "

## 2021-02-19 NOTE — ANESTHESIA TIME REPORT
Anesthesia Start and Stop Event Times     Date Time Event    2/19/2021 0839 Ready for Procedure     0854 Anesthesia Start     0932 Anesthesia Stop        Responsible Staff  02/19/21    Name Role Begin End    Santo Hassan M.D. Anesth 0854 0932        Preop Diagnosis (Free Text):  Pre-op Diagnosis     CHOLECYSTITIS WITH CHOLELITHIASIS        Preop Diagnosis (Codes):    Post op Diagnosis  Cholecystitis      Premium Reason  Non-Premium    Comments:

## 2021-02-19 NOTE — ANESTHESIA PROCEDURE NOTES
Airway    Date/Time: 2/19/2021 9:00 AM  Performed by: Santo Hassan M.D.  Authorized by: Santo Hassan M.D.     Location:  OR  Urgency:  Elective  Difficult Airway: No    Indications for Airway Management:  Anesthesia      Spontaneous Ventilation: absent    Sedation Level:  Deep  Preoxygenated: Yes    Patient Position:  Sniffing  Mask Difficulty Assessment:  0 - not attempted  Final Airway Type:  Endotracheal airway  Final Endotracheal Airway:  ETT  Cuffed: Yes    Technique Used for Successful ETT Placement:  Direct laryngoscopy    Insertion Site:  Oral  Blade Type:  John  Laryngoscope Blade/Videolaryngoscope Blade Size:  3  ETT Size (mm):  7.0  Measured from:  Teeth  ETT to Teeth (cm):  22  Placement Verified by: auscultation and capnometry    Cormack-Lehane Classification:  Grade I - full view of glottis  Number of Attempts at Approach:  1

## 2021-02-19 NOTE — OR SURGEON
Immediate Post OP Note    PreOp Diagnosis: Cholecystitis    PostOp Diagnosis: Same    Procedure(s):  CHOLECYSTECTOMY, LAPAROSCOPIC - Wound Class: Clean    Surgeon(s):  John H Ganser, M.D.    Anesthesiologist/Type of Anesthesia:  Anesthesiologist: Santo Hassan M.D./General    Surgical Staff:  Circulator: Jayjay Albarado R.N.  Relief Circulator: Antoinette Ledesma R.N.  Scrub Person: Guillermo Matthews  First Assist: HESHAM Blackburn    Specimens removed if any:  ID Type Source Tests Collected by Time Destination   A :  Tissue Gallbladder PATHOLOGY SPECIMEN John H Ganser, M.D. 2/19/2021  9:21 AM        Estimated Blood Loss: 0    Findings: Mild edema in wall, stones    Complications: 0        2/19/2021 9:27 AM John H Ganser, M.D.

## 2021-02-19 NOTE — DISCHARGE INSTRUCTIONS
ACTIVITY: Rest and take it easy for the first 24 hours.  A responsible adult is recommended to remain with you during that time.  It is normal to feel sleepy.  We encourage you to not do anything that requires balance, judgment or coordination.    MILD FLU-LIKE SYMPTOMS ARE NORMAL. YOU MAY EXPERIENCE GENERALIZED MUSCLE ACHES, THROAT IRRITATION, HEADACHE AND/OR SOME NAUSEA.    FOR 24 HOURS DO NOT:  Drive, operate machinery or run household appliances.  Drink beer or alcoholic beverages.   Make important decisions or sign legal documents.    SPECIAL INSTRUCTIONS:   SEE CHOLECYSTECTOMY INSTRUCTION SHEET    DIET: To avoid nausea, slowly advance diet as tolerated, avoiding spicy or greasy foods for the first day.  Add more substantial food to your diet according to your physician's instructions.  Babies can be fed formula or breast milk as soon as they are hungry.  INCREASE FLUIDS AND FIBER TO AVOID CONSTIPATION.    SURGICAL DRESSING/BATHING: OK to shower, remove bandages in 3-4 days    FOLLOW-UP APPOINTMENT:  A follow-up appointment should be arranged with your doctor in 1-2 weeks; call to schedule.    You should CALL YOUR PHYSICIAN if you develop:  Fever greater than 101 degrees F.  Pain not relieved by medication, or persistent nausea or vomiting.  Excessive bleeding (blood soaking through dressing) or unexpected drainage from the wound.  Extreme redness or swelling around the incision site, drainage of pus or foul smelling drainage.  Inability to urinate or empty your bladder within 8 hours.  Problems with breathing or chest pain.    You should call 911 if you develop problems with breathing or chest pain.  If you are unable to contact your doctor or surgical center, you should go to the nearest emergency room or urgent care center.  Physician's telephone #: [phone]    If any questions arise, call your doctor.  If your doctor is not available, please feel free to call the Surgical Center at [phone]. The  Contact Center is open Monday through Friday 7AM to 5PM and may speak to a nurse at [phone], or toll free at [phone].     A registered nurse may call you a few days after your surgery to see how you are doing after your procedure.    MEDICATIONS: Resume taking daily medication.  Take prescribed pain medication with food.  If no medication is prescribed, you may take non-aspirin pain medication if needed.  PAIN MEDICATION CAN BE VERY CONSTIPATING.  Take a stool softener or laxative such as senokot, pericolace, or milk of magnesia if needed.    Prescription given for Norco, e-prescribed  Last pain medication given at .  10:00 AM    If your physician has prescribed pain medication that includes Acetaminophen (Tylenol), do not take additional Acetaminophen (Tylenol) while taking the prescribed medication.    Depression / Suicide Risk    As you are discharged from this [facility] facility, it is important to learn how to keep safe from harming yourself.    Recognize the warning signs:  · Abrupt changes in personality, positive or negative- including increase in energy   · Giving away possessions  · Change in eating patterns- significant weight changes-  positive or negative  · Change in sleeping patterns- unable to sleep or sleeping all the time   · Unwillingness or inability to communicate  · Depression  · Unusual sadness, discouragement and loneliness  · Talk of wanting to die  · Neglect of personal appearance   · Rebelliousness- reckless behavior  · Withdrawal from people/activities they love  · Confusion- inability to concentrate     If you or a loved one observes any of these behaviors or has concerns about self-harm, here's what you can do:  · Talk about it- your feelings and reasons for harming yourself  · Remove any means that you might use to hurt yourself (examples: pills, rope, extension cords, firearm)  · Get professional help from the community (Mental Health, Substance Abuse, psychological  counseling)  · Do not be alone:Call your Safe Contact- someone whom you trust who will be there for you.  · Call your local CRISIS HOTLINE 629-3390 or 796-734-6832  · Call your local Children's Mobile Crisis Response Team [place] (545) 357-3775 or www.23andMe  · Call the toll free National Suicide Prevention Hotlines   · National Suicide Prevention Lifeline 278-217-DXRN (7293)  · National Hope Line Network 800-SUICIDE (137-8238)

## 2021-03-01 ENCOUNTER — TELEPHONE (OUTPATIENT)
Dept: CARDIOLOGY | Facility: MEDICAL CENTER | Age: 61
End: 2021-03-01

## 2021-03-01 ENCOUNTER — TELEMEDICINE (OUTPATIENT)
Dept: MEDICAL GROUP | Facility: MEDICAL CENTER | Age: 61
End: 2021-03-01
Payer: MEDICARE

## 2021-03-01 VITALS — RESPIRATION RATE: 16 BRPM | WEIGHT: 169 LBS | BODY MASS INDEX: 29.95 KG/M2 | HEIGHT: 63 IN

## 2021-03-01 DIAGNOSIS — F33.1 MODERATE EPISODE OF RECURRENT MAJOR DEPRESSIVE DISORDER (HCC): ICD-10-CM

## 2021-03-01 DIAGNOSIS — Z90.49 S/P CHOLECYSTECTOMY: ICD-10-CM

## 2021-03-01 DIAGNOSIS — Z12.31 ENCOUNTER FOR SCREENING MAMMOGRAM FOR BREAST CANCER: ICD-10-CM

## 2021-03-01 DIAGNOSIS — R91.8 PULMONARY NODULES: ICD-10-CM

## 2021-03-01 DIAGNOSIS — Q24.8 PERICARDIAL CYST: ICD-10-CM

## 2021-03-01 DIAGNOSIS — G40.909 SEIZURE CEREBRAL (HCC): ICD-10-CM

## 2021-03-01 DIAGNOSIS — R42 DIZZINESS: ICD-10-CM

## 2021-03-01 DIAGNOSIS — R53.83 FATIGUE, UNSPECIFIED TYPE: ICD-10-CM

## 2021-03-01 PROCEDURE — 99214 OFFICE O/P EST MOD 30 MIN: CPT | Mod: 95,CR | Performed by: NURSE PRACTITIONER

## 2021-03-01 ASSESSMENT — FIBROSIS 4 INDEX: FIB4 SCORE: 0.58

## 2021-03-01 NOTE — TELEPHONE ENCOUNTER
CRISTOFER    Pt called and states that she is a week post partum and is wondering when she can have an order an can start scheduling for the Angiogram. Please call pt back to further discuss at [phone].    Thank you.

## 2021-03-01 NOTE — PROGRESS NOTES
Virtual Visit: Established Patient   This visit was conducted via Zoom  using secure and encrypted videoconferencing technology. The patient was in a private location in the state of [place].    The patient's identity was confirmed and verbal consent was obtained for this virtual visit.      CC: Patient requesting telemedicine visit today for follow-up post cholecystectomy and pericardial cyst removal.                                                                                                                                      HPI:   Yuki presents today with the following.    1. S/P cholecystectomy  Patient had a cholecystectomy on February 19 with Dr. Ganser states she is no longer having abdominal pain or nausea but does have fatigue post surgery.  Her biopsies did not show any neoplasm and her most recent lab work showed normal GFR and liver functions.    2. Moderate episode of recurrent major depressive disorder (HCC)  Patient had been on Zoloft up until recently but was concerned it might have been contributing to her fatigue so she stopped it.  She does not feel especially depressed    3. Seizure cerebral (HCC)  Patient states she has had no recent seizures and continues on Topamax    4. Encounter for screening mammogram for breast cancer  Patient due for mammogram    5. Fatigue, unspecified type  Patient states after due to her surgery she does feel fatigued but also has been home and is not leaving much because of COVID-19 concerns.  She denies dizziness, dysuria, change in bowel or bladder function    6. Pericardial cyst  Patient had pericardial cyst excision on February 3 and denies chest pain or breathing issues post surgery    7. Pulmonary nodules  On patient's CT of the chest it did show a 5 mm calcified nodule as shown below with recommendation for follow-up only if at risk and patient was a cigarette smoker but quit 8 to 10 years ago.    5.  5 mm noncalcified right upper lobe pulmonary  nodule     6.  Hepatomegaly     7.  Cholelithiasis     Fleischner Society pulmonary nodule recommendations:  Low Risk: No routine follow-up     High Risk: Optional CT at 12 months     Comments: Nodules less than 6 mm do not require routine follow-up, but certain patients at high risk with suspicious nodule morphology, upper lobe location, or both may warrant 12-month follow-up.    Patient Active Problem List    Diagnosis Date Noted   • Aortic regurgitation 02/21/2012   • Hypokalemia 08/13/2018   • Intractable migraine without aura and without status migrainosus 03/04/2019   • Seizure cerebral (HCC) 03/04/2019   • Pulmonary nodules 02/08/2018   • Pericardial cyst 02/07/2018   • Precordial pain 02/07/2018   • Coronary artery calcification seen on CAT scan 02/07/2018   • Moderate episode of recurrent major depressive disorder (HCC) 01/29/2018   • Hereditary hemochromatosis (HCC) 12/15/2017   • Dyslipidemia 01/17/2017   • Essential hypertension 05/20/2016   • TOS (thoracic outlet syndrome) 04/14/2015   • Fibromyalgia 07/17/2012   • Sleep apnea    • Cervical radiculopathy 09/08/2011   • S/P cervical spinal fusion 09/08/2011   • DDD (degenerative disc disease), cervical 08/03/2011       Current Outpatient Medications   Medication Sig Dispense Refill   • losartan (COZAAR) 100 MG Tab Take 1 Tab by mouth every day. 30 Each 1   • rosuvastatin (CRESTOR) 10 MG Tab Take 1 Tab by mouth every evening. (Patient taking differently: Take 10 mg by mouth every day.) 90 Tab 3   • topiramate (TOPAMAX) 100 MG Tab TAKE ONE AND ONE-HALF TABLET(S) BY MOUTH TWO TIMES A DAY (Patient taking differently: Take 100 mg by mouth every morning. TAKE ONE AND ONE-HALF TABLET(S) BY MOUTH TWO TIMES A DAY) 90 Tab 2     No current facility-administered medications for this visit.         Allergies as of 03/01/2021 - Reviewed 03/01/2021   Allergen Reaction Noted   • Penicillins Hives 10/05/2009   • Sulfa drugs Hives and Rash 10/05/2009        ROS:  Denies,  "chest pain, Shortness of breath, change in bowel or bladder function, fever, chills, abdominal pain or nausea.  Positive for fatigue    Resp 16   Ht 1.6 m (5' 3\")   Wt 76.7 kg (169 lb)   LMP  (LMP Unknown)   BMI 29.94 kg/m²       Physical Exam:  Constitutional: Alert, no distress, well-groomed.  Skin: No rashes in visible areas.  Eye: Round. Conjunctiva clear, lNo icterus.   ENMT: Lips pink without lesions, good dentition, moist mucous membranes. Phonation normal.  Neck: No masses, no thyromegaly. Moves freely without pain.  CV: Pulse as reported by patient  Respiratory: Unlabored respiratory effort, no cough or audible wheeze  Psych: Alert and oriented x3, normal affect and mood.          Assessment and Plan.   61 y.o. female with the following issues.    1. S/P cholecystectomy  Patient appears to be doing well post surgery except for fatigue and I advised her to get up and start moving more and signs and symptoms of infection to be aware of    2. Moderate episode of recurrent major depressive disorder (HCC)  Patient has decided not to use her antidepressant but I told her to go back on it later if needed    3. Seizure cerebral (HCC)  No recent seizures reported    4. Encounter for screening mammogram for breast cancer    - MA-SCREENING MAMMO BILAT W/TOMOSYNTHESIS W/CAD; Future    5. Fatigue, unspecified type  I suspect much of this is post surgery fatigue since he did have a cholecystectomy and a cystic excision in the last month but I will check for anemia, thyroid disease or other issues.  - Comp Metabolic Panel; Future  - CBC WITHOUT DIFFERENTIAL; Future  - TSH; Future    6. Pericardial cyst  Appears this went successfully with removal and she will follow-up with cardiology as needed.  She denies chest pain or breathing issues    7. Pulmonary nodules  I reviewed with patient the guidelines for the small nodule and we agreed that we would do a repeat test in 1 year since she used to be a smoker    "

## 2021-03-02 NOTE — TELEPHONE ENCOUNTER
FYI  Patient called and wanted to let you know she did not go to the ER.  She will monitor her BP and go if she feels it is needed.     Thank you,    Karina FAUSTIN

## 2021-03-02 NOTE — TELEPHONE ENCOUNTER
"Called and spoke to the patient and relayed recommendations and she verbalized understanding.  Also advised not to drive herself and she stated \"I have my hubby\".  Advised I will check on her chart in the morning and keep us updated. She was appreciative of call.   "

## 2021-03-02 NOTE — TELEPHONE ENCOUNTER
"Called and spoke to the patient.  Pericardial cyst removed on 02/03, recheck with Dr. Ganser had to have gallbladder removed 10 days ago as well.  She is Extremely tired, out of breath, cannot go far or walk without having shortness of breath. Sweaty and hot, feel like she is going to pass out, had virtual visit this morning was supposed to go in but had no energy to even go in for that.  She stated Chest pain is different and not constant pressure anymore it is intermittent.     Prior to gallbladder surgery she was Taken by ambulance, \"thought I was having a heart attack, my blood pressure was way up there\", they thought it was from pain from first surgery, she had a little fluid in her lungs from her first procedure.  Routed to  for further recommendations.     "

## 2021-03-02 NOTE — TELEPHONE ENCOUNTER
Sounds concerning from the description of her symptoms and its severity and would suggest an ER visit for evaluation, call EMS if necessary.

## 2021-03-02 NOTE — PROGRESS NOTES
Called and spoke with the patient.  Is having profound fatigue and orthostatic lightheadedness that seems worse.  Had recent pericardial cyst removal 2/3 shortly afterwards she developed symptomatic cholecystitis requiring laparoscopic cholecystectomy 2/19  Recommend ER evaluation.  Repeat echocardiogram.  Laboratory ordered by PCP.

## 2021-03-09 DIAGNOSIS — F33.1 MODERATE EPISODE OF RECURRENT MAJOR DEPRESSIVE DISORDER (HCC): ICD-10-CM

## 2021-03-09 RX ORDER — TOPIRAMATE 100 MG/1
TABLET, FILM COATED ORAL
Qty: 90 TABLET | Refills: 3 | Status: SHIPPED | OUTPATIENT
Start: 2021-03-09 | End: 2022-03-15

## 2021-03-09 RX ORDER — SERTRALINE HYDROCHLORIDE 100 MG/1
TABLET, FILM COATED ORAL
Qty: 90 TABLET | Refills: 3 | Status: SHIPPED | OUTPATIENT
Start: 2021-03-09 | End: 2021-04-22

## 2021-03-15 DIAGNOSIS — Z23 NEED FOR VACCINATION: ICD-10-CM

## 2021-04-02 ENCOUNTER — HOSPITAL ENCOUNTER (OUTPATIENT)
Dept: RADIOLOGY | Facility: MEDICAL CENTER | Age: 61
End: 2021-04-02
Attending: NURSE PRACTITIONER
Payer: MEDICARE

## 2021-04-02 DIAGNOSIS — Z12.31 ENCOUNTER FOR SCREENING MAMMOGRAM FOR BREAST CANCER: ICD-10-CM

## 2021-04-12 ENCOUNTER — HOSPITAL ENCOUNTER (OUTPATIENT)
Dept: CARDIOLOGY | Facility: MEDICAL CENTER | Age: 61
End: 2021-04-12
Attending: INTERNAL MEDICINE
Payer: MEDICARE

## 2021-04-12 DIAGNOSIS — R42 DIZZINESS: ICD-10-CM

## 2021-04-12 DIAGNOSIS — Q24.8 PERICARDIAL CYST: ICD-10-CM

## 2021-04-12 PROCEDURE — 93306 TTE W/DOPPLER COMPLETE: CPT

## 2021-04-13 LAB
LV EJECT FRACT  99904: 65
LV EJECT FRACT MOD 2C 99903: 68.17
LV EJECT FRACT MOD 4C 99902: 62.07
LV EJECT FRACT MOD BP 99901: 64.15

## 2021-04-13 PROCEDURE — 93306 TTE W/DOPPLER COMPLETE: CPT | Mod: 26 | Performed by: INTERNAL MEDICINE

## 2021-04-14 ENCOUNTER — TELEPHONE (OUTPATIENT)
Dept: CARDIOLOGY | Facility: MEDICAL CENTER | Age: 61
End: 2021-04-14

## 2021-04-14 NOTE — TELEPHONE ENCOUNTER
Has a very mild leakage, trace of one of the valves otherwise heart function normal, not serious  I have her make a follow-up in 2 to 3 months.

## 2021-04-19 DIAGNOSIS — I10 ESSENTIAL HYPERTENSION: ICD-10-CM

## 2021-04-19 RX ORDER — HYDROCHLOROTHIAZIDE 12.5 MG/1
CAPSULE, GELATIN COATED ORAL
Qty: 90 CAPSULE | Refills: 0 | Status: SHIPPED | OUTPATIENT
Start: 2021-04-19 | End: 2021-08-06

## 2021-04-19 RX ORDER — LOSARTAN POTASSIUM 100 MG/1
TABLET ORAL
Qty: 90 TABLET | Refills: 0 | Status: SHIPPED | OUTPATIENT
Start: 2021-04-19 | End: 2021-04-20 | Stop reason: SDUPTHER

## 2021-04-20 DIAGNOSIS — I10 ESSENTIAL HYPERTENSION: ICD-10-CM

## 2021-04-20 RX ORDER — LOSARTAN POTASSIUM 100 MG/1
TABLET ORAL
Qty: 100 TABLET | Refills: 0 | Status: SHIPPED | OUTPATIENT
Start: 2021-04-20 | End: 2021-09-23 | Stop reason: SDUPTHER

## 2021-04-22 DIAGNOSIS — F33.1 MODERATE EPISODE OF RECURRENT MAJOR DEPRESSIVE DISORDER (HCC): ICD-10-CM

## 2021-04-22 RX ORDER — CITALOPRAM 40 MG/1
40 TABLET ORAL DAILY
Qty: 90 TABLET | Refills: 2 | Status: SHIPPED | OUTPATIENT
Start: 2021-04-22 | End: 2022-03-15

## 2021-05-04 DIAGNOSIS — Z12.11 COLON CANCER SCREENING: ICD-10-CM

## 2021-05-04 RX ORDER — VENLAFAXINE HYDROCHLORIDE 37.5 MG/1
37.5 CAPSULE, EXTENDED RELEASE ORAL DAILY
COMMUNITY
Start: 2021-03-09 | End: 2021-07-23

## 2021-05-26 ENCOUNTER — PATIENT MESSAGE (OUTPATIENT)
Dept: HEALTH INFORMATION MANAGEMENT | Facility: OTHER | Age: 61
End: 2021-05-26

## 2021-06-24 ENCOUNTER — PATIENT OUTREACH (OUTPATIENT)
Dept: HEALTH INFORMATION MANAGEMENT | Facility: OTHER | Age: 61
End: 2021-06-24

## 2021-06-24 NOTE — PROGRESS NOTES
Attempt #: Final  [facility] Verified: yes  Verify PCP: yes    Comprehensive Geriatric Assessment   1. Scheduling Status:Scheduled     Care Gap Scheduling (Attempt to Schedule EACH Overdue Care Gap!)  Health Maintenance Due   Topic Date Due   • PAP SMEAR  10/20/2018   • Annual Wellness Visit  12/16/2018   • MAMMOGRAM  02/10/2021   • COVID-19 Vaccine (2 - Moderna 2-dose series) 06/22/2021       MyChart Activation: already active

## 2021-07-01 PROBLEM — Z91.81 AT HIGH RISK FOR FALLS: Status: ACTIVE | Noted: 2021-07-01

## 2021-07-12 ENCOUNTER — TELEPHONE (OUTPATIENT)
Dept: CARDIOLOGY | Facility: MEDICAL CENTER | Age: 61
End: 2021-07-12

## 2021-07-19 ENCOUNTER — HOSPITAL ENCOUNTER (OUTPATIENT)
Dept: LAB | Facility: MEDICAL CENTER | Age: 61
End: 2021-07-19
Attending: INTERNAL MEDICINE
Payer: MEDICARE

## 2021-07-19 ENCOUNTER — HOSPITAL ENCOUNTER (OUTPATIENT)
Dept: LAB | Facility: MEDICAL CENTER | Age: 61
End: 2021-07-19
Attending: NURSE PRACTITIONER
Payer: MEDICARE

## 2021-07-19 DIAGNOSIS — E78.5 DYSLIPIDEMIA: ICD-10-CM

## 2021-07-19 LAB
ALBUMIN SERPL BCP-MCNC: 4.2 G/DL (ref 3.2–4.9)
ALBUMIN/GLOB SERPL: 1.6 G/DL
ALP SERPL-CCNC: 54 U/L (ref 30–99)
ALT SERPL-CCNC: 14 U/L (ref 2–50)
ANION GAP SERPL CALC-SCNC: 10 MMOL/L (ref 7–16)
AST SERPL-CCNC: 21 U/L (ref 12–45)
BILIRUB SERPL-MCNC: 0.4 MG/DL (ref 0.1–1.5)
BUN SERPL-MCNC: 16 MG/DL (ref 8–22)
BUN SERPL-MCNC: 16 MG/DL (ref 8–22)
CALCIUM SERPL-MCNC: 9.2 MG/DL (ref 8.5–10.5)
CHLORIDE SERPL-SCNC: 105 MMOL/L (ref 96–112)
CHOLEST SERPL-MCNC: 233 MG/DL (ref 100–199)
CO2 SERPL-SCNC: 24 MMOL/L (ref 20–33)
CREAT SERPL-MCNC: 0.78 MG/DL (ref 0.5–1.4)
CREAT SERPL-MCNC: 0.81 MG/DL (ref 0.5–1.4)
CRP SERPL HS-MCNC: <0.3 MG/DL (ref 0–0.75)
ERYTHROCYTE [SEDIMENTATION RATE] IN BLOOD BY WESTERGREN METHOD: 7 MM/HOUR (ref 0–25)
GLOBULIN SER CALC-MCNC: 2.6 G/DL (ref 1.9–3.5)
GLUCOSE SERPL-MCNC: 80 MG/DL (ref 65–99)
HDLC SERPL-MCNC: 75 MG/DL
LDLC SERPL CALC-MCNC: 145 MG/DL
POTASSIUM SERPL-SCNC: 3.8 MMOL/L (ref 3.6–5.5)
PROT SERPL-MCNC: 6.8 G/DL (ref 6–8.2)
SODIUM SERPL-SCNC: 139 MMOL/L (ref 135–145)
TRIGL SERPL-MCNC: 63 MG/DL (ref 0–149)

## 2021-07-19 PROCEDURE — 84443 ASSAY THYROID STIM HORMONE: CPT

## 2021-07-19 PROCEDURE — 80061 LIPID PANEL: CPT

## 2021-07-19 PROCEDURE — 84520 ASSAY OF UREA NITROGEN: CPT | Mod: XU

## 2021-07-19 PROCEDURE — 82565 ASSAY OF CREATININE: CPT | Mod: XU

## 2021-07-19 PROCEDURE — 86140 C-REACTIVE PROTEIN: CPT

## 2021-07-19 PROCEDURE — 85652 RBC SED RATE AUTOMATED: CPT

## 2021-07-19 PROCEDURE — 82784 ASSAY IGA/IGD/IGG/IGM EACH: CPT

## 2021-07-19 PROCEDURE — 80053 COMPREHEN METABOLIC PANEL: CPT

## 2021-07-19 PROCEDURE — 83516 IMMUNOASSAY NONANTIBODY: CPT

## 2021-07-19 PROCEDURE — 36415 COLL VENOUS BLD VENIPUNCTURE: CPT

## 2021-07-22 LAB
GLIADIN PEPTIDE+TTG IGA+IGG SER QL IA: 7 UNITS (ref 0–19)
IGA SERPL-MCNC: 161 MG/DL (ref 68–408)

## 2021-07-23 ENCOUNTER — OFFICE VISIT (OUTPATIENT)
Dept: CARDIOLOGY | Facility: MEDICAL CENTER | Age: 61
End: 2021-07-23
Payer: MEDICARE

## 2021-07-23 VITALS
HEIGHT: 63 IN | HEART RATE: 72 BPM | BODY MASS INDEX: 31.36 KG/M2 | OXYGEN SATURATION: 95 % | DIASTOLIC BLOOD PRESSURE: 86 MMHG | SYSTOLIC BLOOD PRESSURE: 142 MMHG | WEIGHT: 177 LBS

## 2021-07-23 DIAGNOSIS — R53.83 OTHER FATIGUE: ICD-10-CM

## 2021-07-23 DIAGNOSIS — Q24.8 PERICARDIAL CYST: ICD-10-CM

## 2021-07-23 DIAGNOSIS — L65.9 HAIR LOSS: ICD-10-CM

## 2021-07-23 DIAGNOSIS — I25.10 CORONARY ARTERY CALCIFICATION SEEN ON CAT SCAN: ICD-10-CM

## 2021-07-23 DIAGNOSIS — G47.30 SLEEP APNEA, UNSPECIFIED TYPE: ICD-10-CM

## 2021-07-23 DIAGNOSIS — I10 ESSENTIAL HYPERTENSION: ICD-10-CM

## 2021-07-23 DIAGNOSIS — R00.2 PALPITATIONS: ICD-10-CM

## 2021-07-23 PROCEDURE — 99214 OFFICE O/P EST MOD 30 MIN: CPT | Performed by: PHYSICIAN ASSISTANT

## 2021-07-23 RX ORDER — ROSUVASTATIN CALCIUM 20 MG/1
20 TABLET, COATED ORAL EVERY EVENING
Qty: 100 TABLET | Refills: 3 | Status: SHIPPED | OUTPATIENT
Start: 2021-07-23 | End: 2022-08-26 | Stop reason: SDUPTHER

## 2021-07-23 ASSESSMENT — ENCOUNTER SYMPTOMS
SHORTNESS OF BREATH: 0
MYALGIAS: 0
PALPITATIONS: 1
NEAR-SYNCOPE: 0
ABDOMINAL PAIN: 0
DECREASED APPETITE: 0
BLOATING: 0
DIAPHORESIS: 0
DYSPNEA ON EXERTION: 1
ORTHOPNEA: 0
CONSTIPATION: 1
FEVER: 0
BLURRED VISION: 0
SYNCOPE: 0
SNORING: 0
DIZZINESS: 1
VOMITING: 0
NAUSEA: 0
BRUISES/BLEEDS EASILY: 0
WEAKNESS: 0

## 2021-07-23 ASSESSMENT — FIBROSIS 4 INDEX: FIB4 SCORE: 0.87

## 2021-07-23 NOTE — TELEPHONE ENCOUNTER
Samantha from [facility] called to check on Pts medical clearance.   Ph: [phone] ext [phone]  Fax: [phone]    Thank you

## 2021-07-23 NOTE — PATIENT INSTRUCTIONS
1)  Increase Crestor from 10 mg to 20 mg at night.  Try to work on cutting out cholesterol in your diet (egg yolks, cheese, whole fat milk products, red meats)    2)  Get thyroid test done.    3)  Do the heart monitor.    4)  I will talk with Dr. Morrell about angiogram.

## 2021-07-23 NOTE — PROGRESS NOTES
Cardiology Clinic Follow-up Note    Date of note:    7/23/2021  Primary Care Provider: ANDREWS Paredes    Name:             Yuki Angelo  YOB: 1960  MRN:               [id number]    CC: chest pain, syncope, preop assessment    Primary Cardiologist: Dr. Morrell    Patient HPI:   Yuki Angelo is a 61 y.o. female with PMH including Dyslipidemia, coronary calcium, LIZETTE on CPAP, thoracic outlet syndrome s/p resection of 1st rib at [facility].  She had hx of syncope with negative tilt table test in the past.  She recently underwent cholecystectomy and pericardial cyst removal by Dr. Ganser on 2/3/21.    Interim History:  Ms. Angelo was last seen in this cardiology office by Dr. Morrell 12/2020 with c/o chest pain,  She had a normal MPI on 1/19/2021.    She has several complaints today, but mostly she is feeling exhausted.    She feels fatigued, but also sleepy (states she has trouble staying asleep) and can take naps in the afternoon which is unusual for her.    This has been worsening since about 2/2021.  At times she feels her heart racing and this causes dizziness.  She had gained weight and notes her hair has been falling out in clumps  She gets shortness of breath walking up the flight of stairs in her house.  She also c/o constipation. Seeing GI and is planned to have a colonoscopy next week (7/28)  Also has L sided back pain right under the L scapula.  It is reproducible with palpation and worse with deep breath.    Review of Systems   Constitutional: Positive for malaise/fatigue. Negative for decreased appetite, diaphoresis and fever.   Eyes: Negative for blurred vision.   Cardiovascular: Positive for dyspnea on exertion and palpitations. Negative for chest pain, leg swelling, near-syncope, orthopnea and syncope.   Respiratory: Negative for shortness of breath and snoring.    Hematologic/Lymphatic: Negative for bleeding problem. Does not bruise/bleed easily.   Skin: Negative  "for rash.   Musculoskeletal: Negative for joint pain and myalgias.   Gastrointestinal: Positive for constipation. Negative for bloating, abdominal pain, nausea and vomiting.   Genitourinary: Negative for frequency.   Neurological: Positive for dizziness. Negative for weakness.        Past medical history, social history and family history reviewed.  No changes other than what is outlined in HPI.    Current Outpatient Medications   Medication Sig Dispense Refill   • linaCLOtide (LINZESS PO) Take  by mouth every day.     • citalopram (CELEXA) 40 MG Tab Take 1 tablet by mouth every day. 90 tablet 2   • losartan (COZAAR) 100 MG Tab TAKE 1 TABLET BY MOUTH ONCE DAILY. 100 tablet 0   • hydrochlorothiazide (MICROZIDE) 12.5 MG capsule TAKE ONE CAPSULE BY MOUTH EVERY DAY 90 capsule 0   • topiramate (TOPAMAX) 100 MG Tab TAKE 1 & 1/2 TABLETS BY MOUTH TWO TIMES A DAY 90 tablet 3   • rosuvastatin (CRESTOR) 10 MG Tab Take 1 Tab by mouth every evening. 90 Tab 3     No current facility-administered medications for this visit.       Allergies   Allergen Reactions   • Penicillins Hives     hives   • Sulfa Drugs Hives and Rash     Hives, rash         Physical Exam:  Ambulatory Vitals  /86 (BP Location: Left arm, Patient Position: Sitting)   Pulse 72   Ht 1.6 m (5' 3\")   Wt 80.3 kg (177 lb)   SpO2 95%    BP Readings from Last 4 Encounters:   07/23/21 142/86   07/01/21 122/82   02/19/21 128/82   02/14/21 121/77       Weight/BMI: Body mass index is 31.35 kg/m².  Wt Readings from Last 4 Encounters:   07/23/21 80.3 kg (177 lb)   07/01/21 79.8 kg (176 lb)   03/01/21 76.7 kg (169 lb)   02/19/21 79 kg (174 lb 2.6 oz)       General: no apparent distress  Lungs: normal effort, without crackles, no wheezing or rhonchi.  Heart: normal rate, regular rhythm, no murmur, no rub  Ext:  no lower extremity edema.  Neurological: No focal deficits, no facial asymmetry.  Normal speech.  Psychiatric: Appropriate affect, alert and oriented x 3. "   Skin: Warm extremities, no rash.  MS:  Discomfort with palpation just below the L scapula      Lab Data Review:  Lab Results   Component Value Date/Time    CHOLSTRLTOT 233 (H) 2021 11:30 AM     (H) 2021 11:30 AM    HDL 75 2021 11:30 AM    TRIGLYCERIDE 63 2021 11:30 AM       Lab Results   Component Value Date/Time    SODIUM 139 2021 11:30 AM    POTASSIUM 3.8 2021 11:30 AM    CHLORIDE 105 2021 11:30 AM    CO2 24 2021 11:30 AM    GLUCOSE 80 2021 11:30 AM    BUN 16 2021 11:30 AM    CREATININE 0.81 2021 11:30 AM    CREATININE 0.9 2006 10:49 AM     Lab Results   Component Value Date/Time    ALKPHOSPHAT 54 2021 11:30 AM    ASTSGOT 21 2021 11:30 AM    ALTSGPT 14 2021 11:30 AM    TBILIRUBIN 0.4 2021 11:30 AM      Lab Results   Component Value Date/Time    WBC 13.2 (H) 2021 07:49 AM         Cardiac Imaging and Procedures Review:      Echo 21:  CONCLUSIONS  Normal left ventricular systolic function.  Left ventricular ejection fraction is visually estimated to be 65%.  Prolapse of the posterior mitral valve leaflet.  Trace mitral regurgitation.  Normal right ventricular size and systolic function.    Stress Test 21:  NUCLEAR IMAGING INTERPRETATION   No ischemic changes compared to baseline ECG.   No evidence of significant jeopardized viable myocardium or prior myocardial    infarction.   Normal left ventricular size, ejection fraction, and wall motion.   ECG INTERPRETATION   No ischemic changes compared to baseline ECG.        Assessment and Clinical Decision Makin  Coronary calcium on CT  -   MPI normal in 2021, echo with preserved EF 2021  -   Has exertional dyspnea, could be anginal equivalent, I do not have a high suspicious she has significant coronary artery stenosis, but Dr. Morrell mentioned her having an angiogram.  Will follow up with him to see if he would still recommend this.     2   Palpitations   -   Will get 48 hour Holter    3  Constipation  -   Plans for colonoscopy  -   Overall patient at low risk for perioperative CV complications.    -   She can proceed with this in mind.    4  Fatigue  -   Considering her myriad of symptoms including fatigue, constipation, recent weight gain, hair loss, I recommended she have her thyroid tested.    5  Dyslipidemia   -   Started on crestor 10 mg in 1/2021, repeat lipid panel 7/2021 with LDL of 145  -   Increase crestor from 10 to 20 mg  -   Educated on low cholesterol diet.     6  Essential hypertension   -   BP a little high today, but she seems a bit stressed, has normally been at goal.  -   Continue losartan 100, HCTZ 12.5    7  LIZETTE on CPAP    8  Hx of throacic outlet syndrome s/p 1st rib resection    9  Hx of syncope, thought to be due to venous pooling.    10   Mild MR with Prolapse of the posterior mitral valve leaflet  -    Unlikely to be cause of her BURROWS.    Will follow up with patient by phone with results of testing.  She can have her colonoscopy, recommended to get TSH before procedure.    Donna Irwin PA-C     [facility]

## 2021-07-25 LAB — TEST NAME 95000: NORMAL

## 2021-07-26 NOTE — TELEPHONE ENCOUNTER
[facility] called again regarding clearance. Patient was seen on 7/23 by . Please fax clearance to [phone].    Thank you,    Karina FAUSTIN   Labs

## 2021-08-06 DIAGNOSIS — I10 ESSENTIAL HYPERTENSION: ICD-10-CM

## 2021-08-06 RX ORDER — HYDROCHLOROTHIAZIDE 12.5 MG/1
CAPSULE, GELATIN COATED ORAL
Qty: 90 CAPSULE | Refills: 1 | Status: SHIPPED | OUTPATIENT
Start: 2021-08-06 | End: 2022-03-15

## 2021-08-10 ENCOUNTER — HOSPITAL ENCOUNTER (OUTPATIENT)
Dept: RADIOLOGY | Facility: MEDICAL CENTER | Age: 61
End: 2021-08-10
Attending: INTERNAL MEDICINE
Payer: MEDICARE

## 2021-08-10 DIAGNOSIS — R19.4 BOWEL HABIT CHANGES: ICD-10-CM

## 2021-08-10 DIAGNOSIS — K58.1 IRRITABLE BOWEL SYNDROME WITH CONSTIPATION: ICD-10-CM

## 2021-08-10 DIAGNOSIS — K59.00 CONSTIPATION, UNSPECIFIED CONSTIPATION TYPE: ICD-10-CM

## 2021-08-10 PROCEDURE — 74177 CT ABD & PELVIS W/CONTRAST: CPT | Mod: MH

## 2021-08-10 PROCEDURE — 700117 HCHG RX CONTRAST REV CODE 255: Performed by: INTERNAL MEDICINE

## 2021-08-10 PROCEDURE — 74018 RADEX ABDOMEN 1 VIEW: CPT

## 2021-08-10 RX ADMIN — IOHEXOL 100 ML: 350 INJECTION, SOLUTION INTRAVENOUS at 14:55

## 2021-08-10 RX ADMIN — IOHEXOL 25 ML: 240 INJECTION, SOLUTION INTRATHECAL; INTRAVASCULAR; INTRAVENOUS; ORAL at 14:31

## 2021-08-12 DIAGNOSIS — R00.2 PALPITATIONS: ICD-10-CM

## 2021-08-12 NOTE — PROGRESS NOTES
Due to no reader tech next week. Order changed from holter to e patch. Okay for order change from .     Holter order canceled per MD order.

## 2021-08-31 PROBLEM — M18.11 PRIMARY OSTEOARTHRITIS OF FIRST CARPOMETACARPAL JOINT OF RIGHT HAND: Status: ACTIVE | Noted: 2021-08-31

## 2021-09-07 ENCOUNTER — NON-PROVIDER VISIT (OUTPATIENT)
Dept: CARDIOLOGY | Facility: MEDICAL CENTER | Age: 61
End: 2021-09-07
Payer: MEDICARE

## 2021-09-07 ENCOUNTER — TELEPHONE (OUTPATIENT)
Dept: CARDIOLOGY | Facility: MEDICAL CENTER | Age: 61
End: 2021-09-07

## 2021-09-07 DIAGNOSIS — I49.1 PREMATURE ATRIAL CONTRACTIONS: ICD-10-CM

## 2021-09-07 DIAGNOSIS — I49.3 PREMATURE VENTRICULAR CONTRACTIONS (PVCS) (VPCS): ICD-10-CM

## 2021-09-07 NOTE — TELEPHONE ENCOUNTER
Patient enrolled in the 14 day ePatch Holter monitoring program per Donna Irwin PA-C. (enrollment under patient's cardiologist, Kody Morrell MD).   In office hookup.   >Currently pending EOS.

## 2021-09-21 ENCOUNTER — HOSPITAL ENCOUNTER (OUTPATIENT)
Facility: MEDICAL CENTER | Age: 61
End: 2021-09-21
Attending: OBSTETRICS & GYNECOLOGY
Payer: MEDICARE

## 2021-09-21 PROCEDURE — 86304 IMMUNOASSAY TUMOR CA 125: CPT

## 2021-09-22 LAB — CANCER AG125 SERPL-ACNC: 11.9 U/ML (ref 0–35)

## 2021-09-23 ENCOUNTER — TELEPHONE (OUTPATIENT)
Dept: CARDIOLOGY | Facility: MEDICAL CENTER | Age: 61
End: 2021-09-23

## 2021-09-23 DIAGNOSIS — I10 ESSENTIAL HYPERTENSION: ICD-10-CM

## 2021-09-23 RX ORDER — LOSARTAN POTASSIUM 100 MG/1
100 TABLET ORAL DAILY
Qty: 100 TABLET | Refills: 3 | Status: SHIPPED
Start: 2021-09-23 | End: 2022-08-26

## 2021-09-23 NOTE — TELEPHONE ENCOUNTER
AH    Pt called needing a refill of losartan (COZAAR) 100 MG Tab sent to [facility] [address]. Pts PCP usually fills this medication but he is no longer practicing and Pt has not found a new PCP yet. Pt has been out of medication for a week.     Thank you

## 2021-09-23 NOTE — TELEPHONE ENCOUNTER
"Upon chart review per PAC last OV note, \"-   Continue losartan 100.\"    Medication ordered under MD.  "

## 2021-09-28 DIAGNOSIS — R00.2 PALPITATIONS: ICD-10-CM

## 2021-09-28 PROCEDURE — 93228 REMOTE 30 DAY ECG REV/REPORT: CPT | Performed by: INTERNAL MEDICINE

## 2021-09-29 ENCOUNTER — TELEPHONE (OUTPATIENT)
Dept: CARDIOLOGY | Facility: MEDICAL CENTER | Age: 61
End: 2021-09-29

## 2021-09-29 NOTE — TELEPHONE ENCOUNTER
AH    Pt called asking if her cardiac event monitor results are in so she can get the medical clearance. Please call Pt back at [phone] or [phone].    Thank you

## 2021-10-04 ENCOUNTER — PATIENT MESSAGE (OUTPATIENT)
Dept: CARDIOLOGY | Facility: MEDICAL CENTER | Age: 61
End: 2021-10-04

## 2021-10-04 ENCOUNTER — TELEPHONE (OUTPATIENT)
Dept: CARDIOLOGY | Facility: MEDICAL CENTER | Age: 61
End: 2021-10-04

## 2021-10-04 NOTE — TELEPHONE ENCOUNTER
Kody Morrell M.D.  Melissa Mcgee R.N. 7 hours ago (8:42 AM)     Please notify Yuki that her cardiac monitor is normal      [employer] message sent to pt.

## 2021-10-04 NOTE — TELEPHONE ENCOUNTER
AH    Pt is needing the medial release signed. She brought it in at her last appointment.     Yuki - [phone]    Thank you,   Katerin METZGER

## 2021-10-05 NOTE — TELEPHONE ENCOUNTER
Reviewed clearance requests received and received clearance from [facility] (F: [phone]) requesting:    - cardiac clearance for upcoming RT Thumb Trapeziectomy, open CTR scheduled TBD.    Clearance request relayed to covering APN for advise.  Fax sent to scanning for reference via [employer] completed status received.

## 2021-10-07 ENCOUNTER — PATIENT MESSAGE (OUTPATIENT)
Dept: CARDIOLOGY | Facility: MEDICAL CENTER | Age: 61
End: 2021-10-07

## 2021-10-07 NOTE — TELEPHONE ENCOUNTER
Medical Clearance, Results  ILIA Randolph.  You 22 hours ago (3:14 PM)     Okay for surgery.  Has had negative MPI.  Echo shows no wall motion abnormality with EF 65%.  Thank you, Diane    Message text      Letter drafted and printed with APN recommendations.  Letter faxed to [phone], completed status.    Attempted to call pt twice, unable to reach.  Unable to leave voicemail as voicemail is not set up.      Called pt mobile number, unable to reach.  Left voicemail to return this call.    [employer] message sent to pt regarding APN recommendations.

## 2021-10-22 ENCOUNTER — TELEPHONE (OUTPATIENT)
Dept: MEDICAL GROUP | Facility: MEDICAL CENTER | Age: 61
End: 2021-10-22

## 2021-10-22 NOTE — TELEPHONE ENCOUNTER
ESTABLISHED PATIENT PRE-VISIT PLANNING     Patient was contacted to complete PVP.     Note: Patient will not be contacted if there is no indication to call.     1.  Reviewed notes from the last few office visits within the medical group: Yes    2.  If any orders were placed at last visit or intended to be done for this visit (i.e. 6 mos follow-up), do we have Results/Consult Notes?         •  Labs - Labs were not ordered at last office visit.  Note: If patient appointment is for lab review and patient did not complete labs, check with provider if OK to reschedule patient until labs completed.       •  Imaging - Imaging was not ordered at last office visit.       •  Referrals - No referrals were ordered at last office visit.    3. Is this appointment scheduled as a Hospital Follow-Up? No    4.  Immunizations were updated in Epic using Reconcile Outside Information activity? Yes    5.  Patient is due for the following Health Maintenance Topics:   Health Maintenance Due   Topic Date Due   • PAP SMEAR  NOT NEEDED   • MAMMOGRAM  WILL DO THIS YEAR   • COVID-19 Vaccine (2 - Moderna 2-dose series) WILL SCHEDULE AT THE PHARM   • IMM INFLUENZA (1) WILL DO AT APPT       - Patient is up-to-date on all Health Maintenance topics. No records have been requested at this time.    6.  AHA (Pulse8) form printed for Provider? Email sent to [facility] requesting form       Seeing GI an MARJORIE fpr Colonoscopy and Upcoming Surgery    And wants to talk about

## 2021-10-27 ENCOUNTER — OFFICE VISIT (OUTPATIENT)
Dept: MEDICAL GROUP | Facility: MEDICAL CENTER | Age: 61
End: 2021-10-27
Payer: MEDICARE

## 2021-10-27 VITALS
WEIGHT: 181.66 LBS | RESPIRATION RATE: 16 BRPM | HEIGHT: 63 IN | DIASTOLIC BLOOD PRESSURE: 60 MMHG | HEART RATE: 82 BPM | OXYGEN SATURATION: 97 % | SYSTOLIC BLOOD PRESSURE: 116 MMHG | TEMPERATURE: 97.6 F | BODY MASS INDEX: 32.19 KG/M2

## 2021-10-27 DIAGNOSIS — E78.5 DYSLIPIDEMIA: ICD-10-CM

## 2021-10-27 DIAGNOSIS — Z12.31 SCREENING MAMMOGRAM FOR BREAST CANCER: ICD-10-CM

## 2021-10-27 DIAGNOSIS — M50.30 DDD (DEGENERATIVE DISC DISEASE), CERVICAL: ICD-10-CM

## 2021-10-27 DIAGNOSIS — G43.019 INTRACTABLE MIGRAINE WITHOUT AURA AND WITHOUT STATUS MIGRAINOSUS: ICD-10-CM

## 2021-10-27 DIAGNOSIS — G47.33 OSA (OBSTRUCTIVE SLEEP APNEA): ICD-10-CM

## 2021-10-27 DIAGNOSIS — I10 ESSENTIAL HYPERTENSION: ICD-10-CM

## 2021-10-27 DIAGNOSIS — E83.110 HEREDITARY HEMOCHROMATOSIS (HCC): ICD-10-CM

## 2021-10-27 DIAGNOSIS — N83.202 LEFT OVARIAN CYST: ICD-10-CM

## 2021-10-27 DIAGNOSIS — G54.0 TOS (THORACIC OUTLET SYNDROME): ICD-10-CM

## 2021-10-27 DIAGNOSIS — F33.1 MODERATE EPISODE OF RECURRENT MAJOR DEPRESSIVE DISORDER (HCC): ICD-10-CM

## 2021-10-27 PROCEDURE — 99214 OFFICE O/P EST MOD 30 MIN: CPT | Performed by: FAMILY MEDICINE

## 2021-10-27 ASSESSMENT — FIBROSIS 4 INDEX: FIB4 SCORE: 0.87

## 2021-10-27 ASSESSMENT — PATIENT HEALTH QUESTIONNAIRE - PHQ9
8. MOVING OR SPEAKING SO SLOWLY THAT OTHER PEOPLE COULD HAVE NOTICED. OR THE OPPOSITE, BEING SO FIGETY OR RESTLESS THAT YOU HAVE BEEN MOVING AROUND A LOT MORE THAN USUAL: NOT AT ALL
SUM OF ALL RESPONSES TO PHQ9 QUESTIONS 1 AND 2: 0
2. FEELING DOWN, DEPRESSED, IRRITABLE, OR HOPELESS: NOT AT ALL
6. FEELING BAD ABOUT YOURSELF - OR THAT YOU ARE A FAILURE OR HAVE LET YOURSELF OR YOUR FAMILY DOWN: NOT AL ALL
9. THOUGHTS THAT YOU WOULD BE BETTER OFF DEAD, OR OF HURTING YOURSELF: NOT AT ALL
1. LITTLE INTEREST OR PLEASURE IN DOING THINGS: NOT AT ALL
4. FEELING TIRED OR HAVING LITTLE ENERGY: NOT AT ALL
7. TROUBLE CONCENTRATING ON THINGS, SUCH AS READING THE NEWSPAPER OR WATCHING TELEVISION: NOT AT ALL
3. TROUBLE FALLING OR STAYING ASLEEP OR SLEEPING TOO MUCH: NOT AT ALL
SUM OF ALL RESPONSES TO PHQ QUESTIONS 1-9: 0
5. POOR APPETITE OR OVEREATING: NOT AT ALL

## 2021-10-27 NOTE — PROGRESS NOTES
CC: New patient: Hypertension, hyperlipidemia, hemochromatosis, depression, headache, degenerative disc disease of the cervical spine, thoracic outlet syndrome, sleep apnea, left ovarian cyst     HPI:  Yuki presents today to establish new PCP.    Patient has been active and independent with all ADLs.  The following chronic medical issues:    Essential hypertension  Blood pressure has been adequately controlled on losartan 100 mg daily, and hydrochlorothiazide 12.5 mg daily.  No side effects    Dyslipidemia  He has been tolerating the statin. Denies muscle pain LFTs has been normal.  Patient is currently on rosuvastatin 20 mg daily    Hereditary hemochromatosis (CMS-HCC) (HCC)  Pt states that she was diagnosed with hereditary hemochromatosis around 2008, Blood work was done in 2008 showed controversial result.  However CBC has been normal and patient has been asymptomatic.  Has no history of complications(diabetes, abnormal H&H)    Moderate episode of recurrent major depressive disorder (CMS-HCC) (HCC)  Pt states she was diagnosed with MDD in 2018. Pt states that she had lots of events in her life including death of mother and pet. She states that she was crying often. She is taking citalopram. She had a negative PHQ9 screening today. Denies any suicidal ideation.     Intractable migraine without aura and without status migrainosus  Pt states that she has had migraines since her 30's. Pt states that she is taking topamax and has daily headaches. She states that she also takes ibuprofen.     DDD (degenerative disc disease), cervical  Pt states that she was diagnosed with degenerative disc disease in her cervical apine around 1994. She had surgery at the time and had a repeat surgery by Dr. Lee around 2008.   Currently asymptomatic.    TOS (thoracic outlet syndrome)  Pt states she was diagnosed with thoracic outlet syndrome around 1999. She had the first surgery in 2000 where she had a rib resection on the L side.  She then had a ulnar nerve and carpal tunnel surgery around 10 years later. Pt has chronic pain and was referred to pain management and was recommended to have a spinal cord stimulator which she refused. She had revisions to her L arm surgery and continues to have pain intermittently, but the numbness is there all the time.    Sleep apnea  Pt states that she was diagnosed with sleep apnea about 10 years ago. She initially had a CPAP but no longer on it. She states that she stopped using it recently as she felt she may not need it. She states that he is tired lately.  Pt was advised to continue to use it and the rationale was explained.  Patient stating that she wants to be seen by sleep medicine to change her CPAP machine which is an old one and very noisy.    Left Ovarian cyst  Patient has been having intermittent left lower abdominal pain.  Was seen by her gynecologist, had an abdominal CT scan showed 3.7 cm left ovarian cyst.  As per patient she was referred to Dr. Gibson for more evaluation.      Patient Active Problem List    Diagnosis Date Noted   • Primary osteoarthritis of first carpometacarpal joint of right hand 08/31/2021   • At high risk for falls 07/01/2021   • Intractable migraine without aura and without status migrainosus 03/04/2019   • Seizure cerebral (HCC) 03/04/2019   • Hypokalemia 08/13/2018   • Pulmonary nodules 02/08/2018   • Pericardial cyst 02/07/2018   • Precordial pain 02/07/2018   • Coronary artery calcification seen on CAT scan 02/07/2018   • Moderate episode of recurrent major depressive disorder (HCC) 01/29/2018   • Hereditary hemochromatosis (HCC) 12/15/2017   • Dyslipidemia 01/17/2017   • Essential hypertension 05/20/2016   • TOS (thoracic outlet syndrome) 04/14/2015   • Fibromyalgia 07/17/2012   • Aortic regurgitation 02/21/2012   • Sleep apnea    • Cervical radiculopathy 09/08/2011   • S/P cervical spinal fusion 09/08/2011   • DDD (degenerative disc disease), cervical 08/03/2011        Current Outpatient Medications   Medication Sig Dispense Refill   • hyoscyamine (LEVSIN) 0.125 MG SL Tab      • linaCLOtide 145 MCG Cap Take  by mouth.     • traMADol (ULTRAM) 50 MG Tab Take 1 Tablet by mouth every four hours as needed for up to 7 days. 30 Tablet 0   • losartan (COZAAR) 100 MG Tab Take 1 Tablet by mouth every day. TAKE 1 TABLET BY MOUTH ONCE DAILY. 100 Tablet 3   • Multiple Vitamins-Minerals (WOMENS MULTIVITAMIN PO) Take  by mouth.     • hydrochlorothiazide (MICROZIDE) 12.5 MG capsule TAKE 1 CAPSULE BY MOUTH EVERY DAY. 90 capsule 1   • linaCLOtide (LINZESS PO) Take  by mouth every day.     • rosuvastatin (CRESTOR) 20 MG Tab Take 1 tablet by mouth every evening. 100 tablet 3   • citalopram (CELEXA) 40 MG Tab Take 1 tablet by mouth every day. 90 tablet 2   • topiramate (TOPAMAX) 100 MG Tab TAKE 1 & 1/2 TABLETS BY MOUTH TWO TIMES A DAY 90 tablet 3     No current facility-administered medications for this visit.         Allergies as of 10/27/2021 - Reviewed 10/27/2021   Allergen Reaction Noted   • Penicillins Hives 10/05/2009   • Sulfa drugs Hives and Rash 10/05/2009        Social History     Socioeconomic History   • Marital status:      Spouse name: Not on file   • Number of children: Not on file   • Years of education: Not on file   • Highest education level: Not on file   Occupational History   • Not on file   Tobacco Use   • Smoking status: Former Smoker     Packs/day: 0.50     Years: 37.00     Pack years: 18.50     Types: Cigarettes     Quit date: 2013     Years since quittin.4   • Smokeless tobacco: Never Used   • Tobacco comment:  Started smoking at age 16   Vaping Use   • Vaping Use: Never used   Substance and Sexual Activity   • Alcohol use: Not Currently     Alcohol/week: 0.6 oz     Types: 1 Glasses of wine per week   • Drug use: Not Currently   • Sexual activity: Yes     Partners: Male     Birth control/protection: Post-Menopausal     Comment: .    Other Topics  Concern   • Not on file   Social History Narrative   • Not on file     Social Determinants of Health     Financial Resource Strain:    • Difficulty of Paying Living Expenses:    Food Insecurity:    • Worried About Running Out of Food in the Last Year:    • Ran Out of Food in the Last Year:    Transportation Needs:    • Lack of Transportation (Medical):    • Lack of Transportation (Non-Medical):    Physical Activity:    • Days of Exercise per Week:    • Minutes of Exercise per Session:    Stress:    • Feeling of Stress :    Social Connections:    • Frequency of Communication with Friends and Family:    • Frequency of Social Gatherings with Friends and Family:    • Attends J[employer] Services:    • Active Member of Clubs or Organizations:    • Attends Club or Organization Meetings:    • Marital Status:    Intimate Partner Violence:    • Fear of Current or Ex-Partner:    • Emotionally Abused:    • Physically Abused:    • Sexually Abused:        Family History   Adopted: Yes   Problem Relation Age of Onset   • No Known Problems Son    • No Known Problems Daughter        Past Surgical History:   Procedure Laterality Date   • RICKI BY LAPAROSCOPY  2/19/2021    Procedure: CHOLECYSTECTOMY, LAPAROSCOPIC;  Surgeon: John H Ganser, M.D.;  Location: SURGERY SAME DAY [facility];  Service: General   • PB THORACOSCOPY,DX NO BX Right 2/3/2021    Procedure: THORACOSCOPY;  Surgeon: John H Ganser, M.D.;  Location: SURGERY [facility];  Service: General   • CYST EXCISION Right 2/3/2021    Procedure: EXCISION, CYST-PERICARDIAL CYST;  Surgeon: John H Ganser, M.D.;  Location: SURGERY [facility];  Service: General   • NERVE ULNAR TRANSFER Left 2019    at [place]   • BUNIONECTOMY DANYA Right 11/10/2015    Procedure: BUNIONECTOMY DANYA;  Surgeon: Kermit Arroyo D.P.M.;  Location: SURGERY [facility];  Service:    • OSTECTOMY  11/10/2015    Procedure: OSTECTOMY - 5TH TAILORS ALEKS ;  Surgeon: Kermit Arroyo D.P.M.;  Location:  [facility];  Service:    • ARTHROPLASTY Right 11/10/2015    Procedure: ARTHROPLASTY - 5TH DEROTATIONAL;  Surgeon: Kermit Arroyo D.P.M.;  Location: [facility];  Service:    • HAMMERTOE CORRECTION  11/10/2015    Procedure: HAMMERTOE CORRECTION - 2ND, 3RD, 4TH ;  Surgeon: Kermit Arroyo D.P.M.;  Location: [facility];  Service:    • HYSTEROSCOPY WITH VIDEO DIAGNOSTIC  3/27/2015    Performed by Johnny Jarquin M.D. at SURGERY SAME DAY [facility] [place]   • DILATION AND CURETTAGE  3/27/2015    Performed by Johnny Jarquin M.D. at SURGERY SAME DAY [facility] [place]   • KNEE ARTHROSCOPY  3/11/2013    Performed by Freeman Ballard M.D. at [facility]   • MEDIAL MENISCECTOMY  3/11/2013    Performed by Freeman Ballard M.D. at [facility]   • NERVE ULNAR TRANSFER  3/13/2012    Performed by KATYA NEAL at [facility]   • CARPAL TUNNEL ENDOSCOPIC  3/13/2012    Performed by KATYA NEAL at [facility]   • CERVICAL DISK AND FUSION ANTERIOR  6/21/2010    Performed by LAURA BAH at SURGERY [facility]   • INCISION AND DRAINAGE GENERAL  10/22/2009    Performed by ABEL GUZMAN at SURGERY [facility]   • MASS EXCISION GENERAL  10/6/2009    right breast Performed by ABEL GUZMAN at [facility]   • BREAST BIOPSY  3/5/2009    right; Performed by ABEL GUZMAN at SURGERY [facility]   • RIB RESECTION  2001    right for thoracic outlet syndrome   • KNEE ARTHROSCOPY  1998    left   • TUBAL COAGULATION LAPAROSCOPIC BILATERAL  1997   • CERVICAL FUSION POSTERIOR  1996   • OTHER SURGICAL PROCEDURE  11/1992    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  All other systems are  "negative.    /60 (BP Location: Right arm, Patient Position: Sitting, BP Cuff Size: Adult)   Pulse 82   Temp 36.4 °C (97.6 °F) (Temporal)   Resp 16   Ht 1.6 m (5' 3\")   Wt 82.4 kg (181 lb 10.5 oz)   LMP  (LMP Unknown)   SpO2 97%   BMI 32.18 kg/m²     Physical Exam:  Gen:         Alert and oriented, No apparent distress.  HEENT:   P[place], TM clear,  Oralpharynx no erythema or exudates.  Neck:       No Jugular venous distension, Lymphadenopathy, Thyromegaly, Bruits.  Lungs:     Clear to auscultation bilaterally  CV:          Regular rate and rhythm. No murmurs, rubs or gallops.  Abd:         Soft non tender, non distended. Normal active bowel sounds. No                                        Hepatosplenomegaly, No pulsatile masses.  Ext:          No clubbing, cyanosis, edema.      Assessment and Plan.   61 y.o. female     1. Essential hypertension  Controlled.  Continue on losartan 100 mg daily, and hydrochlorothiazide 12.5 mg daily    2. Dyslipidemia  He has been tolerating the statin. Denies muscle pain LFTs has been normal  Patient has been doing fine on rosuvastatin 20 mg daily    3. Hereditary hemochromatosis (HCC)  Blood work was done in 2008 showed controversial result.  However CBC has been normal and patient has been asymptomatic.  Has no history of complications(diabetes, abnormal H&H)  We will continue monitor CBC    4. Moderate episode of recurrent major depressive disorder (HCC)  Stable.  Has been doing fine on citalopram 40 mg daily    5. Intractable migraine without aura and without status migrainosus  Currently stable.  Continue on Topamax    6. DDD (degenerative disc disease), cervical  History of cervical spine surgery.  Currently stable.  Asymptomatic    7. TOS (thoracic outlet syndrome)  History of rib resection on the left side.  Continue to have intermittent pain, and continuous numbness.  However stable  We will continue monitor.    9. LIZETTE (obstructive sleep apnea)    - REFERRAL TO " PULMONARY AND SLEEP MEDICINE    10. Screening mammogram for breast cancer    - MA-SCREENING MAMMO BILAT W/CAD; Future    11. Left Ovarian cyst  Currently asymptomatic.  However patient has been having intermittent left lower abdominal pain.  Was seen by her gynecologist, had an abdominal CT scan showed 3.7 cm left ovarian cyst.  As per patient she was referred to Dr. Gibson for more evaluation.

## 2021-11-17 ENCOUNTER — HOSPITAL ENCOUNTER (OUTPATIENT)
Dept: RADIOLOGY | Facility: MEDICAL CENTER | Age: 61
End: 2021-11-17
Attending: SPECIALIST | Admitting: OBSTETRICS & GYNECOLOGY
Payer: MEDICARE

## 2021-11-17 ENCOUNTER — PRE-ADMISSION TESTING (OUTPATIENT)
Dept: ADMISSIONS | Facility: MEDICAL CENTER | Age: 61
End: 2021-11-17
Attending: OBSTETRICS & GYNECOLOGY
Payer: MEDICARE

## 2021-11-17 DIAGNOSIS — Z01.811 PRE-OPERATIVE RESPIRATORY EXAMINATION: ICD-10-CM

## 2021-11-17 DIAGNOSIS — Z01.812 PRE-OPERATIVE LABORATORY EXAMINATION: ICD-10-CM

## 2021-11-17 DIAGNOSIS — Z01.810 PRE-OPERATIVE CARDIOVASCULAR EXAMINATION: ICD-10-CM

## 2021-11-17 LAB
ALBUMIN SERPL BCP-MCNC: 4.4 G/DL (ref 3.2–4.9)
ALBUMIN/GLOB SERPL: 1.6 G/DL
ALP SERPL-CCNC: 64 U/L (ref 30–99)
ALT SERPL-CCNC: 15 U/L (ref 2–50)
ANION GAP SERPL CALC-SCNC: 12 MMOL/L (ref 7–16)
APTT PPP: 30.5 SEC (ref 24.7–36)
AST SERPL-CCNC: 17 U/L (ref 12–45)
BASOPHILS # BLD AUTO: 1.2 % (ref 0–1.8)
BASOPHILS # BLD: 0.08 K/UL (ref 0–0.12)
BILIRUB SERPL-MCNC: 0.5 MG/DL (ref 0.1–1.5)
BUN SERPL-MCNC: 11 MG/DL (ref 8–22)
CALCIUM SERPL-MCNC: 9.2 MG/DL (ref 8.5–10.5)
CANCER AG125 SERPL-ACNC: 11.5 U/ML (ref 0–35)
CHLORIDE SERPL-SCNC: 102 MMOL/L (ref 96–112)
CO2 SERPL-SCNC: 25 MMOL/L (ref 20–33)
CREAT SERPL-MCNC: 0.88 MG/DL (ref 0.5–1.4)
EKG IMPRESSION: NORMAL
EOSINOPHIL # BLD AUTO: 0.05 K/UL (ref 0–0.51)
EOSINOPHIL NFR BLD: 0.8 % (ref 0–6.9)
ERYTHROCYTE [DISTWIDTH] IN BLOOD BY AUTOMATED COUNT: 44.7 FL (ref 35.9–50)
GLOBULIN SER CALC-MCNC: 2.7 G/DL (ref 1.9–3.5)
GLUCOSE SERPL-MCNC: 84 MG/DL (ref 65–99)
HCT VFR BLD AUTO: 45.6 % (ref 37–47)
HGB BLD-MCNC: 15.5 G/DL (ref 12–16)
IMM GRANULOCYTES # BLD AUTO: 0.02 K/UL (ref 0–0.11)
IMM GRANULOCYTES NFR BLD AUTO: 0.3 % (ref 0–0.9)
INR PPP: 0.9 (ref 0.87–1.13)
LYMPHOCYTES # BLD AUTO: 2.52 K/UL (ref 1–4.8)
LYMPHOCYTES NFR BLD: 38.3 % (ref 22–41)
MCH RBC QN AUTO: 31.6 PG (ref 27–33)
MCHC RBC AUTO-ENTMCNC: 34 G/DL (ref 33.6–35)
MCV RBC AUTO: 92.9 FL (ref 81.4–97.8)
MONOCYTES # BLD AUTO: 0.52 K/UL (ref 0–0.85)
MONOCYTES NFR BLD AUTO: 7.9 % (ref 0–13.4)
NEUTROPHILS # BLD AUTO: 3.39 K/UL (ref 2–7.15)
NEUTROPHILS NFR BLD: 51.5 % (ref 44–72)
NRBC # BLD AUTO: 0 K/UL
NRBC BLD-RTO: 0 /100 WBC
PLATELET # BLD AUTO: 392 K/UL (ref 164–446)
PMV BLD AUTO: 8.4 FL (ref 9–12.9)
POTASSIUM SERPL-SCNC: 3 MMOL/L (ref 3.6–5.5)
PROT SERPL-MCNC: 7.1 G/DL (ref 6–8.2)
PROTHROMBIN TIME: 11.9 SEC (ref 12–14.6)
RBC # BLD AUTO: 4.91 M/UL (ref 4.2–5.4)
SODIUM SERPL-SCNC: 139 MMOL/L (ref 135–145)
WBC # BLD AUTO: 6.6 K/UL (ref 4.8–10.8)

## 2021-11-17 PROCEDURE — 80053 COMPREHEN METABOLIC PANEL: CPT

## 2021-11-17 PROCEDURE — 93005 ELECTROCARDIOGRAM TRACING: CPT

## 2021-11-17 PROCEDURE — 85610 PROTHROMBIN TIME: CPT

## 2021-11-17 PROCEDURE — 85025 COMPLETE CBC W/AUTO DIFF WBC: CPT

## 2021-11-17 PROCEDURE — 36415 COLL VENOUS BLD VENIPUNCTURE: CPT

## 2021-11-17 PROCEDURE — 93010 ELECTROCARDIOGRAM REPORT: CPT | Performed by: INTERNAL MEDICINE

## 2021-11-17 PROCEDURE — 85730 THROMBOPLASTIN TIME PARTIAL: CPT

## 2021-11-17 PROCEDURE — 71045 X-RAY EXAM CHEST 1 VIEW: CPT

## 2021-11-17 PROCEDURE — 86304 IMMUNOASSAY TUMOR CA 125: CPT

## 2021-11-17 RX ORDER — TRAMADOL HYDROCHLORIDE 50 MG/1
50 TABLET ORAL EVERY 4 HOURS PRN
COMMUNITY
End: 2023-05-03

## 2021-11-17 ASSESSMENT — FIBROSIS 4 INDEX: FIB4 SCORE: 0.87

## 2021-11-29 ENCOUNTER — PRE-ADMISSION TESTING (OUTPATIENT)
Dept: ADMISSIONS | Facility: MEDICAL CENTER | Age: 61
End: 2021-11-29
Attending: OBSTETRICS & GYNECOLOGY
Payer: MEDICARE

## 2021-11-29 DIAGNOSIS — Z01.812 PRE-OPERATIVE LABORATORY EXAMINATION: ICD-10-CM

## 2021-11-29 LAB — COVID ORDER STATUS COVID19: NORMAL

## 2021-11-29 PROCEDURE — U0005 INFEC AGEN DETEC AMPLI PROBE: HCPCS

## 2021-11-29 PROCEDURE — U0003 INFECTIOUS AGENT DETECTION BY NUCLEIC ACID (DNA OR RNA); SEVERE ACUTE RESPIRATORY SYNDROME CORONAVIRUS 2 (SARS-COV-2) (CORONAVIRUS DISEASE [COVID-19]), AMPLIFIED PROBE TECHNIQUE, MAKING USE OF HIGH THROUGHPUT TECHNOLOGIES AS DESCRIBED BY CMS-2020-01-R: HCPCS

## 2021-11-30 LAB
SARS-COV-2 RNA RESP QL NAA+PROBE: NOTDETECTED
SPECIMEN SOURCE: NORMAL

## 2021-12-03 ENCOUNTER — ANESTHESIA (OUTPATIENT)
Dept: SURGERY | Facility: MEDICAL CENTER | Age: 61
End: 2021-12-03
Payer: MEDICARE

## 2021-12-03 ENCOUNTER — ANESTHESIA EVENT (OUTPATIENT)
Dept: SURGERY | Facility: MEDICAL CENTER | Age: 61
End: 2021-12-03
Payer: MEDICARE

## 2021-12-03 ENCOUNTER — HOSPITAL ENCOUNTER (OUTPATIENT)
Facility: MEDICAL CENTER | Age: 61
End: 2021-12-03
Attending: OBSTETRICS & GYNECOLOGY | Admitting: OBSTETRICS & GYNECOLOGY
Payer: MEDICARE

## 2021-12-03 VITALS
OXYGEN SATURATION: 93 % | TEMPERATURE: 98.2 F | BODY MASS INDEX: 31.45 KG/M2 | WEIGHT: 177.47 LBS | HEIGHT: 63 IN | DIASTOLIC BLOOD PRESSURE: 85 MMHG | SYSTOLIC BLOOD PRESSURE: 143 MMHG | HEART RATE: 83 BPM | RESPIRATION RATE: 16 BRPM

## 2021-12-03 LAB
ABO GROUP BLD: NORMAL
BLD GP AB SCN SERPL QL: NORMAL
PATHOLOGY CONSULT NOTE: NORMAL
RH BLD: NORMAL

## 2021-12-03 PROCEDURE — 88331 PATH CONSLTJ SURG 1 BLK 1SPC: CPT

## 2021-12-03 PROCEDURE — 501838 HCHG SUTURE GENERAL: Performed by: OBSTETRICS & GYNECOLOGY

## 2021-12-03 PROCEDURE — 160036 HCHG PACU - EA ADDL 30 MINS PHASE I: Performed by: OBSTETRICS & GYNECOLOGY

## 2021-12-03 PROCEDURE — A9270 NON-COVERED ITEM OR SERVICE: HCPCS | Performed by: ANESTHESIOLOGY

## 2021-12-03 PROCEDURE — 160042 HCHG SURGERY MINUTES - EA ADDL 1 MIN LEVEL 5: Performed by: OBSTETRICS & GYNECOLOGY

## 2021-12-03 PROCEDURE — 700111 HCHG RX REV CODE 636 W/ 250 OVERRIDE (IP): Performed by: ANESTHESIOLOGY

## 2021-12-03 PROCEDURE — 88307 TISSUE EXAM BY PATHOLOGIST: CPT

## 2021-12-03 PROCEDURE — 501399 HCHG SPECIMAN BAG, ENDO CATC: Performed by: OBSTETRICS & GYNECOLOGY

## 2021-12-03 PROCEDURE — 160035 HCHG PACU - 1ST 60 MINS PHASE I: Performed by: OBSTETRICS & GYNECOLOGY

## 2021-12-03 PROCEDURE — 700102 HCHG RX REV CODE 250 W/ 637 OVERRIDE(OP): Performed by: ANESTHESIOLOGY

## 2021-12-03 PROCEDURE — 160025 RECOVERY II MINUTES (STATS): Performed by: OBSTETRICS & GYNECOLOGY

## 2021-12-03 PROCEDURE — 502240 HCHG MISC OR SUPPLY RC 0272: Performed by: OBSTETRICS & GYNECOLOGY

## 2021-12-03 PROCEDURE — 86900 BLOOD TYPING SEROLOGIC ABO: CPT

## 2021-12-03 PROCEDURE — 160048 HCHG OR STATISTICAL LEVEL 1-5: Performed by: OBSTETRICS & GYNECOLOGY

## 2021-12-03 PROCEDURE — 500854 HCHG NEEDLE, INSUFFLATION FOR STEP: Performed by: OBSTETRICS & GYNECOLOGY

## 2021-12-03 PROCEDURE — 700101 HCHG RX REV CODE 250: Performed by: ANESTHESIOLOGY

## 2021-12-03 PROCEDURE — 86901 BLOOD TYPING SEROLOGIC RH(D): CPT

## 2021-12-03 PROCEDURE — 86850 RBC ANTIBODY SCREEN: CPT

## 2021-12-03 PROCEDURE — 501572 HCHG TROCAR, SHIELD OBTU 5X100: Performed by: OBSTETRICS & GYNECOLOGY

## 2021-12-03 PROCEDURE — 88305 TISSUE EXAM BY PATHOLOGIST: CPT | Mod: 59

## 2021-12-03 PROCEDURE — 88112 CYTOPATH CELL ENHANCE TECH: CPT

## 2021-12-03 PROCEDURE — 160047 HCHG PACU  - EA ADDL 30 MINS PHASE II: Performed by: OBSTETRICS & GYNECOLOGY

## 2021-12-03 PROCEDURE — 700105 HCHG RX REV CODE 258: Performed by: OBSTETRICS & GYNECOLOGY

## 2021-12-03 PROCEDURE — 700105 HCHG RX REV CODE 258: Performed by: ANESTHESIOLOGY

## 2021-12-03 PROCEDURE — 160009 HCHG ANES TIME/MIN: Performed by: OBSTETRICS & GYNECOLOGY

## 2021-12-03 PROCEDURE — 500868 HCHG NEEDLE, SURGI(VARES): Performed by: OBSTETRICS & GYNECOLOGY

## 2021-12-03 PROCEDURE — 700101 HCHG RX REV CODE 250: Performed by: OBSTETRICS & GYNECOLOGY

## 2021-12-03 PROCEDURE — 502714 HCHG ROBOTIC SURGERY SERVICES: Performed by: OBSTETRICS & GYNECOLOGY

## 2021-12-03 PROCEDURE — 160046 HCHG PACU - 1ST 60 MINS PHASE II: Performed by: OBSTETRICS & GYNECOLOGY

## 2021-12-03 PROCEDURE — 160031 HCHG SURGERY MINUTES - 1ST 30 MINS LEVEL 5: Performed by: OBSTETRICS & GYNECOLOGY

## 2021-12-03 PROCEDURE — 160002 HCHG RECOVERY MINUTES (STAT): Performed by: OBSTETRICS & GYNECOLOGY

## 2021-12-03 RX ORDER — PROPOFOL 10 MG/ML
INJECTION, EMULSION INTRAVENOUS PRN
Status: DISCONTINUED | OUTPATIENT
Start: 2021-12-03 | End: 2021-12-03 | Stop reason: SURG

## 2021-12-03 RX ORDER — ACETAMINOPHEN 500 MG
1000 TABLET ORAL ONCE
Status: COMPLETED | OUTPATIENT
Start: 2021-12-03 | End: 2021-12-03

## 2021-12-03 RX ORDER — SODIUM CHLORIDE, SODIUM LACTATE, POTASSIUM CHLORIDE, CALCIUM CHLORIDE 600; 310; 30; 20 MG/100ML; MG/100ML; MG/100ML; MG/100ML
INJECTION, SOLUTION INTRAVENOUS CONTINUOUS
Status: DISCONTINUED | OUTPATIENT
Start: 2021-12-03 | End: 2021-12-03 | Stop reason: HOSPADM

## 2021-12-03 RX ORDER — OXYCODONE HCL 5 MG/5 ML
5 SOLUTION, ORAL ORAL
Status: COMPLETED | OUTPATIENT
Start: 2021-12-03 | End: 2021-12-03

## 2021-12-03 RX ORDER — HALOPERIDOL 5 MG/ML
1 INJECTION INTRAMUSCULAR
Status: DISCONTINUED | OUTPATIENT
Start: 2021-12-03 | End: 2021-12-03 | Stop reason: HOSPADM

## 2021-12-03 RX ORDER — LABETALOL HYDROCHLORIDE 5 MG/ML
5 INJECTION, SOLUTION INTRAVENOUS
Status: DISCONTINUED | OUTPATIENT
Start: 2021-12-03 | End: 2021-12-03 | Stop reason: HOSPADM

## 2021-12-03 RX ORDER — DIPHENHYDRAMINE HYDROCHLORIDE 50 MG/ML
12.5 INJECTION INTRAMUSCULAR; INTRAVENOUS
Status: DISCONTINUED | OUTPATIENT
Start: 2021-12-03 | End: 2021-12-03 | Stop reason: HOSPADM

## 2021-12-03 RX ORDER — BUPIVACAINE HYDROCHLORIDE AND EPINEPHRINE 2.5; 5 MG/ML; UG/ML
INJECTION, SOLUTION EPIDURAL; INFILTRATION; INTRACAUDAL; PERINEURAL
Status: DISCONTINUED | OUTPATIENT
Start: 2021-12-03 | End: 2021-12-03 | Stop reason: HOSPADM

## 2021-12-03 RX ORDER — ROCURONIUM BROMIDE 10 MG/ML
INJECTION, SOLUTION INTRAVENOUS PRN
Status: DISCONTINUED | OUTPATIENT
Start: 2021-12-03 | End: 2021-12-03 | Stop reason: SURG

## 2021-12-03 RX ORDER — HYDROMORPHONE HYDROCHLORIDE 1 MG/ML
0.2 INJECTION, SOLUTION INTRAMUSCULAR; INTRAVENOUS; SUBCUTANEOUS
Status: DISCONTINUED | OUTPATIENT
Start: 2021-12-03 | End: 2021-12-03 | Stop reason: HOSPADM

## 2021-12-03 RX ORDER — MIDAZOLAM HYDROCHLORIDE 1 MG/ML
INJECTION INTRAMUSCULAR; INTRAVENOUS PRN
Status: DISCONTINUED | OUTPATIENT
Start: 2021-12-03 | End: 2021-12-03 | Stop reason: SURG

## 2021-12-03 RX ORDER — HYDRALAZINE HYDROCHLORIDE 20 MG/ML
5 INJECTION INTRAMUSCULAR; INTRAVENOUS
Status: DISCONTINUED | OUTPATIENT
Start: 2021-12-03 | End: 2021-12-03 | Stop reason: HOSPADM

## 2021-12-03 RX ORDER — HYDROMORPHONE HYDROCHLORIDE 1 MG/ML
0.4 INJECTION, SOLUTION INTRAMUSCULAR; INTRAVENOUS; SUBCUTANEOUS
Status: DISCONTINUED | OUTPATIENT
Start: 2021-12-03 | End: 2021-12-03 | Stop reason: HOSPADM

## 2021-12-03 RX ORDER — SODIUM CHLORIDE, SODIUM LACTATE, POTASSIUM CHLORIDE, CALCIUM CHLORIDE 600; 310; 30; 20 MG/100ML; MG/100ML; MG/100ML; MG/100ML
INJECTION, SOLUTION INTRAVENOUS CONTINUOUS
Status: ACTIVE | OUTPATIENT
Start: 2021-12-03 | End: 2021-12-03

## 2021-12-03 RX ORDER — HYDROMORPHONE HYDROCHLORIDE 1 MG/ML
0.1 INJECTION, SOLUTION INTRAMUSCULAR; INTRAVENOUS; SUBCUTANEOUS
Status: DISCONTINUED | OUTPATIENT
Start: 2021-12-03 | End: 2021-12-03 | Stop reason: HOSPADM

## 2021-12-03 RX ORDER — DEXAMETHASONE SODIUM PHOSPHATE 4 MG/ML
INJECTION, SOLUTION INTRA-ARTICULAR; INTRALESIONAL; INTRAMUSCULAR; INTRAVENOUS; SOFT TISSUE PRN
Status: DISCONTINUED | OUTPATIENT
Start: 2021-12-03 | End: 2021-12-03 | Stop reason: SURG

## 2021-12-03 RX ORDER — SODIUM CHLORIDE, SODIUM LACTATE, POTASSIUM CHLORIDE, CALCIUM CHLORIDE 600; 310; 30; 20 MG/100ML; MG/100ML; MG/100ML; MG/100ML
INJECTION, SOLUTION INTRAVENOUS
Status: DISCONTINUED | OUTPATIENT
Start: 2021-12-03 | End: 2021-12-03 | Stop reason: SURG

## 2021-12-03 RX ORDER — OXYCODONE HCL 5 MG/5 ML
10 SOLUTION, ORAL ORAL
Status: COMPLETED | OUTPATIENT
Start: 2021-12-03 | End: 2021-12-03

## 2021-12-03 RX ORDER — HYDROMORPHONE HYDROCHLORIDE 2 MG/ML
INJECTION, SOLUTION INTRAMUSCULAR; INTRAVENOUS; SUBCUTANEOUS PRN
Status: DISCONTINUED | OUTPATIENT
Start: 2021-12-03 | End: 2021-12-03 | Stop reason: SURG

## 2021-12-03 RX ORDER — ONDANSETRON 2 MG/ML
4 INJECTION INTRAMUSCULAR; INTRAVENOUS
Status: DISCONTINUED | OUTPATIENT
Start: 2021-12-03 | End: 2021-12-03 | Stop reason: HOSPADM

## 2021-12-03 RX ORDER — PHENYLEPHRINE HCL IN 0.9% NACL 0.5 MG/5ML
SYRINGE (ML) INTRAVENOUS PRN
Status: DISCONTINUED | OUTPATIENT
Start: 2021-12-03 | End: 2021-12-03 | Stop reason: SURG

## 2021-12-03 RX ORDER — ONDANSETRON 2 MG/ML
INJECTION INTRAMUSCULAR; INTRAVENOUS PRN
Status: DISCONTINUED | OUTPATIENT
Start: 2021-12-03 | End: 2021-12-03 | Stop reason: SURG

## 2021-12-03 RX ORDER — CEFOTETAN DISODIUM 2 G/20ML
INJECTION, POWDER, FOR SOLUTION INTRAMUSCULAR; INTRAVENOUS PRN
Status: DISCONTINUED | OUTPATIENT
Start: 2021-12-03 | End: 2021-12-03 | Stop reason: SURG

## 2021-12-03 RX ORDER — KETOROLAC TROMETHAMINE 30 MG/ML
30 INJECTION, SOLUTION INTRAMUSCULAR; INTRAVENOUS ONCE
Status: COMPLETED | OUTPATIENT
Start: 2021-12-03 | End: 2021-12-03

## 2021-12-03 RX ORDER — LIDOCAINE HYDROCHLORIDE 20 MG/ML
INJECTION, SOLUTION EPIDURAL; INFILTRATION; INTRACAUDAL; PERINEURAL PRN
Status: DISCONTINUED | OUTPATIENT
Start: 2021-12-03 | End: 2021-12-03 | Stop reason: SURG

## 2021-12-03 RX ORDER — MIDAZOLAM HYDROCHLORIDE 1 MG/ML
1 INJECTION INTRAMUSCULAR; INTRAVENOUS
Status: DISCONTINUED | OUTPATIENT
Start: 2021-12-03 | End: 2021-12-03 | Stop reason: HOSPADM

## 2021-12-03 RX ADMIN — KETOROLAC TROMETHAMINE 30 MG: 30 INJECTION, SOLUTION INTRAMUSCULAR; INTRAVENOUS at 13:22

## 2021-12-03 RX ADMIN — MIDAZOLAM HYDROCHLORIDE 2 MG: 1 INJECTION, SOLUTION INTRAMUSCULAR; INTRAVENOUS at 09:33

## 2021-12-03 RX ADMIN — ROCURONIUM BROMIDE 50 MG: 10 INJECTION, SOLUTION INTRAVENOUS at 09:38

## 2021-12-03 RX ADMIN — HYDROMORPHONE HYDROCHLORIDE 0.6 MG: 2 INJECTION, SOLUTION INTRAMUSCULAR; INTRAVENOUS; SUBCUTANEOUS at 11:45

## 2021-12-03 RX ADMIN — ACETAMINOPHEN 1000 MG: 500 TABLET ORAL at 09:20

## 2021-12-03 RX ADMIN — SUGAMMADEX 200 MG: 100 INJECTION, SOLUTION INTRAVENOUS at 11:57

## 2021-12-03 RX ADMIN — FENTANYL CITRATE 50 MCG: 50 INJECTION INTRAMUSCULAR; INTRAVENOUS at 15:04

## 2021-12-03 RX ADMIN — ROCURONIUM BROMIDE 10 MG: 10 INJECTION, SOLUTION INTRAVENOUS at 10:52

## 2021-12-03 RX ADMIN — DEXAMETHASONE SODIUM PHOSPHATE 8 MG: 4 INJECTION, SOLUTION INTRA-ARTICULAR; INTRALESIONAL; INTRAMUSCULAR; INTRAVENOUS; SOFT TISSUE at 09:42

## 2021-12-03 RX ADMIN — Medication 100 MCG: at 09:46

## 2021-12-03 RX ADMIN — SODIUM CHLORIDE, POTASSIUM CHLORIDE, SODIUM LACTATE AND CALCIUM CHLORIDE: 600; 310; 30; 20 INJECTION, SOLUTION INTRAVENOUS at 09:35

## 2021-12-03 RX ADMIN — OXYCODONE HYDROCHLORIDE 10 MG: 5 SOLUTION ORAL at 12:16

## 2021-12-03 RX ADMIN — ONDANSETRON 4 MG: 2 INJECTION INTRAMUSCULAR; INTRAVENOUS at 11:44

## 2021-12-03 RX ADMIN — FENTANYL CITRATE 50 MCG: 50 INJECTION INTRAMUSCULAR; INTRAVENOUS at 12:17

## 2021-12-03 RX ADMIN — ROCURONIUM BROMIDE 10 MG: 10 INJECTION, SOLUTION INTRAVENOUS at 10:22

## 2021-12-03 RX ADMIN — HYDROMORPHONE HYDROCHLORIDE 0.4 MG: 2 INJECTION, SOLUTION INTRAMUSCULAR; INTRAVENOUS; SUBCUTANEOUS at 11:51

## 2021-12-03 RX ADMIN — FENTANYL CITRATE 50 MCG: 50 INJECTION INTRAMUSCULAR; INTRAVENOUS at 16:37

## 2021-12-03 RX ADMIN — LIDOCAINE HYDROCHLORIDE 100 MG: 20 INJECTION, SOLUTION EPIDURAL; INFILTRATION; INTRACAUDAL at 09:38

## 2021-12-03 RX ADMIN — PROPOFOL 150 MG: 10 INJECTION, EMULSION INTRAVENOUS at 09:38

## 2021-12-03 RX ADMIN — FENTANYL CITRATE 100 MCG: 50 INJECTION, SOLUTION INTRAMUSCULAR; INTRAVENOUS at 09:38

## 2021-12-03 RX ADMIN — CEFOTETAN DISODIUM 2 G: 2 INJECTION, POWDER, FOR SOLUTION INTRAMUSCULAR; INTRAVENOUS at 09:46

## 2021-12-03 RX ADMIN — SODIUM CHLORIDE, POTASSIUM CHLORIDE, SODIUM LACTATE AND CALCIUM CHLORIDE: 600; 310; 30; 20 INJECTION, SOLUTION INTRAVENOUS at 09:16

## 2021-12-03 RX ADMIN — HALOPERIDOL LACTATE 1 MG: 5 INJECTION, SOLUTION INTRAMUSCULAR at 12:17

## 2021-12-03 RX ADMIN — ROCURONIUM BROMIDE 10 MG: 10 INJECTION, SOLUTION INTRAVENOUS at 11:22

## 2021-12-03 RX ADMIN — MINERAL OIL, PETROLATUM 1 APPLICATION: 425; 573 OINTMENT OPHTHALMIC at 09:38

## 2021-12-03 ASSESSMENT — PAIN SCALES - GENERAL: PAIN_LEVEL: 6

## 2021-12-03 ASSESSMENT — FIBROSIS 4 INDEX: FIB4 SCORE: 0.71

## 2021-12-03 ASSESSMENT — PAIN DESCRIPTION - PAIN TYPE
TYPE: SURGICAL PAIN

## 2021-12-03 NOTE — ANESTHESIA POSTPROCEDURE EVALUATION
Patient: Yuki Angelo    Procedure Summary     Date: 12/03/21 Room / Location: [place] [address] / SURGERY [facility]    Anesthesia Start: 0935 Anesthesia Stop: 1207    Procedures:       HYSTERECTOMY, ROBOT-ASSISTED, USING DA CHEPE XI (Pelvis)      SALPINGO-OOPHORECTOMY (Bilateral Pelvis)      OMENTECTOMY,ROBOT-ASSISTED,USING DA CHEPE XI (Abdomen) Diagnosis: (left ovarian cyst)    Surgeons: Lois Marte M.D. Responsible Provider: Kermit Wilde M.D.    Anesthesia Type: general ASA Status: 2          Final Anesthesia Type: general  Last vitals  BP   Blood Pressure: 109/80    Temp   36.4 °C (97.5 °F)    Pulse   72   Resp   12    SpO2   95 %      Anesthesia Post Evaluation    Patient location during evaluation: PACU  Patient participation: complete - patient participated  Level of consciousness: awake and alert  Pain score: 6    Airway patency: patent  Anesthetic complications: no  Cardiovascular status: hemodynamically stable  Respiratory status: acceptable  Hydration status: euvolemic    PONV: none          No complications documented.     Nurse Pain Score: 6 (NPRS)

## 2021-12-03 NOTE — OR NURSING
1207- Pt arrives to PACU from OR on 6L of oxygen, OPA in place. Report received. 5 dressing sites to abd CDI.     1214-OPA removed.    1220- Pt states pain 9/10 and nausea, medicated per MAR.     1234- Pt  Freeman called and updated on pt status and POC.     1409- Repot called to Kamini KEENAN.

## 2021-12-03 NOTE — OP REPORT
Operative Report    Date: 12/3/2021    Surgeon: Lois Marte M.D.     Assistant: Aleksander Ahuja M.D.    Anesthesiologist: Kermit Wilde M.D.    Pre-operative Diagnosis:   Complex cystic adnexal mass  Pelvic pain     Post-operative Diagnosis:   Left ovarian complex cystic adnexal mass  Pelvic pain     Procedure:   Robotic total laparoscopic hysterectomy with bilateral salpingo-oophorectomy  Robotic peritoneal biopsies  Robotic omental biopsy    Indications: Mrs. Angelo is a pleasant 61 year old  female whose LMP was in . She has a past medical history significant for breast  cancer, hypertension, fibromyalgia and thoracic outlet syndrome. She has a surgical history significant for laparoscopic cholecystectomy,  BTL, and D&C. She was in her usual state of health until 2021 when she c/o abdominal pain and irregular bowel habits. She underwent a  normal colonoscopy with Dr. Garcia at that time. She then underwent a CT AP on 8/10/21 which revealed 1.1 cm hypodensity on the dome  of the liver, otherwise normalappearing upper abdomen, punctate left renal stones, diverticulosis, no lymphadenopathy, no ascites, and a  3.7 cm left ovarian cyst. She followed up with you, Dr. Jarquin, on 10/12/2021. Patient underwent a TVUS at this visit that showed uterus 8  x 3.1 x 4.2 cm with a few small fibroids up to 1.3 cm fundal, EMS 2.2 mm, no free fluid, 3 cm left adnexal cyst with a 5 mm mural nodule,  right ovary not seen. No color flow was noted in the mural nodule. A recent CA-125 was reportedly normal. She is now referred to me for  further treatment plan.  She was seen by me for consultation on 2021. Given her  significant pain symptoms, I recommended robotic hysterectomy and bilateral salpingooophorectomy with possible  staging depending on intraoperative findings and frozen section. Patient was counseled regarding all risks, benefits and alternatives including but not limited to infection, bleeding up to  and requiring a transfusion, damage to surrounding organs including bowel, bladder, and ureter, pain, scarring, thromboembolism, and risks of anesthesia. Patient voiced understanding and desired to proceed. Informed consent was obtained.      Findings:   1. Normal-appearing upper abdomen to include the liver edge, diaphragm, omentum, and small bowel  2. 6 week sized uterus with small fundal serosal fibroids and atrophic appearing right tube and ovary  3. Adhesions of the sigmoid colon to the left uterine cornua, pelvic sidewall and left ovary and tube   4. Endometriotic lesion with extrusion of chocolate cyst fluid at the site of the above noted adhesion  5. Atrophic appearing left ovary with a complex cystic lesion approximating 3.5 cm, removed intact with no evidence of intraperitoneal spillage  6. Smooth abdominal pelvic and cul-de-sac peritoneal surfaces with no evidence of tumor implant or seeding  7. Frozen section consistent with left ovarian cyst with less than 10% atypia grossly, likely a benign cyst with increased proliferation however cannot rule out borderline tumor       Procedure in detail: The patient was identified in the pre-operative holding area and brought to the operating room. Correct side and site were identified. Pre-operative antibiotics were administered prior to the procedure. GETA was smoothly induced. The patient was prepped and draped in the usual sterile fashion.  A Buckley catheter was placed on the field.     An 8 mm umbilical incision was made and a Veress needle placed with confirmation of intra-abdominal position and low initial insufflation pressure.  The abdomen was insufflated with carbon dioxide gas to 15 mmHg.  The 8 millimeter robotic trocar was then placed.  The camera was inserted and a survey of the abdomen and pelvis was performed with findings as noted above.  The patient was placed in steep Trendelenburg position. A left upper quadrant 8 mm robotic port was then placed  under direct visualization in the usual manner 8 cm lateral to the umbilical port, followed by 5 mm assistant port in the left lower quadrant.  In the right upper quadrant 2 additional ports were placed in a similar manner.  The robot was brought to the patient bedside and docked following targeting.  The robotic instruments were placed. The surgeon scrubbed out to attend the console.     The left utero-ovarian pedicle was grasped and the uterus tented superiorly.   Adhesive disease between the sigmoid colon, left uterine cornua, left pelvic sidewall, and left adnexa were noted. This was taken down with the monopolar cautery at the uterine cornua to free the sigmoid colon sharply. At the site of this adhesion, a cystic structure with chocolate fluid was noted, and spontaneously drained. I was then able to elevate the left adnexa, which was as well adherent to the sigmoid mesentery. Monopolar cautery was again used to take down these adhesions as the colon was mobilized inferiorly into the cul-de-sac. Further dissection was performed to release the sigmoid from the left pelvic sidewall peritoneum. Once freed, I was able to visualize a cystic mass arising from the left ovary. The cyst was smooth-walled with no obvious excrescences or surface irregularity.    Physiologic adhesions between the left colon and the left pelvic sidewall were taken down with electrocautery.  The left broad ligament peritoneum lateral to the IP ligament was then incised parallel and the left retroperitoneal space entered.  The ureter was identified on the medial leaf of the broad ligament and the left ovarian vessels were isolated.  The ovarian pedicle was triply cauterized and transected. The underlying broad ligament peritoneum was then transected medially. The left utero-ovarian ligament was isolated, cauterized, and transected. The left ovary and tube were then placed in the paracolic gutter while a posterior colpotomy was created. A  sponge stick was placed from below to identify the correct plane. A 10 cm Endobag was introduced vaginally and the left adnexa collected and delivered. The specimen was submitted for frozen section.    The posterior leaf of the broad ligament peritoneum was then incised to the level of the uterosacral ligament with lateralization of the ureter on this side and development of the [place]ashi space.  We then turned our attention anteriorly and the round ligament was cauterized and transected.  The anterior leaf of the broad ligament was incised to the midline.  Care was taken to dissect the bladder inferiorly off of the lower uterine segment and cervix in layers. A moderate amount of adhesive disease was noted over the vesicouterine plane. The left uterine vessels were then isolated cauterized and transected.  The cardinal ligament was ligated and transected down to the level of the uterosacral ligament.  We then turned our attention to the right side where in a similar procedure was performed.     The cervical vaginal junction incision was continued from the previously made posterior colpotomy.  The dissection was then continued circumferentially around the cervical vaginal junction with the cauterization of the remaining vaginal arteries at the 3 and 9 o'clock position.  The uterus and right adnexa were then freed and brought through the vaginal opening after being grasped with a single-tooth tenaculum.  The vagina was then closed in a double layer closure with an 0-Vicryl suture in a running and figure-of-eight fashion.      Pelvic washings were performed. The abdomen and pelvis were copiously irrigated and excellent hemostasis was noted at this time.      Frozen section returned with left ovarian cyst with less than 10% atypia grossly, likely a benign cyst with increased proliferation however cannot rule out borderline tumor. As such, a decision was made to proceed with staging biopsies.    Monopolar cautery was  used to take peritoneal biopsies at the bladder and cul-de-sac peritoneum, bilateral pelvic sidewall peritoneum, and bilateral paracolic gutters. The patient was then taken out of steep Trendelenburg. The omentum was identified brought down to the lower abdomen and a vessel sealer used to along the length of the distal omentum to secure an omental biopsy. Grossly the omentum appeared normal with no evidence of nodularity or tumor implant.    The patient was then placed back in steep Trendelenburg position. The pelvis was then irrigated and hemostasis ensured. Bilateral ureters were noted to be coursing in the usual position along the pelvic sidewalls. The omental biopsy was collected within a 5 mm Endobag and strings delivered through the umbilical port.     The robotic instruments were withdrawn and the robot was undocked. The Endobag was further delivered through the umbilical port and a ring forcep used to sequentially deliver the omental biopsy as the fascia was bluntly stretched. Following delivery of the entire specimen, the port site was irrigated.    The abdomen was desufflated at which time the remaining ports were withdrawn.  The port sites were irrigated and the overlying skin reapproximated with 3-0 Monocryl suture in a subcuticular fashion.  Dressings were applied.  Patient tolerated the procedure well.  The patient was awakened from general anesthetic, and was taken to the recovery room in stable condition. Sponge and needle counts were correct at the end of the case.     Specimen: uterus, cervix, bilateral fallopian tubes and ovaries, peritoneal biopsies, omental biopsy, pelvic washings for permanent specimen    EBL: 50 cc    Dispo: stable, extubated, to PACU

## 2021-12-03 NOTE — DISCHARGE INSTRUCTIONS
ACTIVITY: Rest and take it easy for the first 24 hours.  A responsible adult is recommended to remain with you during that time.  It is normal to feel sleepy.  We encourage you to not do anything that requires balance, judgment or coordination.    MILD FLU-LIKE SYMPTOMS ARE NORMAL. YOU MAY EXPERIENCE GENERALIZED MUSCLE ACHES, THROAT IRRITATION, HEADACHE AND/OR SOME NAUSEA.    FOR 24 HOURS DO NOT:  Drive, operate machinery or run household appliances.  Drink beer or alcoholic beverages.   Make important decisions or sign legal documents.    SPECIAL INSTRUCTIONS:   Abdominal Hysterectomy, Care After  This sheet gives you information about how to care for yourself after your procedure. Your health care provider may also give you more specific instructions. If you have problems or questions, contact your health care provider.  What can I expect after the procedure?  After your procedure, it is common to have:  · Pain.  · Fatigue.  · Poor appetite.  · Less interest in sex.  · Vaginal bleeding and discharge. You may need to use a sanitary napkin after this procedure.  Follow these instructions at home:  Bathing  · Do not take baths, swim, or use a hot tub until your health care provider approves. Ask your health care provider if you can take showers. You may only be allowed to take sponge baths for bathing.  · Keep the bandage (dressing) dry until your health care provider says it can be removed.  Incision care    · Follow instructions from your health care provider about how to take care of your incision. Make sure you:  ? Wash your hands with soap and water before you change your bandage (dressing). If soap and water are not available, use hand .  ? Change your dressing as told by your health care provider.  ? Leave stitches (sutures), skin glue, or adhesive strips in place. These skin closures may need to stay in place for 2 weeks or longer. If adhesive strip edges start to loosen and curl up, you may trim  the loose edges. Do not remove adhesive strips completely unless your health care provider tells you to do that.  · Check your incision area every day for signs of infection. Check for:  ? Redness, swelling, or pain.  ? Fluid or blood.  ? Warmth.  ? Pus or a bad smell.  Activity  · Do gentle, daily exercises as told by your health care provider. You may be told to take short walks every day and go farther each time.  · Do not lift anything that is heavier than 10 lb (4.5 kg), or the limit that your health care provider tells you, until he or she says that it is safe.  · Do not drive or use heavy machinery while taking prescription pain medicine.  · Do not drive for 24 hours if you were given a medicine to help you relax (sedative).  · Follow your health care provider's instructions about exercise, driving, and general activities. Ask your health care provider what activities are safe for you.  Lifestyle  · Do not douche, use tampons, or have sex for at least 6 weeks or as told by your health care provider.  · Do not drink alcohol until your health care provider approves.  · Drink enough fluid to keep your urine clear or pale yellow.  · Try to have someone at home with you for the first 1-2 weeks to help.  · Do not use any products that contain nicotine or tobacco, such as cigarettes and e-cigarettes. These can delay healing. If you need help quitting, ask your health care provider.  General instructions  · Take over-the-counter and prescription medicines only as told by your health care provider.  · Do not take aspirin or ibuprofen. These medicines can cause bleeding.  · To prevent or treat constipation while you are taking prescription pain medicine, your health care provider may recommend that you:  ? Drink enough fluid to keep your urine clear or pale yellow.  ? Take over-the-counter or prescription medicines.  ? Eat foods that are high in fiber, such as fresh fruits and vegetables, whole grains, and  beans.  ? Limit foods that are high in fat and processed sugars, such as fried and sweet foods.  · Keep all follow-up visits as told by your health care provider. This is important.  Contact a health care provider if:  · You have chills or fever.  · You have redness, swelling, or pain around your incision.  · You have fluid or blood coming from your incision.  · Your incision feels warm to the touch.  · You have pus or a bad smell coming from your incision.  · Your incision breaks open.  · You feel dizzy or light-headed.  · You have pain or bleeding when you urinate.  · You have persistent diarrhea.  · You have persistent nausea and vomiting.  · You have abnormal vaginal discharge.  · You have a rash.  · You have any type of abnormal reaction or you develop an allergy to your medicine.  · Your pain medicine does not help.  Get help right away if:  · You have a fever and your symptoms suddenly get worse.  · You have severe abdominal pain.  · You have shortness of breath.  · You faint.  · You have pain, swelling, or redness in your leg.  · You have heavy vaginal bleeding with blood clots.  Summary  · After your procedure, it is common to have pain, fatigue and vaginal discharge.  · Do not take baths, swim, or use a hot tub until your health care provider approves. Ask your health care provider if you can take showers. You may only be allowed to take sponge baths for bathing.  · Follow your health care provider's instructions about exercise, driving, and general activities. Ask your health care provider what activities are safe for you.  · Do not lift anything that is heavier than 10 lb (4.5 kg), or the limit that your health care provider tells you, until he or she says that it is safe.  · Try to have someone at home with you for the first 1-2 weeks to help.  This information is not intended to replace advice given to you by your health care provider. Make sure you discuss any questions you have with your health care  provider.  Document Released: 07/07/2006 Document Revised: 01/21/2020 Document Reviewed: 12/06/2017  [employer] Patient Education © 2020 [employer]      DIET: To avoid nausea, slowly advance diet as tolerated, avoiding spicy or greasy foods for the first day.  Add more substantial food to your diet according to your physician's instructions.  Babies can be fed formula or breast milk as soon as they are hungry.  INCREASE FLUIDS AND FIBER TO AVOID CONSTIPATION.    SURGICAL DRESSING/BATHING: May shower tomorrow, no submerging in water for 2 weeks.     FOLLOW-UP APPOINTMENT:  A follow-up appointment should be arranged with your doctor in 1-2 weeks; call to schedule.    You should CALL YOUR PHYSICIAN if you develop:  Fever greater than 101 degrees F.  Pain not relieved by medication, or persistent nausea or vomiting.  Excessive bleeding (blood soaking through dressing) or unexpected drainage from the wound.  Extreme redness or swelling around the incision site, drainage of pus or foul smelling drainage.  Inability to urinate or empty your bladder within 8 hours.  Problems with breathing or chest pain.    You should call 911 if you develop problems with breathing or chest pain.  If you are unable to contact your doctor or surgical center, you should go to the nearest emergency room or urgent care center.  Physician's telephone #: [phone]    If any questions arise, call your doctor.  If your doctor is not available, please feel free to call the Surgical Center at [phone].     A registered nurse may call you a few days after your surgery to see how you are doing after your procedure.    MEDICATIONS: Resume taking daily medication.  Take prescribed pain medication with food.  If no medication is prescribed, you may take non-aspirin pain medication if needed.  PAIN MEDICATION CAN BE VERY CONSTIPATING.  Take a stool softener or laxative such as senokot, pericolace, or milk of magnesia if needed.    Last pain  medication given at 12:16 PM, next dose may be taken around 4:16 PM.    If your physician has prescribed pain medication that includes Acetaminophen (Tylenol), do not take additional Acetaminophen (Tylenol) while taking the prescribed medication.    Depression / Suicide Risk    As you are discharged from this [facility] facility, it is important to learn how to keep safe from harming yourself.    Recognize the warning signs:  · Abrupt changes in personality, positive or negative- including increase in energy   · Giving away possessions  · Change in eating patterns- significant weight changes-  positive or negative  · Change in sleeping patterns- unable to sleep or sleeping all the time   · Unwillingness or inability to communicate  · Depression  · Unusual sadness, discouragement and loneliness  · Talk of wanting to die  · Neglect of personal appearance   · Rebelliousness- reckless behavior  · Withdrawal from people/activities they love  · Confusion- inability to concentrate     If you or a loved one observes any of these behaviors or has concerns about self-harm, here's what you can do:  · Talk about it- your feelings and reasons for harming yourself  · Remove any means that you might use to hurt yourself (examples: pills, rope, extension cords, firearm)  · Get professional help from the community (Mental Health, Substance Abuse, psychological counseling)  · Do not be alone:Call your Safe Contact- someone whom you trust who will be there for you.  · Call your local CRISIS HOTLINE 107-0932 or 523-870-6905  · Call your local Children's Mobile Crisis Response Team [place] (684) 277-9737 or www.GiveMeSport  · Call the toll free National Suicide Prevention Hotlines   · National Suicide Prevention Lifeline 213-740-DDYQ (9047)  · National Hope Line Network 800-SUICIDE (157-4610)

## 2021-12-03 NOTE — OR SURGEON
Immediate Post OP Note    PreOp Diagnosis:   Complex cystic adnexal mass  Pelvic pain     PostOp Diagnosis:   Left ovarian complex cystic adnexal mass  Pelvic pain       Procedure(s):  HYSTERECTOMY, ROBOT-ASSISTED, USING DA CHEPE XI - Wound Class: Clean Contaminated  BILATERAL SALPINGO-OOPHORECTOMY - Wound Class: Clean Contaminated  OMENTAL BIOPSY,ROBOT-ASSISTED,USING DA CHEPE XI - Wound Class: Clean Contaminated  PERITONEAL BIOPSIES    Surgeon(s):  Lois Marte M.D.    Anesthesiologist/Type of Anesthesia:  Anesthesiologist: Kermit Wilde M.D./General    Surgical Staff:  Circulator: Leydi Erickson R.N.  Relief Circulator: Lissett Jacobs R.N.  Relief Scrub: Sandra Smalls  Scrub Person: Miriam Brown; Aicha Arreguin; Yesenia Christianson    Specimens removed if any:  ID Type Source Tests Collected by Time Destination   A : left fallopian tube and ovary Tissue Ovary PATHOLOGY SPECIMEN Lois Marte M.D. 12/3/2021 10:38 AM    B : uterus, cervix, right fallopian tube and ovary Tissue Uterus PATHOLOGY SPECIMEN Lois Marte M.D. 12/3/2021 10:41 AM    C : bladder peritoneum Other Other PATHOLOGY SPECIMEN Lois Marte M.D. 12/3/2021 11:00 AM    D : cul de sac peritoneum Other Other PATHOLOGY SPECIMEN Lois Marte M.D. 12/3/2021 11:00 AM    E : left pelvic side wall peritoneum Other Other PATHOLOGY SPECIMEN Lois Marte M.D. 12/3/2021 11:02 AM    F : right pelvic side wall peritoneum Other Other PATHOLOGY SPECIMEN Lois Marte M.D. 12/3/2021 11:05 AM    G : right nagi colic gutter peritoneum  Other Other PATHOLOGY SPECIMEN Lois Marte M.D. 12/3/2021 11:06 AM    H : left nagi colic gutter peritoneum Other Other PATHOLOGY SPECIMEN Lois Marte M.D. 12/3/2021 11:06 AM    I : omentum Other Other PATHOLOGY SPECIMEN Lois Marte M.D. 12/3/2021 11:09 AM    J : pelvic washings Other Other PATHOLOGY SPECIMEN Lois Marte M.D. 12/3/2021 11:33 AM        Estimated Blood  Loss: 50 cc    Findings:   1. Normal-appearing upper abdomen to include the liver edge, diaphragm, omentum, and small bowel  2. 6 week sized uterus with small fundal serosal fibroids and atrophic appearing right tube and ovary  3. Adhesions of the sigmoid colon to the left uterine cornua, pelvic sidewall and left ovary and tube   4. Endometriotic lesion with extrusion of chocolate cyst fluid at the site of the above noted adhesion  5. Atrophic appearing left ovary with a complex cystic lesion approximating 3.5 cm, removed intact with no evidence of intraperitoneal spillage  6. Smooth abdominal pelvic and cul-de-sac peritoneal surfaces with no evidence of tumor implant or seeding  7. Frozen section consistent with left ovarian cyst with less than 10% atypia grossly, likely a benign cyst with increased proliferation however cannot rule out borderline tumor      Complications: None        12/3/2021 12:18 PM Lois Marte M.D.

## 2021-12-03 NOTE — ANESTHESIA PREPROCEDURE EVALUATION
Case: [id number] Date/Time: 12/03/21 1015    Procedures:       HYSTERECTOMY, ROBOT-ASSISTED, USING DA CHEPE XI      SALPINGO-OOPHORECTOMY (Bilateral )      LYMPHADENECTOMY, ROBOT-ASSISTED, USING DA CHEPE XI-POSSIBLE STAGING, PELVIC AND/OR PARAORTIC, PERITONEAL BIOPSIES      OMENTECTOMY,ROBOT-ASSISTED,USING DA CHEPE XI      APPENDECTOMY,ROBOT-ASSISTED,USING DA CHEPE XI    Pre-op diagnosis: PELVIC MASS, BLEEDING    Location: [facility] OR 17 / SURGERY [facility]    Surgeons: Lois Marte M.D.          Relevant Problems   ANESTHESIA   (positive) Sleep apnea      NEURO   (positive) Intractable migraine without aura and without status migrainosus   (positive) Seizure cerebral (HCC)      CARDIAC   (positive) Aortic regurgitation   (positive) Coronary artery calcification seen on CAT scan   (positive) Essential hypertension   (positive) Intractable migraine without aura and without status migrainosus       Physical Exam    Airway   Mallampati: II  TM distance: >3 FB  Neck ROM: full       Cardiovascular - normal exam  Rhythm: regular  Rate: normal  (-) murmur     Dental - normal exam           Pulmonary - normal exam  Breath sounds clear to auscultation     Abdominal    Neurological - normal exam                 Anesthesia Plan    ASA 2       Plan - general       Airway plan will be ETT          Induction: intravenous    Postoperative Plan: Postoperative administration of opioids is intended.    Pertinent diagnostic labs and testing reviewed    Informed Consent:    Anesthetic plan and risks discussed with patient.    Use of blood products discussed with: patient whom consented to blood products.

## 2021-12-03 NOTE — ANESTHESIA PROCEDURE NOTES
Airway    Date/Time: 12/3/2021 9:39 AM  Performed by: Kermit Wilde M.D.  Authorized by: Kermit Wilde M.D.     Location:  OR  Urgency:  Elective  Indications for Airway Management:  Anesthesia      Spontaneous Ventilation: absent    Sedation Level:  Deep  Preoxygenated: Yes    Patient Position:  Sniffing  Mask Difficulty Assessment:  1 - vent by mask  Final Airway Type:  Endotracheal airway  Final Endotracheal Airway:  ETT  Cuffed: Yes    Technique Used for Successful ETT Placement:  Direct laryngoscopy    Insertion Site:  Oral  Blade Type:  John  Laryngoscope Blade/Videolaryngoscope Blade Size:  3  ETT Size (mm):  7.0  Measured from:  Teeth  ETT to Teeth (cm):  21  Placement Verified by: auscultation and capnometry    Cormack-Lehane Classification:  Grade I - full view of glottis  Number of Attempts at Approach:  1

## 2021-12-03 NOTE — DISCHARGE INSTR - OTHER INFO
For after your surgery:  1. No heavy lifting greater than 10 pounds for a minimum 6 weeks  2. Nothing in the vagina (ie no tampons, douching, intercourse) for a minimum of 6 weeks  3. No driving while taking narcotics  4. Call our office [phone] if you develop any fevers, chills, nausea/vomiting, heavy vaginal bleeding, or redness, tenderness, and/or  drainage from your wound, if you have persistent watery discharge while ambulating or stool draining from the vagina.  5. Showering with warm water is ok, no bathing or swimming  6. You may remove wound dressing on postoperative day 1, and place lose bandages for two weeks  7. You may have vaginal spotting or light bleeding which is normal.  8. If you have not had a bowel movement for 2 days, please take over the counter Milk of Magnesium, 1 tablespoon every 4 hours. After 4  doses and if you still have not had a bowel movement, please call your doctor.  9. You may eat a soft diet, such as soup, liquid, for day #1 and if tolerating you may resume your regular diet.  10. If you have had urinary stent(s) placed, please make arrangements to have removed 6 weeks after surgery.  11. If you have had a bowel resection, do no use suppositories.

## 2021-12-04 NOTE — OR NURSING
1500 Assumed care of pt, report received from SHLOMO Lopez. Pt resting with eyes closed.  updated on pt's d/c status. VSS. Alarms on.    1504 pt c/o 7/10 pain, prn given. Rounding in place.     1600 pt given crackers and more juice. Pt states pain is better, more tolerable.    1637 pt c/o 7/10 pain, prn given. Pt on room air, tolerating well.    1714 pt ambulated to bathroom with standby assist. Pt getting dressed with no assistance.     1721 pt's  called and updated pt is ready to discharge home.     1730 pt's piv removed. Pt states pain is tolerable. Pt escorted down via wheelchair by RN.

## 2022-03-15 DIAGNOSIS — I10 ESSENTIAL HYPERTENSION: ICD-10-CM

## 2022-03-15 DIAGNOSIS — F33.1 MODERATE EPISODE OF RECURRENT MAJOR DEPRESSIVE DISORDER (HCC): ICD-10-CM

## 2022-03-15 RX ORDER — CITALOPRAM 40 MG/1
TABLET ORAL
Qty: 90 TABLET | Refills: 2 | Status: SHIPPED | OUTPATIENT
Start: 2022-03-15 | End: 2022-08-26 | Stop reason: SDUPTHER

## 2022-03-15 RX ORDER — TOPIRAMATE 100 MG/1
TABLET, FILM COATED ORAL
Qty: 270 TABLET | Refills: 3 | Status: SHIPPED | OUTPATIENT
Start: 2022-03-15 | End: 2023-05-03

## 2022-03-15 RX ORDER — HYDROCHLOROTHIAZIDE 12.5 MG/1
CAPSULE, GELATIN COATED ORAL
Qty: 90 CAPSULE | Refills: 2 | Status: SHIPPED | OUTPATIENT
Start: 2022-03-15 | End: 2022-08-26 | Stop reason: SDUPTHER

## 2022-05-06 NOTE — TELEPHONE ENCOUNTER
Received surgical clearance from [facility] for patient to have Colonoscopy with Deep propofol sedation with Dr. Alfredito Garcia on July 28th 2021.    Upon chart review, patient seen in ED with chest pain after pericardial cyst, needs a [facility] for clearance.     Sent [employer] message for [facility].   Surgical Clearance form placed in SW folder at [facility] RN desk.    121

## 2022-05-10 DIAGNOSIS — R92.30 DENSE BREAST TISSUE: ICD-10-CM

## 2022-07-06 ENCOUNTER — OFFICE VISIT (OUTPATIENT)
Dept: MEDICAL GROUP | Facility: MEDICAL CENTER | Age: 62
End: 2022-07-06
Payer: MEDICARE

## 2022-07-06 ENCOUNTER — HOSPITAL ENCOUNTER (OUTPATIENT)
Dept: LAB | Facility: MEDICAL CENTER | Age: 62
End: 2022-07-06
Attending: FAMILY MEDICINE
Payer: MEDICARE

## 2022-07-06 VITALS
OXYGEN SATURATION: 96 % | BODY MASS INDEX: 32.39 KG/M2 | WEIGHT: 182.8 LBS | TEMPERATURE: 96.8 F | DIASTOLIC BLOOD PRESSURE: 84 MMHG | HEART RATE: 87 BPM | SYSTOLIC BLOOD PRESSURE: 140 MMHG | HEIGHT: 63 IN

## 2022-07-06 DIAGNOSIS — E83.110 HEREDITARY HEMOCHROMATOSIS (HCC): ICD-10-CM

## 2022-07-06 DIAGNOSIS — R22.1 NECK MASS: ICD-10-CM

## 2022-07-06 DIAGNOSIS — R91.8 PULMONARY NODULES: ICD-10-CM

## 2022-07-06 DIAGNOSIS — F41.1 GENERALIZED ANXIETY DISORDER: ICD-10-CM

## 2022-07-06 LAB
ALBUMIN SERPL BCP-MCNC: 4.3 G/DL (ref 3.2–4.9)
ALBUMIN/GLOB SERPL: 1.6 G/DL
ALP SERPL-CCNC: 69 U/L (ref 30–99)
ALT SERPL-CCNC: 17 U/L (ref 2–50)
ANION GAP SERPL CALC-SCNC: 13 MMOL/L (ref 7–16)
AST SERPL-CCNC: 16 U/L (ref 12–45)
BASOPHILS # BLD AUTO: 0.9 % (ref 0–1.8)
BASOPHILS # BLD: 0.06 K/UL (ref 0–0.12)
BILIRUB SERPL-MCNC: 0.5 MG/DL (ref 0.1–1.5)
BUN SERPL-MCNC: 15 MG/DL (ref 8–22)
CALCIUM SERPL-MCNC: 9.5 MG/DL (ref 8.5–10.5)
CHLORIDE SERPL-SCNC: 106 MMOL/L (ref 96–112)
CO2 SERPL-SCNC: 23 MMOL/L (ref 20–33)
CREAT SERPL-MCNC: 0.86 MG/DL (ref 0.5–1.4)
EOSINOPHIL # BLD AUTO: 0.08 K/UL (ref 0–0.51)
EOSINOPHIL NFR BLD: 1.2 % (ref 0–6.9)
ERYTHROCYTE [DISTWIDTH] IN BLOOD BY AUTOMATED COUNT: 46.2 FL (ref 35.9–50)
GFR SERPLBLD CREATININE-BSD FMLA CKD-EPI: 75 ML/MIN/1.73 M 2
GLOBULIN SER CALC-MCNC: 2.7 G/DL (ref 1.9–3.5)
GLUCOSE SERPL-MCNC: 79 MG/DL (ref 65–99)
HCT VFR BLD AUTO: 47.8 % (ref 37–47)
HGB BLD-MCNC: 15.9 G/DL (ref 12–16)
IMM GRANULOCYTES # BLD AUTO: 0.02 K/UL (ref 0–0.11)
IMM GRANULOCYTES NFR BLD AUTO: 0.3 % (ref 0–0.9)
LYMPHOCYTES # BLD AUTO: 2.29 K/UL (ref 1–4.8)
LYMPHOCYTES NFR BLD: 35.1 % (ref 22–41)
MCH RBC QN AUTO: 30.6 PG (ref 27–33)
MCHC RBC AUTO-ENTMCNC: 33.3 G/DL (ref 33.6–35)
MCV RBC AUTO: 92.1 FL (ref 81.4–97.8)
MONOCYTES # BLD AUTO: 0.54 K/UL (ref 0–0.85)
MONOCYTES NFR BLD AUTO: 8.3 % (ref 0–13.4)
NEUTROPHILS # BLD AUTO: 3.54 K/UL (ref 2–7.15)
NEUTROPHILS NFR BLD: 54.2 % (ref 44–72)
NRBC # BLD AUTO: 0 K/UL
NRBC BLD-RTO: 0 /100 WBC
PLATELET # BLD AUTO: 379 K/UL (ref 164–446)
PMV BLD AUTO: 9 FL (ref 9–12.9)
POTASSIUM SERPL-SCNC: 4.1 MMOL/L (ref 3.6–5.5)
PROT SERPL-MCNC: 7 G/DL (ref 6–8.2)
RBC # BLD AUTO: 5.19 M/UL (ref 4.2–5.4)
SODIUM SERPL-SCNC: 142 MMOL/L (ref 135–145)
TSH SERPL DL<=0.005 MIU/L-ACNC: 1.41 UIU/ML (ref 0.38–5.33)
WBC # BLD AUTO: 6.5 K/UL (ref 4.8–10.8)

## 2022-07-06 PROCEDURE — 36415 COLL VENOUS BLD VENIPUNCTURE: CPT

## 2022-07-06 PROCEDURE — 80053 COMPREHEN METABOLIC PANEL: CPT

## 2022-07-06 PROCEDURE — 84443 ASSAY THYROID STIM HORMONE: CPT

## 2022-07-06 PROCEDURE — 85025 COMPLETE CBC W/AUTO DIFF WBC: CPT

## 2022-07-06 PROCEDURE — 99214 OFFICE O/P EST MOD 30 MIN: CPT | Performed by: FAMILY MEDICINE

## 2022-07-06 RX ORDER — LINACLOTIDE 290 UG/1
290 CAPSULE, GELATIN COATED ORAL
COMMUNITY
Start: 2022-06-15

## 2022-07-06 RX ORDER — HYDROXYZINE HYDROCHLORIDE 25 MG/1
25 TABLET, FILM COATED ORAL NIGHTLY PRN
Qty: 30 TABLET | Refills: 0 | Status: SHIPPED | OUTPATIENT
Start: 2022-07-06 | End: 2023-05-03 | Stop reason: SDUPTHER

## 2022-07-06 ASSESSMENT — ENCOUNTER SYMPTOMS
SPUTUM PRODUCTION: 0
ABDOMINAL PAIN: 1
COUGH: 1
INSOMNIA: 1
HEMOPTYSIS: 0
SHORTNESS OF BREATH: 1
BLOOD IN STOOL: 0
NAUSEA: 0
NERVOUS/ANXIOUS: 1
BLURRED VISION: 1
HEADACHES: 1
VOMITING: 0
WEIGHT LOSS: 0
DIARRHEA: 1
MYALGIAS: 1
FEVER: 0
PALPITATIONS: 0
CHILLS: 1
DIAPHORESIS: 1
WEAKNESS: 1

## 2022-07-06 ASSESSMENT — ANXIETY QUESTIONNAIRES
3. WORRYING TOO MUCH ABOUT DIFFERENT THINGS: MORE THAN HALF THE DAYS
4. TROUBLE RELAXING: MORE THAN HALF THE DAYS
5. BEING SO RESTLESS THAT IT IS HARD TO SIT STILL: SEVERAL DAYS
6. BECOMING EASILY ANNOYED OR IRRITABLE: NOT AT ALL
1. FEELING NERVOUS, ANXIOUS, OR ON EDGE: NEARLY EVERY DAY
GAD7 TOTAL SCORE: 10
2. NOT BEING ABLE TO STOP OR CONTROL WORRYING: MORE THAN HALF THE DAYS
7. FEELING AFRAID AS IF SOMETHING AWFUL MIGHT HAPPEN: NOT AT ALL
IF YOU CHECKED OFF ANY PROBLEMS ON THIS QUESTIONNAIRE, HOW DIFFICULT HAVE THESE PROBLEMS MADE IT FOR YOU TO DO YOUR WORK, TAKE CARE OF THINGS AT HOME, OR GET ALONG WITH OTHER PEOPLE: VERY DIFFICULT

## 2022-07-06 ASSESSMENT — PATIENT HEALTH QUESTIONNAIRE - PHQ9: CLINICAL INTERPRETATION OF PHQ2 SCORE: 0

## 2022-07-06 ASSESSMENT — FIBROSIS 4 INDEX: FIB4 SCORE: 0.72

## 2022-07-06 NOTE — PROGRESS NOTES
"Subjective:     CC: \"Neck masses, anxiety, fatigue\"    HPI:   Yuki presents today with  Matthew:    Neck mass:  - extremely tired, after doing work around house for day will be in bed for 2 days  - not sleeping at night  - anxiety level is at a 10!  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  - February 2021 had pericardial cyst and gall bladder removed, no concerning findings on pathology  - had hysterectomy in December 2021, cancer free  - no recent illnesses  - quit smoking in 2013, had long smoking history  - 1 month ago started getting lumps in neck/trap, now she has 3 palpable masses, 2 on right and 1 on left    Problem   Neck Mass   Generalized Anxiety Disorder   Pulmonary Nodules    2/14/202 CTA chest  5 mm noncalcified right upper lobe pulmonary nodule  Significant smoking history         Current Outpatient Medications Ordered in Epic   Medication Sig Dispense Refill   • hydrOXYzine HCl (ATARAX) 25 MG Tab Take 1 Tablet by mouth at bedtime as needed for Anxiety. 30 Tablet 0   • citalopram (CELEXA) 40 MG Tab TAKE ONE TABLET BY MOUTH EVERY DAY 90 Tablet 2   • hydrochlorothiazide (MICROZIDE) 12.5 MG capsule TAKE ONE CAPSULE BY MOUTH ONCE DAILY 90 Capsule 2   • topiramate (TOPAMAX) 100 MG Tab TAKE 1.5 TABLETS BY MOUTH TWO TIMES A  Tablet 3   • traMADol (ULTRAM) 50 MG Tab Take 50 mg by mouth every four hours as needed.     • hyoscyamine (LEVSIN) 0.125 MG SL Tab      • losartan (COZAAR) 100 MG Tab Take 1 Tablet by mouth every day. TAKE 1 TABLET BY MOUTH ONCE DAILY. 100 Tablet 3   • rosuvastatin (CRESTOR) 20 MG Tab Take 1 tablet by mouth every evening. 100 tablet 3   • LINZESS 290 MCG Cap        No current Epic-ordered facility-administered medications on file.       Health Maintenance: Acute visit; patient to call and schedule " "mammogram    ROS:  Review of Systems   Constitutional: Positive for chills, diaphoresis and malaise/fatigue. Negative for fever and weight loss.        Night sweats (no sheet changes)   Eyes: Positive for blurred vision.   Respiratory: Positive for cough and shortness of breath. Negative for hemoptysis and sputum production.    Cardiovascular: Positive for chest pain. Negative for palpitations.   Gastrointestinal: Positive for abdominal pain and diarrhea. Negative for blood in stool, nausea and vomiting.   Genitourinary: Negative for frequency and urgency.   Musculoskeletal: Positive for myalgias.   Neurological: Positive for weakness and headaches.   Psychiatric/Behavioral: Negative for suicidal ideas. The patient is nervous/anxious and has insomnia.        Objective:     Exam:  BP (!) 140/84   Pulse 87   Temp 36 °C (96.8 °F) (Temporal)   Ht 1.6 m (5' 3\")   Wt 82.9 kg (182 lb 12.8 oz)   LMP  (LMP Unknown)   SpO2 96%   BMI 32.38 kg/m²  Body mass index is 32.38 kg/m².    Physical Exam  Vitals reviewed.   Constitutional:       General: She is not in acute distress.     Appearance: Normal appearance. She is obese.   HENT:      Head: Normocephalic and atraumatic.   Neck:      Comments: Large cystic masses there are smells and mobile, largest 1 on the left supraclavicular and a singular, 2 on the right 1 more in the trap and the other in the supraclavicular region  Cardiovascular:      Rate and Rhythm: Normal rate and regular rhythm.      Heart sounds: Normal heart sounds.   Pulmonary:      Effort: Pulmonary effort is normal. No respiratory distress.      Breath sounds: Normal breath sounds. No wheezing.   Abdominal:      Palpations: Abdomen is soft.      Tenderness: There is no abdominal tenderness.   Musculoskeletal:      Cervical back: Neck supple. No rigidity or tenderness.   Lymphadenopathy:      Cervical: Cervical adenopathy present.   Skin:     General: Skin is warm and dry.   Neurological:      Mental " Status: She is alert. Mental status is at baseline.   Psychiatric:         Mood and Affect: Mood normal.         Behavior: Behavior normal.      Comments: Anxious       CUONG-7 Questionnaire    Feeling nervous, anxious, or on edge: Nearly every day  Not being able to sop or control worrying: More than half the days  Worrying too much about different things: More than half the days  Trouble relaxing: More than half the days  Being so restless that it's hard to sit still: Several days  Becoming easily annoyed or irritable: Not at all  Feeling afraid as if something awful might happen: Not at all  Total: 10    Interpretation of CUONG 7 Total Score   Score Severity :  0-4 No Anxiety   5-9 Mild Anxiety  10-14 Moderate Anxiety  15-21 Severe Anxiety      Assessment & Plan:     62 y.o. female with the following -     Problem List Items Addressed This Visit     Hereditary hemochromatosis (HCC)    Relevant Orders    Comp Metabolic Panel    Pulmonary nodules     Given recent exam findings and previous nodule and chest we will get an updated CT of her chest.  Patient is considered high risk given her smoking history.           Relevant Orders    CT-CHEST (THORAX) W/O    CBC WITH DIFFERENTIAL    Neck mass     Patient with sudden onset of nontender no neck masses.  She did have what appears to be about a gastroenteritis previously.  We discussed that it could be reactive lymph nodes.  However she has had many benign masses removed from her body and this does make her quite nervous have yet another 1 show up.  We will get an ultrasound of her neck as well as some screening lab work.  Previous labs in November were noted to be within normal limits other than hypokalemia.           Relevant Orders    US-SOFT TISSUES OF HEAD - NECK    TSH WITH REFLEX TO FT4    CBC WITH DIFFERENTIAL    Generalized anxiety disorder     CUONG-7 reviewed and elevated  Patient denies SI and HI  She is having difficulty sleeping  She is on Celexa 40 mg generally  that helps maintain her mood however she is having elevated anxiety which certainly is healthcare driven.  We will add as needed hydroxyzine to see if that helps calm her anxiety and get some sleep in the evening.  Also discussed BuSpar which we are not starting today.           Relevant Medications    hydrOXYzine HCl (ATARAX) 25 MG Tab          I spent a total of 34 minutes with record review, exam, communication with the patient, communication with other providers, and documentation of this encounter.      Return in about 4 weeks (around 8/3/2022), or if symptoms worsen or fail to improve.    Please note that this dictation was created using voice recognition software. I have made every reasonable attempt to correct obvious errors, but I expect that there are errors of grammar and possibly content that I did not discover before finalizing the note.

## 2022-07-06 NOTE — ASSESSMENT & PLAN NOTE
Patient with sudden onset of nontender no neck masses.  She did have what appears to be about a gastroenteritis previously.  We discussed that it could be reactive lymph nodes.  However she has had many benign masses removed from her body and this does make her quite nervous have yet another 1 show up.  We will get an ultrasound of her neck as well as some screening lab work.  Previous labs in November were noted to be within normal limits other than hypokalemia.

## 2022-07-06 NOTE — ASSESSMENT & PLAN NOTE
CUONG-7 reviewed and elevated  Patient denies SI and HI  She is having difficulty sleeping  She is on Celexa 40 mg generally that helps maintain her mood however she is having elevated anxiety which certainly is healthcare driven.  We will add as needed hydroxyzine to see if that helps calm her anxiety and get some sleep in the evening.  Also discussed BuSpar which we are not starting today.

## 2022-07-06 NOTE — PATIENT INSTRUCTIONS
SLEEP HYGIENE CHECKLIST:    -Avoid naps.  Napping during the day can disturb the normal pattern of sleep and wakefulness.  -Avoid stimulants, such as caffeine and nicotine, and alcohol as bedtime approaches.  While alcohol is well known to speed the onset of sleep, the process of the body metabolizing the alcohol can cause arousal, thus disrupting sleep.  -Exercise.  All forms of exercise help to ensure sound sleep.  Vigorous activity should be conducted in the morning or late afternoon, while a relaxing exercise, like yoga, can be done before bed to help initiate a restful night sleep.  -Avoid foods too close to bedtime-particularly large meals and chocolate (which contains caffeine).  And try not to make any significant change to your diet.  For example, if you are struggling with sleep problem, is not a good time to start experimenting with spicy dishes.  -Soak up some natural light.  This is particularly important for older people who may not venture outside as frequently as children in younger adults.  Light exposure helps maintain a healthy sleep-wake cycle.  -Establish a regular bedtime routine.  Try to avoid emotionally upsetting conversations and activities before going to sleep.  -Associated bed with sleep.  Is not a good idea to watch television, use her computer or phone, listen to the radio, or read while in bed.  -Ensure a pleasant, relaxing sleep environment.  The bed should be comfortable, in the room should not be too hot, cold, or bright.    Check out www.sleepfoundation.org

## 2022-07-06 NOTE — ASSESSMENT & PLAN NOTE
Given recent exam findings and previous nodule and chest we will get an updated CT of her chest.  Patient is considered high risk given her smoking history.

## 2022-08-09 ENCOUNTER — TELEPHONE (OUTPATIENT)
Dept: SCHEDULING | Facility: IMAGING CENTER | Age: 62
End: 2022-08-09
Payer: MEDICARE

## 2022-08-10 ENCOUNTER — APPOINTMENT (OUTPATIENT)
Dept: MEDICAL GROUP | Facility: MEDICAL CENTER | Age: 62
End: 2022-08-10
Payer: MEDICARE

## 2022-08-12 ENCOUNTER — TELEPHONE (OUTPATIENT)
Dept: MEDICAL GROUP | Facility: MEDICAL CENTER | Age: 62
End: 2022-08-12
Payer: MEDICARE

## 2022-08-12 NOTE — TELEPHONE ENCOUNTER
ESTABLISHED PATIENT PRE-VISIT PLANNING     Annual Wellness Visit Over Due per Health Maintenance    Phone Number Called: [phone] (home) [phone] (work)  Call outcome: Spoke to patient regarding message below.  Message: Per Patient's request rescheduled appointment with Dr. Echevarria, from Monday, 8/15/22, check-in: 8:25 AM, to Friday, 8/26/22, check-in: 9:45 AM, at [address], [id number].      Patient was contacted to complete PVP.     Note: Patient will not be contacted if there is no indication to call.     1.  Reviewed notes from the last few office visits within the medical group: Yes    2.  If any orders were placed at last visit or intended to be done for this visit (i.e. 6 mos follow-up), do we have Results/Consult Notes?           Labs - Labs were not ordered at last office visit.  Last office visit with Dr. Echevarria was on 7/6/2022.  Note: If patient appointment is for lab review and patient did not complete labs, check with provider if OK to reschedule patient until labs completed.         Imaging - Imaging ordered, NOT completed. Patient advised to complete prior to next appointment.     -CT-CHEST (THORAX) W/O: Scheduled   -US-SOFT TISSUES OF HEAD-NECK: Scheduled   -MAMMO: Not Done (Ordered by Prior PCP Dr. Davalos) During Pre-Visit Planning call placed to patient, patient states will complete MAMMO prior to upcoming appointment with Dr. Echevarria rescheduled to Friday 8/26/22.           Referrals - No referrals were ordered at last office visit.    3. Is this appointment scheduled as a Hospital Follow-Up? No    4.  Immunizations were updated in Epic using Reconcile Outside Information activity? Yes    5.  Patient is due for the following Health Maintenance Topics:   Health Maintenance Due   Topic Date Due    MAMMOGRAM  02/10/2021    COVID-19 Vaccine (3 - Booster for Moderna series) 05/23/2022    Annual Wellness Visit  07/02/2022       6.  AHA (Pulse8) form printed for Provider? Yes

## 2022-08-24 ENCOUNTER — HOSPITAL ENCOUNTER (OUTPATIENT)
Dept: RADIOLOGY | Facility: MEDICAL CENTER | Age: 62
End: 2022-08-24
Attending: FAMILY MEDICINE
Payer: MEDICARE

## 2022-08-24 DIAGNOSIS — R91.8 PULMONARY NODULES: ICD-10-CM

## 2022-08-24 DIAGNOSIS — R22.1 NECK MASS: ICD-10-CM

## 2022-08-24 PROCEDURE — 76536 US EXAM OF HEAD AND NECK: CPT

## 2022-08-24 PROCEDURE — 71250 CT THORAX DX C-: CPT | Mod: ME

## 2022-08-26 ENCOUNTER — OFFICE VISIT (OUTPATIENT)
Dept: MEDICAL GROUP | Facility: MEDICAL CENTER | Age: 62
End: 2022-08-26
Payer: MEDICARE

## 2022-08-26 VITALS
TEMPERATURE: 97.1 F | WEIGHT: 182.2 LBS | OXYGEN SATURATION: 96 % | BODY MASS INDEX: 32.28 KG/M2 | HEIGHT: 63 IN | HEART RATE: 82 BPM | DIASTOLIC BLOOD PRESSURE: 76 MMHG | SYSTOLIC BLOOD PRESSURE: 132 MMHG

## 2022-08-26 DIAGNOSIS — G54.0 TOS (THORACIC OUTLET SYNDROME): ICD-10-CM

## 2022-08-26 DIAGNOSIS — I34.1 MITRAL VALVE PROLAPSE: ICD-10-CM

## 2022-08-26 DIAGNOSIS — E66.9 OBESITY (BMI 30.0-34.9): ICD-10-CM

## 2022-08-26 DIAGNOSIS — R91.8 PULMONARY NODULES: ICD-10-CM

## 2022-08-26 DIAGNOSIS — R51.9 CHRONIC NONINTRACTABLE HEADACHE, UNSPECIFIED HEADACHE TYPE: ICD-10-CM

## 2022-08-26 DIAGNOSIS — I10 ESSENTIAL HYPERTENSION: ICD-10-CM

## 2022-08-26 DIAGNOSIS — F33.1 MODERATE EPISODE OF RECURRENT MAJOR DEPRESSIVE DISORDER (HCC): ICD-10-CM

## 2022-08-26 DIAGNOSIS — R22.1 NECK MASS: ICD-10-CM

## 2022-08-26 DIAGNOSIS — G89.29 CHRONIC NONINTRACTABLE HEADACHE, UNSPECIFIED HEADACHE TYPE: ICD-10-CM

## 2022-08-26 PROBLEM — E83.110 HEREDITARY HEMOCHROMATOSIS (HCC): Chronic | Status: ACTIVE | Noted: 2017-12-15

## 2022-08-26 PROBLEM — E87.6 HYPOKALEMIA: Status: RESOLVED | Noted: 2018-08-13 | Resolved: 2022-08-26

## 2022-08-26 PROBLEM — E66.811 OBESITY (BMI 30.0-34.9): Status: ACTIVE | Noted: 2022-08-26

## 2022-08-26 PROBLEM — G40.909 SEIZURE CEREBRAL (HCC): Chronic | Status: ACTIVE | Noted: 2019-03-04

## 2022-08-26 PROCEDURE — 99214 OFFICE O/P EST MOD 30 MIN: CPT | Performed by: FAMILY MEDICINE

## 2022-08-26 RX ORDER — ROSUVASTATIN CALCIUM 20 MG/1
20 TABLET, COATED ORAL EVERY EVENING
Qty: 100 TABLET | Refills: 3 | Status: SHIPPED | OUTPATIENT
Start: 2022-08-26 | End: 2023-05-03 | Stop reason: SDUPTHER

## 2022-08-26 RX ORDER — HYDROCHLOROTHIAZIDE 12.5 MG/1
12.5 CAPSULE, GELATIN COATED ORAL DAILY
Qty: 100 CAPSULE | Refills: 3 | Status: SHIPPED | OUTPATIENT
Start: 2022-08-26 | End: 2023-05-03 | Stop reason: SDUPTHER

## 2022-08-26 RX ORDER — LOSARTAN POTASSIUM 50 MG/1
50 TABLET ORAL NIGHTLY
Qty: 100 TABLET | Refills: 3 | Status: SHIPPED | OUTPATIENT
Start: 2022-08-26 | End: 2023-05-03 | Stop reason: SDUPTHER

## 2022-08-26 RX ORDER — CITALOPRAM 40 MG/1
40 TABLET ORAL
Qty: 90 TABLET | Refills: 3 | Status: SHIPPED | OUTPATIENT
Start: 2022-08-26 | End: 2023-05-03 | Stop reason: SDUPTHER

## 2022-08-26 ASSESSMENT — ENCOUNTER SYMPTOMS
SHORTNESS OF BREATH: 0
FALLS: 0
WEIGHT LOSS: 0
DIZZINESS: 0
BLURRED VISION: 0
LOSS OF CONSCIOUSNESS: 0
HEADACHES: 1
DEPRESSION: 0
MYALGIAS: 0

## 2022-08-26 ASSESSMENT — FIBROSIS 4 INDEX: FIB4 SCORE: 0.66

## 2022-08-26 NOTE — ASSESSMENT & PLAN NOTE
Patient continues to have mass and does feel like they are increasing bilateral neck.  Did not show up on recent CT of chest nor the ultrasound.  We will move to CT of neck with contrast for better definitive imaging.

## 2022-08-26 NOTE — PROGRESS NOTES
"Subjective:     CC: \"Review of imaging\"    HPI:   Yuki presents today with:    Discussed imaging results with patient and reviewed CT of chest together.  Does have several pulmonary nodules most under 6 mm the 1 measures up to 7 mm.  She is a former smoker so making her high risk.  Patient denies any shortness of breath or dyspnea on exertion.    Patient feels like neck masses continue to be present.  Not growing.  Were not characterized at all on ultrasound so may blend in to the tissue.  Patient does have history of thoracic outlet syndrome and has had surgery on her first rib done at [facility].    Patient having difficulty with losing weight and would like help with this.  Understands importance of diet and exercise.      Problem   Obesity (Bmi 30.0-34.9)    8/26/2022: 182 pounds, 82.6 kg, 32.28 BMI     Neck Mass    8/24/2022: Ultrasound neck  IMPRESSION:  1. No abnormalities identified.  2. No suspicious lymph nodes identified in the lateral neck.     Seizure cerebral (HCC)   Pulmonary Nodules    8/24/2022: CT chest  IMPRESSION:     1.  Stable bilateral pulmonary nodules measuring up to 6 mm.  2.  Stable RIGHT middle lobe groundglass nodule measuring 7 mm.  3.  No new suspicious pulmonary nodule.  4.  No pneumonia or pneumothorax.     Fleischner Society pulmonary nodule recommendations:  Low Risk: No routine follow-up     High Risk: Optional CT at 12 months     Moderate episode of recurrent major depressive disorder (HCC)   Hereditary hemochromatosis (HCC)   Essential Hypertension    8/26/2022: 132/76  Hydrochlorothiazide 12.5 mg, losartan 50 mg     Hypokalemia (Resolved)       Current Outpatient Medications Ordered in Epic   Medication Sig Dispense Refill    aspirin EC (ECOTRIN) 81 MG Tablet Delayed Response Take 81 mg by mouth every day.      citalopram (CELEXA) 40 MG Tab Take 1 Tablet by mouth every day. 90 Tablet 3    hydrochlorothiazide (MICROZIDE) 12.5 MG capsule Take 1 Capsule by mouth every day. 100 " "Capsule 3    losartan (COZAAR) 50 MG Tab Take 1 Tablet by mouth every evening. 100 Tablet 3    rosuvastatin (CRESTOR) 20 MG Tab Take 1 Tablet by mouth every evening. 100 Tablet 3    Semaglutide,0.25 or 0.5MG/DOS, 2 MG/1.5ML Solution Pen-injector Inject 0.25 mg under the skin every 7 days. 1.5 mL 0    LINZESS 290 MCG Cap       hydrOXYzine HCl (ATARAX) 25 MG Tab Take 1 Tablet by mouth at bedtime as needed for Anxiety. 30 Tablet 0    topiramate (TOPAMAX) 100 MG Tab TAKE 1.5 TABLETS BY MOUTH TWO TIMES A  Tablet 3    traMADol (ULTRAM) 50 MG Tab Take 50 mg by mouth every four hours as needed.      hyoscyamine (LEVSIN) 0.125 MG SL Tab        No current Epic-ordered facility-administered medications on file.       Health Maintenance: Patient to schedule mammogram    ROS:  Review of Systems   Constitutional:  Positive for malaise/fatigue. Negative for weight loss.   Eyes:  Negative for blurred vision.   Respiratory:  Negative for shortness of breath.    Cardiovascular:  Negative for chest pain.   Genitourinary:  Negative for dysuria.   Musculoskeletal:  Negative for falls and myalgias.   Neurological:  Positive for headaches. Negative for dizziness and loss of consciousness.   Psychiatric/Behavioral:  Negative for depression.      Objective:     Exam:  /76   Pulse 82   Temp 36.2 °C (97.1 °F) (Temporal)   Ht 1.6 m (5' 3\")   Wt 82.6 kg (182 lb 3.2 oz)   LMP  (LMP Unknown)   SpO2 96%   BMI 32.28 kg/m²  Body mass index is 32.28 kg/m².    Physical Exam  Vitals reviewed.   Constitutional:       General: She is not in acute distress.     Appearance: Normal appearance. She is obese.   HENT:      Head: Normocephalic and atraumatic.   Neck:      Comments: Palpable masses remain bilaterally  Cardiovascular:      Rate and Rhythm: Normal rate and regular rhythm.      Heart sounds: Normal heart sounds.   Pulmonary:      Effort: Pulmonary effort is normal.      Breath sounds: Normal breath sounds.   Musculoskeletal:     "  Cervical back: Neck supple. No tenderness.   Skin:     General: Skin is warm and dry.   Neurological:      Mental Status: She is alert. Mental status is at baseline.   Psychiatric:         Mood and Affect: Mood normal.         Behavior: Behavior normal.         Assessment & Plan:     62 y.o. female with the following -     Problem List Items Addressed This Visit       Moderate episode of recurrent major depressive disorder (HCC) (Chronic)     Patient doing well on citalopram we will continue current dose and continue to monitor for any concerning changes or deteriorations.         Relevant Medications    citalopram (CELEXA) 40 MG Tab    Other Relevant Orders    CT-SOFT TISSUE NECK WITH    TOS (thoracic outlet syndrome)    Relevant Orders    CT-SOFT TISSUE NECK WITH    EC-ECHOCARDIOGRAM COMPLETE W/O CONT    Essential hypertension     This is a chronic condition, controlled.  Blood pressure is at goal under 140/90 in the office today.  We will continue the current regimen.           Relevant Medications    hydrochlorothiazide (MICROZIDE) 12.5 MG capsule    losartan (COZAAR) 50 MG Tab    rosuvastatin (CRESTOR) 20 MG Tab    Pulmonary nodules     We will plan on getting follow-up annually.  Patient is high risk due to history of smoking.         Neck mass     Patient continues to have mass and does feel like they are increasing bilateral neck.  Did not show up on recent CT of chest nor the ultrasound.  We will move to CT of neck with contrast for better definitive imaging.         Relevant Orders    CT-SOFT TISSUE NECK WITH    Obesity (BMI 30.0-34.9)     Patient would like help with losing weight.  Start a trial of semaglutide 0.25 mg injectable every 7 days.  Patient follow-up in 1 month for this.         Relevant Medications    Semaglutide,0.25 or 0.5MG/DOS, 2 MG/1.5ML Solution Pen-injector     Other Visit Diagnoses       Mitral valve prolapse        Relevant Medications    hydrochlorothiazide (MICROZIDE) 12.5 MG  capsule    losartan (COZAAR) 50 MG Tab    rosuvastatin (CRESTOR) 20 MG Tab    Other Relevant Orders    EC-ECHOCARDIOGRAM COMPLETE W/O CONT    Chronic nonintractable headache, unspecified headache type        Relevant Orders    CT-SOFT TISSUE NECK WITH            I spent a total of 34 minutes with record review, exam, communication with the patient, communication with other providers, and documentation of this encounter.      Return in about 4 weeks (around 9/23/2022).    Please note that this dictation was created using voice recognition software. I have made every reasonable attempt to correct obvious errors, but I expect that there are errors of grammar and possibly content that I did not discover before finalizing the note.

## 2022-08-26 NOTE — ASSESSMENT & PLAN NOTE
Patient doing well on citalopram we will continue current dose and continue to monitor for any concerning changes or deteriorations.

## 2022-08-26 NOTE — ASSESSMENT & PLAN NOTE
Patient would like help with losing weight.  Start a trial of semaglutide 0.25 mg injectable every 7 days.  Patient follow-up in 1 month for this.

## 2022-08-29 DIAGNOSIS — E66.9 OBESITY (BMI 30.0-34.9): ICD-10-CM

## 2022-08-30 ENCOUNTER — HOSPITAL ENCOUNTER (OUTPATIENT)
Dept: CARDIOLOGY | Facility: MEDICAL CENTER | Age: 62
End: 2022-08-30
Attending: FAMILY MEDICINE
Payer: MEDICARE

## 2022-08-30 DIAGNOSIS — G54.0 TOS (THORACIC OUTLET SYNDROME): ICD-10-CM

## 2022-08-30 DIAGNOSIS — I34.1 MITRAL VALVE PROLAPSE: ICD-10-CM

## 2022-08-30 LAB
LV EJECT FRACT  99904: 60
LV EJECT FRACT MOD 2C 99903: 61.53
LV EJECT FRACT MOD 4C 99902: 60.51
LV EJECT FRACT MOD BP 99901: 59.81

## 2022-08-30 PROCEDURE — 93306 TTE W/DOPPLER COMPLETE: CPT | Mod: 26 | Performed by: INTERNAL MEDICINE

## 2022-08-30 PROCEDURE — 93306 TTE W/DOPPLER COMPLETE: CPT

## 2022-09-03 ENCOUNTER — HOSPITAL ENCOUNTER (OUTPATIENT)
Dept: RADIOLOGY | Facility: MEDICAL CENTER | Age: 62
End: 2022-09-03
Attending: FAMILY MEDICINE
Payer: MEDICARE

## 2022-09-03 DIAGNOSIS — F33.1 MODERATE EPISODE OF RECURRENT MAJOR DEPRESSIVE DISORDER (HCC): ICD-10-CM

## 2022-09-03 DIAGNOSIS — G54.0 TOS (THORACIC OUTLET SYNDROME): ICD-10-CM

## 2022-09-03 DIAGNOSIS — R22.1 NECK MASS: ICD-10-CM

## 2022-09-03 DIAGNOSIS — G89.29 CHRONIC NONINTRACTABLE HEADACHE, UNSPECIFIED HEADACHE TYPE: ICD-10-CM

## 2022-09-03 DIAGNOSIS — R51.9 CHRONIC NONINTRACTABLE HEADACHE, UNSPECIFIED HEADACHE TYPE: ICD-10-CM

## 2022-09-03 PROCEDURE — 70491 CT SOFT TISSUE NECK W/DYE: CPT

## 2022-09-03 PROCEDURE — 700117 HCHG RX CONTRAST REV CODE 255: Performed by: FAMILY MEDICINE

## 2022-09-03 RX ADMIN — IOHEXOL 80 ML: 350 INJECTION, SOLUTION INTRAVENOUS at 15:46

## 2022-11-03 ENCOUNTER — DOCUMENTATION (OUTPATIENT)
Dept: HEALTH INFORMATION MANAGEMENT | Facility: OTHER | Age: 62
End: 2022-11-03
Payer: MEDICARE

## 2023-04-13 ENCOUNTER — TELEPHONE (OUTPATIENT)
Dept: HEALTH INFORMATION MANAGEMENT | Facility: OTHER | Age: 63
End: 2023-04-13

## 2023-04-28 SDOH — ECONOMIC STABILITY: HOUSING INSECURITY: IN THE LAST 12 MONTHS, HOW MANY PLACES HAVE YOU LIVED?: 1

## 2023-04-28 SDOH — ECONOMIC STABILITY: TRANSPORTATION INSECURITY
IN THE PAST 12 MONTHS, HAS THE LACK OF TRANSPORTATION KEPT YOU FROM MEDICAL APPOINTMENTS OR FROM GETTING MEDICATIONS?: NO

## 2023-04-28 SDOH — ECONOMIC STABILITY: INCOME INSECURITY: IN THE LAST 12 MONTHS, WAS THERE A TIME WHEN YOU WERE NOT ABLE TO PAY THE MORTGAGE OR RENT ON TIME?: NO

## 2023-04-28 SDOH — HEALTH STABILITY: PHYSICAL HEALTH: ON AVERAGE, HOW MANY MINUTES DO YOU ENGAGE IN EXERCISE AT THIS LEVEL?: 0 MIN

## 2023-04-28 SDOH — HEALTH STABILITY: MENTAL HEALTH
STRESS IS WHEN SOMEONE FEELS TENSE, NERVOUS, ANXIOUS, OR CAN'T SLEEP AT NIGHT BECAUSE THEIR MIND IS TROUBLED. HOW STRESSED ARE YOU?: TO SOME EXTENT

## 2023-04-28 SDOH — HEALTH STABILITY: PHYSICAL HEALTH: ON AVERAGE, HOW MANY DAYS PER WEEK DO YOU ENGAGE IN MODERATE TO STRENUOUS EXERCISE (LIKE A BRISK WALK)?: 0 DAYS

## 2023-04-28 SDOH — ECONOMIC STABILITY: HOUSING INSECURITY
IN THE LAST 12 MONTHS, WAS THERE A TIME WHEN YOU DID NOT HAVE A STEADY PLACE TO SLEEP OR SLEPT IN A SHELTER (INCLUDING NOW)?: NO

## 2023-04-28 SDOH — ECONOMIC STABILITY: INCOME INSECURITY: HOW HARD IS IT FOR YOU TO PAY FOR THE VERY BASICS LIKE FOOD, HOUSING, MEDICAL CARE, AND HEATING?: NOT HARD AT ALL

## 2023-04-28 SDOH — ECONOMIC STABILITY: FOOD INSECURITY: WITHIN THE PAST 12 MONTHS, YOU WORRIED THAT YOUR FOOD WOULD RUN OUT BEFORE YOU GOT MONEY TO BUY MORE.: NEVER TRUE

## 2023-04-28 SDOH — ECONOMIC STABILITY: TRANSPORTATION INSECURITY
IN THE PAST 12 MONTHS, HAS LACK OF TRANSPORTATION KEPT YOU FROM MEETINGS, WORK, OR FROM GETTING THINGS NEEDED FOR DAILY LIVING?: NO

## 2023-04-28 SDOH — ECONOMIC STABILITY: FOOD INSECURITY: WITHIN THE PAST 12 MONTHS, THE FOOD YOU BOUGHT JUST DIDN'T LAST AND YOU DIDN'T HAVE MONEY TO GET MORE.: NEVER TRUE

## 2023-04-28 SDOH — ECONOMIC STABILITY: TRANSPORTATION INSECURITY
IN THE PAST 12 MONTHS, HAS LACK OF RELIABLE TRANSPORTATION KEPT YOU FROM MEDICAL APPOINTMENTS, MEETINGS, WORK OR FROM GETTING THINGS NEEDED FOR DAILY LIVING?: NO

## 2023-04-28 ASSESSMENT — LIFESTYLE VARIABLES
HOW OFTEN DO YOU HAVE SIX OR MORE DRINKS ON ONE OCCASION: NEVER
AUDIT-C TOTAL SCORE: 2
HOW OFTEN DO YOU HAVE A DRINK CONTAINING ALCOHOL: 2-4 TIMES A MONTH
HOW MANY STANDARD DRINKS CONTAINING ALCOHOL DO YOU HAVE ON A TYPICAL DAY: 1 OR 2
SKIP TO QUESTIONS 9-10: 1

## 2023-04-28 ASSESSMENT — SOCIAL DETERMINANTS OF HEALTH (SDOH)
HOW OFTEN DO YOU ATTEND CHURCH OR RELIGIOUS SERVICES?: 1 TO 4 TIMES PER YEAR
IN A TYPICAL WEEK, HOW MANY TIMES DO YOU TALK ON THE PHONE WITH FAMILY, FRIENDS, OR NEIGHBORS?: THREE TIMES A WEEK
IN A TYPICAL WEEK, HOW MANY TIMES DO YOU TALK ON THE PHONE WITH FAMILY, FRIENDS, OR NEIGHBORS?: THREE TIMES A WEEK
HOW OFTEN DO YOU HAVE SIX OR MORE DRINKS ON ONE OCCASION: NEVER
DO YOU BELONG TO ANY CLUBS OR ORGANIZATIONS SUCH AS CHURCH GROUPS UNIONS, FRATERNAL OR ATHLETIC GROUPS, OR SCHOOL GROUPS?: NO
HOW MANY DRINKS CONTAINING ALCOHOL DO YOU HAVE ON A TYPICAL DAY WHEN YOU ARE DRINKING: 1 OR 2
WITHIN THE PAST 12 MONTHS, YOU WORRIED THAT YOUR FOOD WOULD RUN OUT BEFORE YOU GOT THE MONEY TO BUY MORE: NEVER TRUE
DO YOU BELONG TO ANY CLUBS OR ORGANIZATIONS SUCH AS CHURCH GROUPS UNIONS, FRATERNAL OR ATHLETIC GROUPS, OR SCHOOL GROUPS?: NO
HOW OFTEN DO YOU GET TOGETHER WITH FRIENDS OR RELATIVES?: TWICE A WEEK
HOW OFTEN DO YOU ATTEND CHURCH OR RELIGIOUS SERVICES?: 1 TO 4 TIMES PER YEAR
HOW HARD IS IT FOR YOU TO PAY FOR THE VERY BASICS LIKE FOOD, HOUSING, MEDICAL CARE, AND HEATING?: NOT HARD AT ALL
HOW OFTEN DO YOU ATTENT MEETINGS OF THE CLUB OR ORGANIZATION YOU BELONG TO?: NEVER
HOW OFTEN DO YOU HAVE A DRINK CONTAINING ALCOHOL: 2-4 TIMES A MONTH
HOW OFTEN DO YOU ATTENT MEETINGS OF THE CLUB OR ORGANIZATION YOU BELONG TO?: NEVER
HOW OFTEN DO YOU GET TOGETHER WITH FRIENDS OR RELATIVES?: TWICE A WEEK

## 2023-05-03 ENCOUNTER — OFFICE VISIT (OUTPATIENT)
Dept: MEDICAL GROUP | Facility: MEDICAL CENTER | Age: 63
End: 2023-05-03
Payer: MEDICARE

## 2023-05-03 VITALS
WEIGHT: 186.2 LBS | OXYGEN SATURATION: 99 % | SYSTOLIC BLOOD PRESSURE: 186 MMHG | HEART RATE: 61 BPM | TEMPERATURE: 97.3 F | BODY MASS INDEX: 32.99 KG/M2 | DIASTOLIC BLOOD PRESSURE: 84 MMHG | HEIGHT: 63 IN

## 2023-05-03 DIAGNOSIS — R05.3 CHRONIC COUGH: ICD-10-CM

## 2023-05-03 DIAGNOSIS — I25.10 CORONARY ARTERY CALCIFICATION SEEN ON CAT SCAN: ICD-10-CM

## 2023-05-03 DIAGNOSIS — Z78.0 POSTMENOPAUSAL: ICD-10-CM

## 2023-05-03 DIAGNOSIS — G43.019 INTRACTABLE MIGRAINE WITHOUT AURA AND WITHOUT STATUS MIGRAINOSUS: ICD-10-CM

## 2023-05-03 DIAGNOSIS — E66.9 OBESITY (BMI 30.0-34.9): ICD-10-CM

## 2023-05-03 DIAGNOSIS — E78.5 DYSLIPIDEMIA: ICD-10-CM

## 2023-05-03 DIAGNOSIS — M54.41 ACUTE BILATERAL LOW BACK PAIN WITH BILATERAL SCIATICA: ICD-10-CM

## 2023-05-03 DIAGNOSIS — G40.909 SEIZURE CEREBRAL (HCC): Chronic | ICD-10-CM

## 2023-05-03 DIAGNOSIS — E83.110 HEREDITARY HEMOCHROMATOSIS (HCC): Chronic | ICD-10-CM

## 2023-05-03 DIAGNOSIS — N63.11 MASS OF UPPER OUTER QUADRANT OF RIGHT BREAST: ICD-10-CM

## 2023-05-03 DIAGNOSIS — Z13.21 ENCOUNTER FOR VITAMIN DEFICIENCY SCREENING: ICD-10-CM

## 2023-05-03 DIAGNOSIS — F33.1 MODERATE EPISODE OF RECURRENT MAJOR DEPRESSIVE DISORDER (HCC): Chronic | ICD-10-CM

## 2023-05-03 DIAGNOSIS — I10 ESSENTIAL HYPERTENSION: ICD-10-CM

## 2023-05-03 DIAGNOSIS — F41.1 GENERALIZED ANXIETY DISORDER: ICD-10-CM

## 2023-05-03 DIAGNOSIS — M54.42 ACUTE BILATERAL LOW BACK PAIN WITH BILATERAL SCIATICA: ICD-10-CM

## 2023-05-03 PROCEDURE — 99214 OFFICE O/P EST MOD 30 MIN: CPT | Performed by: FAMILY MEDICINE

## 2023-05-03 RX ORDER — LOSARTAN POTASSIUM 100 MG/1
100 TABLET ORAL NIGHTLY
Qty: 100 TABLET | Refills: 3 | Status: SHIPPED | OUTPATIENT
Start: 2023-05-03 | End: 2023-10-17 | Stop reason: SDUPTHER

## 2023-05-03 RX ORDER — ROSUVASTATIN CALCIUM 20 MG/1
20 TABLET, COATED ORAL EVERY EVENING
Qty: 100 TABLET | Refills: 3 | Status: SHIPPED
Start: 2023-05-03 | End: 2023-05-10

## 2023-05-03 RX ORDER — CITALOPRAM 40 MG/1
40 TABLET ORAL
Qty: 90 TABLET | Refills: 3 | Status: SHIPPED | OUTPATIENT
Start: 2023-05-03 | End: 2023-10-23 | Stop reason: SDUPTHER

## 2023-05-03 RX ORDER — HYDROXYZINE HYDROCHLORIDE 25 MG/1
25 TABLET, FILM COATED ORAL NIGHTLY PRN
Qty: 30 TABLET | Refills: 1 | Status: SHIPPED | OUTPATIENT
Start: 2023-05-03 | End: 2023-10-23 | Stop reason: SDUPTHER

## 2023-05-03 RX ORDER — HYDROCHLOROTHIAZIDE 12.5 MG/1
12.5 CAPSULE, GELATIN COATED ORAL DAILY
Qty: 100 CAPSULE | Refills: 3 | Status: SHIPPED | OUTPATIENT
Start: 2023-05-03 | End: 2023-10-23 | Stop reason: SDUPTHER

## 2023-05-03 ASSESSMENT — PATIENT HEALTH QUESTIONNAIRE - PHQ9
7. TROUBLE CONCENTRATING ON THINGS, SUCH AS READING THE NEWSPAPER OR WATCHING TELEVISION: NOT AT ALL
8. MOVING OR SPEAKING SO SLOWLY THAT OTHER PEOPLE COULD HAVE NOTICED. OR THE OPPOSITE, BEING SO FIGETY OR RESTLESS THAT YOU HAVE BEEN MOVING AROUND A LOT MORE THAN USUAL: NOT AT ALL
4. FEELING TIRED OR HAVING LITTLE ENERGY: NOT AT ALL
1. LITTLE INTEREST OR PLEASURE IN DOING THINGS: NOT AT ALL
3. TROUBLE FALLING OR STAYING ASLEEP OR SLEEPING TOO MUCH: NOT AT ALL
6. FEELING BAD ABOUT YOURSELF - OR THAT YOU ARE A FAILURE OR HAVE LET YOURSELF OR YOUR FAMILY DOWN: NOT AL ALL
SUM OF ALL RESPONSES TO PHQ QUESTIONS 1-9: 0
2. FEELING DOWN, DEPRESSED, IRRITABLE, OR HOPELESS: NOT AT ALL
SUM OF ALL RESPONSES TO PHQ9 QUESTIONS 1 AND 2: 0
5. POOR APPETITE OR OVEREATING: NOT AT ALL
9. THOUGHTS THAT YOU WOULD BE BETTER OFF DEAD, OR OF HURTING YOURSELF: NOT AT ALL

## 2023-05-03 ASSESSMENT — FIBROSIS 4 INDEX: FIB4 SCORE: 0.67

## 2023-05-03 NOTE — PROGRESS NOTES
"Subjective:     CC: \"back pain, obesity, headaches, hypertension\"    HPI:   Yuki presents today with:    Would like to get back on Ozempic  Did well on it  Plans on starting on walking  Having chest pains with deep breathing  Blood pressure at home has been 170-190's seems to be independent of activity  Notes she has a dry cough that never seems to stop  Having pain in low back that radiates down back, been using warm baths and ibuprofen  Feels deep like its in her bones  Pain has been going on two months, wakes up 4-5 times per night  Having night sweats, feels like she is going through the change, it is on and off  No hormone therapy  Is S/P Hysterectomy  Has been feeling fatigued  Feels like she has lump on right side of breast, had a biopsy of this breast 10 years ago, reports was benign  Notes anxiety is quite high  Still taking citalopram  Depression elevated by loss of dog of 12 years        No problems updated.    Current Outpatient Medications Ordered in Epic   Medication Sig Dispense Refill    citalopram (CELEXA) 40 MG Tab Take 1 Tablet by mouth every day. 90 Tablet 3    hydrochlorothiazide (MICROZIDE) 12.5 MG capsule Take 1 Capsule by mouth every day. 100 Capsule 3    hydrOXYzine HCl (ATARAX) 25 MG Tab Take 1 Tablet by mouth at bedtime as needed for Anxiety. 30 Tablet 1    losartan (COZAAR) 100 MG Tab Take 1 Tablet by mouth every evening. 100 Tablet 3    rosuvastatin (CRESTOR) 20 MG Tab Take 1 Tablet by mouth every evening. 100 Tablet 3    Semaglutide,0.25 or 0.5MG/DOS, 2 MG/1.5ML Solution Pen-injector Inject 0.25 mg under the skin every 7 days. 1.5 mL 0    verapamil SR (CALAN-SR) 120 MG CR tablet Take 1 Tablet by mouth every day. 30 Tablet 3    aspirin EC (ECOTRIN) 81 MG Tablet Delayed Response Take 81 mg by mouth every day.      LINZESS 290 MCG Cap       hyoscyamine (LEVSIN) 0.125 MG SL Tab        No current Epic-ordered facility-administered medications on file.       Health Maintenance: order for " "mammogram and bone density    ROS:  ROS see HPI    Objective:     Exam:  BP (!) 186/84   Pulse 61   Temp 36.3 °C (97.3 °F) (Temporal)   Ht 1.6 m (5' 3\")   Wt 84.5 kg (186 lb 3.2 oz)   LMP  (LMP Unknown)   SpO2 99%   BMI 32.98 kg/m²  Body mass index is 32.98 kg/m².    Physical Exam  Vitals reviewed.   Constitutional:       General: She is not in acute distress.     Appearance: Normal appearance. She is not toxic-appearing.   HENT:      Head: Normocephalic and atraumatic.   Cardiovascular:      Rate and Rhythm: Normal rate and regular rhythm.      Heart sounds: Normal heart sounds.   Pulmonary:      Effort: Pulmonary effort is normal. No respiratory distress.      Breath sounds: Normal breath sounds. No wheezing.   Musculoskeletal:         General: Tenderness (midline L3-L4) present.   Skin:     General: Skin is warm and dry.   Neurological:      Mental Status: She is alert. Mental status is at baseline.      Gait: Gait normal.   Psychiatric:         Mood and Affect: Mood normal.         Behavior: Behavior normal.       Assessment & Plan:     63 y.o. female with the following -     1. Hereditary hemochromatosis (HCC)  - CBC WITHOUT DIFFERENTIAL; Future  - IRON/TOTAL IRON BIND; Future  - FERRITIN; Future    2. Moderate episode of recurrent major depressive disorder (HCC)  Chronic and stable, will have some expected sadness with loss of friend but coping  Continue citalopram 40 mg  - citalopram (CELEXA) 40 MG Tab; Take 1 Tablet by mouth every day.  Dispense: 90 Tablet; Refill: 3  - CBC WITHOUT DIFFERENTIAL; Future    3. Seizure cerebral (HCC)  Denies seizure activity    4. Essential hypertension  Blood pressure not under control  Continue hydrochlorothiazide 12.5 mg, increase losartan to 100 mg, will add verapamil to help with blood pressure and may be migraine ppx as well  - hydrochlorothiazide (MICROZIDE) 12.5 MG capsule; Take 1 Capsule by mouth every day.  Dispense: 100 Capsule; Refill: 3  - losartan (COZAAR) " 100 MG Tab; Take 1 Tablet by mouth every evening.  Dispense: 100 Tablet; Refill: 3  - verapamil SR (CALAN-SR) 120 MG CR tablet; Take 1 Tablet by mouth every day.  Dispense: 30 Tablet; Refill: 3  - Comp Metabolic Panel; Future  - MICROALBUMIN CREAT RATIO URINE; Future  - TSH WITH REFLEX TO FT4; Future    5. Intractable migraine without aura and without status migrainosus  Will stop topamax as she has not been taking it secondary to brain fog  Will trial verapamil for headache ppx and to help treat hypertension  - verapamil SR (CALAN-SR) 120 MG CR tablet; Take 1 Tablet by mouth every day.  Dispense: 30 Tablet; Refill: 3  - Sed Rate; Future    6. Generalized anxiety disorder  Feels a little elevated would like to try using the hydroxyzine for this  - citalopram (CELEXA) 40 MG Tab; Take 1 Tablet by mouth every day.  Dispense: 90 Tablet; Refill: 3  - hydrOXYzine HCl (ATARAX) 25 MG Tab; Take 1 Tablet by mouth at bedtime as needed for Anxiety.  Dispense: 30 Tablet; Refill: 1    7. Obesity (BMI 30.0-34.9)  Patient would like to get healthier, reports no side effects with this previously, will start ozempic  - Semaglutide,0.25 or 0.5MG/DOS, 2 MG/1.5ML Solution Pen-injector; Inject 0.25 mg under the skin every 7 days.  Dispense: 1.5 mL; Refill: 0  - CBC WITHOUT DIFFERENTIAL; Future  - Comp Metabolic Panel; Future  - Lipid Profile; Future  - TSH WITH REFLEX TO FT4; Future    8. Dyslipidemia  - rosuvastatin (CRESTOR) 20 MG Tab; Take 1 Tablet by mouth every evening.  Dispense: 100 Tablet; Refill: 3  - Comp Metabolic Panel; Future  - Lipid Profile; Future    9. Coronary artery calcification seen on CAT scan  - rosuvastatin (CRESTOR) 20 MG Tab; Take 1 Tablet by mouth every evening.  Dispense: 100 Tablet; Refill: 3  - CRP QUANTITIVE (NON-CARDIAC); Future    10. Chronic cough  - DX-CHEST-2 VIEWS; Future  - CRP QUANTITIVE (NON-CARDIAC); Future    11. Acute bilateral low back pain with bilateral sciatica  - DX-LUMBAR SPINE-4+ VIEWS;  Future  - DX-HIP-BILATERAL-WITH PELVIS-3/4 VIEWS; Future  - CRP QUANTITIVE (NON-CARDIAC); Future    12. Encounter for vitamin deficiency screening  - VITAMIN D,25 HYDROXY (DEFICIENCY); Future  - VITAMIN B12; Future    13. Mass of upper outer quadrant of right breast  - MA-DIAGNOSTIC MAMMO BILAT W/TOMOSYNTHESIS W/CAD; Future    14. Postmenopausal  - DS-BONE DENSITY STUDY (DEXA); Future       Return in about 4 weeks (around 5/31/2023) for Lab F/U, Imaging F/U, Medication F/U.    Please note that this dictation was created using voice recognition software. I have made every reasonable attempt to correct obvious errors, but I expect that there are errors of grammar and possibly content that I did not discover before finalizing the note.

## 2023-05-05 ENCOUNTER — TELEPHONE (OUTPATIENT)
Dept: MEDICAL GROUP | Facility: MEDICAL CENTER | Age: 63
End: 2023-05-05
Payer: MEDICARE

## 2023-05-05 DIAGNOSIS — E66.9 OBESITY (BMI 30.0-34.9): ICD-10-CM

## 2023-05-05 NOTE — TELEPHONE ENCOUNTER
The pharmacy has requested a refill of Semaglutide, but for an rx of 3mL, not 1.5 as this has been discontinued.

## 2023-05-10 ENCOUNTER — APPOINTMENT (OUTPATIENT)
Dept: RADIOLOGY | Facility: MEDICAL CENTER | Age: 63
End: 2023-05-10
Attending: EMERGENCY MEDICINE
Payer: MEDICARE

## 2023-05-10 ENCOUNTER — ANESTHESIA (OUTPATIENT)
Dept: SURGERY | Facility: MEDICAL CENTER | Age: 63
End: 2023-05-10
Payer: MEDICARE

## 2023-05-10 ENCOUNTER — HOSPITAL ENCOUNTER (OUTPATIENT)
Facility: MEDICAL CENTER | Age: 63
End: 2023-05-12
Attending: EMERGENCY MEDICINE | Admitting: SURGERY
Payer: MEDICARE

## 2023-05-10 ENCOUNTER — ANESTHESIA EVENT (OUTPATIENT)
Dept: SURGERY | Facility: MEDICAL CENTER | Age: 63
End: 2023-05-10
Payer: MEDICARE

## 2023-05-10 ENCOUNTER — APPOINTMENT (OUTPATIENT)
Dept: RADIOLOGY | Facility: MEDICAL CENTER | Age: 63
End: 2023-05-10
Attending: FAMILY MEDICINE
Payer: MEDICARE

## 2023-05-10 DIAGNOSIS — R05.3 CHRONIC COUGH: ICD-10-CM

## 2023-05-10 DIAGNOSIS — M54.42 ACUTE BILATERAL LOW BACK PAIN WITH BILATERAL SCIATICA: ICD-10-CM

## 2023-05-10 DIAGNOSIS — K43.6 IRREDUCIBLE SPIGELIAN HERNIA: ICD-10-CM

## 2023-05-10 DIAGNOSIS — G89.18 POST-OPERATIVE PAIN: ICD-10-CM

## 2023-05-10 DIAGNOSIS — R10.31 RLQ ABDOMINAL PAIN: ICD-10-CM

## 2023-05-10 DIAGNOSIS — M54.41 ACUTE BILATERAL LOW BACK PAIN WITH BILATERAL SCIATICA: ICD-10-CM

## 2023-05-10 LAB
ABO GROUP BLD: NORMAL
ALBUMIN SERPL BCP-MCNC: 4.4 G/DL (ref 3.2–4.9)
ALBUMIN/GLOB SERPL: 1.4 G/DL
ALP SERPL-CCNC: 77 U/L (ref 30–99)
ALT SERPL-CCNC: 16 U/L (ref 2–50)
ANION GAP SERPL CALC-SCNC: 10 MMOL/L (ref 7–16)
AST SERPL-CCNC: 17 U/L (ref 12–45)
BASOPHILS # BLD AUTO: 0.9 % (ref 0–1.8)
BASOPHILS # BLD: 0.07 K/UL (ref 0–0.12)
BILIRUB SERPL-MCNC: 0.4 MG/DL (ref 0.1–1.5)
BLD GP AB SCN SERPL QL: NORMAL
BUN SERPL-MCNC: 12 MG/DL (ref 8–22)
CALCIUM ALBUM COR SERPL-MCNC: 9.8 MG/DL (ref 8.5–10.5)
CALCIUM SERPL-MCNC: 10.1 MG/DL (ref 8.4–10.2)
CHLORIDE SERPL-SCNC: 99 MMOL/L (ref 96–112)
CO2 SERPL-SCNC: 29 MMOL/L (ref 20–33)
CREAT SERPL-MCNC: 0.92 MG/DL (ref 0.5–1.4)
EOSINOPHIL # BLD AUTO: 0.1 K/UL (ref 0–0.51)
EOSINOPHIL NFR BLD: 1.3 % (ref 0–6.9)
ERYTHROCYTE [DISTWIDTH] IN BLOOD BY AUTOMATED COUNT: 43.5 FL (ref 35.9–50)
GFR SERPLBLD CREATININE-BSD FMLA CKD-EPI: 69 ML/MIN/1.73 M 2
GLOBULIN SER CALC-MCNC: 3.1 G/DL (ref 1.9–3.5)
GLUCOSE SERPL-MCNC: 97 MG/DL (ref 65–99)
HCT VFR BLD AUTO: 47.3 % (ref 37–47)
HGB BLD-MCNC: 16.4 G/DL (ref 12–16)
IMM GRANULOCYTES # BLD AUTO: 0.02 K/UL (ref 0–0.11)
IMM GRANULOCYTES NFR BLD AUTO: 0.3 % (ref 0–0.9)
LACTATE SERPL-SCNC: 1 MMOL/L (ref 0.5–2)
LIPASE SERPL-CCNC: 46 U/L (ref 7–58)
LYMPHOCYTES # BLD AUTO: 2.76 K/UL (ref 1–4.8)
LYMPHOCYTES NFR BLD: 37.1 % (ref 22–41)
MCH RBC QN AUTO: 32 PG (ref 27–33)
MCHC RBC AUTO-ENTMCNC: 34.7 G/DL (ref 33.6–35)
MCV RBC AUTO: 92.4 FL (ref 81.4–97.8)
MONOCYTES # BLD AUTO: 0.65 K/UL (ref 0–0.85)
MONOCYTES NFR BLD AUTO: 8.7 % (ref 0–13.4)
NEUTROPHILS # BLD AUTO: 3.84 K/UL (ref 2–7.15)
NEUTROPHILS NFR BLD: 51.7 % (ref 44–72)
NRBC # BLD AUTO: 0 K/UL
NRBC BLD-RTO: 0 /100 WBC
PATHOLOGY CONSULT NOTE: NORMAL
PLATELET # BLD AUTO: 437 K/UL (ref 164–446)
PMV BLD AUTO: 8.3 FL (ref 9–12.9)
POTASSIUM SERPL-SCNC: 3.6 MMOL/L (ref 3.6–5.5)
PROT SERPL-MCNC: 7.5 G/DL (ref 6–8.2)
RBC # BLD AUTO: 5.12 M/UL (ref 4.2–5.4)
RH BLD: NORMAL
SODIUM SERPL-SCNC: 138 MMOL/L (ref 135–145)
WBC # BLD AUTO: 7.4 K/UL (ref 4.8–10.8)

## 2023-05-10 PROCEDURE — 700101 HCHG RX REV CODE 250: Performed by: ANESTHESIOLOGY

## 2023-05-10 PROCEDURE — 72110 X-RAY EXAM L-2 SPINE 4/>VWS: CPT

## 2023-05-10 PROCEDURE — 88300 SURGICAL PATH GROSS: CPT

## 2023-05-10 PROCEDURE — 700111 HCHG RX REV CODE 636 W/ 250 OVERRIDE (IP): Performed by: ANESTHESIOLOGY

## 2023-05-10 PROCEDURE — A9270 NON-COVERED ITEM OR SERVICE: HCPCS | Performed by: SURGERY

## 2023-05-10 PROCEDURE — 160027 HCHG SURGERY MINUTES - 1ST 30 MINS LEVEL 2: Performed by: SURGERY

## 2023-05-10 PROCEDURE — 00832 ANES HERNIA REPAIR VNT&INCAL: CPT | Performed by: ANESTHESIOLOGY

## 2023-05-10 PROCEDURE — 160002 HCHG RECOVERY MINUTES (STAT): Performed by: SURGERY

## 2023-05-10 PROCEDURE — 160009 HCHG ANES TIME/MIN: Performed by: SURGERY

## 2023-05-10 PROCEDURE — 73522 X-RAY EXAM HIPS BI 3-4 VIEWS: CPT

## 2023-05-10 PROCEDURE — 160048 HCHG OR STATISTICAL LEVEL 1-5: Performed by: SURGERY

## 2023-05-10 PROCEDURE — 86850 RBC ANTIBODY SCREEN: CPT

## 2023-05-10 PROCEDURE — 160038 HCHG SURGERY MINUTES - EA ADDL 1 MIN LEVEL 2: Performed by: SURGERY

## 2023-05-10 PROCEDURE — 36415 COLL VENOUS BLD VENIPUNCTURE: CPT

## 2023-05-10 PROCEDURE — 160035 HCHG PACU - 1ST 60 MINS PHASE I: Performed by: SURGERY

## 2023-05-10 PROCEDURE — 86901 BLOOD TYPING SEROLOGIC RH(D): CPT

## 2023-05-10 PROCEDURE — 83690 ASSAY OF LIPASE: CPT

## 2023-05-10 PROCEDURE — 700102 HCHG RX REV CODE 250 W/ 637 OVERRIDE(OP): Performed by: ANESTHESIOLOGY

## 2023-05-10 PROCEDURE — C1781 MESH (IMPLANTABLE): HCPCS | Performed by: SURGERY

## 2023-05-10 PROCEDURE — 700101 HCHG RX REV CODE 250: Performed by: SURGERY

## 2023-05-10 PROCEDURE — 99291 CRITICAL CARE FIRST HOUR: CPT

## 2023-05-10 PROCEDURE — 85025 COMPLETE CBC W/AUTO DIFF WBC: CPT

## 2023-05-10 PROCEDURE — 700111 HCHG RX REV CODE 636 W/ 250 OVERRIDE (IP): Performed by: EMERGENCY MEDICINE

## 2023-05-10 PROCEDURE — 700102 HCHG RX REV CODE 250 W/ 637 OVERRIDE(OP): Performed by: SURGERY

## 2023-05-10 PROCEDURE — 86900 BLOOD TYPING SEROLOGIC ABO: CPT

## 2023-05-10 PROCEDURE — 700117 HCHG RX CONTRAST REV CODE 255: Performed by: EMERGENCY MEDICINE

## 2023-05-10 PROCEDURE — 700105 HCHG RX REV CODE 258: Performed by: SURGERY

## 2023-05-10 PROCEDURE — 83605 ASSAY OF LACTIC ACID: CPT

## 2023-05-10 PROCEDURE — A9270 NON-COVERED ITEM OR SERVICE: HCPCS | Performed by: ANESTHESIOLOGY

## 2023-05-10 PROCEDURE — G0378 HOSPITAL OBSERVATION PER HR: HCPCS

## 2023-05-10 PROCEDURE — 71046 X-RAY EXAM CHEST 2 VIEWS: CPT

## 2023-05-10 PROCEDURE — 96374 THER/PROPH/DIAG INJ IV PUSH: CPT | Mod: XU

## 2023-05-10 PROCEDURE — 96375 TX/PRO/DX INJ NEW DRUG ADDON: CPT | Mod: XU

## 2023-05-10 PROCEDURE — 74177 CT ABD & PELVIS W/CONTRAST: CPT

## 2023-05-10 PROCEDURE — 80053 COMPREHEN METABOLIC PANEL: CPT

## 2023-05-10 DEVICE — MESH VENTRALEX ST W/STRAP - 6.4CM MEDIUM (1EA/CA): Type: IMPLANTABLE DEVICE | Status: FUNCTIONAL

## 2023-05-10 RX ORDER — HYDROMORPHONE HYDROCHLORIDE 1 MG/ML
0.2 INJECTION, SOLUTION INTRAMUSCULAR; INTRAVENOUS; SUBCUTANEOUS
Status: DISCONTINUED | OUTPATIENT
Start: 2023-05-10 | End: 2023-05-10 | Stop reason: HOSPADM

## 2023-05-10 RX ORDER — SODIUM CHLORIDE, SODIUM LACTATE, POTASSIUM CHLORIDE, CALCIUM CHLORIDE 600; 310; 30; 20 MG/100ML; MG/100ML; MG/100ML; MG/100ML
INJECTION, SOLUTION INTRAVENOUS CONTINUOUS
Status: ACTIVE | OUTPATIENT
Start: 2023-05-10 | End: 2023-05-11

## 2023-05-10 RX ORDER — SODIUM CHLORIDE, SODIUM LACTATE, POTASSIUM CHLORIDE, CALCIUM CHLORIDE 600; 310; 30; 20 MG/100ML; MG/100ML; MG/100ML; MG/100ML
INJECTION, SOLUTION INTRAVENOUS CONTINUOUS
Status: DISCONTINUED | OUTPATIENT
Start: 2023-05-10 | End: 2023-05-10 | Stop reason: HOSPADM

## 2023-05-10 RX ORDER — DIPHENHYDRAMINE HYDROCHLORIDE 50 MG/ML
12.5 INJECTION INTRAMUSCULAR; INTRAVENOUS
Status: DISCONTINUED | OUTPATIENT
Start: 2023-05-10 | End: 2023-05-10 | Stop reason: HOSPADM

## 2023-05-10 RX ORDER — HYDROMORPHONE HYDROCHLORIDE 1 MG/ML
0.5 INJECTION, SOLUTION INTRAMUSCULAR; INTRAVENOUS; SUBCUTANEOUS
Status: DISCONTINUED | OUTPATIENT
Start: 2023-05-10 | End: 2023-05-10 | Stop reason: HOSPADM

## 2023-05-10 RX ORDER — CITALOPRAM 20 MG/1
40 TABLET ORAL DAILY
Status: DISCONTINUED | OUTPATIENT
Start: 2023-05-11 | End: 2023-05-12 | Stop reason: HOSPADM

## 2023-05-10 RX ORDER — DEXAMETHASONE SODIUM PHOSPHATE 4 MG/ML
INJECTION, SOLUTION INTRA-ARTICULAR; INTRALESIONAL; INTRAMUSCULAR; INTRAVENOUS; SOFT TISSUE PRN
Status: DISCONTINUED | OUTPATIENT
Start: 2023-05-10 | End: 2023-05-11 | Stop reason: SURG

## 2023-05-10 RX ORDER — LOSARTAN POTASSIUM 25 MG/1
100 TABLET ORAL NIGHTLY
Status: DISCONTINUED | OUTPATIENT
Start: 2023-05-10 | End: 2023-05-12

## 2023-05-10 RX ORDER — HALOPERIDOL 5 MG/ML
1 INJECTION INTRAMUSCULAR
Status: DISCONTINUED | OUTPATIENT
Start: 2023-05-10 | End: 2023-05-10 | Stop reason: HOSPADM

## 2023-05-10 RX ORDER — HYDROCHLOROTHIAZIDE 12.5 MG/1
12.5 TABLET ORAL DAILY
Status: DISCONTINUED | OUTPATIENT
Start: 2023-05-11 | End: 2023-05-12 | Stop reason: HOSPADM

## 2023-05-10 RX ORDER — ONDANSETRON 2 MG/ML
INJECTION INTRAMUSCULAR; INTRAVENOUS PRN
Status: DISCONTINUED | OUTPATIENT
Start: 2023-05-10 | End: 2023-05-11 | Stop reason: SURG

## 2023-05-10 RX ORDER — MIDAZOLAM HYDROCHLORIDE 1 MG/ML
INJECTION INTRAMUSCULAR; INTRAVENOUS PRN
Status: DISCONTINUED | OUTPATIENT
Start: 2023-05-10 | End: 2023-05-11 | Stop reason: SURG

## 2023-05-10 RX ORDER — ONDANSETRON 2 MG/ML
4 INJECTION INTRAMUSCULAR; INTRAVENOUS ONCE
Status: COMPLETED | OUTPATIENT
Start: 2023-05-10 | End: 2023-05-10

## 2023-05-10 RX ORDER — OXYCODONE HCL 5 MG/5 ML
10 SOLUTION, ORAL ORAL
Status: COMPLETED | OUTPATIENT
Start: 2023-05-10 | End: 2023-05-10

## 2023-05-10 RX ORDER — OXYCODONE HCL 5 MG/5 ML
5 SOLUTION, ORAL ORAL
Status: COMPLETED | OUTPATIENT
Start: 2023-05-10 | End: 2023-05-10

## 2023-05-10 RX ORDER — CEFAZOLIN SODIUM 1 G/3ML
INJECTION, POWDER, FOR SOLUTION INTRAMUSCULAR; INTRAVENOUS PRN
Status: DISCONTINUED | OUTPATIENT
Start: 2023-05-10 | End: 2023-05-11 | Stop reason: SURG

## 2023-05-10 RX ORDER — MEPERIDINE HYDROCHLORIDE 25 MG/ML
12.5 INJECTION INTRAMUSCULAR; INTRAVENOUS; SUBCUTANEOUS
Status: DISCONTINUED | OUTPATIENT
Start: 2023-05-10 | End: 2023-05-10 | Stop reason: HOSPADM

## 2023-05-10 RX ORDER — VERAPAMIL HYDROCHLORIDE 240 MG/1
120 TABLET, FILM COATED, EXTENDED RELEASE ORAL DAILY
Status: DISCONTINUED | OUTPATIENT
Start: 2023-05-11 | End: 2023-05-12 | Stop reason: HOSPADM

## 2023-05-10 RX ORDER — OXYCODONE HYDROCHLORIDE 5 MG/1
5 TABLET ORAL EVERY 4 HOURS PRN
Status: DISCONTINUED | OUTPATIENT
Start: 2023-05-10 | End: 2023-05-11

## 2023-05-10 RX ORDER — PHENYLEPHRINE HYDROCHLORIDE 10 MG/ML
INJECTION, SOLUTION INTRAMUSCULAR; INTRAVENOUS; SUBCUTANEOUS PRN
Status: DISCONTINUED | OUTPATIENT
Start: 2023-05-10 | End: 2023-05-11 | Stop reason: SURG

## 2023-05-10 RX ORDER — SODIUM CHLORIDE, SODIUM LACTATE, POTASSIUM CHLORIDE, CALCIUM CHLORIDE 600; 310; 30; 20 MG/100ML; MG/100ML; MG/100ML; MG/100ML
INJECTION, SOLUTION INTRAVENOUS CONTINUOUS
Status: DISCONTINUED | OUTPATIENT
Start: 2023-05-10 | End: 2023-05-10

## 2023-05-10 RX ORDER — BUPIVACAINE HYDROCHLORIDE AND EPINEPHRINE 5; 5 MG/ML; UG/ML
INJECTION, SOLUTION PERINEURAL
Status: DISCONTINUED | OUTPATIENT
Start: 2023-05-10 | End: 2023-05-10 | Stop reason: HOSPADM

## 2023-05-10 RX ORDER — HYDROXYZINE HYDROCHLORIDE 25 MG/1
25 TABLET, FILM COATED ORAL NIGHTLY PRN
Status: DISCONTINUED | OUTPATIENT
Start: 2023-05-10 | End: 2023-05-12 | Stop reason: HOSPADM

## 2023-05-10 RX ORDER — HYDROMORPHONE HYDROCHLORIDE 1 MG/ML
0.4 INJECTION, SOLUTION INTRAMUSCULAR; INTRAVENOUS; SUBCUTANEOUS
Status: DISCONTINUED | OUTPATIENT
Start: 2023-05-10 | End: 2023-05-10 | Stop reason: HOSPADM

## 2023-05-10 RX ORDER — MORPHINE SULFATE 4 MG/ML
4 INJECTION INTRAVENOUS ONCE
Status: COMPLETED | OUTPATIENT
Start: 2023-05-10 | End: 2023-05-10

## 2023-05-10 RX ORDER — HYDRALAZINE HYDROCHLORIDE 20 MG/ML
5 INJECTION INTRAMUSCULAR; INTRAVENOUS
Status: DISCONTINUED | OUTPATIENT
Start: 2023-05-10 | End: 2023-05-10 | Stop reason: HOSPADM

## 2023-05-10 RX ORDER — EPHEDRINE SULFATE 50 MG/ML
5 INJECTION, SOLUTION INTRAVENOUS
Status: DISCONTINUED | OUTPATIENT
Start: 2023-05-10 | End: 2023-05-10 | Stop reason: HOSPADM

## 2023-05-10 RX ORDER — LIDOCAINE HYDROCHLORIDE 20 MG/ML
INJECTION, SOLUTION EPIDURAL; INFILTRATION; INTRACAUDAL; PERINEURAL PRN
Status: DISCONTINUED | OUTPATIENT
Start: 2023-05-10 | End: 2023-05-11 | Stop reason: SURG

## 2023-05-10 RX ADMIN — DEXAMETHASONE SODIUM PHOSPHATE 4 MG: 4 INJECTION INTRA-ARTICULAR; INTRALESIONAL; INTRAMUSCULAR; INTRAVENOUS; SOFT TISSUE at 19:50

## 2023-05-10 RX ADMIN — OXYCODONE 5 MG: 5 TABLET ORAL at 23:05

## 2023-05-10 RX ADMIN — ONDANSETRON 4 MG: 2 INJECTION INTRAMUSCULAR; INTRAVENOUS at 18:43

## 2023-05-10 RX ADMIN — PROPOFOL 150 MG: 10 INJECTION, EMULSION INTRAVENOUS at 19:46

## 2023-05-10 RX ADMIN — HYDROXYZINE HYDROCHLORIDE 25 MG: 25 TABLET, FILM COATED ORAL at 23:57

## 2023-05-10 RX ADMIN — CEFAZOLIN 2 G: 1 INJECTION, POWDER, FOR SOLUTION INTRAMUSCULAR; INTRAVENOUS at 19:50

## 2023-05-10 RX ADMIN — IOHEXOL 100 ML: 350 INJECTION, SOLUTION INTRAVENOUS at 17:52

## 2023-05-10 RX ADMIN — MORPHINE SULFATE 4 MG: 4 INJECTION INTRAVENOUS at 18:43

## 2023-05-10 RX ADMIN — LIDOCAINE HYDROCHLORIDE 80 MG: 20 INJECTION, SOLUTION EPIDURAL; INFILTRATION; INTRACAUDAL at 19:46

## 2023-05-10 RX ADMIN — PHENYLEPHRINE HYDROCHLORIDE 200 MCG: 10 INJECTION INTRAVENOUS at 19:54

## 2023-05-10 RX ADMIN — MIDAZOLAM 2 MG: 1 INJECTION, SOLUTION INTRAMUSCULAR; INTRAVENOUS at 19:41

## 2023-05-10 RX ADMIN — SUGAMMADEX 200 MG: 100 INJECTION, SOLUTION INTRAVENOUS at 20:22

## 2023-05-10 RX ADMIN — FENTANYL CITRATE 100 MCG: 50 INJECTION, SOLUTION INTRAMUSCULAR; INTRAVENOUS at 19:46

## 2023-05-10 RX ADMIN — ROCURONIUM BROMIDE 50 MG: 10 INJECTION, SOLUTION INTRAVENOUS at 19:46

## 2023-05-10 RX ADMIN — OXYCODONE HYDROCHLORIDE 5 MG: 5 SOLUTION ORAL at 20:39

## 2023-05-10 RX ADMIN — ONDANSETRON 4 MG: 2 INJECTION INTRAMUSCULAR; INTRAVENOUS at 20:22

## 2023-05-10 RX ADMIN — SODIUM CHLORIDE, POTASSIUM CHLORIDE, SODIUM LACTATE AND CALCIUM CHLORIDE: 600; 310; 30; 20 INJECTION, SOLUTION INTRAVENOUS at 21:42

## 2023-05-10 RX ADMIN — SODIUM CHLORIDE, POTASSIUM CHLORIDE, SODIUM LACTATE AND CALCIUM CHLORIDE: 600; 310; 30; 20 INJECTION, SOLUTION INTRAVENOUS at 19:32

## 2023-05-10 ASSESSMENT — COGNITIVE AND FUNCTIONAL STATUS - GENERAL
CLIMB 3 TO 5 STEPS WITH RAILING: A LITTLE
MOBILITY SCORE: 22
TURNING FROM BACK TO SIDE WHILE IN FLAT BAD: A LITTLE
SUGGESTED CMS G CODE MODIFIER DAILY ACTIVITY: CH
DAILY ACTIVITIY SCORE: 24
SUGGESTED CMS G CODE MODIFIER MOBILITY: CJ

## 2023-05-10 ASSESSMENT — LIFESTYLE VARIABLES
CONSUMPTION TOTAL: NEGATIVE
TOTAL SCORE: 0
ALCOHOL_USE: NO
TOTAL SCORE: 0
TOTAL SCORE: 0
HAVE PEOPLE ANNOYED YOU BY CRITICIZING YOUR DRINKING: NO
EVER HAD A DRINK FIRST THING IN THE MORNING TO STEADY YOUR NERVES TO GET RID OF A HANGOVER: NO
AVERAGE NUMBER OF DAYS PER WEEK YOU HAVE A DRINK CONTAINING ALCOHOL: 0
HAVE YOU EVER FELT YOU SHOULD CUT DOWN ON YOUR DRINKING: NO
EVER FELT BAD OR GUILTY ABOUT YOUR DRINKING: NO
ON A TYPICAL DAY WHEN YOU DRINK ALCOHOL HOW MANY DRINKS DO YOU HAVE: 0
HOW MANY TIMES IN THE PAST YEAR HAVE YOU HAD 5 OR MORE DRINKS IN A DAY: 0

## 2023-05-10 ASSESSMENT — FIBROSIS 4 INDEX: FIB4 SCORE: 0.67

## 2023-05-10 ASSESSMENT — PATIENT HEALTH QUESTIONNAIRE - PHQ9
SUM OF ALL RESPONSES TO PHQ9 QUESTIONS 1 AND 2: 0
1. LITTLE INTEREST OR PLEASURE IN DOING THINGS: NOT AT ALL
2. FEELING DOWN, DEPRESSED, IRRITABLE, OR HOPELESS: NOT AT ALL

## 2023-05-10 ASSESSMENT — PAIN DESCRIPTION - PAIN TYPE
TYPE: SURGICAL PAIN
TYPE: SURGICAL PAIN
TYPE: ACUTE PAIN

## 2023-05-10 NOTE — ED TRIAGE NOTES
"Chief Complaint   Patient presents with    Bloody Stools     \"More black than bloody\"  Lower abdomen pain 5/10     Lump     Mid lower abdomen that is getting bigger and more painful, pt has reports fever at home 101f TMAX at home   Denies nausea or vomiting but has decreased appetite      BP (!) 178/115   Pulse 85   Temp 36.6 °C (97.8 °F) (Temporal)   Resp 16   Ht 1.6 m (5' 3\")   Wt 83.6 kg (184 lb 4.9 oz)   LMP  (LMP Unknown)   SpO2 98%   BMI 32.65 kg/m²     "

## 2023-05-10 NOTE — ED NOTES
Pt comes in POV, reports lower right abdominal pain. Surgical scar noted where the pain is located. Pt reports 5/10 pain for 5 days. Today pt reports they BM 2x and had black stool.

## 2023-05-10 NOTE — ED NOTES
Patient's vital reassessed and updated with her plan of care.  CT Scan ordered and pending to be done until patient gets into a room, Patient verbalizes understanding.

## 2023-05-10 NOTE — ED PROVIDER NOTES
"  ER Provider Note    Scribed for Be Cook M.d. by Zenaida Spencer. 5/10/2023  4:23 PM    Primary Care Provider: Stan Echevarria D.O.    CHIEF COMPLAINT  Chief Complaint   Patient presents with    Bloody Stools     \"More black than bloody\"  Lower abdomen pain 5/10     Lump     Mid lower abdomen that is getting bigger and more painful, pt has reports fever at home 101f TMAX at home   Denies nausea or vomiting but has decreased appetite      EXTERNAL RECORDS REVIEWED  Outpatient Notes The patient has a history of diverticulosis.     HPI/ROS  LIMITATION TO HISTORY   Select: : None  OUTSIDE HISTORIAN(S):  None.     Yuki Angelo is a 63 y.o. female who presents to the ED complaining of for evaluation of a lump onset 4 days ago. The patient states she also has black stools, bloating, nausea, fever, body chills, decrease in appetite, and lower abdominal pain, but denies any vomiting or upper abdominal pain. She states that her highest fever was of 101 °F. She describes the mid lower abdomen pain as a 5/10 in severity. She reports that she takes an Aspirin every morning, but denies the use of ibuprofen or Aleve. She denies the use of any iron supplements or the use of Pepto Bismol. She denies any history of ulcers.     PAST MEDICAL HISTORY  Past Medical History:   Diagnosis Date    Aortic regurgitation     Dr. Morrell, Cardiolgist    Breath shortness 02/18/2021    pt unsure if sob d/t gallbladder pain or previous surgery; has talked to MD about this; surgery tomorrow- pt may go to ER today    CHEST PAIN     related to pericardial cyst  2021    DDD (degenerative disc disease), cervical 8/3/2011    Fatigue 2/21/2012    Fibromyalgia     GERD (gastroesophageal reflux disease)     High cholesterol     History of echocardiogram 2/21/2012    Hypertension 2015    well controlled on meds    Hypokalemia 8/13/2018    Multilevel degenerative disc disease     Other specified disorder of intestines  " "   constipation    Pain 02/02/2021    shoulders and neck,     Pain management contract agreement 8/13/2018    Polycystic kidney disease     \"told as child\"    Psychiatric problem     depression, anxiety    PVD (peripheral vascular disease) ([facility]) 2/21/2012    S/P tooth extraction 10/27/2015    Sleep apnea     (+) sleep study, not using CPAP; no O2    Snoring 02/18/2021    sleep study done    Thoracic outlet syndrome      SURGICAL HISTORY  Past Surgical History:   Procedure Laterality Date    NC REMOVAL OF OMENTUM  12/3/2021    Procedure: OMENTECTOMY,ROBOT-ASSISTED,USING DA CHEPE XI;  Surgeon: Lois Marte M.D.;  Location: [facility];  Service: Gyn Robotic    HYSTERECTOMY ROBOTIC XI  12/3/2021    Procedure: HYSTERECTOMY, ROBOT-ASSISTED, USING DA CHEPE XI;  Surgeon: Lois Marte M.D.;  Location: [facility];  Service: Gyn Robotic    SALPINGO OOPHORECTOMY Bilateral 12/3/2021    Procedure: SALPINGO-OOPHORECTOMY;  Surgeon: Lois Marte M.D.;  Location: [facility];  Service: Gyn Robotic    RICKI BY LAPAROSCOPY  2/19/2021    Procedure: CHOLECYSTECTOMY, LAPAROSCOPIC;  Surgeon: John H Ganser, M.D.;  [facility];  Service: General    NC THORACOSCOPY,DX NO BX Right 2/3/2021    Procedure: THORACOSCOPY;  Surgeon: John H Ganser, M.D.;  Location: [facility];  Service: General    CYST EXCISION Right 2/3/2021    Procedure: EXCISION, CYST-PERICARDIAL CYST;  Surgeon: John H Ganser, M.D.;  Location: SURGERY [facility];  Service: General    NERVE ULNAR TRANSFER Left 2019    at [place]    BUNIONECTOMY DANYA Right 11/10/2015    Procedure: BUNIONECTOMY DANYA;  Surgeon: Kermit Arroyo DDipeshP.M.;  Location: SURGERY [facility];  Service:     OSTECTOMY  11/10/2015    Procedure: OSTECTOMY - 5TH RUIS ALEKS ;  Surgeon: JAMA HensonP.M.;  Location: SURGERY [facility];  Service:     ARTHROPLASTY Right 11/10/2015    Procedure: ARTHROPLASTY - 5TH " DEROTATIONAL;  Surgeon: Kermit Arroyo D.P.M.;  Location: [facility];  Service:     HAMMERTOE CORRECTION  11/10/2015    Procedure: HAMMERTOE CORRECTION - 2ND, 3RD, 4TH ;  Surgeon: Kermit Arroyo D.P.M.;  Location: [facility];  Service:     HYSTEROSCOPY WITH VIDEO DIAGNOSTIC  3/27/2015    Performed by Johnny Jarquin M.D. at SURGERY SAME DAY [facility]    DILATION AND CURETTAGE  3/27/2015    Performed by Johnny Jarquin M.D. at SURGERY [facility] DAY [facility]    KNEE ARTHROSCOPY  3/11/2013    Performed by Freeman Ballard M.D. at SURGERY [facility]    MENISCECTOMY, KNEE, MEDIAL  3/11/2013    Performed by Freeman Ballard M.D. at [facility]    NERVE ULNAR TRANSFER  3/13/2012    Performed by KATYA NEAL at SURGERY [facility]    CARPAL TUNNEL ENDOSCOPIC  3/13/2012    Performed by KATYA NEAL at SURGERY [facility]    CERVICAL DISK AND FUSION ANTERIOR  6/21/2010    Performed by LAURA BAH at SURGERY [facility]    INCISION AND DRAINAGE GENERAL  10/22/2009    Performed by ABEL GUZMAN at SURGERY [facility]    MASS EXCISION GENERAL  10/6/2009    right breast Performed by ABEL GUZMAN at SURGERY [facility]    BREAST BIOPSY  3/5/2009    right; Performed by ABEL GUZMAN at SURGERY [facility]    RIB RESECTION  2001    right for thoracic outlet syndrome    KNEE ARTHROSCOPY  1998    left    TUBAL COAGULATION LAPAROSCOPIC BILATERAL  1997    CERVICAL FUSION POSTERIOR  1996    OTHER SURGICAL PROCEDURE  11/1992    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 Her smoking use included cigarettes. She has a 18.50 pack-year smoking history. She has never used smokeless tobacco. She reports that she does not  "currently use alcohol. She reports that she does not currently use drugs.    CURRENT MEDICATIONS  Current Discharge Medication List        CONTINUE these medications which have NOT CHANGED    Details   Semaglutide,0.25 or 0.5MG/DOS, 2 MG/3ML Solution Pen-injector Inject 0.25 mg under the skin every 7 days.  Qty: 3 mL, Refills: 0    Comments: Please use code E66.9  Associated Diagnoses: Obesity (BMI 30.0-34.9)      citalopram (CELEXA) 40 MG Tab Take 1 Tablet by mouth every day.  Qty: 90 Tablet, Refills: 3    Associated Diagnoses: Moderate episode of recurrent major depressive disorder (HCC); Generalized anxiety disorder      hydrochlorothiazide (MICROZIDE) 12.5 MG capsule Take 1 Capsule by mouth every day.  Qty: 100 Capsule, Refills: 3    Associated Diagnoses: Essential hypertension      hydrOXYzine HCl (ATARAX) 25 MG Tab Take 1 Tablet by mouth at bedtime as needed for Anxiety.  Qty: 30 Tablet, Refills: 1    Associated Diagnoses: Generalized anxiety disorder      losartan (COZAAR) 100 MG Tab Take 1 Tablet by mouth every evening.  Qty: 100 Tablet, Refills: 3    Associated Diagnoses: Essential hypertension      verapamil SR (CALAN-SR) 120 MG CR tablet Take 1 Tablet by mouth every day.  Qty: 30 Tablet, Refills: 3    Associated Diagnoses: Essential hypertension; Intractable migraine without aura and without status migrainosus      aspirin EC (ECOTRIN) 81 MG Tablet Delayed Response Take 81 mg by mouth every day.      LINZESS 290 MCG Cap Take 290 mcg by mouth 1 time a day as needed (constipation). Indications: Constipation caused by Irritable Bowel Syndrome           ALLERGIES  Penicillins and Sulfa drugs    PHYSICAL EXAM  BP (!) 184/109   Pulse 83   Temp 36.8 °C (98.2 °F) (Temporal)   Resp 16   Ht 1.6 m (5' 3\")   Wt 83.6 kg (184 lb 4.9 oz)   LMP  (LMP Unknown)   SpO2 97%   BMI 32.65 kg/m²   Constitutional: Well developed, Well nourished, Mild distress.   Cardiovascular: Normal heart rate, Normal rhythm, No " murmurs, equal pulses.   Pulmonary: Normal breath sounds, No respiratory distress, No wheezing, No rales, No rhonchi.  Chest: No chest wall tenderness or deformity.   Abdomen:Soft, Right lower quadrant tenderness. Patient had a small robotic incision done. Tender to touch. Non-reproducible. 1 cm x 2 cm deformity. No masses, no rebound, no guarding.   Back: No CVA tenderness.   Musculoskeletal: No major deformities noted, No tenderness.   Skin: Warm, Dry, No erythema, No rash.   Neurologic: Alert & oriented x 3, Normal motor function,  No focal deficits noted.   Psychiatric: Affect normal, Judgment normal, Mood normal.      DIAGNOSTIC STUDIES    Labs:   Results for orders placed or performed during the hospital encounter of 05/10/23   CBC with Differential   Result Value Ref Range    WBC 7.4 4.8 - 10.8 K/uL    RBC 5.12 4.20 - 5.40 M/uL    Hemoglobin 16.4 (H) 12.0 - 16.0 g/dL    Hematocrit 47.3 (H) 37.0 - 47.0 %    MCV 92.4 81.4 - 97.8 fL    MCH 32.0 27.0 - 33.0 pg    MCHC 34.7 33.6 - 35.0 g/dL    RDW 43.5 35.9 - 50.0 fL    Platelet Count 437 164 - 446 K/uL    MPV 8.3 (L) 9.0 - 12.9 fL    Neutrophils-Polys 51.70 44.00 - 72.00 %    Lymphocytes 37.10 22.00 - 41.00 %    Monocytes 8.70 0.00 - 13.40 %    Eosinophils 1.30 0.00 - 6.90 %    Basophils 0.90 0.00 - 1.80 %    Immature Granulocytes 0.30 0.00 - 0.90 %    Nucleated RBC 0.00 /100 WBC    Neutrophils (Absolute) 3.84 2.00 - 7.15 K/uL    Lymphs (Absolute) 2.76 1.00 - 4.80 K/uL    Monos (Absolute) 0.65 0.00 - 0.85 K/uL    Eos (Absolute) 0.10 0.00 - 0.51 K/uL    Baso (Absolute) 0.07 0.00 - 0.12 K/uL    Immature Granulocytes (abs) 0.02 0.00 - 0.11 K/uL    NRBC (Absolute) 0.00 K/uL   Complete Metabolic Panel   Result Value Ref Range    Sodium 138 135 - 145 mmol/L    Potassium 3.6 3.6 - 5.5 mmol/L    Chloride 99 96 - 112 mmol/L    Co2 29 20 - 33 mmol/L    Anion Gap 10.0 7.0 - 16.0    Glucose 97 65 - 99 mg/dL    Bun 12 8 - 22 mg/dL    Creatinine 0.92 0.50 - 1.40 mg/dL     Calcium 10.1 8.4 - 10.2 mg/dL    AST(SGOT) 17 12 - 45 U/L    ALT(SGPT) 16 2 - 50 U/L    Alkaline Phosphatase 77 30 - 99 U/L    Total Bilirubin 0.4 0.1 - 1.5 mg/dL    Albumin 4.4 3.2 - 4.9 g/dL    Total Protein 7.5 6.0 - 8.2 g/dL    Globulin 3.1 1.9 - 3.5 g/dL    A-G Ratio 1.4 g/dL   Lipase   Result Value Ref Range    Lipase 46 7 - 58 U/L   COD (ADULT)   Result Value Ref Range    ABO Grouping Only O     Rh Grouping Only POS     Antibody Screen-Cod NEG    CORRECTED CALCIUM   Result Value Ref Range    Correct Calcium 9.8 8.5 - 10.5 mg/dL   ESTIMATED GFR   Result Value Ref Range    GFR (CKD-EPI) 69 >60 mL/min/1.73 m 2   LACTIC ACID   Result Value Ref Range    Lactic Acid 1.0 0.5 - 2.0 mmol/L   Histology Request   Result Value Ref Range    Pathology Request Sent to Histo      Radiology:   The attending emergency physician has independently interpreted the diagnostic imaging associated with this visit and am waiting the final reading from the radiologist.   Preliminary interpretation is a follows: Small right lower quadrant abdominal hernia  Radiologist interpretation:   CT-ABDOMEN-PELVIS WITH   Final Result      1.  Tiny fat-containing right spigelian hernia in the right mid abdomen. See details above.   2.  Atherosclerosis.   3.  Tiny nonobstructing left renal stone.   4.  Colonic diverticulosis.         COURSE & MEDICAL DECISION MAKING     ED Observation Status? Yes; I am placing the patient in to an observation status due to a diagnostic uncertainty as well as therapeutic intensity. Patient placed in observation status at 4:30 PM, 5/10/2023.     Observation plan is as follows: Perform lab work and imaging for further evaluation.     Upon Reevaluation, the patient's condition has: not improved; and will be escalated to hospitalization.    Patient discharged from ED Observation status at 6:30 PM (Time) 5/10/2023  (Date).     INITIAL ASSESSMENT, COURSE AND PLAN  Care Narrative:     4:23 PM - Patient seen and examined at  bedside. Discussed plan of care, including performing lab work and imaging. Patient agrees to the plan of care. Ordered for UA, Lipase, CMP, CBC w/ Diff., COD, Lactic Acid, and CT-Abdomen w/ to evaluate her symptoms. Differential diagnoses include, but are not limited to: hernia, or acute appendicitis.     6:11 PM - Called Surgery.     6:22 PM - I discussed the patient's case and the above findings with Dr. Pringle (Surgery) who will perform surgery on the patient. Patient was reevaluated at bedside. Discussed lab and radiology results with the patient and informed them that surgery must be performed for the hernia and left renal stone.        PROBLEM LIST  #1 right lower quadrant abdominal pain at this point time this appears to be a small fat-containing postsurgical hernia.  Because of amount of pain the patient is having of discussed case with general surgery Dr. Pringle who will take the patient to the OR.     DISPOSITION AND DISCUSSIONS  I have discussed management of the patient with the following physicians and JACKSON's:  Dr. Pringle (Surgery)    Discussion of management with other [facility] or appropriate source(s): None       Barriers to care at this time, including but not limited to:  None .     Decision tools and prescription drugs considered including, but not limited to: Pain Medications patient was given morphine for pain. .    DISPOSITION:  Patient will be hospitalized by Dr. Pringle in guarded condition.     FINAL DIAGNOSIS  1. Irreducible Spigelian hernia    2. RLQ abdominal pain         Zenaida GARCIA (Scribe), am scribing for, and in the presence of, JG Colon*.    Electronically signed by: Zenaida Spencer (Inga), 5/10/2023    IBe M.* personally performed the services described in this documentation, as scribed by Zenaida Spencer in my presence, and it is both accurate and complete.      The note accurately reflects work and  decisions made by me.  Be Cook M.D.  5/10/2023  9:00 PM

## 2023-05-11 LAB
APPEARANCE UR: CLEAR
BACTERIA #/AREA URNS HPF: ABNORMAL /HPF
BILIRUB UR QL STRIP.AUTO: NEGATIVE
COLOR UR: YELLOW
EPI CELLS #/AREA URNS HPF: ABNORMAL /HPF
GLUCOSE UR STRIP.AUTO-MCNC: NEGATIVE MG/DL
KETONES UR STRIP.AUTO-MCNC: NEGATIVE MG/DL
LEUKOCYTE ESTERASE UR QL STRIP.AUTO: NEGATIVE
MICRO URNS: ABNORMAL
MUCOUS THREADS #/AREA URNS HPF: ABNORMAL /HPF
NITRITE UR QL STRIP.AUTO: NEGATIVE
PH UR STRIP.AUTO: 5.5 [PH] (ref 5–8)
PROT UR QL STRIP: 30 MG/DL
RBC UR QL AUTO: NEGATIVE
SP GR UR STRIP.AUTO: 1.02
UNIDENT CRYS URNS QL MICRO: ABNORMAL /HPF
WBC #/AREA URNS HPF: ABNORMAL /HPF

## 2023-05-11 PROCEDURE — 700111 HCHG RX REV CODE 636 W/ 250 OVERRIDE (IP): Performed by: SURGERY

## 2023-05-11 PROCEDURE — 81001 URINALYSIS AUTO W/SCOPE: CPT

## 2023-05-11 PROCEDURE — 96376 TX/PRO/DX INJ SAME DRUG ADON: CPT

## 2023-05-11 PROCEDURE — A9270 NON-COVERED ITEM OR SERVICE: HCPCS | Performed by: SURGERY

## 2023-05-11 PROCEDURE — G0378 HOSPITAL OBSERVATION PER HR: HCPCS

## 2023-05-11 PROCEDURE — 700102 HCHG RX REV CODE 250 W/ 637 OVERRIDE(OP): Performed by: SURGERY

## 2023-05-11 PROCEDURE — 94760 N-INVAS EAR/PLS OXIMETRY 1: CPT

## 2023-05-11 RX ORDER — IBUPROFEN 400 MG/1
800 TABLET ORAL 3 TIMES DAILY PRN
Status: DISCONTINUED | OUTPATIENT
Start: 2023-05-14 | End: 2023-05-12 | Stop reason: HOSPADM

## 2023-05-11 RX ORDER — TRAZODONE HYDROCHLORIDE 50 MG/1
50 TABLET ORAL NIGHTLY PRN
Status: DISCONTINUED | OUTPATIENT
Start: 2023-05-11 | End: 2023-05-12 | Stop reason: HOSPADM

## 2023-05-11 RX ORDER — SCOLOPAMINE TRANSDERMAL SYSTEM 1 MG/1
1 PATCH, EXTENDED RELEASE TRANSDERMAL
Status: DISCONTINUED | OUTPATIENT
Start: 2023-05-11 | End: 2023-05-12 | Stop reason: HOSPADM

## 2023-05-11 RX ORDER — CALCIUM CARBONATE 500 MG/1
500 TABLET, CHEWABLE ORAL
Status: DISCONTINUED | OUTPATIENT
Start: 2023-05-11 | End: 2023-05-12 | Stop reason: HOSPADM

## 2023-05-11 RX ORDER — ENOXAPARIN SODIUM 100 MG/ML
40 INJECTION SUBCUTANEOUS DAILY
Status: DISCONTINUED | OUTPATIENT
Start: 2023-05-12 | End: 2023-05-12 | Stop reason: HOSPADM

## 2023-05-11 RX ORDER — DEXAMETHASONE SODIUM PHOSPHATE 4 MG/ML
4 INJECTION, SOLUTION INTRA-ARTICULAR; INTRALESIONAL; INTRAMUSCULAR; INTRAVENOUS; SOFT TISSUE
Status: DISCONTINUED | OUTPATIENT
Start: 2023-05-11 | End: 2023-05-12 | Stop reason: HOSPADM

## 2023-05-11 RX ORDER — ACETAMINOPHEN 500 MG
1000 TABLET ORAL EVERY 6 HOURS
Status: DISCONTINUED | OUTPATIENT
Start: 2023-05-11 | End: 2023-05-12 | Stop reason: HOSPADM

## 2023-05-11 RX ORDER — DIPHENHYDRAMINE HYDROCHLORIDE 50 MG/ML
25 INJECTION INTRAMUSCULAR; INTRAVENOUS EVERY 6 HOURS PRN
Status: DISCONTINUED | OUTPATIENT
Start: 2023-05-11 | End: 2023-05-12 | Stop reason: HOSPADM

## 2023-05-11 RX ORDER — HALOPERIDOL 5 MG/ML
1 INJECTION INTRAMUSCULAR EVERY 6 HOURS PRN
Status: DISCONTINUED | OUTPATIENT
Start: 2023-05-11 | End: 2023-05-12 | Stop reason: HOSPADM

## 2023-05-11 RX ORDER — OXYCODONE HYDROCHLORIDE 10 MG/1
10 TABLET ORAL
Status: DISCONTINUED | OUTPATIENT
Start: 2023-05-11 | End: 2023-05-12 | Stop reason: HOSPADM

## 2023-05-11 RX ORDER — DIPHENHYDRAMINE HCL 25 MG
25 TABLET ORAL EVERY 6 HOURS PRN
Status: DISCONTINUED | OUTPATIENT
Start: 2023-05-11 | End: 2023-05-12 | Stop reason: HOSPADM

## 2023-05-11 RX ORDER — ONDANSETRON 2 MG/ML
4 INJECTION INTRAMUSCULAR; INTRAVENOUS EVERY 4 HOURS PRN
Status: DISCONTINUED | OUTPATIENT
Start: 2023-05-11 | End: 2023-05-12 | Stop reason: HOSPADM

## 2023-05-11 RX ORDER — HYDROMORPHONE HYDROCHLORIDE 1 MG/ML
0.5 INJECTION, SOLUTION INTRAMUSCULAR; INTRAVENOUS; SUBCUTANEOUS
Status: DISCONTINUED | OUTPATIENT
Start: 2023-05-11 | End: 2023-05-12 | Stop reason: HOSPADM

## 2023-05-11 RX ORDER — KETOROLAC TROMETHAMINE 30 MG/ML
15 INJECTION, SOLUTION INTRAMUSCULAR; INTRAVENOUS EVERY 6 HOURS
Status: DISCONTINUED | OUTPATIENT
Start: 2023-05-11 | End: 2023-05-12 | Stop reason: HOSPADM

## 2023-05-11 RX ORDER — ACETAMINOPHEN 500 MG
1000 TABLET ORAL EVERY 6 HOURS PRN
Status: DISCONTINUED | OUTPATIENT
Start: 2023-05-16 | End: 2023-05-12 | Stop reason: HOSPADM

## 2023-05-11 RX ORDER — OXYCODONE HYDROCHLORIDE 5 MG/1
5 TABLET ORAL
Status: DISCONTINUED | OUTPATIENT
Start: 2023-05-11 | End: 2023-05-12 | Stop reason: HOSPADM

## 2023-05-11 RX ADMIN — OXYCODONE 5 MG: 5 TABLET ORAL at 13:05

## 2023-05-11 RX ADMIN — ASPIRIN 81 MG: 81 TABLET, COATED ORAL at 05:23

## 2023-05-11 RX ADMIN — ACETAMINOPHEN 1000 MG: 500 TABLET ORAL at 19:52

## 2023-05-11 RX ADMIN — Medication 1 LOZENGE: at 05:26

## 2023-05-11 RX ADMIN — CITALOPRAM HYDROBROMIDE 40 MG: 20 TABLET ORAL at 05:23

## 2023-05-11 RX ADMIN — KETOROLAC TROMETHAMINE 15 MG: 30 INJECTION, SOLUTION INTRAMUSCULAR at 02:19

## 2023-05-11 RX ADMIN — ACETAMINOPHEN 1000 MG: 500 TABLET ORAL at 02:19

## 2023-05-11 RX ADMIN — LOSARTAN POTASSIUM 100 MG: 25 TABLET, FILM COATED ORAL at 17:05

## 2023-05-11 RX ADMIN — VERAPAMIL HYDROCHLORIDE 120 MG: 240 TABLET, FILM COATED, EXTENDED RELEASE ORAL at 08:05

## 2023-05-11 RX ADMIN — ACETAMINOPHEN 1000 MG: 500 TABLET ORAL at 08:04

## 2023-05-11 RX ADMIN — OXYCODONE 5 MG: 5 TABLET ORAL at 06:11

## 2023-05-11 RX ADMIN — ACETAMINOPHEN 1000 MG: 500 TABLET ORAL at 14:50

## 2023-05-11 RX ADMIN — HYDROCHLOROTHIAZIDE 12.5 MG: 12.5 TABLET ORAL at 08:04

## 2023-05-11 RX ADMIN — KETOROLAC TROMETHAMINE 15 MG: 30 INJECTION, SOLUTION INTRAMUSCULAR at 19:53

## 2023-05-11 RX ADMIN — KETOROLAC TROMETHAMINE 15 MG: 30 INJECTION, SOLUTION INTRAMUSCULAR at 14:50

## 2023-05-11 RX ADMIN — KETOROLAC TROMETHAMINE 15 MG: 30 INJECTION, SOLUTION INTRAMUSCULAR at 08:07

## 2023-05-11 ASSESSMENT — PAIN DESCRIPTION - PAIN TYPE
TYPE: SURGICAL PAIN

## 2023-05-11 ASSESSMENT — PATIENT HEALTH QUESTIONNAIRE - PHQ9
1. LITTLE INTEREST OR PLEASURE IN DOING THINGS: NOT AT ALL
SUM OF ALL RESPONSES TO PHQ9 QUESTIONS 1 AND 2: 0
SUM OF ALL RESPONSES TO PHQ9 QUESTIONS 1 AND 2: 0
2. FEELING DOWN, DEPRESSED, IRRITABLE, OR HOPELESS: NOT AT ALL
2. FEELING DOWN, DEPRESSED, IRRITABLE, OR HOPELESS: NOT AT ALL
1. LITTLE INTEREST OR PLEASURE IN DOING THINGS: NOT AT ALL

## 2023-05-11 NOTE — OR NURSING
2029: Patient arrived to PACU from OR via gurney. Report received from anesthesia and RN. Respirations are spontaneous and unlabored. VSS on 6L. Since incision is LEAH and CDI. Cold pack applied.     2039: c/o 5/10 abd pain. See MAR, PO analgesia given    2100: incision is LEAH and CDI    2109: pt meets criteria for transfer to room. Report called to receiving RN

## 2023-05-11 NOTE — OP REPORT
DATE OF SERVICE:  05/10/2023     PREOPERATIVE DIAGNOSIS:  Incarcerated right incisional hernia.     POSTOPERATIVE DIAGNOSIS:  Incarcerated right incisional hernia.     PROCEDURE:  Open incarcerated hernia repair, repaired hernia, 2 cm defect.     SURGEON:  Celso Pringle MD.     ASSISTANT:  None.     ANESTHESIA:  General endotracheal anesthesia.     ESTIMATED BLOOD LOSS:  10 mL.     SPECIMENS:  Hernia sac.     COMPLICATIONS:  None.     CONDITION:  Stable.     INDICATIONS FOR PROCEDURE:  This is a 63-year-old female who presents with an   incarcerated hernia in the right lower quadrant at a previous incision site   from laparoscopic surgery.  Risks, benefits and alternatives of hernia repair   were explained to her before proceeding.     OPERATIVE FINDINGS:  Incarcerated hernia containing partial side wall of the   small bowel.  Small bowel deserosalization repaired with hemostasis.  Hernia   repaired in layers over a 2.5-inch diameter mesh.     OPERATIVE TECHNIQUE:  After informed consent was obtained, the patient was   taken to the operating room and placed in supine position.  After adequate   endotracheal anesthesia was achieved, an incision was placed in the right   lower quadrant.  This was carried down with electrocautery to the level of the   fascia.  This was incised over the top of the hernia, which was not quite   full thickness through the external oblique.  We surrounded the neck of the   hernia and we were able to amputate the hernia sac with a hemostat and a 3-0   Vicryl tie.  We noted that part of the sidewall was involved in this, so we   repaired some deserosalizations at this location with 3-0 Vicryl sutures using   a Lembert technique.  The bowel appeared viable and healthy, so this was   reduced back into the abdominal cavity.     Next, we dissected out the external oblique fascia separate and placed a   2.5-inch diameter Ventralex hernia mesh into the abdomen.  We then ran a   suture line  closing the transversalis, internal oblique and posterior rectus   sheath fascias incorporating the mesh to hold it into position.  With this   completed, we closed the external oblique fascia with a separate running 0 PDS   as well.  Local anesthetic block was given to level of the fascia.     We then closed the deep tissues with 3-0 Vicryl and the skin with a running   4-0 Monocryl.  Dermabond was placed and the procedure was concluded.  The   patient was returned to the PACU in stable condition.  All instrument counts   were correct at the end of the procedure.        ______________________________  MD JOEY Aquino/DANICA/SAHIL    DD:  05/10/2023 20:49  DT:  05/10/2023 21:08    Job#:  [id number]

## 2023-05-11 NOTE — ANESTHESIA POSTPROCEDURE EVALUATION
Patient: Yuki Angelo    Procedure Summary     Date: 05/10/23 Room / Location:  OR  / SURGERY [facility]    Anesthesia Start: 1941 Anesthesia Stop: 2037    Procedure: REPAIR, HERNIA, VENTRAL (Abdomen) Diagnosis: (Incarcerated Incisional Hernia)    Surgeons: Celso Pringle M.D. Responsible Provider: Celso Boston M.D.    Anesthesia Type: general ASA Status: 3          Final Anesthesia Type: general  Last vitals  BP   Blood Pressure : 124/76    Temp   37 °C (98.6 °F)    Pulse   76   Resp   18    SpO2   90 %      Anesthesia Post Evaluation    Patient location during evaluation: PACU  Patient participation: complete - patient participated  Level of consciousness: sleepy but conscious    Airway patency: patent  Anesthetic complications: no  Cardiovascular status: hemodynamically stable  Respiratory status: acceptable  Hydration status: balanced    PONV: none          No notable events documented.     Nurse Pain Score: 7 (NPRS)

## 2023-05-11 NOTE — ED NOTES
Med rec complete per pt at bedside.  Allergies reviewed.   Per pt she has not started Ozempic  or Verapamil Yet.

## 2023-05-11 NOTE — ANESTHESIA PREPROCEDURE EVALUATION
Case: [id number] Date/Time: 05/10/23 1928    Procedure: REPAIR, HERNIA, VENTRAL (Abdomen)    Anesthesia type: General    Location:  OR  / SURGERY [facility]    Surgeons: Celso Pringle M.D.          Relevant Problems   ANESTHESIA   (positive) Sleep apnea      NEURO   (positive) Intractable migraine without aura and without status migrainosus   (positive) Seizure cerebral (HCC)      CARDIAC   (positive) Aortic regurgitation   (positive) Coronary artery calcification seen on CAT scan   (positive) Essential hypertension   (positive) Intractable migraine without aura and without status migrainosus      Other   (positive) Cervical radiculopathy   (positive) DDD (degenerative disc disease), cervical   (positive) Fibromyalgia   (positive) Generalized anxiety disorder   (positive) Hereditary hemochromatosis (HCC)   (positive) Neck mass   (positive) Pericardial cyst   (positive) Precordial pain   (positive) Pulmonary nodules   (positive) S/P cervical spinal fusion   (positive) TOS (thoracic outlet syndrome)     Anes H&P:  PAST MEDICAL HISTORY:   63 y.o. female who presents for Procedure(s) (LRB):  REPAIR, HERNIA, VENTRAL (N/A).  She has current and past medical problems significant for:    Past Medical History:   Diagnosis Date   • Aortic regurgitation     Dr. Morrell, Cardiolgist   • Breath shortness 02/18/2021    pt unsure if sob d/t gallbladder pain or previous surgery; has talked to MD about this; surgery tomorrow- pt may go to ER today   • CHEST PAIN     related to pericardial cyst  2021   • DDD (degenerative disc disease), cervical 8/3/2011   • Fatigue 2/21/2012   • Fibromyalgia    • GERD (gastroesophageal reflux disease)    • High cholesterol    • History of echocardiogram 2/21/2012   • Hypertension 2015    well controlled on meds   • Hypokalemia 8/13/2018   • Multilevel degenerative disc disease    • Other specified disorder of intestines     constipation   • Pain 02/02/2021    shoulders and neck,    • Pain  "management contract agreement 8/13/2018   • Polycystic kidney disease     \"told as child\"   • Psychiatric problem     depression, anxiety   • PVD (peripheral vascular disease) (HCC) 2/21/2012   • S/P tooth extraction 10/27/2015   • Sleep apnea     (+) sleep study, not using CPAP; no O2   • Snoring 02/18/2021    sleep study done   • Thoracic outlet syndrome        SMOKING/ALCOHOL/RECREATIONAL DRUG USE:  Social History     Tobacco Use   • Smoking status: Former     Packs/day: 0.50     Years: 37.00     Pack years: 18.50     Types: Cigarettes     Quit date: 5/1/2013     Years since quitting: 10.0   • Smokeless tobacco: Never   • Tobacco comments:      Started smoking at age 16   Vaping Use   • Vaping Use: Every day   • Substances: Nicotine   Substance Use Topics   • Alcohol use: Not Currently     Comment: social   • Drug use: Not Currently     Social History     Substance and Sexual Activity   Drug Use Not Currently       PAST SURGICAL HISTORY:  Past Surgical History:   Procedure Laterality Date   • NC REMOVAL OF OMENTUM  12/3/2021    Procedure: OMENTECTOMY,ROBOT-ASSISTED,USING DA CHEPE XI;  Surgeon: Lois Marte M.D.;  Location: SURGERY [facility];  Service: Gyn Robotic   • HYSTERECTOMY ROBOTIC XI  12/3/2021    Procedure: HYSTERECTOMY, ROBOT-ASSISTED, USING DA CHEPE XI;  Surgeon: Lois Marte M.D.;  Location: [facility];  Service: Gyn Robotic   • SALPINGO OOPHORECTOMY Bilateral 12/3/2021    Procedure: SALPINGO-OOPHORECTOMY;  Surgeon: Lois Marte M.D.;  Location: SURGERY [facility];  Service: Gyn Robotic   • RICKI BY LAPAROSCOPY  2/19/2021    Procedure: CHOLECYSTECTOMY, LAPAROSCOPIC;  Surgeon: John H Ganser, M.D.;  Location: SURGERY SAME DAY [facility];  Service: General   • NC THORACOSCOPY,DX NO BX Right 2/3/2021    Procedure: THORACOSCOPY;  Surgeon: John H Ganser, M.D.;  Location: SURGERY [facility];  Service: General   • CYST EXCISION Right 2/3/2021    Procedure: EXCISION, CYST-PERICARDIAL " CYST;  Surgeon: John H Ganser, M.D.;  Location: [facility];  Service: General   • NERVE ULNAR TRANSFER Left 2019    at [place]   • BUNIONECTOMY DANYA Right 11/10/2015    Procedure: BUNIONECTOMY DANYA;  Surgeon: Kermit Arroyo D.P.M.;  Location: [facility];  Service:    • OSTECTOMY  11/10/2015    Procedure: OSTECTOMY - 5TH TAILORS BUNION ;  Surgeon: Kermit Arroyo D.P.M.;  Location: [facility];  Service:    • ARTHROPLASTY Right 11/10/2015    Procedure: ARTHROPLASTY - 5TH DEROTATIONAL;  Surgeon: Kermit Arroyo D.P.M.;  Location: [facility];  Service:    • HAMMERTOE CORRECTION  11/10/2015    Procedure: HAMMERTOE CORRECTION - 2ND, 3RD, 4TH ;  Surgeon: Kermit Arroyo D.P.M.;  Location: [facility];  Service:    • HYSTEROSCOPY WITH VIDEO DIAGNOSTIC  3/27/2015    Performed by Johnny Jarquin M.D. at SURGERY SAME DAY [facility]   • DILATION AND CURETTAGE  3/27/2015    Performed by Johnny Jarquin M.D. at SURGERY SAME DAY [facility]   • KNEE ARTHROSCOPY  3/11/2013    Performed by Freeman Ballard M.D. at [facility]   • MENISCECTOMY, KNEE, MEDIAL  3/11/2013    Performed by Freeman Ballard M.D. at [facility]   • NERVE ULNAR TRANSFER  3/13/2012    Performed by KATYA NEAL at [facility]   • CARPAL TUNNEL ENDOSCOPIC  3/13/2012    Performed by KATYA NEAL at [facility]   • CERVICAL DISK AND FUSION ANTERIOR  6/21/2010    Performed by LAURA BAH at SURGERY [facility]   • INCISION AND DRAINAGE GENERAL  10/22/2009    Performed by ABEL GUZMAN at [facility]   • MASS EXCISION GENERAL  10/6/2009    right breast Performed by ABEL GUZMAN at [facility]   • BREAST BIOPSY  3/5/2009    right; Performed by ABEL GUZMAN at [facility]   • RIB RESECTION  2001    right for thoracic outlet syndrome   • KNEE ARTHROSCOPY  1998     left   • TUBAL COAGULATION LAPAROSCOPIC BILATERAL  1997   • CERVICAL FUSION POSTERIOR  1996   • OTHER SURGICAL PROCEDURE  11/1992    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     Current Outpatient Medications on File Prior to Encounter   Medication Sig Dispense Refill   • Semaglutide,0.25 or 0.5MG/DOS, 2 MG/3ML Solution Pen-injector Inject 0.25 mg under the skin every 7 days. 3 mL 0   • citalopram (CELEXA) 40 MG Tab Take 1 Tablet by mouth every day. 90 Tablet 3   • hydrochlorothiazide (MICROZIDE) 12.5 MG capsule Take 1 Capsule by mouth every day. 100 Capsule 3   • hydrOXYzine HCl (ATARAX) 25 MG Tab Take 1 Tablet by mouth at bedtime as needed for Anxiety. 30 Tablet 1   • losartan (COZAAR) 100 MG Tab Take 1 Tablet by mouth every evening. 100 Tablet 3   • verapamil SR (CALAN-SR) 120 MG CR tablet Take 1 Tablet by mouth every day. 30 Tablet 3   • aspirin EC (ECOTRIN) 81 MG Tablet Delayed Response Take 81 mg by mouth every day.     • LINZESS 290 MCG Cap Take 290 mcg by mouth 1 time a day as needed (constipation). Indications: Constipation caused by Irritable Bowel Syndrome         LABS:  Lab Results   Component Value Date/Time    HEMOGLOBIN 16.4 (H) 05/10/2023 1426    HEMATOCRIT 47.3 (H) 05/10/2023 1426    WBC 7.4 05/10/2023 1426     Lab Results   Component Value Date/Time    SODIUM 138 05/10/2023 1426    POTASSIUM 3.6 05/10/2023 1426    CHLORIDE 99 05/10/2023 1426    CO2 29 05/10/2023 1426    GLUCOSE 97 05/10/2023 1426    BUN 12 05/10/2023 1426    CALCIUM 10.1 05/10/2023 1426         PREVIOUS ANESTHETICS: See EMR  __________________________________________      Physical Exam    Airway   Mallampati: III  TM distance: >3 FB  Neck ROM: full       Cardiovascular - normal exam  Rhythm:  regular  Rate: normal  (-) murmur     Dental - normal exam           Pulmonary - normal exam  Breath sounds clear to auscultation     Abdominal   (+) obese     Neurological - normal exam                 Anesthesia Plan    ASA 3   ASA physical status 3 criteria: hypertension - poorly controlled and CAD/stents (> 3 months)    Plan - general       Airway plan will be ETT          Induction: intravenous    Postoperative Plan: Postoperative administration of opioids is intended.    Pertinent diagnostic labs and testing reviewed    Informed Consent:    Anesthetic plan and risks discussed with patient.    Use of blood products discussed with: patient whom consented to blood products.

## 2023-05-11 NOTE — PROGRESS NOTES
Received bedside report from NOC RN. Pt is A&O 4. Pt has complaints of pain. Educated on pain medication use. Denies N/V as of assessment. Assumed care. Safety precaution in place. All needs met at this time. Hourly rounding in place.

## 2023-05-11 NOTE — H&P
"Surgery General History & Physical Note    Date  5/10/2023    Primary Care Physician  Stan Echevarria D.O.    CC  Incarcerated Incisional Hernia    HPI  This is a 63 y.o. female who presented with acute onset pain in the RLQ at a previous incision site.  The pain is constant and severe and is causing her some nausea as well.  She has an extensive surgical and medical history including hysterectomy and cholecystectomy.  She does not take any blood thinners or steroids as this time and she is currently NPO    Past Medical History:   Diagnosis Date    Aortic regurgitation     Dr. Morrell, Cardiolgist    Breath shortness 02/18/2021    pt unsure if sob d/t gallbladder pain or previous surgery; has talked to MD about this; surgery tomorrow- pt may go to ER today    CHEST PAIN     related to pericardial cyst  2021    DDD (degenerative disc disease), cervical 8/3/2011    Fatigue 2/21/2012    Fibromyalgia     GERD (gastroesophageal reflux disease)     High cholesterol     History of echocardiogram 2/21/2012    Hypertension 2015    well controlled on meds    Hypokalemia 8/13/2018    Multilevel degenerative disc disease     Other specified disorder of intestines     constipation    Pain 02/02/2021    shoulders and neck,     Pain management contract agreement 8/13/2018    Polycystic kidney disease     \"told as child\"    Psychiatric problem     depression, anxiety    PVD (peripheral vascular disease) ([facility]) 2/21/2012    S/P tooth extraction 10/27/2015    Sleep apnea     (+) sleep study, not using CPAP; no O2    Snoring 02/18/2021    sleep study done    Thoracic outlet syndrome        Past Surgical History:   Procedure Laterality Date    NH REMOVAL OF OMENTUM  12/3/2021    Procedure: OMENTECTOMY,ROBOT-ASSISTED,USING DA CHEPE XI;  Surgeon: Lois Marte M.D.;  Location: SURGERY [facility];  Service: Gyn Robotic    HYSTERECTOMY ROBOTIC XI  12/3/2021    Procedure: HYSTERECTOMY, ROBOT-ASSISTED, USING DA CHEPE XI;  Surgeon: " Lois Marte M.D.;  Location: SURGERY [facility];  Service: Gyn Robotic    SALPINGO OOPHORECTOMY Bilateral 12/3/2021    Procedure: SALPINGO-OOPHORECTOMY;  Surgeon: Lois Marte M.D.;  Location: SURGERY [facility];  Service: Gyn Robotic    RICKI BY LAPAROSCOPY  2/19/2021    Procedure: CHOLECYSTECTOMY, LAPAROSCOPIC;  Surgeon: John H Ganser, M.D.;  Location: SURGERY SAME DAY [facility];  Service: General    [place] THORACOSCOPY,DX NO BX Right 2/3/2021    Procedure: THORACOSCOPY;  Surgeon: John H Ganser, M.D.;  Location: SURGERY [facility];  Service: General    CYST EXCISION Right 2/3/2021    Procedure: EXCISION, CYST-PERICARDIAL CYST;  Surgeon: John H Ganser, M.D.;  Location: SURGERY [facility];  Service: General    NERVE ULNAR TRANSFER Left 2019    at [place]    BUNIONECTOMY DANYA Right 11/10/2015    Procedure: BUNIONECTOMY DANYA;  Surgeon: JAMA HensonPDipeshMDipesh;  Location: [facility];  Service:     OSTECTOMY  11/10/2015    Procedure: OSTECTOMY - 5TH TAILORS BUNION ;  Surgeon: JAMA HensonPDipeshMDipesh;  Location: [facility];  Service:     ARTHROPLASTY Right 11/10/2015    Procedure: ARTHROPLASTY - 5TH DEROTATIONAL;  Surgeon: JAMA HensonPDipeshMDipesh;  Location: [facility];  Service:     HAMMERTOE CORRECTION  11/10/2015    Procedure: HAMMERTOE CORRECTION - 2ND, 3RD, 4TH ;  Surgeon: JAMA HensonP.MDipesh;  Location: [facility];  Service:     HYSTEROSCOPY WITH VIDEO DIAGNOSTIC  3/27/2015    Performed by Johnny Jarquin M.D. at SURGERY SAME DAY [facility] ORS    DILATION AND CURETTAGE  3/27/2015    Performed by Johnny Jarquin M.D. at SURGERY SAME DAY [facility] ORS    KNEE ARTHROSCOPY  3/11/2013    Performed by Freeman Ballard M.D. at [facility] ORS    MENISCECTOMY, KNEE, MEDIAL  3/11/2013    Performed by Freeman Ballard M.D. at [facility] ORS    NERVE ULNAR TRANSFER  3/13/2012    Performed by KATYA NEAL at  SURGERY [facility] ORS    CARPAL TUNNEL ENDOSCOPIC  3/13/2012    Performed by KATYA NEAL at SURGERY [facility] ORS    CERVICAL DISK AND FUSION ANTERIOR  6/21/2010    Performed by LAURA BAH at SURGERY [facility] ORS    INCISION AND DRAINAGE GENERAL  10/22/2009    Performed by ABEL GUZMAN at SURGERY [facility] ORS    MASS EXCISION GENERAL  10/6/2009    right breast Performed by ABEL GUZMAN at SURGERY [facility] ORS    BREAST BIOPSY  3/5/2009    right; Performed by ABEL GUZMAN at SURGERY [facility] ORS    RIB RESECTION  2001    right for thoracic outlet syndrome    KNEE ARTHROSCOPY  1998    left    TUBAL COAGULATION LAPAROSCOPIC BILATERAL  1997    CERVICAL FUSION POSTERIOR  1996    OTHER SURGICAL PROCEDURE  11/1992    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           Social History     Socioeconomic History    Marital status:      Spouse name: Not on file    Number of children: Not on file    Years of education: Not on file    Highest education level: 12th grade   Occupational History    Not on file   Tobacco Use    Smoking status: Former     Packs/day: 0.50     Years: 37.00     Pack years: 18.50     Types: Cigarettes     Quit date: 5/1/2013     Years since quitting: 10.0    Smokeless tobacco: Never    Tobacco comments:      Started smoking at age 16   Vaping Use    Vaping Use: Every day    Substances: Nicotine   Substance and Sexual Activity    Alcohol use: Not Currently     Comment: social    Drug use: Not Currently    Sexual activity: Yes     Partners: Male     Birth control/protection: Post-Menopausal     Comment: .    Other Topics Concern    Not on file   Social History Narrative    Not on file     Social Determinants of Health     Financial Resource Strain: Low Risk   (4/28/2023)    Overall Financial Resource Strain (CARDIA)     Difficulty of Paying Living Expenses: Not hard at all   Food Insecurity: No Food Insecurity (4/28/2023)    Hunger Vital Sign     Worried About Running Out of Food in the Last Year: Never true     Ran Out of Food in the Last Year: Never true   Transportation Needs: No Transportation Needs (4/28/2023)    PRAPARE - Transportation     Lack of Transportation (Medical): No     Lack of Transportation (Non-Medical): No   Physical Activity: Inactive (4/28/2023)    Exercise Vital Sign     Days of Exercise per Week: 0 days     Minutes of Exercise per Session: 0 min   Stress: Stress Concern Present (4/28/2023)    [employer] - Occupational Stress Questionnaire     Feeling of Stress : To some extent   Social Connections: Moderately Integrated (4/28/2023)    Social Connection and Isolation Panel [NHANES]     Frequency of Communication with Friends and Family: Three times a week     Frequency of Social Gatherings with Friends and Family: Twice a week     Attends Mu-ism Services: 1 to 4 times per year     Active Member of Clubs or Organizations: No     Attends Club or Organization Meetings: Never     Marital Status:    Intimate Partner Violence: Not on file   Housing Stability: Low Risk  (4/28/2023)    Housing Stability Vital Sign     Unable to Pay for Housing in the Last Year: No     Number of Places Lived in the Last Year: 1     Unstable Housing in the Last Year: No       Family History   Adopted: Yes   Problem Relation Age of Onset    No Known Problems Son     No Known Problems Daughter        Allergies  Penicillins and Sulfa drugs    Review of Systems  Negative except as above      Physical Exam  A@O x3, NAD  NCAT, No scleral ictrus  Neck nontender, no lyphadenopathy  Nml Rep effort  RRR, 2+ pulses  Abdomen soft, focal tenderness in RLQ at prior incision site  Extremities warm and well perfused, no pitting edema  Skin shows no  rashes or lesions.     Vital Signs  Blood Pressure : (!) 164/108   Temperature: 36.7 °C (98.1 °F)   Pulse: (!) 110   Respiration: 20   Pulse Oximetry: 93 %       Labs:  Recent Labs     05/10/23  1426   WBC 7.4   RBC 5.12   HEMOGLOBIN 16.4*   HEMATOCRIT 47.3*   MCV 92.4   MCH 32.0   MCHC 34.7   RDW 43.5   PLATELETCT 437   MPV 8.3*     Recent Labs     05/10/23  1426   SODIUM 138   POTASSIUM 3.6   CHLORIDE 99   CO2 29   GLUCOSE 97   BUN 12   CREATININE 0.92   CALCIUM 10.1         Recent Labs     05/10/23  1426   ASTSGOT 17   ALTSGPT 16   TBILIRUBIN 0.4   ALKPHOSPHAT 77   GLOBULIN 3.1       Radiology:  CT-ABDOMEN-PELVIS WITH   Final Result      1.  Tiny fat-containing right spigelian hernia in the right mid abdomen. See details above.   2.  Atherosclerosis.   3.  Tiny nonobstructing left renal stone.   4.  Colonic diverticulosis.            Assessment/Plan:  Incarcerated Ventral Hernia- likely incisional given exam findings.  Discussed immediate repair with her.  Preferred approach would be robotic, but his is not currently available at this institution.  Risks of waiting in the setting of an obstruction outweigh benefits of a minimally invasive approach.  She is NPO and consents to open incarcerated ventral hernia repair with mesh placement.  Risks/benefits/alternatives of surgery discussed with her.

## 2023-05-11 NOTE — CARE PLAN
The patient is Stable - Low risk of patient condition declining or worsening    Shift Goals  Clinical Goals: Pt will be able to void and ambulate within the shift. Pt will maintain oxygen saturation >90%.  Patient Goals: pain control    Progress made toward(s) clinical / shift goals:  Pt able to void and ambulate to the bathroom and back to bed with standby assist within the shift. Pt for urinalysis; unable to catch urine on hat. Pt refused to ambulate at this time. Pt wean to room air with saturation at 91%.   Hourly rounding in progress.       Patient is not progressing towards the following goals:

## 2023-05-11 NOTE — ANESTHESIA PROCEDURE NOTES
Airway    Date/Time: 5/10/2023 7:47 PM    Performed by: Celso Boston M.D.  Authorized by: Celso Boston M.D.    Location:  OR  Urgency:  Elective  Difficult Airway: No    Indications for Airway Management:  Anesthesia      Spontaneous Ventilation: absent    Sedation Level:  Deep  Preoxygenated: Yes    Patient Position:  Sniffing  Mask Difficulty Assessment:  1 - vent by mask  Final Airway Type:  Endotracheal airway  Final Endotracheal Airway:  ETT  Cuffed: Yes    Technique Used for Successful ETT Placement:  Direct laryngoscopy    Insertion Site:  Oral  Blade Type:  Manley  Laryngoscope Blade/Videolaryngoscope Blade Size:  2  ETT Size (mm):  7.0  Measured from:  Teeth  ETT to Teeth (cm):  21  Placement Verified by: auscultation and capnometry    Cormack-Lehane Classification:  Grade I - full view of glottis  Number of Attempts at Approach:  1

## 2023-05-11 NOTE — DISCHARGE PLANNING
HTH/SCP TCN chart review completed. Collaborated with LUCRECIA Armstrong, prior to meeting with the patient. The most current review of medical record, knowledge of pt's PLOF and social support, LACE+ score of 46, 6 clicks scores of 24 ADL and 22 mobility were considered.      TCN met with patient at bedside. Introduced TCN program. Provided education regarding post acute levels of care. Discussed HTH/SCP plan benefits (Meds to Beds, medical uber and [facility] transitional care). Pt verbalized understanding.     Patient endorsed she resides with her  in a 2 level home with 24 steps to the second floor. Patient endorsed concern regarding return to home today. TCN collaborated with attending nurse, advising of patient concern. Per chart review, it appears plan is for patient discharge tomorrow morning (please see General Surgery, Dr. Pringle, note 5/11/2023 at 8:57AM).    Per Kardex, patient documented to ambulate 15 feet independent of assistive device. No choice for provider consults or choice for post acute levels of care indicated at this time in collaboration with CM. Patient requested meds to beds and was amendable to transitional care through [facility] with referral sent.     TCN will continue to follow and collaborate with discharge planning team as additional post acute needs arise. Thank you.     Completed today  [facility] referral (Y) sent 5/11/2023

## 2023-05-11 NOTE — CARE PLAN
Problem: Pain - Standard  Goal: Alleviation of pain or a reduction in pain to the patient’s comfort goal  Description: Target End Date:  Prior to discharge or change in level of care    Document on Vitals flowsheet    1.  Document pain using the appropriate pain scale per order or unit policy  2.  Educate and implement non-pharmacologic comfort measures (i.e. relaxation, distraction, massage, cold/heat therapy, etc.)  3.  Pain management medications as ordered  4.  Reassess pain after pain med administration per policy  5.  If opiods administered assess patient's response to pain medication is appropriate per POSS sedation scale  6.  Follow pain management plan developed in collaboration with patient and interdisciplinary team (including palliative care or pain specialists if applicable)  Outcome: Progressing   The patient is Stable - Low risk of patient condition declining or worsening    Shift Goals  Clinical Goals: pain control  Patient Goals: comfort    Progress made toward(s) clinical / shift goals:  Patient was able to report pain relief with pain medication per MAR and cold pack within the shift.     Patient is not progressing towards the following goals: n/a

## 2023-05-11 NOTE — DISCHARGE PLANNING
Care Transition Team Assessment    Information Source  Orientation Level: Oriented X4  Who is responsible for making decisions for patient? : Patient    Readmission Evaluation  Is this a readmission?: No    Elopement Risk  Legal Hold: No  Ambulatory or Self Mobile in Wheelchair: Yes  Disoriented: No  Psychiatric Symptoms: None  History of Wandering: No  Elopement this Admit: No  Vocalizing Wanting to Leave: No  Displays Behaviors, Body Language Wanting to Leave: No-Not at Risk for Elopement  Elopement Risk: Not at Risk for Elopement    Interdisciplinary Discharge Planning  Lives with - Patient's Self Care Capacity: Spouse  Patient or legal guardian wants to designate a caregiver: No  Support Systems: Friends / Neighbors, Family Member(s)  Housing / Facility: 2 Story House  Durable Medical Equipment: Not Applicable    Discharge Preparedness  What is your plan after discharge?: Home with help  What are your discharge supports?: Spouse  Prior Functional Level: Ambulatory, Independent with Activities of Daily Living, Independent with Medication Management    Functional Assesment  Prior Functional Level: Ambulatory, Independent with Activities of Daily Living, Independent with Medication Management    Finances  Financial Barriers to Discharge: No  Prescription Coverage: Yes    Vision / Hearing Impairment  Vision Impairment : Yes  Right Eye Vision: Impaired, Wears Glasses  Left Eye Vision: Impaired, Wears Glasses  Hearing Impairment : No         Advance Directive  Advance Directive?: DPOA for Health Care  Durable Power of  Name and Contact : Freeman Angelo (spouse) [phone]    Domestic Abuse  Have you ever been the victim of abuse or violence?: No  Physical Abuse or Sexual Abuse: No  Verbal Abuse or Emotional Abuse: No  Possible Abuse/Neglect Reported to:: Not Applicable    Psychological Assessment  History of Substance Abuse: Alcohol, Marijuana, Other (comment) (Cigarette Smoking and Vaping (nicotine))  Date  Last Used - Alcohol: socially  Date Last Used - Marijuana: not currently  Substance Abuse Comments: Former cigarette smoker, Currenly vaping (nicotine), Drinking alcohol socially, denies current illicit/recreational drugs  History of Psychiatric Problems: Yes (Major Depressive Disorder; Generalized Anxiety Disorder)  Non-compliant with Treatment: No  Newly Diagnosed Illness: No    Discharge Risks or Barriers  Discharge risks or barriers?: Substance abuse, Complex medical needs  Patient risk factors: Bariatric, Complex medical needs, Polypharmacy (>7 prescriptions), Substance abuse    Anticipated Discharge Information  Discharge Disposition: Discharged to home/self care (01)  Discharge Address: [address] [place] [id number]  Discharge Contact Phone Number: [phone]    Case Management Discharge Planning    Admission Date: 5/10/2023  GMLOS:    ALOS: 0    6-Clicks ADL Score: 24  6-Clicks Mobility Score: 22      Anticipated Discharge Dispo: Discharge Disposition: Discharged to home/self care (01)  Discharge Address: [address] [place] [address]  Discharge Contact Phone Number: [phone]    DME Needed: No    Action(s) Taken: Updated Provider/Nurse on Discharge Plan    Escalations Completed: Provider and Bedside RN    Medically Clear: No    Next Steps: Continue to monitor for changes and update the discharge plan accordingly. Follow-up with the Attending and Bedside RN for any issues/concerns and assist as indicated.    Barriers to Discharge: Medical clearance and Pending Procedures    Is the patient up for discharge tomorrow: No

## 2023-05-11 NOTE — PROGRESS NOTES
4 Eyes Skin Assessment Completed by Gem RN and Timothy RN.    Head WDL  Ears WDL  Nose WDL  Mouth WDL  Neck WDL  Breast/Chest WDL  Shoulder Blades WDL  Spine Scar  (R) Arm/Elbow/Hand WDL  (L) Arm/Elbow/Hand WDL  Abdomen Incision at Right  Groin WDL  Scrotum/Coccyx WDL  Buttocks round-shape skin tear   (R) Leg WDL  (L) Leg WDL  (R) Heel/Foot/Toe WDL  (L) Heel/Foot/Toe WDL          Devices In Places Blood Pressure Cuff, Pulse Ox, and SCD's      Interventions In Place Pillows    Possible Skin Injury No    Pictures Uploaded Into Epic Yes  Wound Consult Placed N/A  RN Wound Prevention Protocol Ordered No

## 2023-05-11 NOTE — ANESTHESIA TIME REPORT
Anesthesia Start and Stop Event Times     Date Time Event    5/10/2023 1931 Ready for Procedure     1941 Anesthesia Start     2037 Anesthesia Stop        Responsible Staff  05/10/23    Name Role Begin End    Celso Boston M.D. Anesth 1941 2037        Overtime Reason:  no overtime (within assigned shift)    Comments:

## 2023-05-11 NOTE — PROGRESS NOTES
"S:  63 y.o.female s/p Open VHR for incarcerated hernia  POD# 1.  Patient doing well this morning, minimal appetite, but she is tolerating clears PO.  Ambulating in room, no n/v so far.  Wound is causing some pain, controlled with PO medication.  Pt does not want to go home today.      O:  /76   Pulse 76   Temp 37 °C (98.6 °F) (Oral)   Resp 18   Ht 1.6 m (5' 3\")   Wt 83.6 kg (184 lb 4.9 oz)   SpO2 90%   I/O last 3 completed shifts:  In: 540 [P.O.:290; I.V.:250]  Out: 0   Recent Labs     05/10/23  1426   SODIUM 138   POTASSIUM 3.6   CHLORIDE 99   CO2 29   GLUCOSE 97   BUN 12   CREATININE 0.92   CALCIUM 10.1     Recent Labs     05/10/23  1426   WBC 7.4   RBC 5.12   HEMOGLOBIN 16.4*   HEMATOCRIT 47.3*   MCV 92.4   MCH 32.0   MCHC 34.7   RDW 43.5   PLATELETCT 437   MPV 8.3*       Alert and Oriented x3, No Acute Distress  Normal Respiratory Effort  Abdomen soft, appropriately tender  Incisions/Bandages clean/dry/intact  Extremities warm and well perfused      A/P:  Pain control with PO Meds  Diet as tolerated  Fluids SLIV  Ambulate tid and ad lisa  Medically ready for discharge, pt not ready to go home yet, plan for discharge tomorrow am.    "

## 2023-05-11 NOTE — PROGRESS NOTES
Report received from PACU nurse.  Pt came in via gurney; and transferred to bed on comfortable position.   Pt alert and oriented x4. Received with ongoing oxygen at 10 L/min via oxymask per ERAS. No signs of SOB/respiratory distress.   Assessment done. Pain scale of 0/10 at this time.Denies any nausea.  Dermabond dressing on right side of abdomen noted, kept monitored.  Needs attended well. Fall precautions in place. Call light within reach.  Vs taken and monitored.   Plan of care reviewed. Oriented on room and call light use for assistance.   Hourly rounding in progress.      2202 Called Dr. Pringle for order of prn pain medication and left message.    2208 Received call back from Dr. Pringle. MD states telephone order of oxycodone 5 mg tab every for hours for pain; and pt may have regular diet.   Order placed.

## 2023-05-12 VITALS
HEIGHT: 63 IN | DIASTOLIC BLOOD PRESSURE: 78 MMHG | SYSTOLIC BLOOD PRESSURE: 130 MMHG | OXYGEN SATURATION: 94 % | BODY MASS INDEX: 32.66 KG/M2 | WEIGHT: 184.3 LBS | RESPIRATION RATE: 16 BRPM | TEMPERATURE: 98.2 F | HEART RATE: 76 BPM

## 2023-05-12 PROBLEM — G89.18 POST-OPERATIVE PAIN: Status: ACTIVE | Noted: 2023-05-12

## 2023-05-12 LAB
ANION GAP SERPL CALC-SCNC: 10 MMOL/L (ref 7–16)
BUN SERPL-MCNC: 22 MG/DL (ref 8–22)
CALCIUM SERPL-MCNC: 9.1 MG/DL (ref 8.4–10.2)
CHLORIDE SERPL-SCNC: 101 MMOL/L (ref 96–112)
CO2 SERPL-SCNC: 26 MMOL/L (ref 20–33)
CREAT SERPL-MCNC: 1.22 MG/DL (ref 0.5–1.4)
ERYTHROCYTE [DISTWIDTH] IN BLOOD BY AUTOMATED COUNT: 45.9 FL (ref 35.9–50)
GFR SERPLBLD CREATININE-BSD FMLA CKD-EPI: 49 ML/MIN/1.73 M 2
GLUCOSE SERPL-MCNC: 113 MG/DL (ref 65–99)
HCT VFR BLD AUTO: 39 % (ref 37–47)
HGB BLD-MCNC: 13 G/DL (ref 12–16)
MAGNESIUM SERPL-MCNC: 1.9 MG/DL (ref 1.5–2.5)
MCH RBC QN AUTO: 31.8 PG (ref 27–33)
MCHC RBC AUTO-ENTMCNC: 33.3 G/DL (ref 33.6–35)
MCV RBC AUTO: 95.4 FL (ref 81.4–97.8)
PLATELET # BLD AUTO: 357 K/UL (ref 164–446)
PMV BLD AUTO: 8.7 FL (ref 9–12.9)
POTASSIUM SERPL-SCNC: 3.5 MMOL/L (ref 3.6–5.5)
RBC # BLD AUTO: 4.09 M/UL (ref 4.2–5.4)
SODIUM SERPL-SCNC: 137 MMOL/L (ref 135–145)
WBC # BLD AUTO: 9.4 K/UL (ref 4.8–10.8)

## 2023-05-12 PROCEDURE — 96376 TX/PRO/DX INJ SAME DRUG ADON: CPT

## 2023-05-12 PROCEDURE — 83735 ASSAY OF MAGNESIUM: CPT

## 2023-05-12 PROCEDURE — 94760 N-INVAS EAR/PLS OXIMETRY 1: CPT

## 2023-05-12 PROCEDURE — 36415 COLL VENOUS BLD VENIPUNCTURE: CPT

## 2023-05-12 PROCEDURE — 80048 BASIC METABOLIC PNL TOTAL CA: CPT

## 2023-05-12 PROCEDURE — 700111 HCHG RX REV CODE 636 W/ 250 OVERRIDE (IP): Performed by: SURGERY

## 2023-05-12 PROCEDURE — 700102 HCHG RX REV CODE 250 W/ 637 OVERRIDE(OP): Performed by: SURGERY

## 2023-05-12 PROCEDURE — A9270 NON-COVERED ITEM OR SERVICE: HCPCS | Performed by: SURGERY

## 2023-05-12 PROCEDURE — 85027 COMPLETE CBC AUTOMATED: CPT

## 2023-05-12 PROCEDURE — G0378 HOSPITAL OBSERVATION PER HR: HCPCS

## 2023-05-12 RX ORDER — OXYCODONE HYDROCHLORIDE 5 MG/1
5 TABLET ORAL EVERY 4 HOURS PRN
Qty: 12 TABLET | Refills: 0 | Status: SHIPPED | OUTPATIENT
Start: 2023-05-12 | End: 2023-05-15

## 2023-05-12 RX ORDER — IBUPROFEN 600 MG/1
600 TABLET ORAL EVERY 6 HOURS
Qty: 45 TABLET | Refills: 0 | Status: SHIPPED | OUTPATIENT
Start: 2023-05-12 | End: 2023-10-23 | Stop reason: SDUPTHER

## 2023-05-12 RX ORDER — LOSARTAN POTASSIUM 25 MG/1
100 TABLET ORAL
Status: DISCONTINUED | OUTPATIENT
Start: 2023-05-12 | End: 2023-05-12 | Stop reason: HOSPADM

## 2023-05-12 RX ORDER — ACETAMINOPHEN 325 MG/1
650 TABLET ORAL EVERY 6 HOURS
Qty: 60 TABLET | Refills: 0 | Status: SHIPPED | OUTPATIENT
Start: 2023-05-12

## 2023-05-12 RX ORDER — POLYETHYLENE GLYCOL 3350 17 G/17G
17 POWDER, FOR SOLUTION ORAL DAILY
Qty: 20 EACH | Refills: 0 | Status: SHIPPED | OUTPATIENT
Start: 2023-05-12 | End: 2024-03-18

## 2023-05-12 RX ADMIN — ACETAMINOPHEN 1000 MG: 500 TABLET ORAL at 13:20

## 2023-05-12 RX ADMIN — ASPIRIN 81 MG: 81 TABLET, COATED ORAL at 05:11

## 2023-05-12 RX ADMIN — ACETAMINOPHEN 1000 MG: 500 TABLET ORAL at 01:23

## 2023-05-12 RX ADMIN — VERAPAMIL HYDROCHLORIDE 120 MG: 240 TABLET, FILM COATED, EXTENDED RELEASE ORAL at 05:11

## 2023-05-12 RX ADMIN — KETOROLAC TROMETHAMINE 15 MG: 30 INJECTION, SOLUTION INTRAMUSCULAR at 13:18

## 2023-05-12 RX ADMIN — HYDROCHLOROTHIAZIDE 12.5 MG: 12.5 TABLET ORAL at 05:11

## 2023-05-12 RX ADMIN — ACETAMINOPHEN 1000 MG: 500 TABLET ORAL at 07:54

## 2023-05-12 RX ADMIN — KETOROLAC TROMETHAMINE 15 MG: 30 INJECTION, SOLUTION INTRAMUSCULAR at 01:23

## 2023-05-12 RX ADMIN — LOSARTAN POTASSIUM 100 MG: 25 TABLET, FILM COATED ORAL at 07:54

## 2023-05-12 RX ADMIN — KETOROLAC TROMETHAMINE 15 MG: 30 INJECTION, SOLUTION INTRAMUSCULAR at 07:54

## 2023-05-12 RX ADMIN — CITALOPRAM HYDROBROMIDE 40 MG: 20 TABLET ORAL at 05:11

## 2023-05-12 ASSESSMENT — PAIN DESCRIPTION - PAIN TYPE
TYPE: SURGICAL PAIN

## 2023-05-12 NOTE — CARE PLAN
The patient is Stable - Low risk of patient condition declining or worsening    Shift Goals  Clinical Goals: Pain control on a scale of 3/10 or lower  Patient Goals: Sleep comfortably tonight    Progress made toward(s) clinical / shift goals:  Pain medication given as ordered. Hourly rounding done.    Patient is not progressing towards the following goals: n/a

## 2023-05-12 NOTE — PROGRESS NOTES
Received bedside report from NOC RN. Pt is A&O 4. Pt has complaints of pain. Given medication per MAR. Educated on pain medication use. Denies N/V as of assessment. Dressing to right lateral abdomen CDI, LEAH.  Safety precaution in place. All needs met at this time. Hourly rounding in place.

## 2023-05-12 NOTE — DISCHARGE PLANNING
"HTH/SCP TCN chart review completed. No CM consulted. Current discharge considerations are home with no needs. Per Kardex patient ambulating 2 x 15 feet with no AD or assist. Per MD surgery note on 5/12, \"Patient doing well ready for discharge home today.\"    TCN will continue to follow and collaborate with discharge planning team as additional post acute needs arise. Thank you.    Completed:  [facility] referral sent    "

## 2023-05-12 NOTE — PROGRESS NOTES
Received report from day shift nurse. Assumed pt care at 1915. Pt is A&Ox4, resting comfortably in bed. Pt on r.a. No signs of SOB/respiratory distress. Labs noted, VSS. Abdominal wound from surgery is CDI with dermabond in place. Fall precautions in place. Bed at lowest position. Call light and personal belongings within reach. Continue to monitor.

## 2023-05-12 NOTE — PROGRESS NOTES
"S:  63 y.o.female s/p Open VHR for incarcerated hernia  POD# 2.  Patient doing well this morning, fady PO.  Ambulating well tolerating p.o.    O:  /89   Pulse 67   Temp 36.6 °C (97.8 °F) (Temporal)   Resp 18   Ht 1.6 m (5' 3\")   Wt 83.6 kg (184 lb 4.9 oz)   SpO2 92%   I/O last 3 completed shifts:  In: 540 [P.O.:290; I.V.:250]  Out: 0   Recent Labs     05/10/23  1426 05/12/23  0205   SODIUM 138 137   POTASSIUM 3.6 3.5*   CHLORIDE 99 101   CO2 29 26   GLUCOSE 97 113*   BUN 12 22   CREATININE 0.92 1.22   CALCIUM 10.1 9.1     Recent Labs     05/10/23  1426 05/12/23  0205   WBC 7.4 9.4   RBC 5.12 4.09*   HEMOGLOBIN 16.4* 13.0   HEMATOCRIT 47.3* 39.0   MCV 92.4 95.4   MCH 32.0 31.8   MCHC 34.7 33.3*   RDW 43.5 45.9   PLATELETCT 437 357   MPV 8.3* 8.7*       Alert and Oriented x3, No Acute Distress  Normal Respiratory Effort  Abdomen soft, appropriately tender  Incisions/Bandages clean/dry/intact  Extremities warm and well perfused      A/P:  Pain control with PO Meds  Diet as tolerated  Fluids SLIV  Patient doing well ready for discharge home today.  "

## 2023-05-12 NOTE — DISCHARGE INSTRUCTIONS
Hernia Repair Discharge Instructions:    ACTIVITIES: Upon discharge from the hospital, the day of surgery it is requested that you do no significant physical activity and limit mental activities, as you have had sedation. The day after surgery, you may resume activities of daily living, but for four weeks, it is recommended that you do no strenuous activities or heavy lifting (greater than 15 pounds).     DRIVING: You may drive whenever you are off pain medications and are able to perform the activities needed to drive, i.e. turning, bending, twisting, etc.     WOUND: It is not unusual for patients to experience swelling and even bruising at the hernia repair site.      ICE: please use ice on the wound to decrease the swelling for the first 24 hours and then discontinue.     BATHING: The dressing can be removed two days after surgery and the wound can then be wetted in a shower as normal, but avoid submersion in water (tub bath) for at least 2 weeks.    PAIN MEDICATION: You will be given a prescription for pain medication at discharge. Please take these as directed. It is important to remember not to take medications on an empty stomach as this may cause nausea.     BOWEL FUNCTION: After hernia repair, it is not uncommon for patients to experience constipation. This is due to decreasing activity levels as well as pain medications. You may wish to use a stool softener beginning immediately after surgery, and you may or may not need to use a laxative (Milk of Magnesia, Ex-lax; Senokot, etc.) as well.            CALL IF YOU HAVE: (1) Fevers to more than 1010 F, (2) Unusual chest or leg pain,  (3) Drainage or fluid from incision that may be foul smelling, increased  tenderness or soreness at the wound or the wound edges are no longer  together,redness or swelling at the incision site. Please do not hesitate to call  with any other questions.     APPOINTMENT: Contact our office at [phone] for a follow-up appointment  in 2 weeks following your procedure.     If you have any additional questions, please do not hesitate to call the office.     Office address:  [address] [place], [place] [address]e [facility], [address] [place] [address]

## 2023-05-12 NOTE — PROGRESS NOTES
Discharge instructions reviewed and signed by the patient. All questions answered at this time. IV was removed. Patient was transported to ED entrance for discharge.

## 2023-05-17 PROBLEM — K59.03 DRUG-INDUCED CONSTIPATION: Status: ACTIVE | Noted: 2023-05-17

## 2023-05-17 PROBLEM — K43.0 INCARCERATED INCISIONAL HERNIA: Status: ACTIVE | Noted: 2023-05-17

## 2023-06-06 ENCOUNTER — HOSPITAL ENCOUNTER (OUTPATIENT)
Dept: RADIOLOGY | Facility: MEDICAL CENTER | Age: 63
End: 2023-06-06
Attending: FAMILY MEDICINE
Payer: MEDICARE

## 2023-06-06 DIAGNOSIS — N63.20 BILATERAL BREAST LUMP: ICD-10-CM

## 2023-06-06 DIAGNOSIS — N63.10 BILATERAL BREAST LUMP: ICD-10-CM

## 2023-06-06 DIAGNOSIS — N63.11 MASS OF UPPER OUTER QUADRANT OF RIGHT BREAST: ICD-10-CM

## 2023-06-06 PROCEDURE — G0279 TOMOSYNTHESIS, MAMMO: HCPCS

## 2023-06-06 PROCEDURE — 76642 ULTRASOUND BREAST LIMITED: CPT | Mod: RT

## 2023-06-08 PROBLEM — I70.0 ATHEROSCLEROSIS OF AORTA (HCC): Status: ACTIVE | Noted: 2023-06-08

## 2023-07-03 ENCOUNTER — HOSPITAL ENCOUNTER (OUTPATIENT)
Dept: RADIOLOGY | Facility: MEDICAL CENTER | Age: 63
End: 2023-07-03
Attending: FAMILY MEDICINE
Payer: MEDICARE

## 2023-07-03 DIAGNOSIS — Z78.0 POSTMENOPAUSAL: ICD-10-CM

## 2023-07-03 PROCEDURE — 77080 DXA BONE DENSITY AXIAL: CPT

## 2023-07-16 ENCOUNTER — APPOINTMENT (OUTPATIENT)
Dept: RADIOLOGY | Facility: MEDICAL CENTER | Age: 63
End: 2023-07-16
Attending: EMERGENCY MEDICINE
Payer: MEDICARE

## 2023-07-16 ENCOUNTER — APPOINTMENT (OUTPATIENT)
Dept: RADIOLOGY | Facility: MEDICAL CENTER | Age: 63
End: 2023-07-16
Payer: MEDICARE

## 2023-07-16 ENCOUNTER — HOSPITAL ENCOUNTER (EMERGENCY)
Facility: MEDICAL CENTER | Age: 63
End: 2023-07-16
Attending: EMERGENCY MEDICINE
Payer: MEDICARE

## 2023-07-16 VITALS
OXYGEN SATURATION: 94 % | RESPIRATION RATE: 18 BRPM | BODY MASS INDEX: 34.8 KG/M2 | SYSTOLIC BLOOD PRESSURE: 165 MMHG | HEIGHT: 63 IN | DIASTOLIC BLOOD PRESSURE: 93 MMHG | WEIGHT: 196.43 LBS | HEART RATE: 90 BPM | TEMPERATURE: 98.2 F

## 2023-07-16 DIAGNOSIS — R10.9 RIGHT LATERAL ABDOMINAL PAIN: ICD-10-CM

## 2023-07-16 LAB
ALBUMIN SERPL BCP-MCNC: 4.6 G/DL (ref 3.2–4.9)
ALBUMIN/GLOB SERPL: 1.6 G/DL
ALP SERPL-CCNC: 80 U/L (ref 30–99)
ALT SERPL-CCNC: 14 U/L (ref 2–50)
ANION GAP SERPL CALC-SCNC: 14 MMOL/L (ref 7–16)
APPEARANCE UR: CLEAR
AST SERPL-CCNC: 13 U/L (ref 12–45)
BASOPHILS # BLD AUTO: 0.9 % (ref 0–1.8)
BASOPHILS # BLD: 0.07 K/UL (ref 0–0.12)
BILIRUB SERPL-MCNC: 0.5 MG/DL (ref 0.1–1.5)
BILIRUB UR QL STRIP.AUTO: NEGATIVE
BUN SERPL-MCNC: 12 MG/DL (ref 8–22)
CALCIUM ALBUM COR SERPL-MCNC: 9.4 MG/DL (ref 8.5–10.5)
CALCIUM SERPL-MCNC: 9.9 MG/DL (ref 8.5–10.5)
CHLORIDE SERPL-SCNC: 102 MMOL/L (ref 96–112)
CO2 SERPL-SCNC: 22 MMOL/L (ref 20–33)
COLOR UR: YELLOW
CREAT SERPL-MCNC: 0.82 MG/DL (ref 0.5–1.4)
EKG IMPRESSION: NORMAL
EOSINOPHIL # BLD AUTO: 0.06 K/UL (ref 0–0.51)
EOSINOPHIL NFR BLD: 0.8 % (ref 0–6.9)
ERYTHROCYTE [DISTWIDTH] IN BLOOD BY AUTOMATED COUNT: 42.6 FL (ref 35.9–50)
GFR SERPLBLD CREATININE-BSD FMLA CKD-EPI: 79 ML/MIN/1.73 M 2
GLOBULIN SER CALC-MCNC: 2.8 G/DL (ref 1.9–3.5)
GLUCOSE SERPL-MCNC: 92 MG/DL (ref 65–99)
GLUCOSE UR STRIP.AUTO-MCNC: NEGATIVE MG/DL
HCT VFR BLD AUTO: 46.3 % (ref 37–47)
HGB BLD-MCNC: 16.3 G/DL (ref 12–16)
IMM GRANULOCYTES # BLD AUTO: 0.03 K/UL (ref 0–0.11)
IMM GRANULOCYTES NFR BLD AUTO: 0.4 % (ref 0–0.9)
KETONES UR STRIP.AUTO-MCNC: NEGATIVE MG/DL
LEUKOCYTE ESTERASE UR QL STRIP.AUTO: NEGATIVE
LIPASE SERPL-CCNC: 84 U/L (ref 11–82)
LYMPHOCYTES # BLD AUTO: 2.59 K/UL (ref 1–4.8)
LYMPHOCYTES NFR BLD: 32.7 % (ref 22–41)
MCH RBC QN AUTO: 31.9 PG (ref 27–33)
MCHC RBC AUTO-ENTMCNC: 35.2 G/DL (ref 32.2–35.5)
MCV RBC AUTO: 90.6 FL (ref 81.4–97.8)
MICRO URNS: NORMAL
MONOCYTES # BLD AUTO: 0.68 K/UL (ref 0–0.85)
MONOCYTES NFR BLD AUTO: 8.6 % (ref 0–13.4)
NEUTROPHILS # BLD AUTO: 4.48 K/UL (ref 1.82–7.42)
NEUTROPHILS NFR BLD: 56.6 % (ref 44–72)
NITRITE UR QL STRIP.AUTO: NEGATIVE
NRBC # BLD AUTO: 0 K/UL
NRBC BLD-RTO: 0 /100 WBC (ref 0–0.2)
PH UR STRIP.AUTO: 6 [PH] (ref 5–8)
PLATELET # BLD AUTO: 442 K/UL (ref 164–446)
PMV BLD AUTO: 8.2 FL (ref 9–12.9)
POTASSIUM SERPL-SCNC: 3.5 MMOL/L (ref 3.6–5.5)
PROT SERPL-MCNC: 7.4 G/DL (ref 6–8.2)
PROT UR QL STRIP: NEGATIVE MG/DL
RBC # BLD AUTO: 5.11 M/UL (ref 4.2–5.4)
RBC UR QL AUTO: NEGATIVE
SODIUM SERPL-SCNC: 138 MMOL/L (ref 135–145)
SP GR UR STRIP.AUTO: 1.02
TROPONIN T SERPL-MCNC: <6 NG/L (ref 6–19)
TROPONIN T SERPL-MCNC: <6 NG/L (ref 6–19)
UROBILINOGEN UR STRIP.AUTO-MCNC: 0.2 MG/DL
WBC # BLD AUTO: 7.9 K/UL (ref 4.8–10.8)

## 2023-07-16 PROCEDURE — 74177 CT ABD & PELVIS W/CONTRAST: CPT

## 2023-07-16 PROCEDURE — 96375 TX/PRO/DX INJ NEW DRUG ADDON: CPT

## 2023-07-16 PROCEDURE — 83690 ASSAY OF LIPASE: CPT

## 2023-07-16 PROCEDURE — 80053 COMPREHEN METABOLIC PANEL: CPT

## 2023-07-16 PROCEDURE — 71045 X-RAY EXAM CHEST 1 VIEW: CPT

## 2023-07-16 PROCEDURE — 36415 COLL VENOUS BLD VENIPUNCTURE: CPT

## 2023-07-16 PROCEDURE — 81003 URINALYSIS AUTO W/O SCOPE: CPT

## 2023-07-16 PROCEDURE — 96374 THER/PROPH/DIAG INJ IV PUSH: CPT | Mod: XU

## 2023-07-16 PROCEDURE — 84484 ASSAY OF TROPONIN QUANT: CPT

## 2023-07-16 PROCEDURE — 700117 HCHG RX CONTRAST REV CODE 255: Performed by: EMERGENCY MEDICINE

## 2023-07-16 PROCEDURE — 700102 HCHG RX REV CODE 250 W/ 637 OVERRIDE(OP): Performed by: EMERGENCY MEDICINE

## 2023-07-16 PROCEDURE — 700111 HCHG RX REV CODE 636 W/ 250 OVERRIDE (IP): Performed by: EMERGENCY MEDICINE

## 2023-07-16 PROCEDURE — 96376 TX/PRO/DX INJ SAME DRUG ADON: CPT

## 2023-07-16 PROCEDURE — 85025 COMPLETE CBC W/AUTO DIFF WBC: CPT

## 2023-07-16 PROCEDURE — A9270 NON-COVERED ITEM OR SERVICE: HCPCS | Performed by: EMERGENCY MEDICINE

## 2023-07-16 PROCEDURE — 99285 EMERGENCY DEPT VISIT HI MDM: CPT

## 2023-07-16 PROCEDURE — 93005 ELECTROCARDIOGRAM TRACING: CPT | Performed by: EMERGENCY MEDICINE

## 2023-07-16 PROCEDURE — 93005 ELECTROCARDIOGRAM TRACING: CPT

## 2023-07-16 RX ORDER — KETOROLAC TROMETHAMINE 30 MG/ML
15 INJECTION, SOLUTION INTRAMUSCULAR; INTRAVENOUS ONCE
Status: COMPLETED | OUTPATIENT
Start: 2023-07-16 | End: 2023-07-16

## 2023-07-16 RX ORDER — ONDANSETRON 2 MG/ML
4 INJECTION INTRAMUSCULAR; INTRAVENOUS ONCE
Status: COMPLETED | OUTPATIENT
Start: 2023-07-16 | End: 2023-07-16

## 2023-07-16 RX ORDER — MORPHINE SULFATE 4 MG/ML
4 INJECTION INTRAVENOUS ONCE
Status: COMPLETED | OUTPATIENT
Start: 2023-07-16 | End: 2023-07-16

## 2023-07-16 RX ORDER — OXYCODONE HYDROCHLORIDE AND ACETAMINOPHEN 5; 325 MG/1; MG/1
1 TABLET ORAL ONCE
Status: COMPLETED | OUTPATIENT
Start: 2023-07-16 | End: 2023-07-16

## 2023-07-16 RX ORDER — METHYLPREDNISOLONE 4 MG/1
TABLET ORAL
Qty: 1 EACH | Refills: 0 | Status: SHIPPED | OUTPATIENT
Start: 2023-07-16 | End: 2024-01-04

## 2023-07-16 RX ORDER — PREDNISONE 20 MG/1
60 TABLET ORAL ONCE
Status: COMPLETED | OUTPATIENT
Start: 2023-07-16 | End: 2023-07-16

## 2023-07-16 RX ORDER — OXYCODONE HYDROCHLORIDE AND ACETAMINOPHEN 5; 325 MG/1; MG/1
1 TABLET ORAL EVERY 4 HOURS PRN
Qty: 15 TABLET | Refills: 0 | Status: SHIPPED | OUTPATIENT
Start: 2023-07-16 | End: 2023-07-21

## 2023-07-16 RX ADMIN — ONDANSETRON 4 MG: 2 INJECTION INTRAMUSCULAR; INTRAVENOUS at 17:14

## 2023-07-16 RX ADMIN — MORPHINE SULFATE 4 MG: 4 INJECTION, SOLUTION INTRAMUSCULAR; INTRAVENOUS at 19:23

## 2023-07-16 RX ADMIN — KETOROLAC TROMETHAMINE 15 MG: 30 INJECTION, SOLUTION INTRAMUSCULAR; INTRAVENOUS at 17:14

## 2023-07-16 RX ADMIN — OXYCODONE AND ACETAMINOPHEN 1 TABLET: 5; 325 TABLET ORAL at 20:01

## 2023-07-16 RX ADMIN — MORPHINE SULFATE 4 MG: 4 INJECTION, SOLUTION INTRAMUSCULAR; INTRAVENOUS at 17:14

## 2023-07-16 RX ADMIN — PREDNISONE 60 MG: 20 TABLET ORAL at 20:00

## 2023-07-16 RX ADMIN — IOHEXOL 90 ML: 350 INJECTION, SOLUTION INTRAVENOUS at 18:55

## 2023-07-16 ASSESSMENT — PAIN DESCRIPTION - PAIN TYPE
TYPE: ACUTE PAIN;SURGICAL PAIN
TYPE: ACUTE PAIN;SURGICAL PAIN

## 2023-07-16 ASSESSMENT — FIBROSIS 4 INDEX: FIB4 SCORE: 0.77

## 2023-07-16 NOTE — ED NOTES
Pt ambulated to Blue 22 with a steady gait. C/C is Chest pain and Abdominal pain. Pt is in a gown, on the monitor and call light within reach. Chart up for ERP.

## 2023-07-16 NOTE — ED PROVIDER NOTES
"ER Provider Note    Scribed for Chris Reaves D.O. by Manfred Bowen. 7/16/2023  4:51 PM    Primary Care Provider: Stan Echevarria D.O.    CHIEF COMPLAINT  Chief Complaint   Patient presents with    Abdominal Pain     Pt had hernia sx on May 15th. Has had increasingly painful lump to RLQ since, now having constant pain to the area. Having nausea & decreased appetite, worse over past week. Also c/o chills & sweats x 1 week.    Chest Pain     Pt woke in the middle of the night last night with central chest pain, constant, radiates to L shoulder intermittently. No aggravating sx per pt.       HPI/ROS    Yuki Angelo is a 63 y.o. female who presents to the Emergency Department for constant right lower quadrant abdominal pain onset 1 week ago. Patient reports a hernia surgery two months ago, and noticed a lump in the same area two weeks later. She complains of pain in the same area as the lump which is located near the surgical scar. She reports that the pain around the lump went away for a period of time following its appearance but came back one week ago. She states the lump has gotten bigger and also states she was experiencing nausea, diarrhea, and vomiting onset 4 days ago. She comments her pain is alleviated with ibuprofen. She reports exacerbation of her abdominal pain when breathing and states \"something is tugging and pulling\" in the area of her lump. Patient also has a history of a hysterectomy. She is allergic to Penicillin and Sulfa drugs.     ROS as per HPI.    PAST MEDICAL HISTORY  Past Medical History:   Diagnosis Date    Aortic regurgitation     Dr. Morrell, Cardiolgist    Breath shortness 02/18/2021    pt unsure if sob d/t gallbladder pain or previous surgery; has talked to MD about this; surgery tomorrow- pt may go to ER today    CHEST PAIN     related to pericardial cyst  2021    DDD (degenerative disc disease), cervical 8/3/2011    Fatigue 2/21/2012    Fibromyalgia     GERD " "(gastroesophageal reflux disease)     High cholesterol     History of echocardiogram 2/21/2012    Hypertension 2015    well controlled on meds    Hypokalemia 8/13/2018    Multilevel degenerative disc disease     Other specified disorder of intestines     constipation    Pain 02/02/2021    shoulders and neck,     Pain management contract agreement 8/13/2018    Polycystic kidney disease     \"told as child\"    Psychiatric problem     depression, anxiety    PVD (peripheral vascular disease) ([facility]) 2/21/2012    S/P tooth extraction 10/27/2015    Sleep apnea     (+) sleep study, not using CPAP; no O2    Snoring 02/18/2021    sleep study done    Thoracic outlet syndrome        SURGICAL HISTORY  Past Surgical History:   Procedure Laterality Date    VENTRAL HERNIA REPAIR N/A 5/10/2023    Procedure: REPAIR, HERNIA, VENTRAL;  Surgeon: Celso Pringle M.D.;  Location: SURGERY [facility];  Service: General    IA REMOVAL OF OMENTUM  12/3/2021    Procedure: OMENTECTOMY,ROBOT-ASSISTED,USING DA CHEPE XI;  Surgeon: Lois Marte M.D.;  Location: SURGERY [facility];  Service: Gyn Robotic    HYSTERECTOMY ROBOTIC XI  12/3/2021    Procedure: HYSTERECTOMY, ROBOT-ASSISTED, USING DA CHEPE XI;  Surgeon: Lois Marte M.D.;  Location: SURGERY [facility];  Service: Gyn Robotic    SALPINGO OOPHORECTOMY Bilateral 12/3/2021    Procedure: SALPINGO-OOPHORECTOMY;  Surgeon: Lois Marte M.D.;  Location: SURGERY [facility];  Service: Gyn Robotic    RICKI BY LAPAROSCOPY  2/19/2021    Procedure: CHOLECYSTECTOMY, LAPAROSCOPIC;  Surgeon: John H Ganser, M.D.;  Location: SURGERY SAME DAY [facility];  Service: General    IA THORACOSCOPY,DX NO BX Right 2/3/2021    Procedure: THORACOSCOPY;  Surgeon: John H Ganser, M.D.;  Location: SURGERY [facility];  Service: General    CYST EXCISION Right 2/3/2021    Procedure: EXCISION, CYST-PERICARDIAL CYST;  Surgeon: John H Ganser, M.D.;  Location: SURGERY [facility];  Service: General    NERVE ULNAR " TRANSFER Left 2019    at [place]    BUNIONECTOMY DANYA Right 11/10/2015    Procedure: BUNIONECTOMY DANYA;  Surgeon: Kermit Arroyo D.P.M.;  Location: [facility];  Service:     OSTECTOMY  11/10/2015    Procedure: OSTECTOMY - 5TH TAILORS BUNION ;  Surgeon: Kermit Arroyo D.P.M.;  Location: [facility];  Service:     ARTHROPLASTY Right 11/10/2015    Procedure: ARTHROPLASTY - 5TH DEROTATIONAL;  Surgeon: Kermit Arroyo D.P.M.;  Location: [facility];  Service:     HAMMERTOE CORRECTION  11/10/2015    Procedure: HAMMERTOE CORRECTION - 2ND, 3RD, 4TH ;  Surgeon: Kermit Arroyo D.P.M.;  Location: [facility];  Service:     HYSTEROSCOPY WITH VIDEO DIAGNOSTIC  3/27/2015    Performed by Johnny Jarquin M.D. at SURGERY SAME DAY [facility]    DILATION AND CURETTAGE  3/27/2015    Performed by Johnny Jarquin M.D. at SURGERY SAME DAY [facility]    KNEE ARTHROSCOPY  3/11/2013    Performed by Freeman Ballard M.D. at SURGERY [facility]    MENISCECTOMY, KNEE, MEDIAL  3/11/2013    Performed by Freeman Ballard M.D. at SURGERY [facility]    NERVE ULNAR TRANSFER  3/13/2012    Performed by KATYA NEAL at [facility]    CARPAL TUNNEL ENDOSCOPIC  3/13/2012    Performed by KATYA NEAL at SURGERY [facility]    CERVICAL DISK AND FUSION ANTERIOR  6/21/2010    Performed by LAURA BAH at SURGERY [facility]    INCISION AND DRAINAGE GENERAL  10/22/2009    Performed by ABEL GUZMAN at SURGERY [facility]    MASS EXCISION GENERAL  10/6/2009    right breast Performed by ABEL GUZMAN at SURGERY [facility]    BREAST BIOPSY  3/5/2009    right; Performed by ABEL GUZMAN at SURGERY [facility]    RIB RESECTION  2001    right for thoracic outlet syndrome    KNEE ARTHROSCOPY  1998    left    TUBAL COAGULATION LAPAROSCOPIC BILATERAL  1997    CERVICAL FUSION POSTERIOR  1996    OTHER SURGICAL PROCEDURE   11/1992    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 Her smoking use included cigarettes. She has a 18.50 pack-year smoking history. She has never used smokeless tobacco. She reports that she does not currently use alcohol. She reports that she does not currently use drugs.    CURRENT MEDICATIONS  Discharge Medication List as of 7/16/2023  8:38 PM        CONTINUE these medications which have NOT CHANGED    Details   Semaglutide,0.25 or 0.5MG/DOS, (OZEMPIC, 0.25 OR 0.5 MG/DOSE,) 2 MG/3ML Solution Pen-injector Inject 0.5 mg under the skin every 7 days., Disp-3 mL, R-1, Normal      acetaminophen (TYLENOL) 325 MG Tab Take 2 Tablets by mouth every 6 hours. Alternate w/ ibuprofen to take a medication every 3 hrs, take scheduled for 3 days post-op then PRN, Disp-60 Tablet, R-0, Normal      ibuprofen (MOTRIN) 600 MG Tab Take 1 Tablet by mouth every 6 hours. Alternate w/ tylenol to take a medication every 3 hrs, take scheduled for 3 days post-op then PRN, Disp-45 Tablet, R-0, Normal      polyethylene glycol/lytes (MIRALAX) 17 g Pack Take 1 Packet by mouth every day. While taking narcotics, hold if greater then 3 BMs a day, Disp-20 Each, R-0, Normal      citalopram (CELEXA) 40 MG Tab Take 1 Tablet by mouth every day., Disp-90 Tablet, R-3, Normal      hydrochlorothiazide (MICROZIDE) 12.5 MG capsule Take 1 Capsule by mouth every day., Disp-100 Capsule, R-3, Normal      hydrOXYzine HCl (ATARAX) 25 MG Tab Take 1 Tablet by mouth at bedtime as needed for Anxiety., Disp-30 Tablet, R-1, Normal      losartan (COZAAR) 100 MG Tab Take 1 Tablet by mouth every evening., Disp-100 Tablet, R-3, Normal      verapamil SR (CALAN-SR) 120 MG CR tablet Take 1 Tablet by mouth every day., Disp-30 Tablet, R-3, Normal      aspirin EC  "(ECOTRIN) 81 MG Tablet Delayed Response Take 81 mg by mouth every day., Historical Med      LINZESS 290 MCG Cap Take 290 mcg by mouth 1 time a day as needed (constipation). Indications: Constipation caused by Irritable Bowel Syndrome, [place], Historical Med             ALLERGIES  Penicillins and Sulfa drugs    PHYSICAL EXAM  BP (!) 187/110   Pulse 84   Temp 36.2 °C (97.1 °F) (Temporal)   Resp 18   Ht 1.6 m (5' 3\")   Wt 89.1 kg (196 lb 6.9 oz)   LMP  (LMP Unknown)   SpO2 98%   BMI 34.80 kg/m²     General: No acute distress.  HENT: Normocephalic, Mucus membranes are moist.   Chest: Lungs have even and unlabored respirations, Clear to auscultation.   Cardiovascular: Regular rate and regular rhythm, No peripheral cyanosis.  Abdomen: Right mid abdomen has a healed surgical scar. There is a palpable mass with tenderness along the same surgical scar deep along musculature.   Neuro: Awake, Conversive, Able to relay recent events.  Psychiatric: Calm and cooperative.     EXTERNAL RECORDS REVIEWED  Had hernia surgery in May of 2023.     INITIAL ASSESSMENT  Patient is having pain, tenderness, and a mass from a previous hernia repair. She also has been experiencing vomiting. Concern for strangulated hernia or bowel obstruction. I will order CT for evaluation, as well as treatment for pain and nausea while results are pending.    ED Observation Status? Yes; I am placing the patient in to an observation status due to a diagnostic uncertainty as well as therapeutic intensity. Patient placed in observation status at 4:58 PM, 7/16/2023.     Observation plan is as follows: Treatment for pain and nausea while results are pending.    Upon Reevaluation, the patient's condition has: Improved; and will be discharged.    Patient discharged from ED Observation status at 8:39 PM (Time) 07/16/2023 (Date).     DIAGNOSTIC STUDIES    Labs:   Results for orders placed or performed during the hospital encounter of 07/16/23   CBC with " Differential   Result Value Ref Range    WBC 7.9 4.8 - 10.8 K/uL    RBC 5.11 4.20 - 5.40 M/uL    Hemoglobin 16.3 (H) 12.0 - 16.0 g/dL    Hematocrit 46.3 37.0 - 47.0 %    MCV 90.6 81.4 - 97.8 fL    MCH 31.9 27.0 - 33.0 pg    MCHC 35.2 32.2 - 35.5 g/dL    RDW 42.6 35.9 - 50.0 fL    Platelet Count 442 164 - 446 K/uL    MPV 8.2 (L) 9.0 - 12.9 fL    Neutrophils-Polys 56.60 44.00 - 72.00 %    Lymphocytes 32.70 22.00 - 41.00 %    Monocytes 8.60 0.00 - 13.40 %    Eosinophils 0.80 0.00 - 6.90 %    Basophils 0.90 0.00 - 1.80 %    Immature Granulocytes 0.40 0.00 - 0.90 %    Nucleated RBC 0.00 0.00 - 0.20 /100 WBC    Neutrophils (Absolute) 4.48 1.82 - 7.42 K/uL    Lymphs (Absolute) 2.59 1.00 - 4.80 K/uL    Monos (Absolute) 0.68 0.00 - 0.85 K/uL    Eos (Absolute) 0.06 0.00 - 0.51 K/uL    Baso (Absolute) 0.07 0.00 - 0.12 K/uL    Immature Granulocytes (abs) 0.03 0.00 - 0.11 K/uL    NRBC (Absolute) 0.00 K/uL   Complete Metabolic Panel   Result Value Ref Range    Sodium 138 135 - 145 mmol/L    Potassium 3.5 (L) 3.6 - 5.5 mmol/L    Chloride 102 96 - 112 mmol/L    Co2 22 20 - 33 mmol/L    Anion Gap 14.0 7.0 - 16.0    Glucose 92 65 - 99 mg/dL    Bun 12 8 - 22 mg/dL    Creatinine 0.82 0.50 - 1.40 mg/dL    Calcium 9.9 8.5 - 10.5 mg/dL    AST(SGOT) 13 12 - 45 U/L    ALT(SGPT) 14 2 - 50 U/L    Alkaline Phosphatase 80 30 - 99 U/L    Total Bilirubin 0.5 0.1 - 1.5 mg/dL    Albumin 4.6 3.2 - 4.9 g/dL    Total Protein 7.4 6.0 - 8.2 g/dL    Globulin 2.8 1.9 - 3.5 g/dL    A-G Ratio 1.6 g/dL   Lipase   Result Value Ref Range    Lipase 84 (H) 11 - 82 U/L   Urinalysis    Specimen: Urine   Result Value Ref Range    Color Yellow     Character Clear     Specific Gravity 1.018 <1.035    Ph 6.0 5.0 - 8.0    Glucose Negative Negative mg/dL    Ketones Negative Negative mg/dL    Protein Negative Negative mg/dL    Bilirubin Negative Negative    Urobilinogen, Urine 0.2 Negative    Nitrite Negative Negative    Leukocyte Esterase Negative Negative    Occult  Blood Negative Negative    Micro Urine Req see below    Troponins NOW   Result Value Ref Range    Troponin T <6 6 - 19 ng/L   Troponins in two (2) hours   Result Value Ref Range    Troponin T <6 6 - 19 ng/L   ESTIMATED GFR   Result Value Ref Range    GFR (CKD-EPI) 79 >60 mL/min/1.73 m 2   CORRECTED CALCIUM   Result Value Ref Range    Correct Calcium 9.4 8.5 - 10.5 mg/dL   EKG   Result Value Ref Range    Report       [facility] Emergency Dept.    Test Date:  2023  Pt Name:    IRAIS HANDLEY               Department: ER  MRN:        [id number]                      Room:        22  Gender:     Female                       Technician: [id number]  :        1960                   Requested By:ER TRIAGE PROTOCOL  Order #:    [id number]                    Reading MD: ANA BETTS D.O.    Measurements  Intervals                                Axis  Rate:       79                           P:          33  MS:         166                          QRS:        -19  QRSD:       95                           T:          35  QT:         387  QTc:        444    Interpretive Statements  Sinus rhythm  Probable left atrial enlargement  Borderline left axis deviation  RSR' in V1 or V2, right VCD or RVH  Compared to ECG 2021 13:01:45  No significant changes  Electronically Signed On 2023 19:23:58 PDT by ANA BETTS D.O.         EKG:   I have independently interpreted the above EKG.    Radiology:   The attending emergency physician has independently interpreted the diagnostic imaging associated with this visit and am waiting the final reading from the radiologist.   Preliminary interpretation is as follows: CT shows no obstruction  Radiologist interpretation:   CT-ABDOMEN-PELVIS WITH   Final Result      1.  Increased density in the RIGHT lower abdominal wall which may represent postoperative change or inflammation.  No abscess or recurrent hernia demonstrated.   2.  Normal appendix.   3.   Colonic diverticulosis without evidence for diverticulitis.   4.  No bowel obstruction or perforation.      DX-CHEST-PORTABLE (1 VIEW)   Final Result      No acute cardiopulmonary abnormality.          COURSE & MEDICAL DECISION MAKING     COURSE AND PLAN  4:51 PM - Patient seen and examined at bedside. Discussed plan of care, including diagnostic studies for evaluation. Patient agrees to the plan of care. The patient will be medicated with Toradol injection 15 mg, Morphine injection 4 mg, and Zofran injection 4 mg. Ordered for Troponin, EKG, UA, Lipase, CMP, CBC w diff, Corrected Calcium, Estimated GFR, CT-Abdomen Pelvis With, and DX-Chest to evaluate her symptoms.     7:19 PM - Patient was reevaluated at bedside. Treated patient with Morphine 4 mg, Percocet 5-325 mg, and Deltasone 60 mg for her symptoms. Patient received transient improvement with pain medications. Will add pain medications. Blood pressure has been elevated, suspect due to stress and pain. Instructed her to monitor blood pressure at home to gain further information about baseline blood pressure. Patient had the opportunity to ask any questions. The plan for discharge was discussed with them and they were told to return for any new or worsening symptoms. She was also informed of the plans for follow up. Patient is understanding and agreeable to the plan for discharge.    ED Summary: Patient presents with a pain at her hernia repair site, palpitation there is a palpable nodule there.  There was concerns for recurrent hernia.  CT was done shows no hernia but there is inflammation.  This may be reaction to mesh, or reaction to the surgery itself.  The patient was medicated for pain with good results and the patient was placed on steroids to reduce inflammation.    HTN/IDDM FOLLOW UP:  The patient has known hypertension and is being followed by their primary care doctor      DISPOSITION AND DISCUSSIONS    Patient will be discharged home.    In  prescribing controlled substances to this patient, I certify that I have obtained and reviewed the medical history of Yuki Angelo. I have also made a good bebeto effort to obtain applicable records from other providers who have treated the patient and records did not demonstrate any increased risk of substance abuse that would prevent me from prescribing controlled substances.     I have conducted a physical exam and documented it. I have reviewed Ms. Angelo’s prescription history as maintained by the [place] Prescription Monitoring Program.     I have assessed the patient’s risk for abuse, dependency, and addiction using the validated Opioid Risk Tool available at https://www.mdcalc.com/mztnwh-sojb-bphn-ort-narcotic-abuse.     Given the above, I believe the benefits of controlled substance therapy outweigh the risks. The reasons for prescribing controlled substances include non-narcotic, oral analgesic alternatives have been inadequate for pain control. Accordingly, I have discussed the risk and benefits, treatment plan, and alternative therapies with the patient.     FOLLOW UP:  Celso Pringle M.D.  [address] [place] [address] [id number] [phone]    In 1 week      OUTPATIENT MEDICATIONS:  Discharge Medication List as of 7/16/2023  8:38 PM        START taking these medications    Details   methylPREDNISolone (MEDROL DOSEPAK) 4 MG Tablet Therapy Pack Use as directed, Disp-1 Each, R-0, Normal      oxyCODONE-acetaminophen (PERCOCET) 5-325 MG Tab Take 1 Tablet by mouth every four hours as needed for Moderate Pain for up to 5 days., Disp-15 Tablet, R-0, Normal           FINAL DIAGNOSIS  1. Right lateral abdominal pain      Manfred GARCIA (Inga), am scribing for, and in the presence of, Chris Reaves D.O..    Electronically signed by: Manfred Bowen (Inga), 7/16/2023    Chris GARCIA D.O. personally performed the services described in this documentation, as scribed by Manfred Bowen in  my presence, and it is both accurate and complete.    The note accurately reflects work and decisions made by me.  Chris Reaves D.O.  7/16/2023  9:12 PM

## 2023-07-17 NOTE — ED NOTES
"Pt discharged . GCS 15. IV discontinued and gauze placed, pt in possession of belongings. Pt provided discharge education and information pertaining to medications and follow up appointments. Pt received copy of discharge instructions and verbalized understanding.   BP (!) 165/93   Pulse 90   Temp 36.8 °C (98.2 °F) (Temporal)   Resp 18   Ht 1.6 m (5' 3\")   Wt 89.1 kg (196 lb 6.9 oz)   LMP  (LMP Unknown)   SpO2 94%   BMI 34.80 kg/m²     "

## 2023-07-17 NOTE — ED NOTES
Patient noted to have oxygen saturation 89% on room air after narcotic pain medication. Patient placed on 1 L nasal cannula. Oxygen saturation 94% at this time.

## 2023-07-17 NOTE — DISCHARGE INSTRUCTIONS
CT scan shows inflammation, there is no signs of infection or recurrent hernia.    To be placed on steroid medications to reduce inflammation, you been prescribed pain medication assist with the pain.  Please call your surgeon's office tomorrow morning to make a follow-up appointment.  Return for any change or worsening symptoms

## 2023-07-17 NOTE — ED NOTES
Medicated patient per MAR for 7/10 RLQ sharp abdominal pain. Patient denies further needs. Call light within reach.     Xray at bedside.

## 2023-07-17 NOTE — ED NOTES
Blood drawn and sent to lab. Patient requesting additional pain medication for 7/10 pain. Dr. Reaves notified of request.

## 2023-07-17 NOTE — ED NOTES
Assumed care of patient, patient bedside report received from to RN Mathew . Pt AAO X 4 , respirations even and unlabored, on 2 liters O2 via nasal cannula . Introduced self as pt RN, POC discussed, call light in reach, Oxygen safety measures in place and RN traced the line, Fall risk interventions in place.

## 2023-07-17 NOTE — ED NOTES
Medicated patient per MAR. Patient denies further needs. Call light within reach.     Bedside report given to SHLOMO Mercado. Patient care transferred.

## 2023-08-21 ENCOUNTER — HOSPITAL ENCOUNTER (OUTPATIENT)
Dept: LAB | Facility: MEDICAL CENTER | Age: 63
End: 2023-08-21
Attending: PEDIATRICS
Payer: MEDICARE

## 2023-08-21 DIAGNOSIS — M54.41 ACUTE BILATERAL LOW BACK PAIN WITH BILATERAL SCIATICA: ICD-10-CM

## 2023-08-21 DIAGNOSIS — I10 ESSENTIAL HYPERTENSION: ICD-10-CM

## 2023-08-21 DIAGNOSIS — Z13.21 ENCOUNTER FOR VITAMIN DEFICIENCY SCREENING: ICD-10-CM

## 2023-08-21 DIAGNOSIS — M54.42 ACUTE BILATERAL LOW BACK PAIN WITH BILATERAL SCIATICA: ICD-10-CM

## 2023-08-21 DIAGNOSIS — E78.5 DYSLIPIDEMIA: ICD-10-CM

## 2023-08-21 DIAGNOSIS — R05.3 CHRONIC COUGH: ICD-10-CM

## 2023-08-21 DIAGNOSIS — I25.10 CORONARY ARTERY CALCIFICATION SEEN ON CAT SCAN: ICD-10-CM

## 2023-08-21 DIAGNOSIS — E66.9 OBESITY (BMI 30.0-34.9): ICD-10-CM

## 2023-08-21 DIAGNOSIS — E83.110 HEREDITARY HEMOCHROMATOSIS (HCC): Chronic | ICD-10-CM

## 2023-08-21 DIAGNOSIS — F33.1 MODERATE EPISODE OF RECURRENT MAJOR DEPRESSIVE DISORDER (HCC): Chronic | ICD-10-CM

## 2023-08-21 DIAGNOSIS — G43.019 INTRACTABLE MIGRAINE WITHOUT AURA AND WITHOUT STATUS MIGRAINOSUS: ICD-10-CM

## 2023-08-21 LAB
25(OH)D3 SERPL-MCNC: 15 NG/ML (ref 30–100)
ALBUMIN SERPL BCP-MCNC: 4.5 G/DL (ref 3.2–4.9)
ALBUMIN/GLOB SERPL: 1.8 G/DL
ALP SERPL-CCNC: 65 U/L (ref 30–99)
ALT SERPL-CCNC: 16 U/L (ref 2–50)
ANION GAP SERPL CALC-SCNC: 11 MMOL/L (ref 7–16)
AST SERPL-CCNC: 20 U/L (ref 12–45)
BILIRUB SERPL-MCNC: 0.4 MG/DL (ref 0.1–1.5)
BUN SERPL-MCNC: 10 MG/DL (ref 8–22)
CALCIUM ALBUM COR SERPL-MCNC: 9.2 MG/DL (ref 8.5–10.5)
CALCIUM SERPL-MCNC: 9.6 MG/DL (ref 8.5–10.5)
CHLORIDE SERPL-SCNC: 102 MMOL/L (ref 96–112)
CHOLEST SERPL-MCNC: 243 MG/DL (ref 100–199)
CO2 SERPL-SCNC: 26 MMOL/L (ref 20–33)
CREAT SERPL-MCNC: 0.87 MG/DL (ref 0.5–1.4)
CRP SERPL HS-MCNC: <0.3 MG/DL (ref 0–0.75)
ERYTHROCYTE [DISTWIDTH] IN BLOOD BY AUTOMATED COUNT: 47.1 FL (ref 35.9–50)
ERYTHROCYTE [SEDIMENTATION RATE] IN BLOOD BY WESTERGREN METHOD: 6 MM/HOUR (ref 0–25)
FERRITIN SERPL-MCNC: 74.1 NG/ML (ref 10–291)
GFR SERPLBLD CREATININE-BSD FMLA CKD-EPI: 74 ML/MIN/1.73 M 2
GLOBULIN SER CALC-MCNC: 2.5 G/DL (ref 1.9–3.5)
GLUCOSE SERPL-MCNC: 92 MG/DL (ref 65–99)
HCT VFR BLD AUTO: 48.7 % (ref 37–47)
HDLC SERPL-MCNC: 70 MG/DL
HGB BLD-MCNC: 15.9 G/DL (ref 12–16)
IRON SATN MFR SERPL: 42 % (ref 15–55)
IRON SERPL-MCNC: 136 UG/DL (ref 40–170)
LDLC SERPL CALC-MCNC: 149 MG/DL
MCH RBC QN AUTO: 30.9 PG (ref 27–33)
MCHC RBC AUTO-ENTMCNC: 32.6 G/DL (ref 32.2–35.5)
MCV RBC AUTO: 94.7 FL (ref 81.4–97.8)
PLATELET # BLD AUTO: 486 K/UL (ref 164–446)
PMV BLD AUTO: 8.5 FL (ref 9–12.9)
POTASSIUM SERPL-SCNC: 3.4 MMOL/L (ref 3.6–5.5)
PROT SERPL-MCNC: 7 G/DL (ref 6–8.2)
RBC # BLD AUTO: 5.14 M/UL (ref 4.2–5.4)
SODIUM SERPL-SCNC: 139 MMOL/L (ref 135–145)
TIBC SERPL-MCNC: 321 UG/DL (ref 250–450)
TRIGL SERPL-MCNC: 118 MG/DL (ref 0–149)
TSH SERPL DL<=0.005 MIU/L-ACNC: 2 UIU/ML (ref 0.38–5.33)
UIBC SERPL-MCNC: 185 UG/DL (ref 110–370)
VIT B12 SERPL-MCNC: 403 PG/ML (ref 211–911)
WBC # BLD AUTO: 5.3 K/UL (ref 4.8–10.8)

## 2023-08-21 PROCEDURE — 80053 COMPREHEN METABOLIC PANEL: CPT

## 2023-08-21 PROCEDURE — 85027 COMPLETE CBC AUTOMATED: CPT

## 2023-08-21 PROCEDURE — 83540 ASSAY OF IRON: CPT

## 2023-08-21 PROCEDURE — 80061 LIPID PANEL: CPT

## 2023-08-21 PROCEDURE — 82043 UR ALBUMIN QUANTITATIVE: CPT

## 2023-08-21 PROCEDURE — 82607 VITAMIN B-12: CPT

## 2023-08-21 PROCEDURE — 83550 IRON BINDING TEST: CPT

## 2023-08-21 PROCEDURE — 84443 ASSAY THYROID STIM HORMONE: CPT

## 2023-08-21 PROCEDURE — 36415 COLL VENOUS BLD VENIPUNCTURE: CPT

## 2023-08-21 PROCEDURE — 82570 ASSAY OF URINE CREATININE: CPT

## 2023-08-21 PROCEDURE — 86140 C-REACTIVE PROTEIN: CPT

## 2023-08-21 PROCEDURE — 82728 ASSAY OF FERRITIN: CPT

## 2023-08-21 PROCEDURE — 85652 RBC SED RATE AUTOMATED: CPT

## 2023-08-21 PROCEDURE — 82306 VITAMIN D 25 HYDROXY: CPT

## 2023-08-22 LAB
CREAT UR-MCNC: 325.64 MG/DL
MICROALBUMIN UR-MCNC: 1.6 MG/DL
MICROALBUMIN/CREAT UR: 5 MG/G (ref 0–30)

## 2023-09-04 DIAGNOSIS — E66.9 OBESITY (BMI 30.0-34.9): ICD-10-CM

## 2023-09-05 RX ORDER — SEMAGLUTIDE 0.68 MG/ML
0.5 INJECTION, SOLUTION SUBCUTANEOUS
Qty: 3 ML | Refills: 1 | Status: SHIPPED | OUTPATIENT
Start: 2023-09-05 | End: 2023-10-23 | Stop reason: SDUPTHER

## 2023-09-13 ENCOUNTER — APPOINTMENT (RX ONLY)
Dept: URBAN - METROPOLITAN AREA CLINIC 4 | Facility: CLINIC | Age: 63
Setting detail: DERMATOLOGY
End: 2023-09-13

## 2023-09-13 DIAGNOSIS — Z71.89 OTHER SPECIFIED COUNSELING: ICD-10-CM

## 2023-09-13 DIAGNOSIS — D485 NEOPLASM OF UNCERTAIN BEHAVIOR OF SKIN: ICD-10-CM

## 2023-09-13 PROBLEM — D48.5 NEOPLASM OF UNCERTAIN BEHAVIOR OF SKIN: Status: ACTIVE | Noted: 2023-09-13

## 2023-09-13 PROCEDURE — 11102 TANGNTL BX SKIN SINGLE LES: CPT

## 2023-09-13 PROCEDURE — ? COUNSELING

## 2023-09-13 PROCEDURE — 99202 OFFICE O/P NEW SF 15 MIN: CPT | Mod: 25

## 2023-09-13 PROCEDURE — ? BIOPSY BY SHAVE METHOD

## 2023-09-13 PROCEDURE — ? DIAGNOSIS COMMENT

## 2023-09-13 ASSESSMENT — LOCATION DETAILED DESCRIPTION DERM: LOCATION DETAILED: LEFT SUPERIOR CENTRAL BUCCAL CHEEK

## 2023-09-13 ASSESSMENT — LOCATION SIMPLE DESCRIPTION DERM: LOCATION SIMPLE: LEFT CHEEK

## 2023-09-13 ASSESSMENT — LOCATION ZONE DERM: LOCATION ZONE: FACE

## 2023-09-13 NOTE — PROCEDURE: DIAGNOSIS COMMENT
Comment: Patient reports this was a black lesion, and then. It has slowly gone away.  She had a picture in her phone of a very black lesion.
Detail Level: Simple
Render Risk Assessment In Note?: no

## 2023-10-17 DIAGNOSIS — I10 ESSENTIAL HYPERTENSION: ICD-10-CM

## 2023-10-17 PROCEDURE — RXMED WILLOW AMBULATORY MEDICATION CHARGE: Performed by: FAMILY MEDICINE

## 2023-10-17 RX ORDER — LOSARTAN POTASSIUM 100 MG/1
100 TABLET ORAL NIGHTLY
Qty: 100 TABLET | Refills: 3 | Status: SHIPPED | OUTPATIENT
Start: 2023-10-17

## 2023-10-23 ENCOUNTER — PHARMACY VISIT (OUTPATIENT)
Dept: PHARMACY | Facility: MEDICAL CENTER | Age: 63
End: 2023-10-23
Payer: COMMERCIAL

## 2023-10-23 ENCOUNTER — APPOINTMENT (OUTPATIENT)
Dept: MEDICAL GROUP | Facility: MEDICAL CENTER | Age: 63
End: 2023-10-23
Payer: MEDICARE

## 2023-10-23 DIAGNOSIS — F41.1 GENERALIZED ANXIETY DISORDER: ICD-10-CM

## 2023-10-23 DIAGNOSIS — R10.9 RIGHT LATERAL ABDOMINAL PAIN: ICD-10-CM

## 2023-10-23 DIAGNOSIS — G89.18 POST-OPERATIVE PAIN: ICD-10-CM

## 2023-10-23 DIAGNOSIS — F33.1 MODERATE EPISODE OF RECURRENT MAJOR DEPRESSIVE DISORDER (HCC): Chronic | ICD-10-CM

## 2023-10-23 DIAGNOSIS — G43.019 INTRACTABLE MIGRAINE WITHOUT AURA AND WITHOUT STATUS MIGRAINOSUS: ICD-10-CM

## 2023-10-23 DIAGNOSIS — I10 ESSENTIAL HYPERTENSION: ICD-10-CM

## 2023-10-23 DIAGNOSIS — E66.9 OBESITY (BMI 30.0-34.9): ICD-10-CM

## 2023-10-23 PROCEDURE — RXMED WILLOW AMBULATORY MEDICATION CHARGE: Performed by: FAMILY MEDICINE

## 2023-10-23 RX ORDER — HYDROXYZINE HYDROCHLORIDE 25 MG/1
25 TABLET, FILM COATED ORAL NIGHTLY PRN
Qty: 30 TABLET | Refills: 0 | Status: SHIPPED | OUTPATIENT
Start: 2023-10-23 | End: 2023-12-29 | Stop reason: SDUPTHER

## 2023-10-23 RX ORDER — CITALOPRAM 40 MG/1
40 TABLET ORAL
Qty: 90 TABLET | Refills: 0 | Status: SHIPPED | OUTPATIENT
Start: 2023-10-23 | End: 2023-12-29 | Stop reason: SDUPTHER

## 2023-10-23 RX ORDER — IBUPROFEN 600 MG/1
600 TABLET ORAL EVERY 6 HOURS
Qty: 45 TABLET | Refills: 0 | Status: SHIPPED | OUTPATIENT
Start: 2023-10-23 | End: 2023-12-29 | Stop reason: SDUPTHER

## 2023-10-23 RX ORDER — HYDROCHLOROTHIAZIDE 12.5 MG/1
12.5 CAPSULE, GELATIN COATED ORAL DAILY
Qty: 100 CAPSULE | Refills: 0 | Status: SHIPPED | OUTPATIENT
Start: 2023-10-23 | End: 2024-03-18

## 2023-10-23 RX ORDER — METHYLPREDNISOLONE 4 MG/1
TABLET ORAL
Qty: 1 EACH | Refills: 0 | OUTPATIENT
Start: 2023-10-23

## 2023-10-23 RX ORDER — SEMAGLUTIDE 0.68 MG/ML
0.5 INJECTION, SOLUTION SUBCUTANEOUS
Qty: 3 ML | Refills: 1 | Status: SHIPPED | OUTPATIENT
Start: 2023-10-23 | End: 2023-12-29 | Stop reason: SDUPTHER

## 2023-11-25 PROCEDURE — RXMED WILLOW AMBULATORY MEDICATION CHARGE: Performed by: FAMILY MEDICINE

## 2023-11-27 ENCOUNTER — PHARMACY VISIT (OUTPATIENT)
Dept: PHARMACY | Facility: MEDICAL CENTER | Age: 63
End: 2023-11-27
Payer: COMMERCIAL

## 2023-12-08 ENCOUNTER — TELEPHONE (OUTPATIENT)
Dept: MEDICAL GROUP | Facility: MEDICAL CENTER | Age: 63
End: 2023-12-08
Payer: MEDICARE

## 2023-12-08 DIAGNOSIS — R13.10 DYSPHAGIA, UNSPECIFIED TYPE: ICD-10-CM

## 2023-12-08 NOTE — TELEPHONE ENCOUNTER
Caller Name: Joleen talbot [facility]  Call Back Number: [phone]    How would the patient prefer to be contacted with a response: Phone call OK to leave a detailed message    : [facility] had unsuccessful request to [facility] - needs primary doctor referral to [facility] b/c of insurance - for dysphagia R13.10

## 2023-12-20 ENCOUNTER — APPOINTMENT (RX ONLY)
Dept: URBAN - METROPOLITAN AREA CLINIC 4 | Facility: CLINIC | Age: 63
Setting detail: DERMATOLOGY
End: 2023-12-20

## 2023-12-20 DIAGNOSIS — L91.8 OTHER HYPERTROPHIC DISORDERS OF THE SKIN: ICD-10-CM

## 2023-12-20 DIAGNOSIS — Z71.89 OTHER SPECIFIED COUNSELING: ICD-10-CM

## 2023-12-20 DIAGNOSIS — L30.8 OTHER SPECIFIED DERMATITIS: ICD-10-CM

## 2023-12-20 DIAGNOSIS — R20.2 PARESTHESIA OF SKIN: ICD-10-CM

## 2023-12-20 PROCEDURE — 99213 OFFICE O/P EST LOW 20 MIN: CPT

## 2023-12-20 PROCEDURE — ? COUNSELING

## 2023-12-20 PROCEDURE — ? DIAGNOSIS COMMENT

## 2023-12-20 ASSESSMENT — LOCATION SIMPLE DESCRIPTION DERM
LOCATION SIMPLE: LEFT CHEEK
LOCATION SIMPLE: UPPER BACK
LOCATION SIMPLE: RIGHT AXILLARY VAULT

## 2023-12-20 ASSESSMENT — LOCATION DETAILED DESCRIPTION DERM
LOCATION DETAILED: LEFT SUPERIOR CENTRAL BUCCAL CHEEK
LOCATION DETAILED: RIGHT AXILLARY VAULT
LOCATION DETAILED: SUPERIOR THORACIC SPINE

## 2023-12-20 ASSESSMENT — LOCATION ZONE DERM
LOCATION ZONE: FACE
LOCATION ZONE: AXILLAE
LOCATION ZONE: TRUNK

## 2023-12-20 NOTE — PROCEDURE: DIAGNOSIS COMMENT
Render Risk Assessment In Note?: no
Comment: Biopsy proven #[id number]. Discussed pathology in depth. Will monitor.
Detail Level: Simple

## 2023-12-29 DIAGNOSIS — F33.1 MODERATE EPISODE OF RECURRENT MAJOR DEPRESSIVE DISORDER (HCC): Chronic | ICD-10-CM

## 2023-12-29 DIAGNOSIS — G89.18 POST-OPERATIVE PAIN: ICD-10-CM

## 2023-12-29 DIAGNOSIS — F41.1 GENERALIZED ANXIETY DISORDER: ICD-10-CM

## 2023-12-29 DIAGNOSIS — E66.9 OBESITY (BMI 30.0-34.9): ICD-10-CM

## 2024-01-02 PROCEDURE — RXMED WILLOW AMBULATORY MEDICATION CHARGE

## 2024-01-02 RX ORDER — IBUPROFEN 600 MG/1
600 TABLET ORAL EVERY 6 HOURS
Qty: 45 TABLET | Refills: 0 | Status: SHIPPED | OUTPATIENT
Start: 2024-01-02 | End: 2024-03-19 | Stop reason: SDUPTHER

## 2024-01-02 RX ORDER — HYDROXYZINE HYDROCHLORIDE 25 MG/1
25 TABLET, FILM COATED ORAL NIGHTLY PRN
Qty: 100 TABLET | Refills: 0 | Status: SHIPPED | OUTPATIENT
Start: 2024-01-02

## 2024-01-02 RX ORDER — SEMAGLUTIDE 0.68 MG/ML
0.5 INJECTION, SOLUTION SUBCUTANEOUS
Qty: 3 ML | Refills: 1 | Status: SHIPPED | OUTPATIENT
Start: 2024-01-02 | End: 2024-03-03 | Stop reason: SDUPTHER

## 2024-01-02 RX ORDER — CITALOPRAM 40 MG/1
40 TABLET ORAL
Qty: 100 TABLET | Refills: 0 | Status: SHIPPED | OUTPATIENT
Start: 2024-01-02

## 2024-01-04 ENCOUNTER — PHARMACY VISIT (OUTPATIENT)
Dept: PHARMACY | Facility: MEDICAL CENTER | Age: 64
End: 2024-01-04
Payer: COMMERCIAL

## 2024-01-04 ENCOUNTER — OFFICE VISIT (OUTPATIENT)
Dept: MEDICAL GROUP | Facility: MEDICAL CENTER | Age: 64
End: 2024-01-04
Payer: MEDICARE

## 2024-01-04 VITALS
DIASTOLIC BLOOD PRESSURE: 76 MMHG | HEART RATE: 83 BPM | OXYGEN SATURATION: 97 % | TEMPERATURE: 96.7 F | SYSTOLIC BLOOD PRESSURE: 102 MMHG | RESPIRATION RATE: 18 BRPM | HEIGHT: 63 IN | WEIGHT: 180 LBS | BODY MASS INDEX: 31.89 KG/M2

## 2024-01-04 DIAGNOSIS — K43.0 INCARCERATED INCISIONAL HERNIA: ICD-10-CM

## 2024-01-04 DIAGNOSIS — G89.18 POST-OPERATIVE PAIN: ICD-10-CM

## 2024-01-04 DIAGNOSIS — R05.1 ACUTE COUGH: ICD-10-CM

## 2024-01-04 DIAGNOSIS — G54.0 TOS (THORACIC OUTLET SYNDROME): ICD-10-CM

## 2024-01-04 DIAGNOSIS — M79.7 FIBROMYALGIA: ICD-10-CM

## 2024-01-04 DIAGNOSIS — R10.84 GENERALIZED ABDOMINAL PAIN: ICD-10-CM

## 2024-01-04 DIAGNOSIS — F41.1 GENERALIZED ANXIETY DISORDER: ICD-10-CM

## 2024-01-04 LAB
FLUAV RNA SPEC QL NAA+PROBE: NEGATIVE
FLUBV RNA SPEC QL NAA+PROBE: NEGATIVE
RSV RNA SPEC QL NAA+PROBE: NEGATIVE
SARS-COV-2 RNA RESP QL NAA+PROBE: NEGATIVE

## 2024-01-04 PROCEDURE — 0241U POCT CEPHEID COV-2, FLU A/B, RSV - PCR: CPT | Performed by: STUDENT IN AN ORGANIZED HEALTH CARE EDUCATION/TRAINING PROGRAM

## 2024-01-04 PROCEDURE — 3078F DIAST BP <80 MM HG: CPT | Performed by: STUDENT IN AN ORGANIZED HEALTH CARE EDUCATION/TRAINING PROGRAM

## 2024-01-04 PROCEDURE — 99214 OFFICE O/P EST MOD 30 MIN: CPT | Performed by: STUDENT IN AN ORGANIZED HEALTH CARE EDUCATION/TRAINING PROGRAM

## 2024-01-04 PROCEDURE — 3074F SYST BP LT 130 MM HG: CPT | Performed by: STUDENT IN AN ORGANIZED HEALTH CARE EDUCATION/TRAINING PROGRAM

## 2024-01-04 RX ORDER — OXYCODONE HYDROCHLORIDE AND ACETAMINOPHEN 5; 325 MG/1; MG/1
TABLET ORAL
COMMUNITY
End: 2024-01-04

## 2024-01-04 RX ORDER — OXYCODONE HYDROCHLORIDE AND ACETAMINOPHEN 5; 325 MG/1; MG/1
1 TABLET ORAL EVERY 8 HOURS PRN
Qty: 15 TABLET | Refills: 0 | Status: SHIPPED | OUTPATIENT
Start: 2024-01-04 | End: 2024-01-15

## 2024-01-04 RX ORDER — VERAPAMIL HYDROCHLORIDE 120 MG/1
CAPSULE, EXTENDED RELEASE ORAL
COMMUNITY
End: 2024-03-18

## 2024-01-04 RX ORDER — ROSUVASTATIN CALCIUM 20 MG/1
20 TABLET, COATED ORAL DAILY
COMMUNITY

## 2024-01-04 RX ORDER — METHYLPREDNISOLONE 4 MG/1
TABLET ORAL
Qty: 21 TABLET | Refills: 0 | Status: SHIPPED | OUTPATIENT
Start: 2024-01-04 | End: 2024-03-18

## 2024-01-04 ASSESSMENT — FIBROSIS 4 INDEX: FIB4 SCORE: 0.67

## 2024-01-04 NOTE — PROGRESS NOTES
"Chief Complaint   Patient presents with    Nasal Congestion    Shortness of Breath    Abdominal Pain    Back Pain    Fatigue    Nausea    Cough        HPI: Patient is a 63 y.o. female complaining of     Cold- since nov  Coughing  Can't get rid   Tired  Dayquil and nightquil  Vit C  Vitamins  Ibuprofan & tylenol        Hernia removed  So much pain  So bloated  R and left and lower back  Little lump R and on left         ROS:  No fever, cough, nausea, changes in bowel movements or skin rash.        EXAM:  /76   Pulse 83   Temp 35.9 °C (96.7 °F)   Resp 18   Ht 1.6 m (5' 3\")   Wt 81.6 kg (180 lb)   SpO2 97%   General: Alert, no conversational dyspnea or audible wheeze, non-toxic appearance.  Eyes: PERRL, conjunctiva slightly injected, no eye discharge.  Ears: Normal pinnae,TM's { TMS:41699} bilaterally.  Nares: Patent with {Mucous/ sputum desc:25204} mucus.  Sinuses: {TENDER/NONTENDER:99559} over maxillary / frontal sinuses.  Throat: Erythematous injection without exudate.   Neck: Supple, with {LYMPH NODES 0-18 YEARS:16027}.  Lungs: Clear to auscultation bilaterally, no wheeze, crackles or rhonchi.   Heart: Regular rate without murmur.  Skin: Warm and dry without rash.       "

## 2024-01-05 NOTE — PROGRESS NOTES
"Subjective:     Chief Complaint   Patient presents with    Nasal Congestion    Shortness of Breath    Abdominal Pain    Back Pain    Fatigue    Nausea    Cough         HPI:   Yuki presents today with multiple concerns.  Patient notes that there are several acute on chronic problems that she has not addressed.    Abdominal pain  Patient had hernia surgery May 15 but has been having increasing painful lump to right lower quadrant since.  Patient was seen in ED for this for CT scan 7/16 that revealed increased density in right lower abdominal wall which may represent postoperative change or inflammation  But no hernia or abscess noted.  Patient was given prednisone and pain relief and symptoms slightly improved but have progressively worsened.  Patient tender to slight touch, pain out of proportion to physical exam.  Guarding.  Will get repeat CT abdomen to further evaluate.  Previously treated with prednisone and Percocet as needed, will refill.    Cough   Patient notes that she has had cough and congestion since November.  Patient denies fevers or chills.  Patient has only taken Tylenol.      Patient notes that she feels extremely fatigued, has been tired for several months.  Patient appears to have discussed this with her PCP who ordered labs.  Labs in August with no significant findings to explain fatigue.  Patient has since started on potassium and vitamin D supplement.        ROS:  Gen: no fevers/chills  Pulm: no sob, no cough  CV: no chest pain, no palpitations  GI: no nausea/vomiting, no diarrhea        Objective:     Exam:  /76   Pulse 83   Temp 35.9 °C (96.7 °F)   Resp 18   Ht 1.6 m (5' 3\")   Wt 81.6 kg (180 lb)   LMP  (LMP Unknown)   SpO2 97%   BMI 31.89 kg/m²  Body mass index is 31.89 kg/m².    Gen: Alert and oriented, No apparent distress.  Neck: Neck is supple without lymphadenopathy.  Lungs: Normal effort, CTA bilaterally, no wheezes, rhonchi, or rales  CV: Regular rate and rhythm. No " murmurs, rubs, or gallops.  Ext: No clubbing, cyanosis, edema.  Abdomen: Soft, nondistended.  Patient notes bloating,  unable to appreciate bloating.  Pain out of proportion to physical exam, rebound, guarding, worse in right/left lower quadrants.    Assessment & Plan:     63 y.o. female with the following -     1. Generalized abdominal pain  Acute, stable.  Will trial treatment with prednisone and Percocet as needed.  Will get CT scan, patient scheduled for urgent scan.  Discussed signs and symptoms that warrant emergent evaluation in the ED for any worsening symptoms patient advised to be seen sooner in the ED.  - CT-ABDOMEN-PELVIS WITH; Future  - methylPREDNISolone (MEDROL DOSEPAK) 4 MG Tablet Therapy Pack; As directed on the packaging label.  Dispense: 21 Tablet; Refill: 0  - Comp Metabolic Panel; Future  - oxyCODONE-acetaminophen (PERCOCET) 5-325 MG Tab; Take 1 Tablet by mouth every 8 hours as needed for Severe Pain for up to 10 days.  Dispense: 15 Tablet; Refill: 0    2. Post-operative pain  - CT-ABDOMEN-PELVIS WITH; Future  - methylPREDNISolone (MEDROL DOSEPAK) 4 MG Tablet Therapy Pack; As directed on the packaging label.  Dispense: 21 Tablet; Refill: 0  - Comp Metabolic Panel; Future  - oxyCODONE-acetaminophen (PERCOCET) 5-325 MG Tab; Take 1 Tablet by mouth every 8 hours as needed for Severe Pain for up to 10 days.  Dispense: 15 Tablet; Refill: 0    3. Acute cough  - POCT CEPHEID COV-2, FLU A/B, RSV - PCR    4. TOS (thoracic outlet syndrome)  5. Fibromyalgia  Chronic, stable.  Patient will need follow-up with PCP.    6. Generalized anxiety disorder  Chronic, uncontrolled.  Patient needs to be better at follow-up with PCP.    7. Incarcerated incisional hernia         No follow-ups on file.    Please note that this dictation was created using voice recognition software. I have made every reasonable attempt to correct obvious errors, but I expect that there are errors of grammar and possibly content that I did  not discover before finalizing the note.

## 2024-01-08 PROCEDURE — RXMED WILLOW AMBULATORY MEDICATION CHARGE: Performed by: STUDENT IN AN ORGANIZED HEALTH CARE EDUCATION/TRAINING PROGRAM

## 2024-01-10 ENCOUNTER — HOSPITAL ENCOUNTER (OUTPATIENT)
Dept: LAB | Facility: MEDICAL CENTER | Age: 64
End: 2024-01-10
Attending: STUDENT IN AN ORGANIZED HEALTH CARE EDUCATION/TRAINING PROGRAM
Payer: MEDICARE

## 2024-01-10 ENCOUNTER — PHARMACY VISIT (OUTPATIENT)
Dept: PHARMACY | Facility: MEDICAL CENTER | Age: 64
End: 2024-01-10
Payer: COMMERCIAL

## 2024-01-10 DIAGNOSIS — R10.84 GENERALIZED ABDOMINAL PAIN: ICD-10-CM

## 2024-01-10 DIAGNOSIS — G89.18 POST-OPERATIVE PAIN: ICD-10-CM

## 2024-01-10 PROCEDURE — 36415 COLL VENOUS BLD VENIPUNCTURE: CPT

## 2024-01-10 PROCEDURE — 80053 COMPREHEN METABOLIC PANEL: CPT

## 2024-01-11 ENCOUNTER — APPOINTMENT (OUTPATIENT)
Dept: RADIOLOGY | Facility: MEDICAL CENTER | Age: 64
End: 2024-01-11
Attending: STUDENT IN AN ORGANIZED HEALTH CARE EDUCATION/TRAINING PROGRAM
Payer: MEDICARE

## 2024-01-11 LAB
ALBUMIN SERPL BCP-MCNC: 4.5 G/DL (ref 3.2–4.9)
ALBUMIN/GLOB SERPL: 1.6 G/DL
ALP SERPL-CCNC: 71 U/L (ref 30–99)
ALT SERPL-CCNC: 26 U/L (ref 2–50)
ANION GAP SERPL CALC-SCNC: 13 MMOL/L (ref 7–16)
AST SERPL-CCNC: 24 U/L (ref 12–45)
BILIRUB SERPL-MCNC: 0.5 MG/DL (ref 0.1–1.5)
BUN SERPL-MCNC: 12 MG/DL (ref 8–22)
CALCIUM ALBUM COR SERPL-MCNC: 9.3 MG/DL (ref 8.5–10.5)
CALCIUM SERPL-MCNC: 9.7 MG/DL (ref 8.5–10.5)
CHLORIDE SERPL-SCNC: 101 MMOL/L (ref 96–112)
CO2 SERPL-SCNC: 25 MMOL/L (ref 20–33)
CREAT SERPL-MCNC: 0.74 MG/DL (ref 0.5–1.4)
GFR SERPLBLD CREATININE-BSD FMLA CKD-EPI: 89 ML/MIN/1.73 M 2
GLOBULIN SER CALC-MCNC: 2.8 G/DL (ref 1.9–3.5)
GLUCOSE SERPL-MCNC: 85 MG/DL (ref 65–99)
POTASSIUM SERPL-SCNC: 3.7 MMOL/L (ref 3.6–5.5)
PROT SERPL-MCNC: 7.3 G/DL (ref 6–8.2)
SODIUM SERPL-SCNC: 139 MMOL/L (ref 135–145)

## 2024-01-14 ENCOUNTER — HOSPITAL ENCOUNTER (OUTPATIENT)
Dept: RADIOLOGY | Facility: MEDICAL CENTER | Age: 64
End: 2024-01-14
Attending: STUDENT IN AN ORGANIZED HEALTH CARE EDUCATION/TRAINING PROGRAM
Payer: MEDICARE

## 2024-01-14 DIAGNOSIS — R10.84 GENERALIZED ABDOMINAL PAIN: ICD-10-CM

## 2024-01-14 DIAGNOSIS — G89.18 POST-OPERATIVE PAIN: ICD-10-CM

## 2024-01-14 PROCEDURE — 700117 HCHG RX CONTRAST REV CODE 255: Performed by: STUDENT IN AN ORGANIZED HEALTH CARE EDUCATION/TRAINING PROGRAM

## 2024-01-14 PROCEDURE — 74177 CT ABD & PELVIS W/CONTRAST: CPT

## 2024-01-14 RX ADMIN — IOHEXOL 100 ML: 350 INJECTION, SOLUTION INTRAVENOUS at 13:05

## 2024-01-25 PROCEDURE — RXMED WILLOW AMBULATORY MEDICATION CHARGE: Performed by: FAMILY MEDICINE

## 2024-01-25 PROCEDURE — RXMED WILLOW AMBULATORY MEDICATION CHARGE

## 2024-01-26 ENCOUNTER — PHARMACY VISIT (OUTPATIENT)
Dept: PHARMACY | Facility: MEDICAL CENTER | Age: 64
End: 2024-01-26
Payer: COMMERCIAL

## 2024-03-03 DIAGNOSIS — E66.9 OBESITY (BMI 30.0-34.9): ICD-10-CM

## 2024-03-04 RX ORDER — SEMAGLUTIDE 0.68 MG/ML
0.5 INJECTION, SOLUTION SUBCUTANEOUS
Qty: 3 ML | Refills: 0 | Status: SHIPPED | OUTPATIENT
Start: 2024-03-04

## 2024-03-06 PROCEDURE — RXMED WILLOW AMBULATORY MEDICATION CHARGE: Performed by: FAMILY MEDICINE

## 2024-03-12 ENCOUNTER — PHARMACY VISIT (OUTPATIENT)
Dept: PHARMACY | Facility: MEDICAL CENTER | Age: 64
End: 2024-03-12
Payer: COMMERCIAL

## 2024-03-18 ENCOUNTER — OFFICE VISIT (OUTPATIENT)
Dept: MEDICAL GROUP | Facility: MEDICAL CENTER | Age: 64
End: 2024-03-18
Payer: MEDICARE

## 2024-03-18 ENCOUNTER — HOSPITAL ENCOUNTER (OUTPATIENT)
Facility: MEDICAL CENTER | Age: 64
End: 2024-03-18
Attending: FAMILY MEDICINE
Payer: MEDICARE

## 2024-03-18 VITALS
BODY MASS INDEX: 31.54 KG/M2 | SYSTOLIC BLOOD PRESSURE: 122 MMHG | DIASTOLIC BLOOD PRESSURE: 80 MMHG | HEART RATE: 71 BPM | HEIGHT: 63 IN | WEIGHT: 178 LBS | TEMPERATURE: 97.3 F | OXYGEN SATURATION: 96 % | RESPIRATION RATE: 16 BRPM

## 2024-03-18 DIAGNOSIS — M25.512 CHRONIC LEFT SHOULDER PAIN: ICD-10-CM

## 2024-03-18 DIAGNOSIS — M79.605 PAIN IN BOTH LOWER EXTREMITIES: ICD-10-CM

## 2024-03-18 DIAGNOSIS — M54.12 CERVICAL RADICULOPATHY: ICD-10-CM

## 2024-03-18 DIAGNOSIS — M50.30 DDD (DEGENERATIVE DISC DISEASE), CERVICAL: ICD-10-CM

## 2024-03-18 DIAGNOSIS — G89.29 CHRONIC LEFT SHOULDER PAIN: ICD-10-CM

## 2024-03-18 DIAGNOSIS — I70.0 ATHEROSCLEROSIS OF AORTA (HCC): ICD-10-CM

## 2024-03-18 DIAGNOSIS — R12 HEART BURN: ICD-10-CM

## 2024-03-18 DIAGNOSIS — R13.10 DYSPHAGIA, UNSPECIFIED TYPE: ICD-10-CM

## 2024-03-18 DIAGNOSIS — R79.89 LOW VITAMIN D LEVEL: ICD-10-CM

## 2024-03-18 DIAGNOSIS — F33.1 MODERATE EPISODE OF RECURRENT MAJOR DEPRESSIVE DISORDER (HCC): Chronic | ICD-10-CM

## 2024-03-18 DIAGNOSIS — E78.5 DYSLIPIDEMIA: ICD-10-CM

## 2024-03-18 DIAGNOSIS — R22.1 NECK MASS: ICD-10-CM

## 2024-03-18 DIAGNOSIS — M79.7 FIBROMYALGIA: ICD-10-CM

## 2024-03-18 DIAGNOSIS — M79.604 PAIN IN BOTH LOWER EXTREMITIES: ICD-10-CM

## 2024-03-18 DIAGNOSIS — I25.10 CORONARY ARTERY CALCIFICATION SEEN ON CAT SCAN: ICD-10-CM

## 2024-03-18 DIAGNOSIS — I10 ESSENTIAL HYPERTENSION: ICD-10-CM

## 2024-03-18 DIAGNOSIS — E83.110 HEREDITARY HEMOCHROMATOSIS (HCC): Chronic | ICD-10-CM

## 2024-03-18 PROBLEM — K43.0 INCARCERATED INCISIONAL HERNIA: Status: RESOLVED | Noted: 2023-05-17 | Resolved: 2024-03-18

## 2024-03-18 PROCEDURE — G0480 DRUG TEST DEF 1-7 CLASSES: HCPCS

## 2024-03-18 PROCEDURE — 80307 DRUG TEST PRSMV CHEM ANLYZR: CPT

## 2024-03-18 PROCEDURE — 3079F DIAST BP 80-89 MM HG: CPT | Performed by: FAMILY MEDICINE

## 2024-03-18 PROCEDURE — 99214 OFFICE O/P EST MOD 30 MIN: CPT | Performed by: FAMILY MEDICINE

## 2024-03-18 PROCEDURE — 3074F SYST BP LT 130 MM HG: CPT | Performed by: FAMILY MEDICINE

## 2024-03-18 RX ORDER — TRAMADOL HYDROCHLORIDE 50 MG/1
50 TABLET ORAL
Qty: 30 TABLET | Refills: 0 | Status: SHIPPED | OUTPATIENT
Start: 2024-03-18 | End: 2024-04-19

## 2024-03-18 RX ORDER — PANTOPRAZOLE SODIUM 40 MG/1
40 TABLET, DELAYED RELEASE ORAL
Qty: 90 TABLET | Refills: 3 | Status: SHIPPED | OUTPATIENT
Start: 2024-03-18

## 2024-03-18 ASSESSMENT — PATIENT HEALTH QUESTIONNAIRE - PHQ9: CLINICAL INTERPRETATION OF PHQ2 SCORE: 0

## 2024-03-18 ASSESSMENT — FIBROSIS 4 INDEX: FIB4 SCORE: 0.64

## 2024-03-18 NOTE — PROGRESS NOTES
"Verbal consent was acquired by the patient to use User[employer] ambient listening note generation during this visit     Subjective:     CC: \"labs, abdominal concerns, various pains\"      History of Present Illness  The patient is a 64-year-old female. We are doing a lab follow-up, working on referral to GI and talking about some pain in her body.    She would like to go over her blood work. She has been in the hospital a couple of times. She had a hernia repair in 05/2023. She went in in 07/2023 for abdominal pain and chest pain. She is taking vitamin D with calcium. Her fatigue is so bad. A week ago, all of a sudden, her right leg pain woke her up. She ended up staying in bed for 2 days. It started in her hip and then went to her left leg. Her whole arm is swollen. She also has shakes. Her whole arm hurts when she lifts it up. It is weak. Her neck hurts. It hurts when she walks. She can not put pressure on her leg anymore. She is not using MiraLAX. She is still taking verapamil, rosuvastatin 20 mg, losartan 100 mg, aspirin 81 mg, and Celexa 40 mg. She is not taking hydrochlorothiazide 12.5 mg. She uses Linzess as needed. She is still taking Ozempic 0.5 mg. She denies any problems with constipation or nausea. They wanted to do a GI test because she gags and has difficulty swallowing. She had an emergency hernia surgery. When she went back, she was complaining. She was given a prescription to go to [place], but she was not able to go. She called the office and asked for a referral to GI, but she was told to see her primary care physician. She sometimes gets nausea. It sets off when she brushes her teeth, drinks coffee, or takes her pills. She has dry heaves. She has a lot of heartburn. She is not missing any medications. She does not see anyone for her aches and pains. She is tired of being in pain. She is requesting something for pain. She takes ibuprofen, Tylenol, and Advil. She switches on and off, but it does not " "work. She was given 6 pills of Percocet and oxycodone during her last surgery. She used to take tramadol every single day, which helped.    Supplemental Information  She was told she has a blockage in her aorta. She has regurgitation. She is out of breath all the time. 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          Objective:     Exam:  /80   Pulse 71   Temp 36.3 °C (97.3 °F) (Temporal)   Resp 16   Ht 1.6 m (5' 3\")   Wt 80.7 kg (178 lb)   LMP  (LMP Unknown)   SpO2 96%   BMI 31.53 kg/m²  Body mass index is 31.53 kg/m².    Physical Exam  Vitals reviewed.   Constitutional:       General: She is not in acute distress.     Appearance: Normal appearance.   HENT:      Head: Normocephalic and atraumatic.   Cardiovascular:      Rate and Rhythm: Normal rate and regular rhythm.      Heart sounds: Normal heart sounds.   Pulmonary:      Effort: Pulmonary effort is normal. No respiratory distress.      Breath sounds: Normal breath sounds.   Skin:     General: Skin is warm and dry.   Neurological:      Mental Status: She is alert. Mental status is at baseline.      Gait: Gait normal.   Psychiatric:         Mood and Affect: Mood normal.         Behavior: Behavior normal.           Results  Laboratory Studies  Labs were reviewed with the patient.    Imaging  Ultrasound of the neck was reviewed with the patient.      Assessment & Plan:       1. Hereditary hemochromatosis (HCC)  - CBC WITH DIFFERENTIAL; Future  - Comp Metabolic Panel; Future  - IRON/TOTAL IRON BIND; Future  - FERRITIN; Future    2. Low vitamin D level  - VITAMIN D,25 HYDROXY (DEFICIENCY); Future    3. Moderate episode of recurrent major depressive disorder (HCC)  - CBC WITH DIFFERENTIAL; Future  - Comp Metabolic Panel; Future  - TSH WITH REFLEX TO FT4; Future    4. Atherosclerosis of aorta (HCC)  - Comp Metabolic Panel; Future  - Lipid Profile; Future    5. Coronary artery calcification seen on CAT scan  - Comp Metabolic Panel; Future  - Lipid " Profile; Future    6. Dyslipidemia  - Comp Metabolic Panel; Future  - Lipid Profile; Future    7. DDD (degenerative disc disease), cervical  - DX-CERVICAL SPINE-2 OR 3 VIEWS; Future  - DX-SHOULDER 2+ LEFT; Future  - DX-LUMBAR SPINE-4+ VIEWS; Future  - Referral to Pain Clinic  - Sed Rate; Future  - CRP QUANTITIVE (NON-CARDIAC); Future  - traMADol (ULTRAM) 50 MG Tab; Take 1 Tablet by mouth 1 time a day as needed for Severe Pain for up to 30 days. Indications: Pain  Dispense: 30 Tablet; Refill: 0  - Consent for Opiate Prescription  - URINE DRUG SCREEN; Future    8. Fibromyalgia  - DX-CERVICAL SPINE-2 OR 3 VIEWS; Future  - DX-SHOULDER 2+ LEFT; Future  - DX-LUMBAR SPINE-4+ VIEWS; Future  - Referral to Pain Clinic  - Sed Rate; Future  - CRP QUANTITIVE (NON-CARDIAC); Future  - traMADol (ULTRAM) 50 MG Tab; Take 1 Tablet by mouth 1 time a day as needed for Severe Pain for up to 30 days. Indications: Pain  Dispense: 30 Tablet; Refill: 0  - Consent for Opiate Prescription  - URINE DRUG SCREEN; Future    9. Essential hypertension  - MICROALBUMIN CREAT RATIO URINE; Future    10. Neck mass  - TSH WITH REFLEX TO FT4; Future    11. Dysphagia, unspecified type  - Referral to Gastroenterology  - pantoprazole (PROTONIX) 40 MG Tablet Delayed Response; Take 1 Tablet by mouth every morning before breakfast.  Dispense: 90 Tablet; Refill: 3  - DX-ESOPH. FLUORO (BARIUM SWALLOW); Future    12. Heart burn  - Referral to Gastroenterology  - pantoprazole (PROTONIX) 40 MG Tablet Delayed Response; Take 1 Tablet by mouth every morning before breakfast.  Dispense: 90 Tablet; Refill: 3  - DX-ESOPH. FLUORO (BARIUM SWALLOW); Future    13. Cervical radiculopathy  - DX-CERVICAL SPINE-2 OR 3 VIEWS; Future  - DX-SHOULDER 2+ LEFT; Future  - Referral to Pain Clinic  - Sed Rate; Future  - CRP QUANTITIVE (NON-CARDIAC); Future  - traMADol (ULTRAM) 50 MG Tab; Take 1 Tablet by mouth 1 time a day as needed for Severe Pain for up to 30 days. Indications: Pain   "Dispense: 30 Tablet; Refill: 0  - Consent for Opiate Prescription  - URINE DRUG SCREEN; Future    14. Pain in both lower extremities  - DX-LUMBAR SPINE-4+ VIEWS; Future  - Referral to Pain Clinic  - Sed Rate; Future  - CRP QUANTITIVE (NON-CARDIAC); Future  - traMADol (ULTRAM) 50 MG Tab; Take 1 Tablet by mouth 1 time a day as needed for Severe Pain for up to 30 days. Indications: Pain  Dispense: 30 Tablet; Refill: 0  - Consent for Opiate Prescription  - URINE DRUG SCREEN; Future    15. Chronic left shoulder pain  - DX-SHOULDER 2+ LEFT; Future  - Referral to Pain Clinic  - Sed Rate; Future  - CRP QUANTITIVE (NON-CARDIAC); Future  - traMADol (ULTRAM) 50 MG Tab; Take 1 Tablet by mouth 1 time a day as needed for Severe Pain for up to 30 days. Indications: Pain  Dispense: 30 Tablet; Refill: 0  - Consent for Opiate Prescription  - URINE DRUG SCREEN; Future    Patient understands this prescription is a controlled substance which is potentially habit-forming and its use is regulated by the [place]. We also discussed the new \"black box\" warning regarding the lethal combination of opioids and benzodiazepines. Refills are subject to terms of a controlled substance agreement and patient has an updated one on file. Most recent UDS is appropriate. Any refill requires an office visit. Narcotics may have adverse effects and the risks of addiction, accidental overdose and death were emphasized. Provided prescriptions for the next 30 days.    Assessment & Plan  1. Fatigue.  I will recheck her cholesterol.    2. Right leg pain.  Her pulse is staying good. I will prescribe tramadol. I advised her to be careful with constipation, being too tired, or changing her breathing status. I will refer her to physical medicine. I will do some updated x-rays. I will refer her to physical therapy.    3. Dysphagia.  This could be the cause of her heartburn. I will refer her to GI. I will order a swallow study. I will prescribe a medicine for her to take " in the morning to help with heartburn.    4. Lab follow-up.  Her vitamin D was low. Her thyroid was normal. Her iron was normal. Her cholesterol was high. Her potassium was mildly low. Her platelets were slightly elevated. Her kidney and liver function were normal. I will recheck her vitamin D. I will recheck her cholesterol. I will recheck her urine.    5. Medication refill.  She will sign a controlled substance agreement. She will watch out for constipation or any changes to her breathing. I advised her not to mix it with other sedating drugs or alcohol.    Follow-up  The patient will follow up in 4 weeks.         Return in about 4 weeks (around 4/15/2024) for Imaging F/U, Lab F/U, Medication F/U.      This note was created using voice recognition software (Dragon). The accuracy of the dictation is limited by the abilities of the software. I have reviewed the note prior to signing, however some errors in grammar and context are still possible. If you have any questions related to this note please do not hesitate to contact our office.

## 2024-03-19 DIAGNOSIS — G89.18 POST-OPERATIVE PAIN: ICD-10-CM

## 2024-03-19 PROCEDURE — RXMED WILLOW AMBULATORY MEDICATION CHARGE: Performed by: FAMILY MEDICINE

## 2024-03-19 RX ORDER — IBUPROFEN 600 MG/1
600 TABLET ORAL EVERY 6 HOURS
Qty: 45 TABLET | Refills: 0 | Status: SHIPPED | OUTPATIENT
Start: 2024-03-19

## 2024-03-19 NOTE — TELEPHONE ENCOUNTER
Received request via: Pharmacy    Was the patient seen in the last year in this department? Yes    Does the patient have an active prescription (recently filled or refills available) for medication(s) requested? No    Pharmacy Name:  [facility] [place]     Does the patient have longterm Plus and need 100 day supply (blood pressure, diabetes and cholesterol meds only)? Medication is not for cholesterol, blood pressure or diabetes

## 2024-03-20 LAB
AMPHET CTO UR CFM-MCNC: NEGATIVE NG/ML
BARBITURATES CTO UR CFM-MCNC: NEGATIVE NG/ML
BENZODIAZ CTO UR CFM-MCNC: NORMAL NG/ML
CANNABINOIDS CTO UR CFM-MCNC: NEGATIVE NG/ML
COCAINE CTO UR CFM-MCNC: NEGATIVE NG/ML
CREAT UR-MCNC: 273.5 MG/DL (ref 20–400)
DRUG COMMENT 753798: NORMAL
METHADONE CTO UR CFM-MCNC: NEGATIVE NG/ML
OPIATES CTO UR CFM-MCNC: NEGATIVE NG/ML
PCP CTO UR CFM-MCNC: NEGATIVE NG/ML
PROPOXYPH CTO UR CFM-MCNC: NEGATIVE NG/ML

## 2024-03-20 PROCEDURE — RXMED WILLOW AMBULATORY MEDICATION CHARGE: Performed by: FAMILY MEDICINE

## 2024-03-22 LAB
1OH-MIDAZOLAM UR CFM-MCNC: <20 NG/ML
7AMINOCLONAZEPAM UR CFM-MCNC: <5 NG/ML
A-OH ALPRAZ UR CFM-MCNC: 602 NG/ML
ALPRAZ UR CFM-MCNC: 122 NG/ML
CHLORDIAZEP UR CFM-MCNC: <20 NG/ML
CLONAZEPAM UR CFM-MCNC: <5 NG/ML
DIAZEPAM UR CFM-MCNC: <20 NG/ML
LORAZEPAM UR CFM-MCNC: <20 NG/ML
MIDAZOLAM UR CFM-MCNC: <20 NG/ML
NORDIAZEPAM UR CFM-MCNC: <20 NG/ML
OXAZEPAM UR CFM-MCNC: <20 NG/ML
TEMAZEPAM UR CFM-MCNC: <20 NG/ML

## 2024-03-22 PROCEDURE — G0480 DRUG TEST DEF 1-7 CLASSES: HCPCS

## 2024-03-30 ENCOUNTER — APPOINTMENT (OUTPATIENT)
Dept: RADIOLOGY | Facility: MEDICAL CENTER | Age: 64
End: 2024-03-30
Attending: FAMILY MEDICINE
Payer: MEDICARE

## 2024-04-01 ENCOUNTER — PHARMACY VISIT (OUTPATIENT)
Dept: PHARMACY | Facility: MEDICAL CENTER | Age: 64
End: 2024-04-01
Payer: COMMERCIAL

## 2024-04-04 DIAGNOSIS — E66.9 OBESITY (BMI 30.0-34.9): ICD-10-CM

## 2024-04-04 DIAGNOSIS — F41.1 GENERALIZED ANXIETY DISORDER: ICD-10-CM

## 2024-04-04 DIAGNOSIS — F33.1 MODERATE EPISODE OF RECURRENT MAJOR DEPRESSIVE DISORDER (HCC): Chronic | ICD-10-CM

## 2024-04-04 NOTE — TELEPHONE ENCOUNTER
Received request via: Pharmacy    Was the patient seen in the last year in this department? Yes    Does the patient have an active prescription (recently filled or refills available) for medication(s) requested? No    Pharmacy Name: [facility] [place]     Does the patient have long term Plus and need 100 day supply (blood pressure, diabetes and cholesterol meds only)? Yes, quantity updated to 100 days

## 2024-04-04 NOTE — TELEPHONE ENCOUNTER
Received request via: Pharmacy    Was the patient seen in the last year in this department? Yes    Does the patient have an active prescription (recently filled or refills available) for medication(s) requested? No    Pharmacy Name:    [facility] [place]              Does the patient have intermediate Plus and need 100 day supply (blood pressure, diabetes and cholesterol meds only)? Medication is not for cholesterol, blood pressure or diabetes

## 2024-04-09 ENCOUNTER — DOCUMENTATION (OUTPATIENT)
Dept: HEALTH INFORMATION MANAGEMENT | Facility: OTHER | Age: 64
End: 2024-04-09
Payer: MEDICARE

## 2024-04-09 RX ORDER — CITALOPRAM 40 MG/1
40 TABLET ORAL
Qty: 100 TABLET | Refills: 0 | Status: SHIPPED | OUTPATIENT
Start: 2024-04-09

## 2024-04-09 RX ORDER — SEMAGLUTIDE 0.68 MG/ML
0.5 INJECTION, SOLUTION SUBCUTANEOUS
Qty: 3 ML | Refills: 0 | Status: SHIPPED | OUTPATIENT
Start: 2024-04-09

## 2024-04-09 RX ORDER — HYDROXYZINE HYDROCHLORIDE 25 MG/1
25 TABLET, FILM COATED ORAL NIGHTLY PRN
Qty: 100 TABLET | Refills: 0 | Status: SHIPPED | OUTPATIENT
Start: 2024-04-09

## 2024-04-09 NOTE — TELEPHONE ENCOUNTER
New script for semaglutide paled   Treatment Goal Explanation (Does Not Render In The Note): Stable for the purposes of categorizing medical decision making is defined by the specific treatment goals for an individual patient. A patient that is not at their treatment goal is not stable, even if the condition has not changed and there is no short- term threat to life or function.

## 2024-04-10 PROCEDURE — RXMED WILLOW AMBULATORY MEDICATION CHARGE: Performed by: FAMILY MEDICINE

## 2024-04-11 ENCOUNTER — PHARMACY VISIT (OUTPATIENT)
Dept: PHARMACY | Facility: MEDICAL CENTER | Age: 64
End: 2024-04-11
Payer: COMMERCIAL

## 2024-04-17 ENCOUNTER — APPOINTMENT (OUTPATIENT)
Dept: MEDICAL GROUP | Facility: MEDICAL CENTER | Age: 64
End: 2024-04-17
Payer: MEDICARE

## 2024-04-18 ENCOUNTER — TELEPHONE (OUTPATIENT)
Dept: HEALTH INFORMATION MANAGEMENT | Facility: OTHER | Age: 64
End: 2024-04-18

## 2024-04-19 ENCOUNTER — PATIENT MESSAGE (OUTPATIENT)
Dept: MEDICAL GROUP | Facility: MEDICAL CENTER | Age: 64
End: 2024-04-19
Payer: MEDICARE

## 2024-04-19 ENCOUNTER — TELEPHONE (OUTPATIENT)
Dept: MEDICAL GROUP | Facility: MEDICAL CENTER | Age: 64
End: 2024-04-19
Payer: MEDICARE

## 2024-04-19 DIAGNOSIS — E66.9 OBESITY (BMI 30.0-34.9): ICD-10-CM

## 2024-04-19 NOTE — TELEPHONE ENCOUNTER
FINAL PRIOR AUTHORIZATION STATUS:    1.  Name of Medication & Dose: Semaglutide,0.25 or 0.5MG/DOS, (OZEMPIC, 0.25 OR 0.5 MG/DOSE,) 2 MG/3ML Solution Pen-injector      2. Prior Auth Status: Denied.  Reason: Covermymeds: Key   [id number]    3. Action Taken: Pharmacy Notified: no Patient Notified: no

## 2024-04-24 ENCOUNTER — TELEPHONE (OUTPATIENT)
Dept: HEALTH INFORMATION MANAGEMENT | Facility: OTHER | Age: 64
End: 2024-04-24
Payer: MEDICARE

## 2024-04-29 ENCOUNTER — HOSPITAL ENCOUNTER (OUTPATIENT)
Dept: RADIOLOGY | Facility: MEDICAL CENTER | Age: 64
End: 2024-04-29
Attending: FAMILY MEDICINE
Payer: MEDICARE

## 2024-04-29 DIAGNOSIS — M25.512 CHRONIC LEFT SHOULDER PAIN: ICD-10-CM

## 2024-04-29 DIAGNOSIS — M79.605 PAIN IN BOTH LOWER EXTREMITIES: ICD-10-CM

## 2024-04-29 DIAGNOSIS — R13.10 DYSPHAGIA, UNSPECIFIED TYPE: ICD-10-CM

## 2024-04-29 DIAGNOSIS — M79.7 FIBROMYALGIA: ICD-10-CM

## 2024-04-29 DIAGNOSIS — G89.29 CHRONIC LEFT SHOULDER PAIN: ICD-10-CM

## 2024-04-29 DIAGNOSIS — M50.30 DDD (DEGENERATIVE DISC DISEASE), CERVICAL: ICD-10-CM

## 2024-04-29 DIAGNOSIS — M54.12 CERVICAL RADICULOPATHY: ICD-10-CM

## 2024-04-29 DIAGNOSIS — R12 HEART BURN: ICD-10-CM

## 2024-04-29 DIAGNOSIS — M79.604 PAIN IN BOTH LOWER EXTREMITIES: ICD-10-CM

## 2024-04-29 PROCEDURE — 74220 X-RAY XM ESOPHAGUS 1CNTRST: CPT

## 2024-04-29 PROCEDURE — 72110 X-RAY EXAM L-2 SPINE 4/>VWS: CPT

## 2024-04-29 PROCEDURE — 72040 X-RAY EXAM NECK SPINE 2-3 VW: CPT

## 2024-04-29 PROCEDURE — 73030 X-RAY EXAM OF SHOULDER: CPT | Mod: LT

## 2024-04-30 ENCOUNTER — PATIENT MESSAGE (OUTPATIENT)
Dept: MEDICAL GROUP | Facility: MEDICAL CENTER | Age: 64
End: 2024-04-30
Payer: MEDICARE

## 2024-04-30 DIAGNOSIS — M77.8 LEFT SHOULDER TENDONITIS: ICD-10-CM

## 2024-05-03 ENCOUNTER — TELEPHONE (OUTPATIENT)
Dept: MEDICAL GROUP | Facility: MEDICAL CENTER | Age: 64
End: 2024-05-03
Payer: MEDICARE

## 2024-05-03 NOTE — TELEPHONE ENCOUNTER
FINAL PRIOR AUTHORIZATION STATUS:    1.  Name of Medication & Dose: Semaglutide,0.25 or 0.5MG/DOS, (OZEMPIC, 0.25 OR 0.5 MG/DOSE,) 2 MG/3ML Solution Pen-injector      2. Prior Auth Status: Denied.  Reason: See denial letter in media    3. Action Taken: Pharmacy Notified: no Patient Notified: no

## 2024-05-22 ENCOUNTER — NON-PROVIDER VISIT (OUTPATIENT)
Dept: VASCULAR LAB | Facility: MEDICAL CENTER | Age: 64
End: 2024-05-22
Attending: INTERNAL MEDICINE
Payer: MEDICARE

## 2024-05-22 DIAGNOSIS — E66.9 OBESITY (BMI 30.0-34.9): ICD-10-CM

## 2024-05-22 NOTE — PROGRESS NOTES
"Patient Consult Note - Initial Visit    TIME IN: 2:44pm  TIME OUT: 3:28pm    Primary care physician: Stan Echevarria D.O.    Reason for consult: Obesity/Weight Managment    HPI:  Yuki Angelo is a 64 y.o. old patient who comes in today for evaluation of above stated problem.    Most Recent HbA1c:   Lab Results   Component Value Date/Time    HBA1C 5.6 03/09/2020 11:33 AM      Lab Results   Component Value Date/Time    CREATININE 0.74 01/10/2024 12:39 PM    CREATININE 0.9 06/19/2006 10:49 AM              Current Regimen Weight Loss Medications  None    Previously attempted medications  Ozempic 0.5mg - no longer covered by insurance    Current Exercise - Walks around large property on occasion depending on weather.  Exercise Goal -   Suggested incorporating post- prandial exercise, 5-10 min walking after each meal.  Also suggested mild to moderate body weight/core exercises done at home.  Goal would be at least 150 min/week exercise.    Dietary -   Overall, good understanding of optimal nutrition habits.  Not able to determine if at caloric deficit, with milk and juice, likely not.  Portions are kept small.    Pt reports eating:  Breakfast - yogurt, fruit, oatmeal, toast - usually one or two of these items.  Snack - none  Lunch - usually only a piece of fruit or some whole grain crackers.  Snack - none  Dinner - Largest meal of the day, states that her and  are \"meat and potatoes folks\", does admit to starchy pastas regularly.  Beverages - drinks a good deal of milk, well hydrated with water, juice on regular basis.    Lab Results   Component Value Date/Time    CHOLSTRLTOT 243 (H) 08/21/2023 11:36 AM     (H) 08/21/2023 11:36 AM    HDL 70 08/21/2023 11:36 AM    TRIGLYCERIDE 118 08/21/2023 11:36 AM       Last Microalbumin/Cr:  Lab Results   Component Value Date/Time    MALBCRT 5 08/21/2023 11:36 AM    MICROALBUR 1.6 08/21/2023 11:36 AM         Past Medical History:  Patient Active Problem List    " Diagnosis Date Noted    Atherosclerosis of aorta (HCC) 06/08/2023    Drug-induced constipation 05/17/2023    Post-operative pain 05/12/2023    Obesity (BMI 30.0-34.9) 08/26/2022    Neck mass 07/06/2022    Generalized anxiety disorder 07/06/2022    Primary osteoarthritis of first carpometacarpal joint of right hand 08/31/2021    At high risk for falls 07/01/2021    Intractable migraine without aura and without status migrainosus 03/04/2019    Seizure cerebral (HCC) 03/04/2019    Pulmonary nodules 02/08/2018    Pericardial cyst 02/07/2018    Precordial pain 02/07/2018    Coronary artery calcification seen on CAT scan 02/07/2018    Moderate episode of recurrent major depressive disorder (HCC) 01/29/2018    Hereditary hemochromatosis (HCC) 12/15/2017    Dyslipidemia 01/17/2017    Essential hypertension 05/20/2016    TOS (thoracic outlet syndrome) 04/14/2015    Fibromyalgia 07/17/2012    Aortic regurgitation 02/21/2012    Sleep apnea     Cervical radiculopathy 09/08/2011    S/P cervical spinal fusion 09/08/2011    DDD (degenerative disc disease), cervical 08/03/2011       Past Surgical History:  Past Surgical History:   Procedure Laterality Date    VENTRAL HERNIA REPAIR N/A 5/10/2023    Procedure: REPAIR, HERNIA, VENTRAL;  Surgeon: Celso Pringle M.D.;  Location: SURGERY [facility];  Service: General    WY REMOVAL OF OMENTUM  12/3/2021    Procedure: OMENTECTOMY,ROBOT-ASSISTED,USING DA CHEPE XI;  Surgeon: Lois Marte M.D.;  Location: SURGERY [facility];  Service: Gyn Robotic    HYSTERECTOMY ROBOTIC XI  12/3/2021    Procedure: HYSTERECTOMY, ROBOT-ASSISTED, USING DA CHEPE XI;  Surgeon: Lois Marte M.D.;  Location: SURGERY [facility];  Service: Gyn Robotic    SALPINGO OOPHORECTOMY Bilateral 12/3/2021    Procedure: SALPINGO-OOPHORECTOMY;  Surgeon: Lois Marte M.D.;  Location: [facility];  Service: Gyn Robotic    RICKI BY LAPAROSCOPY  2/19/2021    Procedure: CHOLECYSTECTOMY, LAPAROSCOPIC;   Surgeon: John H Ganser, M.D.;  Location: SURGERY SAME DAY [facility];  Service: General    MD THORACOSCOPY,DX NO BX Right 2/3/2021    Procedure: THORACOSCOPY;  Surgeon: John H Ganser, M.D.;  Location: SURGERY [facility];  Service: General    CYST EXCISION Right 2/3/2021    Procedure: EXCISION, CYST-PERICARDIAL CYST;  Surgeon: John H Ganser, M.D.;  Location: SURGERY [facility];  Service: General    NERVE ULNAR TRANSFER Left 2019    at [place]    BUNIONECTOMY DANYA Right 11/10/2015    Procedure: BUNIONECTOMY DANYA;  Surgeon: JAMA HensonPAUBRIE;  Location: SURGERY [facility];  Service:     OSTECTOMY  11/10/2015    Procedure: OSTECTOMY - 5TH TAILORS BUNION ;  Surgeon: JAMA HensonPAUBRIE;  Location: SURGERY [facility];  Service:     ARTHROPLASTY Right 11/10/2015    Procedure: ARTHROPLASTY - 5TH DEROTATIONAL;  Surgeon: JAMA HensonPAUBRIE;  Location: SURGERY [facility];  Service:     HAMMERTOE CORRECTION  11/10/2015    Procedure: HAMMERTOE CORRECTION - 2ND, 3RD, 4TH ;  Surgeon: JAMA HensonPDipeshMDipesh;  Location: SURGERY [facility];  Service:     HYSTEROSCOPY WITH VIDEO DIAGNOSTIC  3/27/2015    Performed by Johnny Jarquin M.D. at SURGERY SAME DAY [facility]    DILATION AND CURETTAGE  3/27/2015    Performed by Johnny Jarquin M.D. at SURGERY SAME DAY [facility] ORS    KNEE ARTHROSCOPY  3/11/2013    Performed by Freeman Ballard M.D. at [facility] ORS    MENISCECTOMY, KNEE, MEDIAL  3/11/2013    Performed by Freeman Ballard M.D. at [facility] ORS    NERVE ULNAR TRANSFER  3/13/2012    Performed by KATYA NEAL at [facility] ORS    CARPAL TUNNEL ENDOSCOPIC  3/13/2012    Performed by KATYA NEAL at [facility]    CERVICAL DISK AND FUSION ANTERIOR  6/21/2010    Performed by LAURA BAH at [facility] ORS    INCISION AND DRAINAGE GENERAL  10/22/2009    Performed by ABEL GUZMAN at [facility]     MASS EXCISION GENERAL  10/6/2009    right breast Performed by ABEL GUZMAN at SURGERY [facility] ORS    BREAST BIOPSY  3/5/2009    right; Performed by ABEL GUZMAN at SURGERY [facility] ORS    RIB RESECTION      right for thoracic outlet syndrome    KNEE ARTHROSCOPY  1998    left    TUBAL COAGULATION LAPAROSCOPIC BILATERAL  1997    CERVICAL FUSION POSTERIOR  1996    OTHER SURGICAL PROCEDURE  1992    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       Allergies:  Penicillins and Sulfa drugs    Social History:  Social History     Socioeconomic History    Marital status:      Spouse name: Not on file    Number of children: Not on file    Years of education: Not on file    Highest education level: 12th grade   Occupational History    Not on file   Tobacco Use    Smoking status: Former     Current packs/day: 0.00     Average packs/day: 0.5 packs/day for 37.0 years (18.5 ttl pk-yrs)     Types: Cigarettes     Start date: 1976     Quit date: 2013     Years since quittin.0    Smokeless tobacco: Never    Tobacco comments:      Started smoking at age 16   Vaping Use    Vaping status: Former    Substances: Nicotine   Substance and Sexual Activity    Alcohol use: Yes     Comment: social    Drug use: Not Currently    Sexual activity: Yes     Partners: Male     Birth control/protection: Post-Menopausal     Comment: .    Other Topics Concern    Not on file   Social History Narrative    Not on file     Social Determinants of Health     Financial Resource Strain: Low Risk  (2023)    Overall Financial Resource Strain (CARDIA)     Difficulty of Paying Living Expenses: Not hard at all   Food Insecurity: No Food Insecurity (2023)    Hunger Vital Sign     Worried About Running Out of Food in the Last Year: Never true     Ran Out of Food in the Last Year: Never true   Transportation Needs: No Transportation Needs (2023)    PRAPARE - Transportation     Lack of Transportation  (Medical): No     Lack of Transportation (Non-Medical): No   Physical Activity: Inactive (4/28/2023)    Exercise Vital Sign     Days of Exercise per Week: 0 days     Minutes of Exercise per Session: 0 min   Stress: Stress Concern Present (4/28/2023)    [employer] - Occupational Stress Questionnaire     Feeling of Stress : To some extent   Social Connections: Moderately Integrated (4/28/2023)    Social Connection and Isolation Panel [NHANES]     Frequency of Communication with Friends and Family: Three times a week     Frequency of Social Gatherings with Friends and Family: Twice a week     Attends [employer] Services: 1 to 4 times per year     Active Member of Clubs or Organizations: No     Attends Club or Organization Meetings: Never     Marital Status:    Intimate Partner Violence: Not on file   Housing Stability: Low Risk  (4/28/2023)    Housing Stability Vital Sign     Unable to Pay for Housing in the Last Year: No     Number of Places Lived in the Last Year: 1     Unstable Housing in the Last Year: No       Family History:  Family History   Adopted: Yes   Problem Relation Age of Onset    No Known Problems Son     No Known Problems Daughter        Medications:    Current Outpatient Medications:     citalopram (CELEXA) 40 MG Tab, Take 1 Tablet by mouth every day **Patient needs to schedule an appointment, Disp: 100 Tablet, Rfl: 0    hydrOXYzine HCl (ATARAX) 25 MG Tab, Take 1 Tablet by mouth at bedtime as needed for Anxiety., Disp: 100 Tablet, Rfl: 0    ibuprofen (MOTRIN) 600 MG Tab, Take 1 tablet by mouth every 6 hours. Alternate w/ tylenol to take a medication every 3 hrs, take scheduled for 3 days post-op then as needed., Disp: 45 Tablet, Rfl: 0    pantoprazole (PROTONIX) 40 MG Tablet Delayed Response, Take 1 Tablet by mouth every morning before breakfast., Disp: 90 Tablet, Rfl: 3    rosuvastatin (CRESTOR) 20 MG Tab, Take 20 mg by mouth every day., Disp: , Rfl:     verapamil  "SR (CALAN SR) 120 MG CR tablet, Take 1 Tablet by mouth every day., Disp: 30 Tablet, Rfl: 0    losartan (COZAAR) 100 MG Tab, Take 1 Tablet by mouth every evening., Disp: 100 Tablet, Rfl: 3    acetaminophen (TYLENOL) 325 MG Tab, Take 2 Tablets by mouth every 6 hours. Alternate w/ ibuprofen to take a medication every 3 hrs, take scheduled for 3 days post-op then PRN, Disp: 60 Tablet, Rfl: 0    aspirin EC (ECOTRIN) 81 MG Tablet Delayed Response, Take 81 mg by mouth every day., Disp: , Rfl:     LINZESS 290 MCG Cap, Take 290 mcg by mouth 1 time a day as needed (constipation). Indications: Constipation caused by Irritable Bowel Syndrome, Disp: , Rfl:     Labs: Reviewed    Physical Examination:  Vital signs: LMP  (LMP Unknown)  There is no height or weight on file to calculate BMI.    Assessment and Plan:    Patient presents for initial visit today, referred by PCP.  Had previously found moderate weight loss success with Ozempic, but medication is no longer covered by insurance.  Hx of multiple surgeries in the last several years, most recent was total hysterectomy.  She states that weight gain accelerated at that time.  As above, rather clean nutrition habits, but unclear if patient consistently at caloric deficit.  Pain associated with thoracic outlet surgery years ago continues to hamper her ability to do consistent exercise.    Discussed weight loss therapy at length.  Use of stimulants not advisable given difficult to control BP.  Even with [employer] pricing, Ozempic would be cost prohibitive.      - Medication changes -   None at this time     - Lifestyle changes -   Diet:  Advised referral to nutrition services to assist with determining rest metabolic rate to ensure caloric deficit.  Stressed importance of eliminating \"empty calories\" such as juice, large amount of milk, and regular intake of   Otherwise, keep with current nutrition habits.  Exercise:    Recommended more post prandial activity, such as 5-10 walk after " each meal.  Recommended mild to moderate body weight/core exercises at home.  Goal would be at least 150 min/week moderate intensity exercise.    Follow Up:  With PCP    Be Lyons, [email or web address]  05/22/24    CC:   MATT Patel M.D.                                                   [facility] Consent      Name    Yuki Angelo    MRN number: [id number]    the following are guidelines for participation in the [facility].     I, ____Yuki Angelo_____, understand and voluntarily agree to participate in the [facility] and to have services provided to me by pharmacists working in collaboration with my provider.    I understand the pharmacist within the [facility] may initiate, modify or discontinue my medications, order appropriate testing and appointments, perform exams, monitor treatment, and make clinical evaluations and decisions pursuant to a collaborative practice agreement with my provider.  I understand the pharmacist within the [facility] is not a physician, osteopathic physician, advanced practice registered nurse or physician assistant and may not diagnose.  I will take all my medications as instructed and not change the way I take it without first talking to my provider or a pharmacist within the [facility].  I understand that if I am late to my appointment I may not be able to be seen by a pharmacist at that time and will have to reschedule my appointment.  During appointment with pharmacist I understand that pharmacist has the right not to answer questions or perform services outside the pharmacist’s scope of practice.  By signing below, I provide informed consent for the pharmacist to provide these services and for my participation in the [facility].      Yuki Angelo            [id number]          05/22/24  Patient Name                   MRN number  Date     ___X_Obtained verbal consent from pt, No signature due to COVID concerns___   Patient Signature

## 2024-07-14 ENCOUNTER — HOSPITAL ENCOUNTER (OUTPATIENT)
Dept: RADIOLOGY | Facility: MEDICAL CENTER | Age: 64
End: 2024-07-14
Attending: NURSE PRACTITIONER
Payer: MEDICARE

## 2024-07-14 DIAGNOSIS — R10.84 ABDOMINAL PAIN, GENERALIZED: ICD-10-CM

## 2024-07-14 DIAGNOSIS — I10 ESSENTIAL HYPERTENSION, MALIGNANT: ICD-10-CM

## 2024-07-14 DIAGNOSIS — K59.00 COLONIC CONSTIPATION: ICD-10-CM

## 2024-07-14 DIAGNOSIS — K21.9 GASTROESOPHAGEAL REFLUX DISEASE, UNSPECIFIED WHETHER ESOPHAGITIS PRESENT: ICD-10-CM

## 2024-07-14 DIAGNOSIS — R13.19 CONSTANT LOW-GRADE DYSPHAGIA: ICD-10-CM

## 2024-07-14 PROCEDURE — 76700 US EXAM ABDOM COMPLETE: CPT

## 2024-07-18 ENCOUNTER — OFFICE VISIT (OUTPATIENT)
Dept: CARDIOLOGY | Facility: MEDICAL CENTER | Age: 64
End: 2024-07-18
Payer: MEDICARE

## 2024-07-18 ENCOUNTER — TELEPHONE (OUTPATIENT)
Dept: CARDIOLOGY | Facility: MEDICAL CENTER | Age: 64
End: 2024-07-18

## 2024-07-18 VITALS
OXYGEN SATURATION: 95 % | BODY MASS INDEX: 32.43 KG/M2 | RESPIRATION RATE: 16 BRPM | HEIGHT: 63 IN | HEART RATE: 82 BPM | WEIGHT: 183 LBS | DIASTOLIC BLOOD PRESSURE: 82 MMHG | SYSTOLIC BLOOD PRESSURE: 118 MMHG

## 2024-07-18 DIAGNOSIS — E78.5 DYSLIPIDEMIA: ICD-10-CM

## 2024-07-18 DIAGNOSIS — I10 ESSENTIAL HYPERTENSION: ICD-10-CM

## 2024-07-18 DIAGNOSIS — I25.10 CORONARY ARTERY CALCIFICATION SEEN ON CAT SCAN: ICD-10-CM

## 2024-07-18 PROCEDURE — 99213 OFFICE O/P EST LOW 20 MIN: CPT

## 2024-07-18 PROCEDURE — 3074F SYST BP LT 130 MM HG: CPT

## 2024-07-18 PROCEDURE — 99212 OFFICE O/P EST SF 10 MIN: CPT

## 2024-07-18 PROCEDURE — 3079F DIAST BP 80-89 MM HG: CPT

## 2024-07-18 ASSESSMENT — ENCOUNTER SYMPTOMS
HEADACHES: 0
LIGHT-HEADEDNESS: 0
PND: 0
DYSPNEA ON EXERTION: 0
ORTHOPNEA: 0
SYNCOPE: 0
NEAR-SYNCOPE: 0
SHORTNESS OF BREATH: 0
PALPITATIONS: 0
DIZZINESS: 0

## 2024-07-18 ASSESSMENT — FIBROSIS 4 INDEX: FIB4 SCORE: 0.64

## 2024-09-16 ENCOUNTER — OFFICE VISIT (OUTPATIENT)
Dept: CARDIOLOGY | Facility: MEDICAL CENTER | Age: 64
End: 2024-09-16
Attending: INTERNAL MEDICINE
Payer: MEDICARE

## 2024-09-16 VITALS
DIASTOLIC BLOOD PRESSURE: 94 MMHG | HEIGHT: 63 IN | OXYGEN SATURATION: 98 % | RESPIRATION RATE: 16 BRPM | BODY MASS INDEX: 32.36 KG/M2 | SYSTOLIC BLOOD PRESSURE: 118 MMHG | HEART RATE: 82 BPM | WEIGHT: 182.6 LBS

## 2024-09-16 DIAGNOSIS — G47.30 SLEEP APNEA, UNSPECIFIED TYPE: ICD-10-CM

## 2024-09-16 DIAGNOSIS — I10 ESSENTIAL HYPERTENSION: ICD-10-CM

## 2024-09-16 DIAGNOSIS — I70.0 ATHEROSCLEROSIS OF AORTA (HCC): ICD-10-CM

## 2024-09-16 DIAGNOSIS — R91.8 PULMONARY NODULES: ICD-10-CM

## 2024-09-16 PROCEDURE — 3074F SYST BP LT 130 MM HG: CPT | Performed by: INTERNAL MEDICINE

## 2024-09-16 PROCEDURE — 3080F DIAST BP >= 90 MM HG: CPT | Performed by: INTERNAL MEDICINE

## 2024-09-16 PROCEDURE — 99214 OFFICE O/P EST MOD 30 MIN: CPT | Performed by: INTERNAL MEDICINE

## 2024-09-16 PROCEDURE — 99212 OFFICE O/P EST SF 10 MIN: CPT | Performed by: INTERNAL MEDICINE

## 2024-09-16 RX ORDER — LOSARTAN POTASSIUM AND HYDROCHLOROTHIAZIDE 25; 100 MG/1; MG/1
1 TABLET ORAL DAILY
Qty: 100 TABLET | Refills: 3 | Status: SHIPPED | OUTPATIENT
Start: 2024-09-16

## 2024-09-16 RX ORDER — NEBIVOLOL 5 MG/1
5 TABLET ORAL DAILY
Qty: 90 TABLET | Refills: 3 | Status: SHIPPED | OUTPATIENT
Start: 2024-09-16

## 2024-09-16 ASSESSMENT — FIBROSIS 4 INDEX: FIB4 SCORE: 0.64

## 2024-09-16 NOTE — PROGRESS NOTES
"Chief Complaint   Patient presents with    Hypertension       Subjective     Yuki Angelo is a 64 y.o. female who presents today with HTN, mild AR    Has had higher BP recently and with dentsist so needs clearance    Remotely diagnosed with sleep apnea    Past Medical History:   Diagnosis Date    Aortic regurgitation     Dr. Morrell, Cardiolgist    Breath shortness 02/18/2021    pt unsure if sob d/t gallbladder pain or previous surgery; has talked to MD about this; surgery tomorrow- pt may go to ER today    CHEST PAIN     related to pericardial cyst  2021    DDD (degenerative disc disease), cervical 08/03/2011    Fatigue 02/21/2012    Fibromyalgia     GERD (gastroesophageal reflux disease)     High cholesterol     History of echocardiogram 02/21/2012    Hypertension 2015    well controlled on meds    Hypokalemia 08/13/2018    Incarcerated incisional hernia     Multilevel degenerative disc disease     Other specified disorder of intestines     constipation    Pain 02/02/2021    shoulders and neck,     Pain management contract agreement 08/13/2018    Polycystic kidney disease     \"told as child\"    Psychiatric problem     depression, anxiety    PVD (peripheral vascular disease) ([facility]) 02/21/2012    S/P tooth extraction 10/27/2015    Sleep apnea     (+) sleep study, not using CPAP; no O2    Snoring 02/18/2021    sleep study done    Thoracic outlet syndrome      Past Surgical History:   Procedure Laterality Date    VENTRAL HERNIA REPAIR N/A 5/10/2023    Procedure: REPAIR, HERNIA, VENTRAL;  Surgeon: Celso Pringle M.D.;  Location: [facility];  Service: General    OK REMOVAL OF OMENTUM  12/3/2021    Procedure: OMENTECTOMY,ROBOT-ASSISTED,USING DA CHEPE XI;  Surgeon: Lois Marte M.D.;  Location: [facility];  Service: Gyn Robotic    HYSTERECTOMY ROBOTIC XI  12/3/2021    Procedure: HYSTERECTOMY, ROBOT-ASSISTED, USING DA CHEPE XI;  Surgeon: Lois Marte M.D.;  Location: [facility];  Service: Gyn Robotic    SALPINGO OOPHORECTOMY Bilateral 12/3/2021    Procedure: SALPINGO-OOPHORECTOMY;  Surgeon: Lois Marte M.D.;  Location: SURGERY [facility];  Service: Gyn Robotic    RICKI BY LAPAROSCOPY  2/19/2021    Procedure: CHOLECYSTECTOMY, LAPAROSCOPIC;  Surgeon: John H Ganser, M.D.;  Location: SURGERY SAME DAY [facility];  Service: General    AR THORACOSCOPY,DX NO BX Right 2/3/2021    Procedure: THORACOSCOPY;  Surgeon: John H Ganser, M.D.;  Location: SURGERY [facility];  Service: General    CYST EXCISION Right 2/3/2021    Procedure: EXCISION, CYST-PERICARDIAL CYST;  Surgeon: John H Ganser, M.D.;  Location: SURGERY [facility];  Service: General    NERVE ULNAR TRANSFER Left 2019    at [place]    BUNIONECTOMY DANYA Right 11/10/2015    Procedure: BUNIONECTOMY DANYA;  Surgeon: JAMA HensonPAUBRIE;  Location: [facility];  Service:     OSTECTOMY  11/10/2015    Procedure: OSTECTOMY - 5TH TAILORS BUNION ;  Surgeon: JAMA HensonPDipeshMDipesh;  Location: [facility];  Service:     ARTHROPLASTY Right 11/10/2015    Procedure: ARTHROPLASTY - 5TH DEROTATIONAL;  Surgeon: JAMA HensonPDipeshMDipesh;  Location: [facility];  Service:     HAMMERTOE CORRECTION  11/10/2015    Procedure: HAMMERTOE CORRECTION - 2ND, 3RD, 4TH ;  Surgeon: JAMA HensonPDipeshMDipesh;  Location: [facility];  Service:     HYSTEROSCOPY WITH VIDEO DIAGNOSTIC  3/27/2015    Performed by Johnny Jarquin M.D. at SURGERY SAME DAY [facility] ORS    DILATION AND CURETTAGE  3/27/2015    Performed by Johnny Jarquin M.D. at SURGERY SAME DAY [facility] ORS    KNEE ARTHROSCOPY  3/11/2013    Performed by Freeman Ballard M.D. at [facility]    MENISCECTOMY, KNEE, MEDIAL  3/11/2013    Performed by Freeman Ballard M.D. at [facility]    NERVE ULNAR TRANSFER  3/13/2012    Performed by KATYA NEAL at [facility] ORS    CARPAL TUNNEL ENDOSCOPIC   3/13/2012    Performed by KATYA NEAL at SURGERY [facility] ORS    CERVICAL DISK AND FUSION ANTERIOR  2010    Performed by LAURA BAH at SURGERY [facility] ORS    INCISION AND DRAINAGE GENERAL  10/22/2009    Performed by ABEL GUZMAN at SURGERY [facility] ORS    MASS EXCISION GENERAL  10/6/2009    right breast Performed by ABEL GUZMAN at SURGERY [facility] ORS    BREAST BIOPSY  3/5/2009    right; Performed by ABEL GUZMAN at SURGERY [facility] ORS    RIB RESECTION      right for thoracic outlet syndrome    KNEE ARTHROSCOPY  1998    left    TUBAL COAGULATION LAPAROSCOPIC BILATERAL  1997    CERVICAL FUSION POSTERIOR  1996    OTHER SURGICAL PROCEDURE  1992    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     Family History   Adopted: Yes   Problem Relation Age of Onset    No Known Problems Son     No Known Problems Daughter      Social History     Socioeconomic History    Marital status:      Spouse name: Not on file    Number of children: Not on file    Years of education: Not on file    Highest education level: 12th grade   Occupational History    Not on file   Tobacco Use    Smoking status: Former     Current packs/day: 0.00     Average packs/day: 0.5 packs/day for 37.0 years (18.5 ttl pk-yrs)     Types: Cigarettes     Start date: 1976     Quit date: 2013     Years since quittin.3    Smokeless tobacco: Never    Tobacco comments:      Started smoking at age 16   Vaping Use    Vaping status: Former    Substances: Nicotine   Substance and Sexual Activity    Alcohol use: Not Currently    Drug use: Not Currently    Sexual activity: Yes     Partners: Male     Birth control/protection: Post-Menopausal     Comment: .    Other Topics Concern    Not on file   Social History Narrative    Not on file     Social Determinants of Health     Financial Resource Strain: Low Risk  (2023)    Overall Financial Resource Strain (CARDIA)     Difficulty of Paying Living  Expenses: Not hard at all   Food Insecurity: No Food Insecurity (4/28/2023)    Hunger Vital Sign     Worried About Running Out of Food in the Last Year: Never true     Ran Out of Food in the Last Year: Never true   Transportation Needs: No Transportation Needs (4/28/2023)    PRAPARE - Transportation     Lack of Transportation (Medical): No     Lack of Transportation (Non-Medical): No   Physical Activity: Inactive (4/28/2023)    Exercise Vital Sign     Days of Exercise per Week: 0 days     Minutes of Exercise per Session: 0 min   Stress: Stress Concern Present (4/28/2023)    [employer] [place] [employer] - Occupational Stress Questionnaire     Feeling of Stress : To some extent   Social Connections: Moderately Integrated (4/28/2023)    Social Connection and Isolation Panel [NHANES]     Frequency of Communication with Friends and Family: Three times a week     Frequency of Social Gatherings with Friends and Family: Twice a week     Attends Hindu Services: 1 to 4 times per year     Active Member of Clubs or Organizations: No     Attends Club or Organization Meetings: Never     Marital Status:    Intimate Partner Violence: Not on file   Housing Stability: Low Risk  (4/28/2023)    Housing Stability Vital Sign     Unable to Pay for Housing in the Last Year: No     Number of Places Lived in the Last Year: 1     Unstable Housing in the Last Year: No     Allergies   Allergen Reactions    Penicillins Hives and Rash     RXN=age 16    Other Reaction(s): Not available    Sulfa Drugs Hives and Rash     RXN=age 16    Other Reaction(s): Not available     Outpatient Encounter Medications as of 9/16/2024   Medication Sig Dispense Refill    losartan-hydrochlorothiazide (HYZAAR) 100-25 MG per tablet Take 1 Tablet by mouth every day. 100 Tablet 3    nebivolol (BYSTOLIC) 5 MG Tab tablet Take 1 Tablet by mouth every day. 90 Tablet 3    ibuprofen (MOTRIN) 600 MG Tab Take 1 tablet by mouth every 6 hours. Alternate w/  "tylenol to take a medication every 3 hrs, take scheduled for 3 days post-op then as needed. 45 Tablet 0    pantoprazole (PROTONIX) 40 MG Tablet Delayed Response Take 1 Tablet by mouth every morning before breakfast. 90 Tablet 3    rosuvastatin (CRESTOR) 20 MG Tab Take 20 mg by mouth every day.      verapamil SR (CALAN SR) 120 MG CR tablet Take 1 Tablet by mouth every day. 30 Tablet 0    acetaminophen (TYLENOL) 325 MG Tab Take 2 Tablets by mouth every 6 hours. Alternate w/ ibuprofen to take a medication every 3 hrs, take scheduled for 3 days post-op then PRN 60 Tablet 0    aspirin EC (ECOTRIN) 81 MG Tablet Delayed Response Take 81 mg by mouth every day.      citalopram (CELEXA) 40 MG Tab Take 1 Tablet by mouth every day **Patient needs to schedule an appointment (Patient not taking: Reported on 9/16/2024) 100 Tablet 0    [DISCONTINUED] losartan (COZAAR) 100 MG Tab Take 1 Tablet by mouth every evening. 100 Tablet 3     No facility-administered encounter medications on file as of 9/16/2024.     ROS           Objective     BP (!) 118/94 (BP Location: Left arm, Patient Position: Sitting, BP Cuff Size: Adult)   Pulse 82   Resp 16   Ht 1.6 m (5' 3\")   Wt 82.8 kg (182 lb 9.6 oz)   LMP  (LMP Unknown)   SpO2 98%   BMI 32.35 kg/m²     Physical Exam  Constitutional:       General: She is not in acute distress.     Appearance: She is not diaphoretic.   Eyes:      General: No scleral icterus.  Neck:      Vascular: No JVD.   Cardiovascular:      Rate and Rhythm: Normal rate.      Heart sounds: Normal heart sounds. No murmur heard.     No friction rub. No gallop.   Pulmonary:      Effort: No respiratory distress.      Breath sounds: No wheezing or rales.   Abdominal:      General: Bowel sounds are normal.      Palpations: Abdomen is soft.   Musculoskeletal:      Right lower leg: No edema.      Left lower leg: No edema.   Skin:     Findings: No rash.   Neurological:      Mental Status: She is alert. Mental status is at " baseline.   Psychiatric:         Mood and Affect: Mood normal.            We reviewed in person the most recent labs  Recent Results (from the past 5040 hour(s))   URINE DRUG SCREEN W/CONF (AR)    Collection Time: 03/18/24  3:09 PM   Result Value Ref Range    Urine Amphetamine-Methamphetam Negative Cutoff 300 ng/mL    Barbiturates Negative Cutoff 200 ng/mL    Benzodiazepines PresumptivePOS Cutoff 200 ng/mL    Propoxyphene Negative Cutoff 300 ng/mL    Cocaine Metabolite Negative Cutoff 150 ng/mL    Methadone Negative Cutoff 150 ng/mL    Codeine-Morphine Negative Cutoff 300 ng/mL    Phencyclidine -Pcp Negative Cutoff 25 ng/mL    Cannabinoid Metab Negative Cutoff 50 ng/mL    Creatinine Urine 273.5 20.0 - 400.0 mg/dL    Drug Comment Urine Drugs See Note    BENZODIAZEPINES, UR CONFIRM    Collection Time: 03/22/24 12:00 AM   Result Value Ref Range    Diazepam, Ur <20 ng/mL    Oxazepam, Ur <20 ng/mL    Temazepam, Ur <20 ng/mL    Nordiazepam, Ur <20 ng/mL    Chlordiazepoxide, Ur <20 ng/mL    Lorazepam, Ur <20 ng/mL    Alprazolam, Ur 122 ng/mL    Alpha-hydroxyalprazolam, Ur 602 ng/mL    Clonazepam, Ur <5 ng/mL    7-aminoclonazepam, Ur <5 ng/mL    Midazolam, Ur <20 ng/mL    Alpha-hydroxymidazolam, Ur <20 ng/mL         Assessment & Plan     1. Sleep apnea, unspecified type  Referral to Pulmonary and Sleep Medicine      2. Essential hypertension  losartan-hydrochlorothiazide (HYZAAR) 100-25 MG per tablet    nebivolol (BYSTOLIC) 5 MG Tab tablet      3. Atherosclerosis of aorta (HCC)        4. Pulmonary nodules  Referral to Pulmonary and Sleep Medicine          Medical Decision Making: Today's Assessment/Status/Plan:        It was my pleasure to meet with Ms. Angelo.    We addressed the management of hypertension at today's visit. Blood pressure is NOT IDEALLYcontrolled.  We specifically assessed the labs on hypertension treatment  Added bystiolic    We addressed the management of dyslipidemia at today's visit. She is on  appropriate statin.      Dr Morrell will see her 1 year time and encouraged her to follow up with us over the phone or electronically using my MyChart as issues arise.    It is my pleasure to participate in the care of Ms. Angelo.  Please do not hesitate to contact me with questions or concerns.    Balwinder Cunha MD PhD [facility]  Cardiologist [facility]    Please note that this dictation was created using voice recognition software. There may be errors I did not discover before finalizing the note.

## 2024-09-20 ENCOUNTER — OFFICE VISIT (OUTPATIENT)
Dept: SLEEP MEDICINE | Facility: MEDICAL CENTER | Age: 64
End: 2024-09-20
Attending: INTERNAL MEDICINE
Payer: MEDICARE

## 2024-09-20 VITALS
BODY MASS INDEX: 31.59 KG/M2 | WEIGHT: 178.3 LBS | OXYGEN SATURATION: 96 % | RESPIRATION RATE: 16 BRPM | SYSTOLIC BLOOD PRESSURE: 110 MMHG | HEART RATE: 72 BPM | DIASTOLIC BLOOD PRESSURE: 80 MMHG | HEIGHT: 63 IN

## 2024-09-20 DIAGNOSIS — I10 ESSENTIAL HYPERTENSION: ICD-10-CM

## 2024-09-20 DIAGNOSIS — G47.33 OSA (OBSTRUCTIVE SLEEP APNEA): ICD-10-CM

## 2024-09-20 DIAGNOSIS — R53.82 CHRONIC FATIGUE, UNSPECIFIED: ICD-10-CM

## 2024-09-20 DIAGNOSIS — R06.83 SNORING: ICD-10-CM

## 2024-09-20 DIAGNOSIS — R06.81 WITNESSED EPISODE OF APNEA: ICD-10-CM

## 2024-09-20 DIAGNOSIS — F51.04 PSYCHOPHYSIOLOGICAL INSOMNIA: ICD-10-CM

## 2024-09-20 PROCEDURE — 3079F DIAST BP 80-89 MM HG: CPT | Performed by: PREVENTIVE MEDICINE

## 2024-09-20 PROCEDURE — 3074F SYST BP LT 130 MM HG: CPT | Performed by: PREVENTIVE MEDICINE

## 2024-09-20 PROCEDURE — 99204 OFFICE O/P NEW MOD 45 MIN: CPT | Performed by: PREVENTIVE MEDICINE

## 2024-09-20 PROCEDURE — 99212 OFFICE O/P EST SF 10 MIN: CPT | Performed by: PREVENTIVE MEDICINE

## 2024-09-20 RX ORDER — ZOLPIDEM TARTRATE 5 MG/1
5 TABLET ORAL
Qty: 2 TABLET | Refills: 0 | Status: SHIPPED | OUTPATIENT
Start: 2024-09-20 | End: 2025-09-20

## 2024-09-20 ASSESSMENT — FIBROSIS 4 INDEX: FIB4 SCORE: 0.64

## 2024-09-20 NOTE — PROGRESS NOTES
"CHIEF COMPLIANT: \"Establish care.\"  HISTORY OF PRESENT ILLNESS:  Yuki Angelo is a 64 y.o. female kindly referred by Stan Echevarria D.O. and  is here for a sleep medicine consultation.     Sleep History:  Yuki Angelo has been tested for sleep apnea.  No sleep study data available.  Patient was tested 10 years ago and used PAP therapy for 8-9 years.  It is unclear why she stopped.  The patient reports snoring, witnessed apneas, fragmented sleep, and general anxiety disorder .  The patient goes to bed at 8 pm and wakes up at 8 am. It usually takes the patient approximately 30 mins to fall asleep. The patient does not feel refreshed after sleeping and does not nap during the day. This pt is a former  smoker. She smoked 0.5 PPD for 37 years and quit in 2013.  Patient does not  use cannabis. This patient does not  drink alcoholic beverages and does drink caffeinated beverages daily (1).     The patient denies any symptoms of narcolepsy such as sleep paralysis or cataplexy, or any symptoms that suggest parasomnias such as sleep walking or acting out of dreams.    Yuki Angelo is retired      Denies family members who use PAP therapy/snore. Adopted    EPWORTH SCORE:    7/24, this is not consistent  with EDS.       Significant comorbidities and modifying factors: see below    PAST MEDICAL HISTORY:  Past Medical History:   Diagnosis Date    Aortic regurgitation     Dr. Morrell, Cardiolgist    Breath shortness 02/18/2021    pt unsure if sob d/t gallbladder pain or previous surgery; has talked to MD about this; surgery tomorrow- pt may go to ER today    CHEST PAIN     related to pericardial cyst  2021    DDD (degenerative disc disease), cervical 08/03/2011    Fatigue 02/21/2012    Fibromyalgia     GERD (gastroesophageal reflux disease)     High cholesterol     History of echocardiogram 02/21/2012    Hypertension 2015    well controlled on meds    Hypokalemia 08/13/2018    Incarcerated incisional " "hernia     Multilevel degenerative disc disease     Other specified disorder of intestines     constipation    Pain 02/02/2021    shoulders and neck,     Pain management contract agreement 08/13/2018    Polycystic kidney disease     \"told as child\"    Psychiatric problem     depression, anxiety    PVD (peripheral vascular disease) (HCC) 02/21/2012    S/P tooth extraction 10/27/2015    Sleep apnea     (+) sleep study, not using CPAP; no O2    Snoring 02/18/2021    sleep study done    Thoracic outlet syndrome       PROBLEM LIST:  Patient Active Problem List    Diagnosis Date Noted    Atherosclerosis of aorta (HCC) 06/08/2023    Drug-induced constipation 05/17/2023    Post-operative pain 05/12/2023    Obesity (BMI 30.0-34.9) 08/26/2022    Neck mass 07/06/2022    Generalized anxiety disorder 07/06/2022    Primary osteoarthritis of first carpometacarpal joint of right hand 08/31/2021    At high risk for falls 07/01/2021    Intractable migraine without aura and without status migrainosus 03/04/2019    Seizure cerebral (HCC) 03/04/2019    Pulmonary nodules 02/08/2018    Pericardial cyst 02/07/2018    Precordial pain 02/07/2018    Coronary artery calcification seen on CAT scan 02/07/2018    Moderate episode of recurrent major depressive disorder (HCC) 01/29/2018    Hereditary hemochromatosis (HCC) 12/15/2017    Dyslipidemia 01/17/2017    Essential hypertension 05/20/2016    TOS (thoracic outlet syndrome) 04/14/2015    Fibromyalgia 07/17/2012    Aortic regurgitation 02/21/2012    Sleep apnea     Cervical radiculopathy 09/08/2011    S/P cervical spinal fusion 09/08/2011    DDD (degenerative disc disease), cervical 08/03/2011     PAST SOCIAL HISTORY:  Past Surgical History:   Procedure Laterality Date    VENTRAL HERNIA REPAIR N/A 5/10/2023    Procedure: REPAIR, HERNIA, VENTRAL;  Surgeon: Celso Pringle M.D.;  Location: SURGERY [facility];  Service: General    NC REMOVAL OF OMENTUM  12/3/2021    Procedure: " OMENTECTOMY,ROBOT-ASSISTED,USING DA CHEPE XI;  Surgeon: Lois Marte M.D.;  Location: SURGERY [facility];  Service: Gyn Robotic    HYSTERECTOMY ROBOTIC XI  12/3/2021    Procedure: HYSTERECTOMY, ROBOT-ASSISTED, USING DA CHEPE XI;  Surgeon: Lois Marte M.D.;  Location: SURGERY [facility];  Service: Gyn Robotic    SALPINGO OOPHORECTOMY Bilateral 12/3/2021    Procedure: SALPINGO-OOPHORECTOMY;  Surgeon: Lois Marte M.D.;  Location: SURGERY [facility];  Service: Gyn Robotic    RICKI BY LAPAROSCOPY  2/19/2021    Procedure: CHOLECYSTECTOMY, LAPAROSCOPIC;  Surgeon: John H Ganser, M.D.;  Location: SURGERY SAME DAY [facility];  Service: General    DC THORACOSCOPY,DX NO BX Right 2/3/2021    Procedure: THORACOSCOPY;  Surgeon: John H Ganser, M.D.;  Location: SURGERY [facility];  Service: General    CYST EXCISION Right 2/3/2021    Procedure: EXCISION, CYST-PERICARDIAL CYST;  Surgeon: John H Ganser, M.D.;  Location: SURGERY [facility];  Service: General    NERVE ULNAR TRANSFER Left 2019    at [place]    BUNIONECTOMY DANYA Right 11/10/2015    Procedure: BUNIONECTOMY DANYA;  Surgeon: JAMA HensonPDipeshMDipesh;  Location: [facility];  Service:     OSTECTOMY  11/10/2015    Procedure: OSTECTOMY - 5TH TAILORS BUNION ;  Surgeon: JAMA HensonPDipeshMDipesh;  Location: SURGERY [facility];  Service:     ARTHROPLASTY Right 11/10/2015    Procedure: ARTHROPLASTY - 5TH DEROTATIONAL;  Surgeon: ROXIE Henson.P.MDipesh;  Location: SURGERY [facility];  Service:     HAMMERTOE CORRECTION  11/10/2015    Procedure: HAMMERTOE CORRECTION - 2ND, 3RD, 4TH ;  Surgeon: JAMA HensonP.MDipesh;  Location: [facility];  Service:     HYSTEROSCOPY WITH VIDEO DIAGNOSTIC  3/27/2015    Performed by Johnny Jarquin M.D. at SURGERY SAME DAY [facility] ORS    DILATION AND CURETTAGE  3/27/2015    Performed by Johnny Jarquin M.D. at SURGERY SAME DAY [facility] ORS    KNEE ARTHROSCOPY  3/11/2013     Performed by Freeman Ballard M.D. at SURGERY [facility]S    MENISCECTOMY, KNEE, MEDIAL  3/11/2013    Performed by Freeman Ballard M.D. at SURGERY [facility]    NERVE ULNAR TRANSFER  3/13/2012    Performed by KATYA NEAL at SURGERY [facility]    CARPAL TUNNEL ENDOSCOPIC  3/13/2012    Performed by KATYA NEAL at SURGERY [facility]    CERVICAL DISK AND FUSION ANTERIOR  2010    Performed by LAURA BAH at SURGERY [facility]S    INCISION AND DRAINAGE GENERAL  10/22/2009    Performed by ABEL GUZMAN at SURGERY [facility]S    MASS EXCISION GENERAL  10/6/2009    right breast Performed by ABEL GUZMAN at SURGERY [facility]    BREAST BIOPSY  3/5/2009    right; Performed by ABEL GUZMAN at SURGERY [facility]    RIB RESECTION      right for thoracic outlet syndrome    KNEE ARTHROSCOPY  1998    left    TUBAL COAGULATION LAPAROSCOPIC BILATERAL  1997    CERVICAL FUSION POSTERIOR      OTHER SURGICAL PROCEDURE  1992    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     PAST FAMILY HISTORY:  Family History   Adopted: Yes   Problem Relation Age of Onset    No Known Problems Son     No Known Problems Daughter      SOCIAL HISTORY:  Social History     Socioeconomic History    Marital status:      Spouse name: Not on file    Number of children: Not on file    Years of education: Not on file    Highest education level: 12th grade   Occupational History    Not on file   Tobacco Use    Smoking status: Former     Current packs/day: 0.00     Average packs/day: 0.5 packs/day for 37.0 years (18.5 ttl pk-yrs)     Types: Cigarettes     Start date: 1976     Quit date: 2013     Years since quittin.4    Smokeless tobacco: Never    Tobacco comments:      Started smoking at age 16   Vaping Use    Vaping status: Former    Substances: Nicotine   Substance and Sexual Activity    Alcohol use: Not Currently    Drug use: Not Currently    Sexual  activity: Yes     Partners: Male     Birth control/protection: Post-Menopausal     Comment: .    Other Topics Concern    Not on file   Social History Narrative    Not on file     Social Determinants of Health     Financial Resource Strain: Low Risk  (4/28/2023)    Overall Financial Resource Strain (CARDIA)     Difficulty of Paying Living Expenses: Not hard at all   Food Insecurity: No Food Insecurity (4/28/2023)    Hunger Vital Sign     Worried About Running Out of Food in the Last Year: Never true     Ran Out of Food in the Last Year: Never true   Transportation Needs: No Transportation Needs (4/28/2023)    PRAPARE - Transportation     Lack of Transportation (Medical): No     Lack of Transportation (Non-Medical): No   Physical Activity: Inactive (4/28/2023)    Exercise Vital Sign     Days of Exercise per Week: 0 days     Minutes of Exercise per Session: 0 min   Stress: Stress Concern Present (4/28/2023)    [employer] - Occupational Stress Questionnaire     Feeling of Stress : To some extent   Social Connections: Moderately Integrated (4/28/2023)    Social Connection and Isolation Panel [NHANES]     Frequency of Communication with Friends and Family: Three times a week     Frequency of Social Gatherings with Friends and Family: Twice a week     Attends Protestant Services: 1 to 4 times per year     Active Member of Clubs or Organizations: No     Attends Club or Organization Meetings: Never     Marital Status:    Intimate Partner Violence: Not on file   Housing Stability: Low Risk  (4/28/2023)    Housing Stability Vital Sign     Unable to Pay for Housing in the Last Year: No     Number of Places Lived in the Last Year: 1     Unstable Housing in the Last Year: No     ALLERGIES: Penicillins and Sulfa drugs  MEDICATIONS:  Current Outpatient Medications   Medication Sig Dispense Refill    zolpidem (AMBIEN) 5 MG Tab Take 1 Tablet by mouth at bedtime as needed.  May repeat 1 time.  "for Sleep (Sleep study) for up to 2 doses. Two tabs  make up  a one day  dose  Indications: Trouble Sleeping 2 Tablet 0    losartan-hydrochlorothiazide (HYZAAR) 100-25 MG per tablet Take 1 Tablet by mouth every day. 100 Tablet 3    nebivolol (BYSTOLIC) 5 MG Tab tablet Take 1 Tablet by mouth every day. 90 Tablet 3    pantoprazole (PROTONIX) 40 MG Tablet Delayed Response Take 1 Tablet by mouth every morning before breakfast. 90 Tablet 3    rosuvastatin (CRESTOR) 20 MG Tab Take 20 mg by mouth every day.      verapamil SR (CALAN SR) 120 MG CR tablet Take 1 Tablet by mouth every day. 30 Tablet 0    aspirin EC (ECOTRIN) 81 MG Tablet Delayed Response Take 81 mg by mouth every day.       No current facility-administered medications for this visit.    \"CURRENT RX\"    REVIEW OF SYSTEMS:  Constitutional: Denies weight loss, endorses chronic daytime fatigue --also see HPI    PHYSICAL EXAM/VITALS:  /80 (BP Location: Left arm, Patient Position: Sitting, BP Cuff Size: Large adult)   Pulse 72   Resp 16   Ht 1.6 m (5' 3\")   Wt 80.9 kg (178 lb 4.8 oz)   LMP  (LMP Unknown)   SpO2 96%   BMI 31.58 kg/m²   Neck circumference (inches): 15.5  Appearance: Well-nourished, well-developed,  looks stated age, no acute distress  Eyes:   EOMI  ENMT:   Hard palate narrow: No   Hard palate high: No   Soft palate/uvula (Mallampati score): 4  Tongue Scalloping: No   Retrognathia:  No   Micrognathia:  No    Neck: Supple, trachea midline  Respiratory effort:  No intercostal retractions or use of accessory muscles  Lung auscultation:  No wheezes rhonchi rubs or rales  Cardiac: No murmurs, rubs, or gallops; regular rhythm, normal rate; no edema  Musculoskeletal:  Grossly normal; gait and station normal  Neurologic:  oriented to person, time, place, and purpose; judgement intact  Psychiatric:  No depression, anxiety, agitation    MEDICAL DECISION MAKING:  The medical record was reviewed as it pertains to this referral. This includes records " from primary care,consultants notes, referral request, hospital records, labs and imaging. Any available diagnostic and titration nocturnal polysomnograms, home sleep apnea tests, continuous nocturnal oximetry results, multiple sleep latency tests, and recent compliance reports were reviewed with the patient.    ASSESSMENT/PLAN:  Yuki Angelo is a 64 y.o. female  who has been diagnosed with obstructive sleep apnea. Patient is requesting an in lab study.  A split night study will be ordered. Also patient will be referred to Dr. Reese for CBTi.          DIAGNOSES :    1. LIZETTE (obstructive sleep apnea)  - Polysomnography, 4 or More; Future    2. Snoring  - Polysomnography, 4 or More; Future    3. Witnessed episode of apnea  - Polysomnography, 4 or More; Future    4. Chronic fatigue, unspecified  - Polysomnography, 4 or More; Future    5. Psychophysiological insomnia  - zolpidem (AMBIEN) 5 MG Tab; Take 1 Tablet by mouth at bedtime as needed.  May repeat 1 time. for Sleep (Sleep study) for up to 2 doses. Two tabs  make up  a one day  dose  Indications: Trouble Sleeping  Dispense: 2 Tablet; Refill: 0  - Referral to Psychology    6. Essential hypertension  - Polysomnography, 4 or More; Future    The patient has signs and symptoms consistent with obstructive sleep apnea hypopnea syndrome. Will schedule a nocturnal polysomnogram or a home sleep apnea test (please see plan).  The risks of untreated sleep apnea were discussed with the patient at length. Patients with LIZETTE are at increased risk of cardiovascular disease including coronary artery disease, systemic arterial hypertension, pulmonary arterial hypertension, cardiac arrhythmias, and stroke. LIZETTE patients have an increased risk of motor vehicle accidents, type 2 diabetes, chronic kidney disease, and non-alcoholic liver disease. The patient was advised to avoid driving a motor vehicle when drowsy.  Have advised the patient to follow up with the appropriate  healthcare practitioners for all other medical problems and issues.    RETURN TO CLINIC: Return for 3 weeks after SS - discuss results w/ any provider  or first   avail with Dr. Oro.    My total time spent caring for the patient on the day of the encounter was 40 minutes. This includes time spent on a thorough chart review including other physician notes, all sleep studies, as well as critical labs and pulmonary and cardiac studies.  Additionally, it includes a thorough discussion of good sleep hygiene and stimulus control, as well as  the need for consistency in terms of sleep preparation and practice.    Please note that this dictation was created using voice recognition software.  I have made every reasonable attempt to correct obvious errors, I expect that there are errors of grammar and possibly content that I did not discover before finalizing this note.

## 2024-09-23 ENCOUNTER — OFFICE VISIT (OUTPATIENT)
Dept: SLEEP MEDICINE | Facility: MEDICAL CENTER | Age: 64
End: 2024-09-23
Attending: INTERNAL MEDICINE
Payer: MEDICARE

## 2024-09-23 VITALS
BODY MASS INDEX: 31.89 KG/M2 | WEIGHT: 180 LBS | SYSTOLIC BLOOD PRESSURE: 120 MMHG | RESPIRATION RATE: 15 BRPM | HEART RATE: 70 BPM | OXYGEN SATURATION: 97 % | DIASTOLIC BLOOD PRESSURE: 80 MMHG | HEIGHT: 63 IN

## 2024-09-23 DIAGNOSIS — R91.8 PULMONARY NODULES: ICD-10-CM

## 2024-09-23 PROCEDURE — 3074F SYST BP LT 130 MM HG: CPT | Performed by: FAMILY MEDICINE

## 2024-09-23 PROCEDURE — 99214 OFFICE O/P EST MOD 30 MIN: CPT | Performed by: FAMILY MEDICINE

## 2024-09-23 PROCEDURE — 3079F DIAST BP 80-89 MM HG: CPT | Performed by: FAMILY MEDICINE

## 2024-09-23 PROCEDURE — 99212 OFFICE O/P EST SF 10 MIN: CPT | Performed by: FAMILY MEDICINE

## 2024-09-23 ASSESSMENT — FIBROSIS 4 INDEX: FIB4 SCORE: 0.64

## 2024-09-23 NOTE — PROGRESS NOTES
"Subjective:     CC:  The encounter diagnosis was Pulmonary nodules.    HISTORY OF THE PRESENT ILLNESS: Patient is a 64 y.o. female. This pleasant patient is here today to establish care in the [facility] and discuss pulmonary nodules. Her referring provider is Dr. Balwinder Cunha.  Her PCP is Dr. Stan Echevarria.    Pulmonary nodules- Yuki is a 64F former smoker with a 18.5 pack year history who has HTN, HLD, TOS, sleep apnea who is presenting for evaluation of pulmonary nodules.  She was noted on have pulmonary nodules on CT chest 7/7/2006.  She had a follow-up CT chest 5/21/2015 which showed, \"5mm or less diameter nodules are appreciated in the right upper lobe, right middle lobe, and right lower lobe distribution.  They are unchanged.  2mm nodule in the lateral left lower lobe is also stable.  No new nodules or infiltrates are appreciated.\"    The nodules were stable and continued to measure less than 6 mm on CT chest 8/24/22 and her right middle lobe groundglass nodule remained stable at 7mm on CT chest 8/22/24.  She has had significant second hand tobacco smoke exposure.    She is adopted with no known family history regarding lung issues or cancers.  She is a former vape user.  She quit smoking cigarettes around 2013.  She worked for [employer] and is now retired.  She lived in [place] before moving to [place] 24 years ago.  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  She has no known significant dust exposure, asbestos, radon, TB, radiation (except for medical use).  She has no history of hospitalization for respiratory problems or been on a ventilator.      She has left upper chest swelling for the last 2 years with progressive worsening along her clavicle and numbness along her left arm.  She has noticed rapid breathing at night for which she needs to take a deep breath to improve symptoms.  She has a sleep study scheduled for " this Friday. She has no history of asthma, emphysema, or COPD but states used in inhaler briefly in the past.  She has never had PFTs.    She has never had PNA.    She has had no recent fevers but has hot flashes intermittently.  She has had hot flashes for the last 5 months and feels dizzy when having hot flashes and chills afterwards.  She has had a dry cough for 3 months.  No sputum or hemoptysis.  Cough worsens when around air conditioners.   She has SOB for the last year with prolonged walking.  No recent worsening in SOB.  She has no wheezing.  No abnormal bleeding.  No abnormal weight loss.  No GERD symptoms.       Allergies: Penicillins and Sulfa drugs    Current Outpatient Medications Ordered in Epic   Medication Sig Dispense Refill    zolpidem (AMBIEN) 5 MG Tab Take 1 Tablet by mouth at bedtime as needed.  May repeat 1 time. for Sleep (Sleep study) for up to 2 doses. Two tabs  make up  a one day  dose  Indications: Trouble Sleeping 2 Tablet 0    losartan-hydrochlorothiazide (HYZAAR) 100-25 MG per tablet Take 1 Tablet by mouth every day. 100 Tablet 3    nebivolol (BYSTOLIC) 5 MG Tab tablet Take 1 Tablet by mouth every day. 90 Tablet 3    pantoprazole (PROTONIX) 40 MG Tablet Delayed Response Take 1 Tablet by mouth every morning before breakfast. 90 Tablet 3    rosuvastatin (CRESTOR) 20 MG Tab Take 20 mg by mouth every day.      verapamil SR (CALAN SR) 120 MG CR tablet Take 1 Tablet by mouth every day. 30 Tablet 0    aspirin EC (ECOTRIN) 81 MG Tablet Delayed Response Take 81 mg by mouth every day.       No current [facility]-ordered facility-administered medications on file.       Past Medical History:   Diagnosis Date    Aortic regurgitation     Dr. Morrell, Cardiolgist    Breath shortness 02/18/2021    pt unsure if sob d/t gallbladder pain or previous surgery; has talked to MD about this; surgery tomorrow- pt may go to ER today    CHEST PAIN     related to pericardial cyst  2021    DDD (degenerative disc  "disease), cervical 08/03/2011    Fatigue 02/21/2012    Fibromyalgia     GERD (gastroesophageal reflux disease)     High cholesterol     History of echocardiogram 02/21/2012    Hypertension 2015    well controlled on meds    Hypokalemia 08/13/2018    Incarcerated incisional hernia     Multilevel degenerative disc disease     Other specified disorder of intestines     constipation    Pain 02/02/2021    shoulders and neck,     Pain management contract agreement 08/13/2018    Polycystic kidney disease     \"told as child\"    Psychiatric problem     depression, anxiety    PVD (peripheral vascular disease) ([facility]) 02/21/2012    S/P tooth extraction 10/27/2015    Sleep apnea     (+) sleep study, not using CPAP; no O2    Snoring 02/18/2021    sleep study done    Thoracic outlet syndrome        Past Surgical History:   Procedure Laterality Date    VENTRAL HERNIA REPAIR N/A 5/10/2023    Procedure: REPAIR, HERNIA, VENTRAL;  Surgeon: Celso Pringle M.D.;  Location: SURGERY [facility];  Service: General    FL REMOVAL OF OMENTUM  12/3/2021    Procedure: OMENTECTOMY,ROBOT-ASSISTED,USING DA CHEPE XI;  Surgeon: Lois Marte M.D.;  Location: SURGERY [facility];  Service: Gyn Robotic    HYSTERECTOMY ROBOTIC XI  12/3/2021    Procedure: HYSTERECTOMY, ROBOT-ASSISTED, USING DA CHEPE XI;  Surgeon: Lois Marte M.D.;  Location: SURGERY [facility];  Service: Gyn Robotic    SALPINGO OOPHORECTOMY Bilateral 12/3/2021    Procedure: SALPINGO-OOPHORECTOMY;  Surgeon: Lois Marte M.D.;  Location: SURGERY [facility];  Service: Gyn Robotic    RICKI BY LAPAROSCOPY  2/19/2021    Procedure: CHOLECYSTECTOMY, LAPAROSCOPIC;  Surgeon: John H Ganser, M.D.;  Location: SURGERY SAME DAY [facility];  Service: General    FL THORACOSCOPY,DX NO BX Right 2/3/2021    Procedure: THORACOSCOPY;  Surgeon: John H Ganser, M.D.;  Location: SURGERY [facility];  Service: General    CYST EXCISION Right 2/3/2021    Procedure: EXCISION, CYST-PERICARDIAL CYST; "  Surgeon: John H Ganser, M.D.;  Location: SURGERY [facility];  Service: General    NERVE ULNAR TRANSFER Left 2019    at [place]    BUNIONECTOMY DANYA Right 11/10/2015    Procedure: BUNIONECTOMY DANYA;  Surgeon: Kermit Arroyo D.P.M.;  Location: [facility];  Service:     OSTECTOMY  11/10/2015    Procedure: OSTECTOMY - 5TH TAILORS BUNION ;  Surgeon: Kermit Arroyo D.P.M.;  Location: SURGERY [facility];  Service:     ARTHROPLASTY Right 11/10/2015    Procedure: ARTHROPLASTY - 5TH DEROTATIONAL;  Surgeon: Kermit Arroyo D.P.M.;  Location: SURGERY [facility];  Service:     HAMMERTOE CORRECTION  11/10/2015    Procedure: HAMMERTOE CORRECTION - 2ND, 3RD, 4TH ;  Surgeon: Kermit Arroyo D.P.M.;  Location: [facility];  Service:     HYSTEROSCOPY WITH VIDEO DIAGNOSTIC  3/27/2015    Performed by Johnny Jarquin M.D. at SURGERY SAME DAY [facility]    DILATION AND CURETTAGE  3/27/2015    Performed by Johnny Jarquin M.D. at SURGERY SAME DAY [facility]    KNEE ARTHROSCOPY  3/11/2013    Performed by Freeman Ballard M.D. at SURGERY [facility] ORS    MENISCECTOMY, KNEE, MEDIAL  3/11/2013    Performed by Freeman Ballard M.D. at [facility]    NERVE ULNAR TRANSFER  3/13/2012    Performed by KATYA NEAL at [facility]    CARPAL TUNNEL ENDOSCOPIC  3/13/2012    Performed by KATYA NEAL at SURGERY [facility] ORS    CERVICAL DISK AND FUSION ANTERIOR  6/21/2010    Performed by LAURA BAH at SURGERY [facility] ORS    INCISION AND DRAINAGE GENERAL  10/22/2009    Performed by ABEL GUZMAN at SURGERY [facility] ORS    MASS EXCISION GENERAL  10/6/2009    right breast Performed by ABEL GUZMAN at [facility] ORS    BREAST BIOPSY  3/5/2009    right; Performed by ABEL GUZMAN at SURGERY [facility] ORS    RIB RESECTION  2001    right for thoracic outlet syndrome    KNEE ARTHROSCOPY  1998    left    TUBAL  "COAGULATION LAPAROSCOPIC BILATERAL  1997    CERVICAL FUSION POSTERIOR      OTHER SURGICAL PROCEDURE  1992    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       Social History     Tobacco Use    Smoking status: Former     Current packs/day: 0.00     Average packs/day: 0.5 packs/day for 37.0 years (18.5 ttl pk-yrs)     Types: Cigarettes     Start date: 1976     Quit date: 2013     Years since quittin.4    Smokeless tobacco: Never    Tobacco comments:      Started smoking at age 16   Vaping Use    Vaping status: Former    Substances: Nicotine   Substance Use Topics    Alcohol use: Not Currently    Drug use: Not Currently       Social History     Social History Narrative    Not on file       Family History   Adopted: Yes   Problem Relation Age of Onset    No Known Problems Son     No Known Problems Daughter        Health Maintenance: Colonoscopy UTD and mammogram UTD    ROS:   Gen: no fevers/chills, no changes in weight  Eyes: no changes in vision  ENT:  no bloody nose  Pulm: per hpi  CV: no chest pain, no palpitations  GI: no nausea/vomiting, +constipation  : no dysuria  Neuro: per hpi  Heme/Lymph: no abnormal bleeding      Objective:       Exam: /80 (BP Location: Right arm, Patient Position: Sitting, BP Cuff Size: Adult)   Pulse 70   Resp 15   Ht 1.6 m (5' 3\")   Wt 81.6 kg (180 lb)   SpO2 97%  Body mass index is 31.89 kg/m².    General: Normal appearing. No distress.  HEENT: Normocephalic.    Eyes: Eyes conjunctiva clear lids without ptosis  Neck: Supple. Thyroid is not enlarged. +supraclavicular swelling  Pulmonary: Clear to ausculation.  Normal effort. No rales, ronchi, or wheezing.  Cardiovascular: Regular rate and rhythm without murmur.   Abdomen: Soft, mild RUQ TTP, nondistended. Normal bowel sounds.   Neurologic: No gross motor deficits  Lymph: No cervical or supraclavicular lymph nodes are palpable  Skin: Warm and dry.  No obvious lesions.  Musculoskeletal: Normal gait. +left " arm more than right arm swelling  Psych: Normal mood and affect. Alert and oriented. Judgment and insight is normal.    Assessment & Plan:   64 y.o. female with the following -    1. Pulmonary nodules  Chronic, unclear control.  She has a chronic dry cough with environmental triggers and intermittent SOB but no other pulmonary symptoms.  We discussed that lung nodules can be due to inflammation, scarring, infection and tumor/malignancy.  We reviewed her imaging from 10/16/2018-8/24/22.  Her less than 5mm pulmonary nodules remained stable during that time period and are behaving benign.  Given her smoking history, recommended continued monitoring of her 7mm right middle lobe groundglass nodule to ensure 5 years of stability based on Fleischner Society 2017 recommendations.  CT chest ordered and recommended Yuki return in 4-6 weeks for imaging review.  Encouraged her to avoid lung irritants and tobacco smoke exposure.  Follow-up and red flag precautions given.   - CT-CHEST (THORAX) W/O; Future    36 minutes was spent face to face for this encounter    Return in about 6 weeks (around 11/4/2024) for follow-up lung nodules after CT has been obtained.    Please note that this dictation was created using voice recognition software. I have made every reasonable attempt to correct obvious errors, but I expect that there are errors of grammar and possibly content that I did not discover before finalizing the note.

## 2024-09-23 NOTE — Clinical Note
Thank you for referring Yuki to the [facility].  I have attached my encounter note for your review -Dr. Violet Shook

## 2024-09-27 ENCOUNTER — SLEEP STUDY (OUTPATIENT)
Dept: SLEEP MEDICINE | Facility: MEDICAL CENTER | Age: 64
End: 2024-09-27
Attending: PREVENTIVE MEDICINE
Payer: MEDICARE

## 2024-09-27 DIAGNOSIS — R06.83 SNORING: ICD-10-CM

## 2024-09-27 DIAGNOSIS — I10 ESSENTIAL HYPERTENSION: ICD-10-CM

## 2024-09-27 DIAGNOSIS — G47.33 OSA (OBSTRUCTIVE SLEEP APNEA): ICD-10-CM

## 2024-09-27 DIAGNOSIS — R06.81 WITNESSED EPISODE OF APNEA: ICD-10-CM

## 2024-09-27 DIAGNOSIS — R53.82 CHRONIC FATIGUE, UNSPECIFIED: ICD-10-CM

## 2024-09-27 PROCEDURE — 95811 POLYSOM 6/>YRS CPAP 4/> PARM: CPT | Performed by: PREVENTIVE MEDICINE

## 2024-09-30 NOTE — PROCEDURES
Physician:   Patient: IRAIS HANDLEY  ID: [id number] Date: 9/27/2024 Exam No.:   MONTAGE: Standard  STUDY TYPE: Split Night  RECORDING TECHNIQUE:   After the scalp was prepared, gold plated electrodes were applied to the scalp according to the International 10-20 System. EEG (electroencephalogram) was continuously monitored from the O1-M2, O2-M1, C3-M2, C4-M1, F3-M2, and F4-M1. EOGs (electrooculograms) were monitored by electrodes placed at the left and right outer canthi. Chin EMG (electromyogram) was monitored by electrodes placed on the mentalis and sub-mentalis muscles. Nasal and oral airflow were monitored using a triple port thermocouple as well as oronasal pressure transducer. Respiratory effort was measured by inductive plethysmography technology employing abdominal and thoracic belts. Blood oxygen saturation and pulse were monitored by pulse oximetry. Heart rhythm was monitored by surface electrocardiogram. Leg EMG was studied using surface electrodes placed on left and right anterior tibialis. A microphone was used to monitor tracheal sounds and snoring. Body position was monitored and documented by technician observation.   SCORING CRITERIA:   A modification of the AASM manual for scoring of sleep and associated events was used. Obstructive apneas were scored by cessation of airflow for at least 10 seconds with continuing respiratory effort. Central apneas were scored by cessation of airflow for at least 10 seconds with no respiratory effort. Hypopneas were scored by a 30% or more reduction in airflow for at least 10 seconds accompanied by arterial oxygen desaturation of 3% or an arousal. For CMS (Medicare) patients, per AASM rule 1B, hypopneas are scored by 30% with mild reduction in airflow for at least 10 seconds accompanied by arterial saturation decreased at 4%.  DIAGNOSTIC  Study start time was 10:29:37 PM. Diagnostic recording time was 157 minutes with a total sleep time of 126 minutes resulting in a  sleep efficiency of 80.00%%. Sleep latency from the start of the study was 15 minutes and the latency from sleep to REM was 80 minutes. In total,28 arousals were scored for an arousal index of 13.3.  Respiratory:  There were a total of 46 apneas consisting of 46 obstructive apneas, 0 mixed apneas, and 0 central apneas. A total of 42 hypopneas were scored. The apnea index was 21.90 per hour and the hypopnea index was 20.00 per hour resulting in an overall AHI of 41.90. AHI during REM was 57.1 and AHI while supine was 41.90.  Oximetry:  There was a mean oxygen saturation of 91.0%. The minimum oxygen saturation during NREM sleep was 85.0% and in REM was 85.0%. Time spent during sleep with oxygen saturations <88% was 19.7 minutes.   Cardiac:  The highest heart rate seen while awake was 82 BPM while the highest heart rate during sleep was 66 BPM with an average sleeping heart rate of 55 BPM.  Limb Movements:  There were a total of 0 PLMs during sleep, which resulted in a PLM index of 0.0. There were 5 PLMs associated with arousals which resulted in a PLMS arousal index of 2.4.  TREATMENT:  Treatment recording time was 5h 5.5m (305 minutes) with a total sleep time of 4h 28.0m (268 minutes) resulting in a sleep efficiency of 87.7%. Sleep latency from the start of treatment was 07 minutes and REM latency from sleep onset was 0h 34.0m. The patient had 76 arousals in total for an arousal index of 17.0.  Respiratory:   There were 3 apneas in total consisting of 2 obstructive apneas, 1 central apneas, and 0 mixed apneas for an apnea index of 0.67. The patient had 10 hypopneas in total, which resulted in a hypopnea index of 2.24. The overall AHI was 2.91, with a REM AHI of 2.45, and a supine AHI of 0.00.   Oximetry:  The mean SaO2 during treatment was 93.0%. The minimum oxygen saturation in NREM was 90.0 % and in REM was 89.0%. Patient spent 0.0 minutes of TST with SaO2 <88%.  Cardiac:  The highest heart rate during sleep was 59  BPM with an average sleeping heart rate of 52BPM.  Limb Movements:  There were a total of 26 PLMS during titration sleep time that resulted in an index of 5.8. There were 9 PLMS associated with arousals. This resulted in a PLM arousal index of 2.0.  Titration: CPAP was tried from 5-9cm H2O.  This was a fully attended sleep study. This test was technically adequate. This patient was titrated on CPAP starting at *** cm of water pressure. Patient was titrated up to *** cm of water pressure. Patient did best at *** cm of water pressure. Patient spent *** minutes at that pressure and the AHI was *** which is considered *** obstructive sleep apnea.   Assessment:   DIAGNOSTIC: ***Obstructive Sleep Apnea Hypopnea - AHI*** ***Nocturnal desaturation - ruma saturation ***% - saturations <88% below for *** minutes of TST.  TREATMENT: ***Obstructive Sleep Apnea Hypopnea - AHI*** ***Nocturnal desaturation - ruma saturation ***% - saturations <88% below for *** minutes of TST.  Recommendation:

## 2024-10-30 ENCOUNTER — DOCUMENTATION (OUTPATIENT)
Dept: MEDICAL GROUP | Facility: MEDICAL CENTER | Age: 64
End: 2024-10-30
Payer: MEDICARE

## 2024-10-30 DIAGNOSIS — E78.5 DYSLIPIDEMIA: ICD-10-CM

## 2024-10-30 RX ORDER — ROSUVASTATIN CALCIUM 20 MG/1
20 TABLET, COATED ORAL DAILY
Qty: 100 TABLET | Refills: 3 | Status: SHIPPED | OUTPATIENT
Start: 2024-10-30

## 2024-11-27 ENCOUNTER — TELEPHONE (OUTPATIENT)
Dept: HEALTH INFORMATION MANAGEMENT | Facility: OTHER | Age: 64
End: 2024-11-27
Payer: MEDICARE

## 2025-01-29 ENCOUNTER — TELEPHONE (OUTPATIENT)
Dept: MEDICAL GROUP | Facility: MEDICAL CENTER | Age: 65
End: 2025-01-29
Payer: MEDICARE

## 2025-01-29 NOTE — TELEPHONE ENCOUNTER
[facility]     Patient with unmet Star goals:  Medication non-adherence for Statin - called and s/w pharmacy. They claim pt has not p/u.     Sent 100ds of pt's rosuvastatin to pharmacy on file. Called and notified pt. She agrees to p/u at her earliest convenience.      Arik Meredith, RaoulD, BCACP

## 2025-01-30 ENCOUNTER — APPOINTMENT (OUTPATIENT)
Dept: RADIOLOGY | Facility: MEDICAL CENTER | Age: 65
End: 2025-01-30
Attending: FAMILY MEDICINE
Payer: MEDICARE

## 2025-01-31 ENCOUNTER — HOSPITAL ENCOUNTER (OUTPATIENT)
Dept: RADIOLOGY | Facility: MEDICAL CENTER | Age: 65
End: 2025-01-31
Payer: MEDICARE

## 2025-02-05 PROBLEM — I34.1 MITRAL VALVE PROLAPSE: Status: ACTIVE | Noted: 2025-02-05

## 2025-02-05 PROBLEM — I49.9 DYSRHYTHMIA: Status: ACTIVE | Noted: 2025-02-05

## 2025-02-11 ENCOUNTER — APPOINTMENT (OUTPATIENT)
Dept: RADIOLOGY | Facility: MEDICAL CENTER | Age: 65
End: 2025-02-11
Attending: EMERGENCY MEDICINE
Payer: MEDICARE

## 2025-02-11 ENCOUNTER — HOSPITAL ENCOUNTER (EMERGENCY)
Facility: MEDICAL CENTER | Age: 65
End: 2025-02-11
Attending: EMERGENCY MEDICINE
Payer: MEDICARE

## 2025-02-11 VITALS
TEMPERATURE: 97.6 F | BODY MASS INDEX: 31.09 KG/M2 | OXYGEN SATURATION: 92 % | RESPIRATION RATE: 18 BRPM | DIASTOLIC BLOOD PRESSURE: 108 MMHG | WEIGHT: 175.49 LBS | HEIGHT: 63 IN | SYSTOLIC BLOOD PRESSURE: 159 MMHG | HEART RATE: 67 BPM

## 2025-02-11 DIAGNOSIS — M79.601 RIGHT ARM PAIN: ICD-10-CM

## 2025-02-11 LAB
ALBUMIN SERPL BCP-MCNC: 4.2 G/DL (ref 3.2–4.9)
ALBUMIN/GLOB SERPL: 1.3 G/DL
ALP SERPL-CCNC: 73 U/L (ref 30–99)
ALT SERPL-CCNC: 24 U/L (ref 2–50)
ANION GAP SERPL CALC-SCNC: 9 MMOL/L (ref 7–16)
AST SERPL-CCNC: 31 U/L (ref 12–45)
BASOPHILS # BLD AUTO: 0.9 % (ref 0–1.8)
BASOPHILS # BLD: 0.07 K/UL (ref 0–0.12)
BILIRUB SERPL-MCNC: 0.3 MG/DL (ref 0.1–1.5)
BUN SERPL-MCNC: 16 MG/DL (ref 8–22)
CALCIUM ALBUM COR SERPL-MCNC: 9.8 MG/DL (ref 8.5–10.5)
CALCIUM SERPL-MCNC: 10 MG/DL (ref 8.5–10.5)
CHLORIDE SERPL-SCNC: 100 MMOL/L (ref 96–112)
CO2 SERPL-SCNC: 27 MMOL/L (ref 20–33)
CREAT SERPL-MCNC: 0.9 MG/DL (ref 0.5–1.4)
EKG IMPRESSION: NORMAL
EOSINOPHIL # BLD AUTO: 0.09 K/UL (ref 0–0.51)
EOSINOPHIL NFR BLD: 1.2 % (ref 0–6.9)
ERYTHROCYTE [DISTWIDTH] IN BLOOD BY AUTOMATED COUNT: 43.3 FL (ref 35.9–50)
GFR SERPLBLD CREATININE-BSD FMLA CKD-EPI: 70 ML/MIN/1.73 M 2
GLOBULIN SER CALC-MCNC: 3.3 G/DL (ref 1.9–3.5)
GLUCOSE SERPL-MCNC: 72 MG/DL (ref 65–99)
HCT VFR BLD AUTO: 47.6 % (ref 37–47)
HGB BLD-MCNC: 15.9 G/DL (ref 12–16)
HOLDING TUBE BB 8507: NORMAL
IMM GRANULOCYTES # BLD AUTO: 0.03 K/UL (ref 0–0.11)
IMM GRANULOCYTES NFR BLD AUTO: 0.4 % (ref 0–0.9)
LYMPHOCYTES # BLD AUTO: 2.32 K/UL (ref 1–4.8)
LYMPHOCYTES NFR BLD: 31.4 % (ref 22–41)
MCH RBC QN AUTO: 30.7 PG (ref 27–33)
MCHC RBC AUTO-ENTMCNC: 33.4 G/DL (ref 32.2–35.5)
MCV RBC AUTO: 91.9 FL (ref 81.4–97.8)
MONOCYTES # BLD AUTO: 0.78 K/UL (ref 0–0.85)
MONOCYTES NFR BLD AUTO: 10.6 % (ref 0–13.4)
NEUTROPHILS # BLD AUTO: 4.09 K/UL (ref 1.82–7.42)
NEUTROPHILS NFR BLD: 55.5 % (ref 44–72)
NRBC # BLD AUTO: 0 K/UL
NRBC BLD-RTO: 0 /100 WBC (ref 0–0.2)
NT-PROBNP SERPL IA-MCNC: 336 PG/ML (ref 0–125)
PLATELET # BLD AUTO: 468 K/UL (ref 164–446)
PMV BLD AUTO: 8.3 FL (ref 9–12.9)
POTASSIUM SERPL-SCNC: 3.8 MMOL/L (ref 3.6–5.5)
PROT SERPL-MCNC: 7.5 G/DL (ref 6–8.2)
RBC # BLD AUTO: 5.18 M/UL (ref 4.2–5.4)
SODIUM SERPL-SCNC: 136 MMOL/L (ref 135–145)
TROPONIN T SERPL-MCNC: <6 NG/L (ref 6–19)
WBC # BLD AUTO: 7.4 K/UL (ref 4.8–10.8)

## 2025-02-11 PROCEDURE — 93005 ELECTROCARDIOGRAM TRACING: CPT | Mod: TC | Performed by: EMERGENCY MEDICINE

## 2025-02-11 PROCEDURE — 71045 X-RAY EXAM CHEST 1 VIEW: CPT

## 2025-02-11 PROCEDURE — 84484 ASSAY OF TROPONIN QUANT: CPT

## 2025-02-11 PROCEDURE — A9270 NON-COVERED ITEM OR SERVICE: HCPCS | Performed by: EMERGENCY MEDICINE

## 2025-02-11 PROCEDURE — 83880 ASSAY OF NATRIURETIC PEPTIDE: CPT

## 2025-02-11 PROCEDURE — 93005 ELECTROCARDIOGRAM TRACING: CPT | Mod: TC

## 2025-02-11 PROCEDURE — 99284 EMERGENCY DEPT VISIT MOD MDM: CPT

## 2025-02-11 PROCEDURE — 36415 COLL VENOUS BLD VENIPUNCTURE: CPT

## 2025-02-11 PROCEDURE — 80053 COMPREHEN METABOLIC PANEL: CPT

## 2025-02-11 PROCEDURE — 85025 COMPLETE CBC W/AUTO DIFF WBC: CPT

## 2025-02-11 PROCEDURE — 700102 HCHG RX REV CODE 250 W/ 637 OVERRIDE(OP): Performed by: EMERGENCY MEDICINE

## 2025-02-11 RX ORDER — IBUPROFEN 600 MG/1
600 TABLET, FILM COATED ORAL ONCE
Status: COMPLETED | OUTPATIENT
Start: 2025-02-11 | End: 2025-02-11

## 2025-02-11 RX ORDER — ACETAMINOPHEN 325 MG/1
650 TABLET ORAL ONCE
Status: COMPLETED | OUTPATIENT
Start: 2025-02-11 | End: 2025-02-11

## 2025-02-11 RX ORDER — OXYCODONE HYDROCHLORIDE 5 MG/1
5 TABLET ORAL ONCE
Status: COMPLETED | OUTPATIENT
Start: 2025-02-11 | End: 2025-02-11

## 2025-02-11 RX ADMIN — IBUPROFEN 600 MG: 600 TABLET, FILM COATED ORAL at 21:19

## 2025-02-11 RX ADMIN — ACETAMINOPHEN 650 MG: 325 TABLET ORAL at 21:19

## 2025-02-11 RX ADMIN — OXYCODONE 5 MG: 5 TABLET ORAL at 21:19

## 2025-02-11 ASSESSMENT — HEART SCORE
TROPONIN: LESS THAN OR EQUAL TO NORMAL LIMIT
ECG: NORMAL
HEART SCORE: 3
HISTORY: SLIGHTLY SUSPICIOUS
AGE: 65+
RISK FACTORS: 1-2 RISK FACTORS

## 2025-02-11 ASSESSMENT — FIBROSIS 4 INDEX: FIB4 SCORE: 0.64

## 2025-02-12 NOTE — ED PROVIDER NOTES
"  ER Provider Note    Scribed for Darlyn Zamora M.d. by Xin Clark. 2/11/2025  8:33 PM    Primary Care Provider: Stan Echevarria D.O.    CHIEF COMPLAINT  Chief Complaint   Patient presents with    Chest Pain     X2 days   Comes and goes, more noticeable when laying down     Arm Swelling     R arm pain and swelling started this morning close to elbow and sgx was on thumb and wrist.   Had sgx at the [facility] on Friday. Pt concerned about blood clot in arm     Shortness of Breath     Since last night      LIMITATION TO HISTORY   Select: : None    HPI/ROS  OUTSIDE HISTORIAN(S):  None    EXTERNAL RECORDS REVIEWED  Outpatient Notes renal Ortho note reviewed.  She had surgery reconstruction 3 days ago on the seventh of this month for chronic right hand and wrist pain.    Yuki Angelo is a 64 y.o. female who presents to the ED for acute chest pain and right arm swelling onset 2 days ago. Patient reports that she had right hand surgery 3 days ago at the [facility] and has been taking oxycodone every four hours, along with Tylenol. She reports that she has been feeling sweaty, having hot and cold flashes, chest pain at night, body aches and constipation. Last night, patient reports having shortness of breath. This morning, the patient had gotten up from bed for breakfast and had noticed a lump/rash on her right arm, and adds that she had a headache. She states that she presents today due to a concern for a blood clot. Denies any upper arm swelling, but notes having some right shoulder pain. Denies any fever. She has been elevating and resting her arm above heart level. Patient states that she takes blood pressure medication, noting that she had taken them today. She reports that her blood pressure has \"always been high,\" noting that it is normally around 162/98. She has also been taking stool softener, which she had started yesterday. The patient has an allergy to Penicillins and Sulfa drugs.     PAST MEDICAL HISTORY  Past " "Medical History:   Diagnosis Date    Aortic regurgitation     Dr. Morrell, Cardiolgist    Breath shortness 02/18/2021    pt unsure if sob d/t gallbladder pain or previous surgery; has talked to MD about this; surgery tomorrow- pt may go to ER today    CHEST PAIN     related to pericardial cyst  2021    DDD (degenerative disc disease), cervical 08/03/2011    Fatigue 02/21/2012    Fibromyalgia     GERD (gastroesophageal reflux disease)     High cholesterol     History of echocardiogram 02/21/2012    Hypertension 2015    well controlled on meds    Hypokalemia 08/13/2018    Incarcerated incisional hernia     Multilevel degenerative disc disease     Other specified disorder of intestines     constipation    Pain 02/02/2021    shoulders and neck,     Pain management contract agreement 08/13/2018    Polycystic kidney disease     \"told as child\"    Psychiatric problem     depression, anxiety    PVD (peripheral vascular disease) ([facility]) 02/21/2012    S/P tooth extraction 10/27/2015    Sleep apnea     (+) sleep study, not using CPAP; no O2    Snoring 02/18/2021    sleep study done    Thoracic outlet syndrome      SURGICAL HISTORY  Past Surgical History:   Procedure Laterality Date    PB REPAIR INTERCARP/CARP-METACARP JT Right 2/7/2025    Procedure: RIGHT THUMB TRAPEZIECTOMY LIGAMENT RECONSTRUCTION TENDON INTERPOSITION;  Surgeon: Leonardo Henry M.D.;  Location: [facility] [place];  Service: Orthopedics    KS NEUROPLASTY & OR TRANSPOS MEDIAN NRV CARPAL MERCEDES Right 2/7/2025    Procedure: RIGHT HAND OPEN CARPAL TUNNEL RELEASE;  Surgeon: Leonardo Henry M.D.;  Location: [facility] [place];  Service: Orthopedics    VENTRAL HERNIA REPAIR N/A 5/10/2023    Procedure: REPAIR, HERNIA, VENTRAL;  Surgeon: Celso Pringle M.D.;  Location: SURGERY [facility];  Service: General    KS REMOVAL OF OMENTUM  12/3/2021    Procedure: OMENTECTOMY,ROBOT-ASSISTED,USING DA CHEPE XI;  Surgeon: Lois Marte, " M.D.;  Location: [facility];  Service: Gyn Robotic    HYSTERECTOMY ROBOTIC XI  12/3/2021    Procedure: HYSTERECTOMY, ROBOT-ASSISTED, USING DA CHEPE XI;  Surgeon: Lois Marte M.D.;  Location: SURGERY [facility];  Service: Gyn Robotic    SALPINGO OOPHORECTOMY Bilateral 12/3/2021    Procedure: SALPINGO-OOPHORECTOMY;  Surgeon: Lois Marte M.D.;  Location: SURGERY [facility];  Service: Gyn Robotic    RICKI BY LAPAROSCOPY  2/19/2021    Procedure: CHOLECYSTECTOMY, LAPAROSCOPIC;  Surgeon: John H Ganser, M.D.;  Location: SURGERY SAME DAY [facility];  Service: General    MA THORACOSCOPY,DX NO BX Right 2/3/2021    Procedure: THORACOSCOPY;  Surgeon: John H Ganser, M.D.;  Location: SURGERY [facility];  Service: General    CYST EXCISION Right 2/3/2021    Procedure: EXCISION, CYST-PERICARDIAL CYST;  Surgeon: John H Ganser, M.D.;  Location: SURGERY [facility];  Service: General    NERVE ULNAR TRANSFER Left 2019    at [place]    BUNIONECTOMY DANYA Right 11/10/2015    Procedure: BUNIONECTOMY DANYA;  Surgeon: JAMA HensonPDipeshMDipesh;  Location: [facility];  Service:     OSTECTOMY  11/10/2015    Procedure: OSTECTOMY - 5TH TAILORS BUNION ;  Surgeon: JAMA HensonP.MDipesh;  Location: [facility];  Service:     ARTHROPLASTY Right 11/10/2015    Procedure: ARTHROPLASTY - 5TH DEROTATIONAL;  Surgeon: ROXIE Henson.P.MDipesh;  Location: [facility];  Service:     HAMMERTOE CORRECTION  11/10/2015    Procedure: HAMMERTOE CORRECTION - 2ND, 3RD, 4TH ;  Surgeon: ROXIE Henson.P.MDipesh;  Location: [facility];  Service:     HYSTEROSCOPY WITH VIDEO DIAGNOSTIC  3/27/2015    Performed by Johnny Jarquin M.D. at SURGERY SAME DAY [facility] ORS    DILATION AND CURETTAGE  3/27/2015    Performed by Johnny Jarquin M.D. at SURGERY SAME DAY [facility] ORS    KNEE ARTHROSCOPY  3/11/2013    Performed by Freeman Ballard M.D. at [facility]     MENISCECTOMY, KNEE, MEDIAL  3/11/2013    Performed by Freeman Ballard M.D. at SURGERY [facility]    NERVE ULNAR TRANSFER  3/13/2012    Performed by KATYA NEAL at SURGERY [facility]    CARPAL TUNNEL ENDOSCOPIC  3/13/2012    Performed by KATYA NEAL at SURGERY [facility]    CERVICAL DISK AND FUSION ANTERIOR  6/21/2010    Performed by LAURA BAH at SURGERY [facility]    INCISION AND DRAINAGE GENERAL  10/22/2009    Performed by ABEL GUZMAN at SURGERY [facility]    MASS EXCISION GENERAL  10/6/2009    right breast Performed by ABEL GUZMAN at SURGERY [facility]    BREAST BIOPSY  3/5/2009    right; Performed by ABEL GUZMAN at SURGERY [facility]    RIB RESECTION  2001    right for thoracic outlet syndrome    KNEE ARTHROSCOPY  1998    left    TUBAL COAGULATION LAPAROSCOPIC BILATERAL  1997    CERVICAL FUSION POSTERIOR  1996    OTHER SURGICAL PROCEDURE  11/1992    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 Her smoking use included cigarettes. She started smoking about 48 years ago. She has a 18.5 pack-year smoking history. She has never used smokeless tobacco. She reports that she does not currently use alcohol. She reports that she does not currently use drugs.    CURRENT MEDICATIONS  Previous Medications    ASPIRIN EC (ECOTRIN) 81 MG TABLET DELAYED RESPONSE    Take 81 mg by mouth every day.    LOSARTAN-HYDROCHLOROTHIAZIDE (HYZAAR) 100-25 MG PER TABLET    Take 1 Tablet by mouth every day.    NEBIVOLOL (BYSTOLIC) 5 MG TAB TABLET    Take 1 Tablet by mouth every day.    OXYCODONE IMMEDIATE-RELEASE (ROXICODONE) 5 MG TAB    Take 1 Tablet by mouth every four hours as needed for Severe Pain for up to 7 days.    PANTOPRAZOLE (PROTONIX) 40 MG TABLET DELAYED  "RESPONSE    Take 1 Tablet by mouth every morning before breakfast.    ROSUVASTATIN (CRESTOR) 20 MG TAB    Take 1 Tablet by mouth every day.    TRAMADOL (ULTRAM) 50 MG TAB    Take 50 mg by mouth every 8 hours as needed for Moderate Pain or Mild Pain.    ZOLPIDEM (AMBIEN) 5 MG TAB    Take 1 Tablet by mouth at bedtime as needed.  May repeat 1 time. for Sleep (Sleep study) for up to 2 doses. Two tabs  make up  a one day  dose  Indications: Trouble Sleeping     ALLERGIES  Penicillins and Sulfa drugs    PHYSICAL EXAM  BP (!) 181/105   Pulse 80   Temp 36.4 °C (97.6 °F) (Temporal)   Resp 16   Ht 1.6 m (5' 3\")   Wt 79.6 kg (175 lb 7.8 oz)   LMP  (LMP Unknown)   SpO2 97%   BMI 31.09 kg/m²     Constitutional: Alert in no apparent distress.  HENT: No signs of trauma, Bilateral external ears normal, Nose normal.   Eyes: Pupils are equal and reactive, Conjunctiva normal, Non-icteric.   Neck: NNo stridor.   Cardiovascular: Regular rate and rhythm, no murmurs.   Thorax & Lungs: Clear to auscultation, Normal breath sounds, No respiratory distress, No wheezing, No chest tenderness.   Abdomen: Bowel sounds normal, Soft, No tenderness, No masses, No peritoneal signs.  Skin: Warm, Dry, No erythema, No rash.   Musculoskeletal: Right fingers 2-3 are swollen and puffy, Ecchymosis of her right forearm just proximal to the splint, No diffuse swelling of the proximal arm, Intact capillary refill, No significant tenderness  Neurologic: Alert, moving all extremities without difficulty, no focal deficits.     DIAGNOSTIC STUDIES & PROCEDURES    Labs:   Results for orders placed or performed during the hospital encounter of 02/11/25   Comp Metabolic Panel    Collection Time: 02/11/25  8:00 PM   Result Value Ref Range    Sodium 136 135 - 145 mmol/L    Potassium 3.8 3.6 - 5.5 mmol/L    Chloride 100 96 - 112 mmol/L    Co2 27 20 - 33 mmol/L    Anion Gap 9.0 7.0 - 16.0    Glucose 72 65 - 99 mg/dL    Bun 16 8 - 22 mg/dL    Creatinine 0.90 0.50 - " 1.40 mg/dL    Calcium 10.0 8.5 - 10.5 mg/dL    Correct Calcium 9.8 8.5 - 10.5 mg/dL    AST(SGOT) 31 12 - 45 U/L    ALT(SGPT) 24 2 - 50 U/L    Alkaline Phosphatase 73 30 - 99 U/L    Total Bilirubin 0.3 0.1 - 1.5 mg/dL    Albumin 4.2 3.2 - 4.9 g/dL    Total Protein 7.5 6.0 - 8.2 g/dL    Globulin 3.3 1.9 - 3.5 g/dL    A-G Ratio 1.3 g/dL   proBrain Natriuretic Peptide, NT    Collection Time: 02/11/25  8:00 PM   Result Value Ref Range    NT-proBNP 336 (H) 0 - 125 pg/mL   CBC with Differential    Collection Time: 02/11/25  8:00 PM   Result Value Ref Range    WBC 7.4 4.8 - 10.8 K/uL    RBC 5.18 4.20 - 5.40 M/uL    Hemoglobin 15.9 12.0 - 16.0 g/dL    Hematocrit 47.6 (H) 37.0 - 47.0 %    MCV 91.9 81.4 - 97.8 fL    MCH 30.7 27.0 - 33.0 pg    MCHC 33.4 32.2 - 35.5 g/dL    RDW 43.3 35.9 - 50.0 fL    Platelet Count 468 (H) 164 - 446 K/uL    MPV 8.3 (L) 9.0 - 12.9 fL    Neutrophils-Polys 55.50 44.00 - 72.00 %    Lymphocytes 31.40 22.00 - 41.00 %    Monocytes 10.60 0.00 - 13.40 %    Eosinophils 1.20 0.00 - 6.90 %    Basophils 0.90 0.00 - 1.80 %    Immature Granulocytes 0.40 0.00 - 0.90 %    Nucleated RBC 0.00 0.00 - 0.20 /100 WBC    Neutrophils (Absolute) 4.09 1.82 - 7.42 K/uL    Lymphs (Absolute) 2.32 1.00 - 4.80 K/uL    Monos (Absolute) 0.78 0.00 - 0.85 K/uL    Eos (Absolute) 0.09 0.00 - 0.51 K/uL    Baso (Absolute) 0.07 0.00 - 0.12 K/uL    Immature Granulocytes (abs) 0.03 0.00 - 0.11 K/uL    NRBC (Absolute) 0.00 K/uL   Troponins NOW    Collection Time: 02/11/25  8:00 PM   Result Value Ref Range    Troponin T <6 6 - 19 ng/L   HOLD BLOOD BANK SPECIMEN (NOT TESTED)    Collection Time: 02/11/25  8:00 PM   Result Value Ref Range    Holding Tube - Bb DONE    ESTIMATED GFR    Collection Time: 02/11/25  8:00 PM   Result Value Ref Range    GFR (CKD-EPI) 70 >60 mL/min/1.73 m 2   EKG    Collection Time: 02/11/25 10:05 PM   Result Value Ref Range    Report       [facility] Emergency Dept.    Test Date:  2025-02-11  Pt  Name:    IRAIS HANDLEY               Department: ER  MRN:        [id number]                      Room:  Gender:     Female                       Technician: [id number]  :        [id number]                   Requested By:ER TRIAGE PROTOCOL  Order #:    [id number]                    Reading MD: KELLY ROSAS    Measurements  Intervals                                Axis  Rate:       77                           P:          38  OH:         169                          QRS:        -18  QRSD:       96                           T:          40  QT:         374  QTc:        424    Interpretive Statements  Sinus rhythm  Borderline left axis deviation  RSR' in V1 or V2, right VCD or RVH  Borderline T abnormalities, anterior leads  Compared to ECG 2023 16:20:42  T-wave abnormality now present  Impression: Sinus rhythm no evidence of ischemia.  Electronically Signed On 2025 22:05:36 PST by KELLY ROSAS     All labs reviewed by me.    EKG:   I have independently interpreted this EKG as seen above.    Radiology:   The attending Emergency Physician has independently interpreted the diagnostic imaging associated with this visit and is awaiting the final reading from the radiologist, which will be displayed below.    Preliminary interpretation is a follows: Normal-appearing chest x-ray.  Radiologist interpretation:   DX-CHEST-PORTABLE (1 VIEW)   Final Result         1.  Left basilar atelectasis, no focal infiltrate           COURSE & MEDICAL DECISION MAKING    ED Observation Status? No; Patient does not meet criteria for ED Observation.     8:33 PM - Patient seen and evaluated at bedside. I rewrapped the patient's arm and the patient reports that her arm is feeling improved. I discussed with the patient why she does not have a blood clot. I informed the patient that she can use ibuprofen with acetaminophen for her pain every 4-6 hours. Discussed the plan of care, including ordering imaging, labs and EKG to further evaluate.  Patient will be treated with ibuprofen tablet 600 mg, acetaminophen tablet 650 mg, and oxycodone immediate-release tablet 5 mg for her symptoms. Ordered DX-chest, Troponins in two hours, Hold blood bank specimen, CMP, BNP, CBC with diff, Troponins NOW, EKG to evaluate. She understands and agrees to the plan of care.     10:03 PM - Patient was reevaluated at bedside. Lab and radiology results were discussed with the patient. Plan for discharge and follow up were discussed as outlined below. The patient is stable for discharge at this time and will return for any new or worsening symptoms. Patient verbalizes understanding and support with my plan for discharge. Patient was discharged by staff.     INITIAL ASSESSMENT AND PLAN  Care Narrative: This is a 64-year-old female that presents with increased pain of her right upper extremity after surgery 3 days ago.  She noticed some bruising on her inside of her arm and was concerned for possible blood clot however she has no diffuse arm swelling the bruise she is indicating is superficial and likely secondary to the surgery.  Patient's chest x-ray is normal.  EKG does not show any evidence of ischemia.    Upon initial evaluation I removed the outer wrap of her splint and replaced it with a looser Ace.  She felt significantly better and her finger started to become less puffy with this the pain improved.  We had a long discussion regarding pain management for her surgery.  I recommended that she take a Profen and Tylenol schedule III times a day with the oxycodone on top as needed.  I think this would greatly improve her pain management and be able to get her off the narcotics fairly quickly she is also having constipation likely related to her narcotics and this may help as well trying to get off of those fairly quickly.  With regards to her chest pain or shortness of breath.  Her troponin is unremarkable BNP is not very elevated.  CBC CMP are normal chest x-ray has some  mild atelectasis.  She is not tachycardic she is not hypoxic she has no diffuse swelling I do not think she has significant risk for PE I do not think additional imaging is indicated.Patient felt significantly better and she will be discharged.          CHEST PAIN:   HEART Score for Major Cardiac Events  HEART Score     History: Slightly suspicious  ECG: Normal  Age: 65+  Risk Factors: 1-2 risk factors  Troponin: Less than or equal to normal limit    Heart Score: 3    Total Score   0-3 Points = Low Score, risk of MACE 0.9-1.7%.  4-6 Points = Moderate Score, risk of MACE 12-16.6%  7-10 Points = High Score, risk of MACE 50-65%                   DISPOSITION AND DISCUSSIONS  I have discussed management of the patient with the following physicians and JACKSON's: None    Discussion of management with other [facility] or appropriate source(s): none    Escalation of care considered, and ultimately not performed: Laboratory analysis and diagnostic imaging.    Barriers to care at this time, including but not limited to:  None .     Decision tools and prescription drugs considered including, but not limited to:  none .      The patient will return for new or worsening symptoms and is stable at the time of discharge. Patient was given return precautions. Patient and/or family member verbalizes understanding and will comply.    DISPOSITION:  Patient will be discharged home in stable condition.    FOLLOW UP:  Stan Echevarria D.O.  [address]  [address]  [facility] [address]  [phone]          Leonardo Henry M.D.  [address] [facility] [address]  [phone]          [facility], Emergency Dept  [address] [facility] [id number] [phone]    Return to the emergency department for worsening pain swelling vomiting shortness of breath or other concerns.    FINAL IMPRESSION   1. Right arm pain         I, Xin Clark (Scribe), am scribing for, and in the presence of, Darlyn Zamora,  M.D..    Electronically signed by: Xin Clark (Scribe), 2/11/2025    IDarlyn M.D. personally performed the services described in this documentation, as scribed by Xin Clark in my presence, and it is both accurate and complete.     The note accurately reflects work and decisions made by me.  Darlyn Zamora M.D.  2/11/2025  10:08 PM

## 2025-02-12 NOTE — ED TRIAGE NOTES
Chief Complaint   Patient presents with    Chest Pain     X2 days   Comes and goes, more noticeable when laying down     Arm Swelling     R arm pain and swelling started this morning close to elbow and sgx was on thumb and wrist.   Had sgx at the [facility] on Friday. Pt concerned about blood clot in arm     Shortness of Breath     Since last night

## 2025-05-15 ENCOUNTER — TELEPHONE (OUTPATIENT)
Dept: HEALTH INFORMATION MANAGEMENT | Facility: OTHER | Age: 65
End: 2025-05-15

## 2025-08-08 ENCOUNTER — APPOINTMENT (OUTPATIENT)
Dept: RADIOLOGY | Facility: MEDICAL CENTER | Age: 65
End: 2025-08-08
Attending: EMERGENCY MEDICINE
Payer: MEDICARE

## 2025-08-08 ENCOUNTER — HOSPITAL ENCOUNTER (OUTPATIENT)
Facility: MEDICAL CENTER | Age: 65
End: 2025-08-09
Attending: EMERGENCY MEDICINE | Admitting: STUDENT IN AN ORGANIZED HEALTH CARE EDUCATION/TRAINING PROGRAM
Payer: MEDICARE

## 2025-08-08 DIAGNOSIS — R07.9 ACUTE CHEST PAIN: Primary | ICD-10-CM

## 2025-08-08 PROBLEM — I24.9 ACS (ACUTE CORONARY SYNDROME) (HCC): Status: ACTIVE | Noted: 2025-08-08

## 2025-08-08 LAB
ALBUMIN SERPL BCP-MCNC: 4.4 G/DL (ref 3.2–4.9)
ALBUMIN/GLOB SERPL: 1.6 G/DL
ALP SERPL-CCNC: 69 U/L (ref 30–99)
ALT SERPL-CCNC: 15 U/L (ref 2–50)
ANION GAP SERPL CALC-SCNC: 16 MMOL/L (ref 7–16)
AST SERPL-CCNC: 26 U/L (ref 12–45)
BASOPHILS # BLD AUTO: 0.8 % (ref 0–1.8)
BASOPHILS # BLD: 0.06 K/UL (ref 0–0.12)
BILIRUB SERPL-MCNC: 0.7 MG/DL (ref 0.1–1.5)
BUN SERPL-MCNC: 15 MG/DL (ref 8–22)
CALCIUM ALBUM COR SERPL-MCNC: 9.4 MG/DL (ref 8.5–10.5)
CALCIUM SERPL-MCNC: 9.7 MG/DL (ref 8.5–10.5)
CHLORIDE SERPL-SCNC: 102 MMOL/L (ref 96–112)
CHOLEST SERPL-MCNC: 268 MG/DL (ref 100–199)
CO2 SERPL-SCNC: 23 MMOL/L (ref 20–33)
CREAT SERPL-MCNC: 1.07 MG/DL (ref 0.5–1.4)
D DIMER PPP IA.FEU-MCNC: <0.27 UG/ML (FEU) (ref 0–0.5)
EKG IMPRESSION: NORMAL
EOSINOPHIL # BLD AUTO: 0.06 K/UL (ref 0–0.51)
EOSINOPHIL NFR BLD: 0.8 % (ref 0–6.9)
ERYTHROCYTE [DISTWIDTH] IN BLOOD BY AUTOMATED COUNT: 43.6 FL (ref 35.9–50)
EST. AVERAGE GLUCOSE BLD GHB EST-MCNC: 105 MG/DL
GFR SERPLBLD CREATININE-BSD FMLA CKD-EPI: 57 ML/MIN/1.73 M 2
GLOBULIN SER CALC-MCNC: 2.8 G/DL (ref 1.9–3.5)
GLUCOSE SERPL-MCNC: 99 MG/DL (ref 65–99)
HBA1C MFR BLD: 5.3 % (ref 4–5.6)
HCT VFR BLD AUTO: 46.1 % (ref 37–47)
HDLC SERPL-MCNC: 67 MG/DL
HGB BLD-MCNC: 15.2 G/DL (ref 12–16)
IMM GRANULOCYTES # BLD AUTO: 0.02 K/UL (ref 0–0.11)
IMM GRANULOCYTES NFR BLD AUTO: 0.3 % (ref 0–0.9)
LDLC SERPL CALC-MCNC: 179 MG/DL
LYMPHOCYTES # BLD AUTO: 2.43 K/UL (ref 1–4.8)
LYMPHOCYTES NFR BLD: 34.1 % (ref 22–41)
MCH RBC QN AUTO: 30.3 PG (ref 27–33)
MCHC RBC AUTO-ENTMCNC: 33 G/DL (ref 32.2–35.5)
MCV RBC AUTO: 92 FL (ref 81.4–97.8)
MONOCYTES # BLD AUTO: 0.51 K/UL (ref 0–0.85)
MONOCYTES NFR BLD AUTO: 7.2 % (ref 0–13.4)
NEUTROPHILS # BLD AUTO: 4.05 K/UL (ref 1.82–7.42)
NEUTROPHILS NFR BLD: 56.8 % (ref 44–72)
NRBC # BLD AUTO: 0 K/UL
NRBC BLD-RTO: 0 /100 WBC (ref 0–0.2)
NT-PROBNP SERPL IA-MCNC: 69 PG/ML (ref 0–125)
PLATELET # BLD AUTO: 443 K/UL (ref 164–446)
PMV BLD AUTO: 8.5 FL (ref 9–12.9)
POTASSIUM SERPL-SCNC: 3 MMOL/L (ref 3.6–5.5)
PROT SERPL-MCNC: 7.2 G/DL (ref 6–8.2)
RBC # BLD AUTO: 5.01 M/UL (ref 4.2–5.4)
SODIUM SERPL-SCNC: 141 MMOL/L (ref 135–145)
TRIGL SERPL-MCNC: 109 MG/DL (ref 0–149)
TROPONIN T SERPL-MCNC: <6 NG/L (ref 6–19)
TROPONIN T SERPL-MCNC: <6 NG/L (ref 6–19)
WBC # BLD AUTO: 7.1 K/UL (ref 4.8–10.8)

## 2025-08-08 PROCEDURE — 71045 X-RAY EXAM CHEST 1 VIEW: CPT

## 2025-08-08 PROCEDURE — 700102 HCHG RX REV CODE 250 W/ 637 OVERRIDE(OP): Performed by: PHYSICIAN ASSISTANT

## 2025-08-08 PROCEDURE — 96372 THER/PROPH/DIAG INJ SC/IM: CPT

## 2025-08-08 PROCEDURE — 85379 FIBRIN DEGRADATION QUANT: CPT

## 2025-08-08 PROCEDURE — 80053 COMPREHEN METABOLIC PANEL: CPT

## 2025-08-08 PROCEDURE — 99223 1ST HOSP IP/OBS HIGH 75: CPT | Mod: AI | Performed by: STUDENT IN AN ORGANIZED HEALTH CARE EDUCATION/TRAINING PROGRAM

## 2025-08-08 PROCEDURE — 83880 ASSAY OF NATRIURETIC PEPTIDE: CPT

## 2025-08-08 PROCEDURE — 700111 HCHG RX REV CODE 636 W/ 250 OVERRIDE (IP): Mod: JZ | Performed by: STUDENT IN AN ORGANIZED HEALTH CARE EDUCATION/TRAINING PROGRAM

## 2025-08-08 PROCEDURE — 80061 LIPID PANEL: CPT

## 2025-08-08 PROCEDURE — A9270 NON-COVERED ITEM OR SERVICE: HCPCS

## 2025-08-08 PROCEDURE — 85025 COMPLETE CBC W/AUTO DIFF WBC: CPT

## 2025-08-08 PROCEDURE — 83036 HEMOGLOBIN GLYCOSYLATED A1C: CPT

## 2025-08-08 PROCEDURE — 84484 ASSAY OF TROPONIN QUANT: CPT

## 2025-08-08 PROCEDURE — 93005 ELECTROCARDIOGRAM TRACING: CPT | Mod: TC | Performed by: EMERGENCY MEDICINE

## 2025-08-08 PROCEDURE — 99285 EMERGENCY DEPT VISIT HI MDM: CPT

## 2025-08-08 PROCEDURE — 700105 HCHG RX REV CODE 258: Performed by: EMERGENCY MEDICINE

## 2025-08-08 PROCEDURE — G0378 HOSPITAL OBSERVATION PER HR: HCPCS

## 2025-08-08 PROCEDURE — 700102 HCHG RX REV CODE 250 W/ 637 OVERRIDE(OP): Performed by: STUDENT IN AN ORGANIZED HEALTH CARE EDUCATION/TRAINING PROGRAM

## 2025-08-08 PROCEDURE — 700102 HCHG RX REV CODE 250 W/ 637 OVERRIDE(OP)

## 2025-08-08 PROCEDURE — A9270 NON-COVERED ITEM OR SERVICE: HCPCS | Performed by: STUDENT IN AN ORGANIZED HEALTH CARE EDUCATION/TRAINING PROGRAM

## 2025-08-08 PROCEDURE — 36415 COLL VENOUS BLD VENIPUNCTURE: CPT

## 2025-08-08 PROCEDURE — A9270 NON-COVERED ITEM OR SERVICE: HCPCS | Performed by: PHYSICIAN ASSISTANT

## 2025-08-08 RX ORDER — POLYETHYLENE GLYCOL 3350 17 G/17G
1 POWDER, FOR SOLUTION ORAL
Status: DISCONTINUED | OUTPATIENT
Start: 2025-08-08 | End: 2025-08-09 | Stop reason: HOSPADM

## 2025-08-08 RX ORDER — LOSARTAN POTASSIUM AND HYDROCHLOROTHIAZIDE 25; 100 MG/1; MG/1
1 TABLET ORAL DAILY
Status: DISCONTINUED | OUTPATIENT
Start: 2025-08-09 | End: 2025-08-08

## 2025-08-08 RX ORDER — ONDANSETRON 2 MG/ML
4 INJECTION INTRAMUSCULAR; INTRAVENOUS EVERY 4 HOURS PRN
Status: DISCONTINUED | OUTPATIENT
Start: 2025-08-08 | End: 2025-08-09 | Stop reason: HOSPADM

## 2025-08-08 RX ORDER — HYDROCHLOROTHIAZIDE 25 MG/1
25 TABLET ORAL
Status: DISCONTINUED | OUTPATIENT
Start: 2025-08-09 | End: 2025-08-09 | Stop reason: HOSPADM

## 2025-08-08 RX ORDER — LOSARTAN POTASSIUM 50 MG/1
100 TABLET ORAL
Status: DISCONTINUED | OUTPATIENT
Start: 2025-08-09 | End: 2025-08-09 | Stop reason: HOSPADM

## 2025-08-08 RX ORDER — POTASSIUM CHLORIDE 7.45 MG/ML
10 INJECTION INTRAVENOUS
Status: DISCONTINUED | OUTPATIENT
Start: 2025-08-08 | End: 2025-08-08

## 2025-08-08 RX ORDER — NEBIVOLOL 5 MG/1
5 TABLET ORAL DAILY
Status: DISCONTINUED | OUTPATIENT
Start: 2025-08-09 | End: 2025-08-08

## 2025-08-08 RX ORDER — OXYCODONE HYDROCHLORIDE 5 MG/1
5 TABLET ORAL
Refills: 0 | Status: DISCONTINUED | OUTPATIENT
Start: 2025-08-08 | End: 2025-08-09 | Stop reason: HOSPADM

## 2025-08-08 RX ORDER — OXYCODONE HYDROCHLORIDE 5 MG/1
2.5 TABLET ORAL
Refills: 0 | Status: DISCONTINUED | OUTPATIENT
Start: 2025-08-08 | End: 2025-08-09 | Stop reason: HOSPADM

## 2025-08-08 RX ORDER — ONDANSETRON 4 MG/1
4 TABLET, ORALLY DISINTEGRATING ORAL EVERY 4 HOURS PRN
Status: DISCONTINUED | OUTPATIENT
Start: 2025-08-08 | End: 2025-08-09 | Stop reason: HOSPADM

## 2025-08-08 RX ORDER — ASPIRIN 81 MG/1
81 TABLET ORAL DAILY
Status: DISCONTINUED | OUTPATIENT
Start: 2025-08-09 | End: 2025-08-09 | Stop reason: HOSPADM

## 2025-08-08 RX ORDER — SODIUM CHLORIDE 9 MG/ML
1000 INJECTION, SOLUTION INTRAVENOUS ONCE
Status: COMPLETED | OUTPATIENT
Start: 2025-08-08 | End: 2025-08-08

## 2025-08-08 RX ORDER — OMEPRAZOLE 20 MG/1
20 CAPSULE, DELAYED RELEASE ORAL DAILY
Status: DISCONTINUED | OUTPATIENT
Start: 2025-08-09 | End: 2025-08-09 | Stop reason: HOSPADM

## 2025-08-08 RX ORDER — ACETAMINOPHEN/DIPHENHYDRAMINE 500MG-25MG
1 TABLET ORAL
COMMUNITY

## 2025-08-08 RX ORDER — ROSUVASTATIN CALCIUM 20 MG/1
20 TABLET, COATED ORAL EVERY EVENING
Status: DISCONTINUED | OUTPATIENT
Start: 2025-08-08 | End: 2025-08-09 | Stop reason: HOSPADM

## 2025-08-08 RX ORDER — ACETAMINOPHEN 325 MG/1
650 TABLET ORAL EVERY 6 HOURS PRN
Status: DISCONTINUED | OUTPATIENT
Start: 2025-08-08 | End: 2025-08-09 | Stop reason: HOSPADM

## 2025-08-08 RX ORDER — AMOXICILLIN 250 MG
2 CAPSULE ORAL EVERY EVENING
Status: DISCONTINUED | OUTPATIENT
Start: 2025-08-08 | End: 2025-08-09 | Stop reason: HOSPADM

## 2025-08-08 RX ORDER — POTASSIUM CHLORIDE 1500 MG/1
40 TABLET, EXTENDED RELEASE ORAL ONCE
Status: COMPLETED | OUTPATIENT
Start: 2025-08-08 | End: 2025-08-08

## 2025-08-08 RX ORDER — HYDROMORPHONE HYDROCHLORIDE 1 MG/ML
0.25 INJECTION, SOLUTION INTRAMUSCULAR; INTRAVENOUS; SUBCUTANEOUS
Status: DISCONTINUED | OUTPATIENT
Start: 2025-08-08 | End: 2025-08-09 | Stop reason: HOSPADM

## 2025-08-08 RX ORDER — ACETAMINOPHEN, ASPIRIN AND CAFFEINE 250; 250; 65 MG/1; MG/1; MG/1
1 TABLET ORAL EVERY 6 HOURS PRN
COMMUNITY

## 2025-08-08 RX ORDER — ENOXAPARIN SODIUM 100 MG/ML
40 INJECTION SUBCUTANEOUS DAILY
Status: DISCONTINUED | OUTPATIENT
Start: 2025-08-08 | End: 2025-08-09 | Stop reason: HOSPADM

## 2025-08-08 RX ORDER — METOPROLOL TARTRATE 25 MG/1
25 TABLET, FILM COATED ORAL 2 TIMES DAILY
Status: DISCONTINUED | OUTPATIENT
Start: 2025-08-08 | End: 2025-08-09 | Stop reason: HOSPADM

## 2025-08-08 RX ORDER — HYDRALAZINE HYDROCHLORIDE 20 MG/ML
10 INJECTION INTRAMUSCULAR; INTRAVENOUS EVERY 4 HOURS PRN
Status: DISCONTINUED | OUTPATIENT
Start: 2025-08-08 | End: 2025-08-09 | Stop reason: HOSPADM

## 2025-08-08 RX ORDER — PANTOPRAZOLE SODIUM 40 MG/1
40 TABLET, DELAYED RELEASE ORAL
Status: DISCONTINUED | OUTPATIENT
Start: 2025-08-08 | End: 2025-08-08

## 2025-08-08 RX ORDER — ATORVASTATIN CALCIUM 40 MG/1
40 TABLET, FILM COATED ORAL EVERY EVENING
Status: DISCONTINUED | OUTPATIENT
Start: 2025-08-08 | End: 2025-08-08

## 2025-08-08 RX ADMIN — SODIUM CHLORIDE 1000 ML: 9 INJECTION, SOLUTION INTRAVENOUS at 16:05

## 2025-08-08 RX ADMIN — ROSUVASTATIN CALCIUM 20 MG: 20 TABLET, FILM COATED ORAL at 19:50

## 2025-08-08 RX ADMIN — POTASSIUM CHLORIDE 10 MEQ: 7.46 INJECTION, SOLUTION INTRAVENOUS at 19:45

## 2025-08-08 RX ADMIN — POTASSIUM CHLORIDE 40 MEQ: 1500 TABLET, EXTENDED RELEASE ORAL at 19:50

## 2025-08-08 RX ADMIN — ENOXAPARIN SODIUM 40 MG: 100 INJECTION SUBCUTANEOUS at 19:50

## 2025-08-08 RX ADMIN — METOPROLOL TARTRATE 25 MG: 25 TABLET, FILM COATED ORAL at 19:50

## 2025-08-08 RX ADMIN — Medication 5 MG: at 23:48

## 2025-08-08 RX ADMIN — OXYCODONE 5 MG: 5 TABLET ORAL at 22:12

## 2025-08-08 RX ADMIN — DOCUSATE SODIUM 50 MG AND SENNOSIDES 8.6 MG 2 TABLET: 8.6; 5 TABLET, FILM COATED ORAL at 19:50

## 2025-08-08 ASSESSMENT — LIFESTYLE VARIABLES
EVER FELT BAD OR GUILTY ABOUT YOUR DRINKING: NO
CONSUMPTION TOTAL: NEGATIVE
HOW MANY TIMES IN THE PAST YEAR HAVE YOU HAD 5 OR MORE DRINKS IN A DAY: 0
ALCOHOL_USE: NO
DOES PATIENT WANT TO STOP DRINKING: NO
HAVE PEOPLE ANNOYED YOU BY CRITICIZING YOUR DRINKING: NO
ON A TYPICAL DAY WHEN YOU DRINK ALCOHOL HOW MANY DRINKS DO YOU HAVE: 0
TOTAL SCORE: 0
EVER HAD A DRINK FIRST THING IN THE MORNING TO STEADY YOUR NERVES TO GET RID OF A HANGOVER: NO
TOTAL SCORE: 0
HAVE YOU EVER FELT YOU SHOULD CUT DOWN ON YOUR DRINKING: NO
AVERAGE NUMBER OF DAYS PER WEEK YOU HAVE A DRINK CONTAINING ALCOHOL: 0
TOTAL SCORE: 0

## 2025-08-08 ASSESSMENT — ENCOUNTER SYMPTOMS
NECK PAIN: 0
SHORTNESS OF BREATH: 0
DIZZINESS: 0
NAUSEA: 0
BACK PAIN: 0
ORTHOPNEA: 0
DIARRHEA: 0
VOMITING: 0
DOUBLE VISION: 0
PHOTOPHOBIA: 0
EYE DISCHARGE: 0
SPUTUM PRODUCTION: 0
HEADACHES: 0
HEMOPTYSIS: 0
EYE PAIN: 0
PALPITATIONS: 0
ABDOMINAL PAIN: 0
FEVER: 0
CHILLS: 0

## 2025-08-08 ASSESSMENT — COGNITIVE AND FUNCTIONAL STATUS - GENERAL
SUGGESTED CMS G CODE MODIFIER DAILY ACTIVITY: CH
MOBILITY SCORE: 24
SUGGESTED CMS G CODE MODIFIER MOBILITY: CH
DAILY ACTIVITIY SCORE: 24

## 2025-08-08 ASSESSMENT — PATIENT HEALTH QUESTIONNAIRE - PHQ9
1. LITTLE INTEREST OR PLEASURE IN DOING THINGS: NOT AT ALL
2. FEELING DOWN, DEPRESSED, IRRITABLE, OR HOPELESS: NOT AT ALL
SUM OF ALL RESPONSES TO PHQ9 QUESTIONS 1 AND 2: 0

## 2025-08-08 ASSESSMENT — PAIN DESCRIPTION - PAIN TYPE
TYPE: CHRONIC PAIN
TYPE: ACUTE PAIN
TYPE: ACUTE PAIN

## 2025-08-08 ASSESSMENT — HEART SCORE
ECG: NON-SPECIFIC REPOLARIZATION DISTURBANCE
RISK FACTORS: >2 RISK FACTORS OR HX OF ATHEROSCLEROTIC DISEASE
HEART SCORE: 7
HISTORY: HIGHLY SUSPICIOUS
TROPONIN: LESS THAN OR EQUAL TO NORMAL LIMIT
AGE: 65+

## 2025-08-08 ASSESSMENT — FIBROSIS 4 INDEX
FIB4 SCORE: 1.02
FIB4 SCORE: 0.91

## 2025-08-09 ENCOUNTER — APPOINTMENT (OUTPATIENT)
Dept: CARDIOLOGY | Facility: MEDICAL CENTER | Age: 65
End: 2025-08-09
Attending: STUDENT IN AN ORGANIZED HEALTH CARE EDUCATION/TRAINING PROGRAM
Payer: MEDICARE

## 2025-08-09 ENCOUNTER — APPOINTMENT (OUTPATIENT)
Dept: RADIOLOGY | Facility: MEDICAL CENTER | Age: 65
End: 2025-08-09
Attending: STUDENT IN AN ORGANIZED HEALTH CARE EDUCATION/TRAINING PROGRAM
Payer: MEDICARE

## 2025-08-09 VITALS
HEART RATE: 56 BPM | TEMPERATURE: 97.1 F | DIASTOLIC BLOOD PRESSURE: 65 MMHG | SYSTOLIC BLOOD PRESSURE: 140 MMHG | BODY MASS INDEX: 31.68 KG/M2 | HEIGHT: 63 IN | WEIGHT: 178.79 LBS | RESPIRATION RATE: 16 BRPM | OXYGEN SATURATION: 95 %

## 2025-08-09 PROBLEM — R55 NEAR SYNCOPE: Status: RESOLVED | Noted: 2019-01-01 | Resolved: 2025-08-09

## 2025-08-09 PROBLEM — I24.9 ACS (ACUTE CORONARY SYNDROME) (HCC): Status: RESOLVED | Noted: 2025-08-08 | Resolved: 2025-08-09

## 2025-08-09 PROBLEM — E87.6 HYPOKALEMIA: Status: RESOLVED | Noted: 2018-08-13 | Resolved: 2025-08-09

## 2025-08-09 LAB
ALBUMIN SERPL BCP-MCNC: 3.8 G/DL (ref 3.2–4.9)
ALBUMIN/GLOB SERPL: 1.6 G/DL
ALP SERPL-CCNC: 62 U/L (ref 30–99)
ALT SERPL-CCNC: 13 U/L (ref 2–50)
ANION GAP SERPL CALC-SCNC: 11 MMOL/L (ref 7–16)
AST SERPL-CCNC: 19 U/L (ref 12–45)
BILIRUB SERPL-MCNC: 0.4 MG/DL (ref 0.1–1.5)
BUN SERPL-MCNC: 20 MG/DL (ref 8–22)
CALCIUM ALBUM COR SERPL-MCNC: 9.6 MG/DL (ref 8.5–10.5)
CALCIUM SERPL-MCNC: 9.4 MG/DL (ref 8.5–10.5)
CHLORIDE SERPL-SCNC: 101 MMOL/L (ref 96–112)
CO2 SERPL-SCNC: 25 MMOL/L (ref 20–33)
CREAT SERPL-MCNC: 0.8 MG/DL (ref 0.5–1.4)
ERYTHROCYTE [DISTWIDTH] IN BLOOD BY AUTOMATED COUNT: 43.3 FL (ref 35.9–50)
GFR SERPLBLD CREATININE-BSD FMLA CKD-EPI: 80 ML/MIN/1.73 M 2
GLOBULIN SER CALC-MCNC: 2.4 G/DL (ref 1.9–3.5)
GLUCOSE SERPL-MCNC: 99 MG/DL (ref 65–99)
HCT VFR BLD AUTO: 40.1 % (ref 37–47)
HGB BLD-MCNC: 13.4 G/DL (ref 12–16)
LV EJECT FRACT  99904: 62
LV EJECT FRACT MOD 2C 99903: 62.41
LV EJECT FRACT MOD 4C 99902: 60.88
LV EJECT FRACT MOD BP 99901: 61.99
MCH RBC QN AUTO: 30.6 PG (ref 27–33)
MCHC RBC AUTO-ENTMCNC: 33.4 G/DL (ref 32.2–35.5)
MCV RBC AUTO: 91.6 FL (ref 81.4–97.8)
PLATELET # BLD AUTO: 389 K/UL (ref 164–446)
PMV BLD AUTO: 8.4 FL (ref 9–12.9)
POTASSIUM SERPL-SCNC: 3.9 MMOL/L (ref 3.6–5.5)
PROT SERPL-MCNC: 6.2 G/DL (ref 6–8.2)
RBC # BLD AUTO: 4.38 M/UL (ref 4.2–5.4)
SODIUM SERPL-SCNC: 137 MMOL/L (ref 135–145)
WBC # BLD AUTO: 6.7 K/UL (ref 4.8–10.8)

## 2025-08-09 PROCEDURE — 700102 HCHG RX REV CODE 250 W/ 637 OVERRIDE(OP): Performed by: HOSPITALIST

## 2025-08-09 PROCEDURE — 85027 COMPLETE CBC AUTOMATED: CPT

## 2025-08-09 PROCEDURE — 700102 HCHG RX REV CODE 250 W/ 637 OVERRIDE(OP): Performed by: STUDENT IN AN ORGANIZED HEALTH CARE EDUCATION/TRAINING PROGRAM

## 2025-08-09 PROCEDURE — 93306 TTE W/DOPPLER COMPLETE: CPT | Mod: 26 | Performed by: INTERNAL MEDICINE

## 2025-08-09 PROCEDURE — A9270 NON-COVERED ITEM OR SERVICE: HCPCS | Performed by: HOSPITALIST

## 2025-08-09 PROCEDURE — 36415 COLL VENOUS BLD VENIPUNCTURE: CPT

## 2025-08-09 PROCEDURE — A9502 TC99M TETROFOSMIN: HCPCS

## 2025-08-09 PROCEDURE — 99239 HOSP IP/OBS DSCHRG MGMT >30: CPT | Performed by: HOSPITALIST

## 2025-08-09 PROCEDURE — G0378 HOSPITAL OBSERVATION PER HR: HCPCS

## 2025-08-09 PROCEDURE — 700111 HCHG RX REV CODE 636 W/ 250 OVERRIDE (IP)

## 2025-08-09 PROCEDURE — A9270 NON-COVERED ITEM OR SERVICE: HCPCS | Performed by: STUDENT IN AN ORGANIZED HEALTH CARE EDUCATION/TRAINING PROGRAM

## 2025-08-09 PROCEDURE — 93306 TTE W/DOPPLER COMPLETE: CPT

## 2025-08-09 PROCEDURE — 80053 COMPREHEN METABOLIC PANEL: CPT

## 2025-08-09 RX ORDER — REGADENOSON 0.08 MG/ML
INJECTION, SOLUTION INTRAVENOUS
Status: COMPLETED
Start: 2025-08-09 | End: 2025-08-09

## 2025-08-09 RX ORDER — AMLODIPINE BESYLATE 5 MG/1
5 TABLET ORAL DAILY
Qty: 100 TABLET | Refills: 3 | Status: SHIPPED | OUTPATIENT
Start: 2025-08-09 | End: 2026-09-13

## 2025-08-09 RX ORDER — REGADENOSON 0.08 MG/ML
0.4 INJECTION, SOLUTION INTRAVENOUS ONCE
Status: COMPLETED | OUTPATIENT
Start: 2025-08-09 | End: 2025-08-09

## 2025-08-09 RX ORDER — AMINOPHYLLINE 25 MG/ML
100 INJECTION, SOLUTION INTRAVENOUS
Status: DISCONTINUED | OUTPATIENT
Start: 2025-08-09 | End: 2025-08-09 | Stop reason: HOSPADM

## 2025-08-09 RX ORDER — AMLODIPINE BESYLATE 5 MG/1
5 TABLET ORAL
Status: DISCONTINUED | OUTPATIENT
Start: 2025-08-09 | End: 2025-08-09 | Stop reason: HOSPADM

## 2025-08-09 RX ADMIN — REGADENOSON 0.4 MG: 0.08 INJECTION, SOLUTION INTRAVENOUS at 08:38

## 2025-08-09 RX ADMIN — AMLODIPINE BESYLATE 5 MG: 5 TABLET ORAL at 13:14

## 2025-08-09 RX ADMIN — HYDROCHLOROTHIAZIDE 25 MG: 25 TABLET ORAL at 05:50

## 2025-08-09 RX ADMIN — METOPROLOL TARTRATE 25 MG: 25 TABLET, FILM COATED ORAL at 05:50

## 2025-08-09 RX ADMIN — ASPIRIN 81 MG: 81 TABLET, COATED ORAL at 05:50

## 2025-08-09 RX ADMIN — LOSARTAN POTASSIUM 100 MG: 50 TABLET, FILM COATED ORAL at 05:50

## 2025-08-09 RX ADMIN — OMEPRAZOLE 20 MG: 20 CAPSULE, DELAYED RELEASE ORAL at 05:50

## 2025-08-09 RX ADMIN — ACETAMINOPHEN 650 MG: 325 TABLET ORAL at 05:50

## 2025-08-09 ASSESSMENT — PAIN DESCRIPTION - PAIN TYPE
TYPE: ACUTE PAIN
TYPE: CHRONIC PAIN

## 2025-08-10 PROBLEM — R07.9 CHEST PAIN: Status: RESOLVED | Noted: 2025-08-08 | Resolved: 2025-08-09

## 2025-08-11 ENCOUNTER — PATIENT OUTREACH (OUTPATIENT)
Dept: HEALTH INFORMATION MANAGEMENT | Facility: OTHER | Age: 65
End: 2025-08-11
Payer: MEDICARE

## 2025-08-11 ENCOUNTER — PATIENT OUTREACH (OUTPATIENT)
Dept: MEDICAL GROUP | Facility: MEDICAL CENTER | Age: 65
End: 2025-08-11
Payer: MEDICARE

## 2025-08-11 DIAGNOSIS — I10 PRIMARY HYPERTENSION: Primary | ICD-10-CM

## 2025-08-11 SDOH — HEALTH STABILITY: MENTAL HEALTH: HOW MANY DRINKS CONTAINING ALCOHOL DO YOU HAVE ON A TYPICAL DAY WHEN YOU ARE DRINKING?: PATIENT DOES NOT DRINK

## 2025-08-11 SDOH — HEALTH STABILITY: MENTAL HEALTH: HOW OFTEN DO YOU HAVE A DRINK CONTAINING ALCOHOL?: NEVER

## 2025-08-11 SDOH — SOCIAL STABILITY: SOCIAL NETWORK: HOW OFTEN DO YOU GET TOGETHER WITH FRIENDS OR RELATIVES?: THREE TIMES A WEEK

## 2025-08-11 SDOH — SOCIAL STABILITY: SOCIAL INSECURITY: WITHIN THE LAST YEAR, HAVE YOU BEEN AFRAID OF YOUR PARTNER OR EX-PARTNER?: NO

## 2025-08-11 SDOH — HEALTH STABILITY: MENTAL HEALTH
DO YOU FEEL STRESS - TENSE, RESTLESS, NERVOUS, OR ANXIOUS, OR UNABLE TO SLEEP AT NIGHT BECAUSE YOUR MIND IS TROUBLED ALL THE TIME - THESE DAYS?: NOT AT ALL

## 2025-08-11 SDOH — SOCIAL STABILITY: SOCIAL NETWORK
DO YOU BELONG TO ANY CLUBS OR ORGANIZATIONS SUCH AS CHURCH GROUPS, UNIONS, FRATERNAL OR ATHLETIC GROUPS, OR SCHOOL GROUPS?: NO

## 2025-08-11 SDOH — HEALTH STABILITY: PHYSICAL HEALTH
HOW OFTEN DO YOU NEED TO HAVE SOMEONE HELP YOU WHEN YOU READ INSTRUCTIONS, PAMPHLETS, OR OTHER WRITTEN MATERIAL FROM YOUR DOCTOR OR PHARMACY?: NEVER

## 2025-08-11 SDOH — ECONOMIC STABILITY: HOUSING INSECURITY: AT ANY TIME IN THE PAST 12 MONTHS, WERE YOU HOMELESS OR LIVING IN A SHELTER (INCLUDING NOW)?: NO

## 2025-08-11 SDOH — SOCIAL STABILITY: SOCIAL INSECURITY: WITHIN THE LAST YEAR, HAVE YOU BEEN HUMILIATED OR EMOTIONALLY ABUSED IN OTHER WAYS BY YOUR PARTNER OR EX-PARTNER?: NO

## 2025-08-11 SDOH — ECONOMIC STABILITY: INCOME INSECURITY: IN THE PAST 12 MONTHS HAS THE ELECTRIC, GAS, OIL, OR WATER COMPANY THREATENED TO SHUT OFF SERVICES IN YOUR HOME?: NO

## 2025-08-11 SDOH — SOCIAL STABILITY: SOCIAL INSECURITY
WITHIN THE LAST YEAR, HAVE YOU BEEN KICKED, HIT, SLAPPED, OR OTHERWISE PHYSICALLY HURT BY YOUR PARTNER OR EX-PARTNER?: NO

## 2025-08-11 SDOH — SOCIAL STABILITY: SOCIAL NETWORK: HOW OFTEN DO YOU ATTEND MEETINGS OF THE CLUBS OR ORGANIZATIONS YOU BELONG TO?: NEVER

## 2025-08-11 SDOH — HEALTH STABILITY: MENTAL HEALTH: HOW OFTEN DO YOU HAVE SIX OR MORE DRINKS ON ONE OCCASION?: NEVER

## 2025-08-11 SDOH — SOCIAL STABILITY: SOCIAL INSECURITY
WITHIN THE LAST YEAR, HAVE YOU BEEN RAPED OR FORCED TO HAVE ANY KIND OF SEXUAL ACTIVITY BY YOUR PARTNER OR EX-PARTNER?: NO

## 2025-08-11 SDOH — ECONOMIC STABILITY: FOOD INSECURITY: HOW HARD IS IT FOR YOU TO PAY FOR THE VERY BASICS LIKE FOOD, HOUSING, MEDICAL CARE, AND HEATING?: NOT HARD AT ALL

## 2025-08-11 SDOH — SOCIAL STABILITY: SOCIAL NETWORK: HOW OFTEN DO YOU ATTEND CHURCH OR RELIGIOUS SERVICES?: MORE THAN 4 TIMES PER YEAR

## 2025-08-11 SDOH — SOCIAL STABILITY: SOCIAL INSECURITY: ARE YOU MARRIED, WIDOWED, DIVORCED, SEPARATED, NEVER MARRIED, OR LIVING WITH A PARTNER?: MARRIED

## 2025-08-11 SDOH — SOCIAL STABILITY: SOCIAL NETWORK: IN A TYPICAL WEEK, HOW MANY TIMES DO YOU TALK ON THE PHONE WITH FAMILY, FRIENDS, OR NEIGHBORS?: TWICE A WEEK

## 2025-08-11 SDOH — HEALTH STABILITY: PHYSICAL HEALTH: ON AVERAGE, HOW MANY MINUTES DO YOU ENGAGE IN EXERCISE AT THIS LEVEL?: 90 MIN

## 2025-08-11 SDOH — ECONOMIC STABILITY: HOUSING INSECURITY: IN THE PAST 12 MONTHS, HOW MANY TIMES HAVE YOU MOVED WHERE YOU WERE LIVING?: 0

## 2025-08-11 SDOH — ECONOMIC STABILITY: FOOD INSECURITY: WITHIN THE PAST 12 MONTHS, THE FOOD YOU BOUGHT JUST DIDN'T LAST AND YOU DIDN'T HAVE MONEY TO GET MORE.: NEVER TRUE

## 2025-08-11 SDOH — ECONOMIC STABILITY: HOUSING INSECURITY: IN THE LAST 12 MONTHS, WAS THERE A TIME WHEN YOU WERE NOT ABLE TO PAY THE MORTGAGE OR RENT ON TIME?: NO

## 2025-08-11 SDOH — ECONOMIC STABILITY: FOOD INSECURITY: WITHIN THE PAST 12 MONTHS, YOU WORRIED THAT YOUR FOOD WOULD RUN OUT BEFORE YOU GOT THE MONEY TO BUY MORE.: NEVER TRUE

## 2025-08-11 SDOH — HEALTH STABILITY: PHYSICAL HEALTH: ON AVERAGE, HOW MANY DAYS PER WEEK DO YOU ENGAGE IN MODERATE TO STRENUOUS EXERCISE (LIKE A BRISK WALK)?: 7 DAYS

## 2025-08-11 SDOH — ECONOMIC STABILITY: TRANSPORTATION INSECURITY: IN THE PAST 12 MONTHS, HAS LACK OF TRANSPORTATION KEPT YOU FROM MEDICAL APPOINTMENTS OR FROM GETTING MEDICATIONS?: NO

## 2025-08-11 ASSESSMENT — ACTIVITIES OF DAILY LIVING (ADL): LACK_OF_TRANSPORTATION: NO

## 2025-08-11 ASSESSMENT — LIFESTYLE VARIABLES
AUDIT-C TOTAL SCORE: 0
SKIP TO QUESTIONS 9-10: 1

## 2025-08-20 PROBLEM — G43.909 MIGRAINES: Status: RESOLVED | Noted: 2025-08-20 | Resolved: 2025-08-20

## 2025-08-20 PROBLEM — R55 SYNCOPE AND COLLAPSE: Status: RESOLVED | Noted: 2024-09-10 | Resolved: 2025-08-20

## 2025-08-20 PROBLEM — K64.9 HEMORRHOIDS: Status: RESOLVED | Noted: 2025-08-20 | Resolved: 2025-08-20

## 2025-08-20 PROBLEM — N83.202 CYST OF LEFT OVARY: Status: RESOLVED | Noted: 2025-08-20 | Resolved: 2025-08-20

## 2025-08-20 PROBLEM — K64.1 SECOND DEGREE HEMORRHOIDS: Status: RESOLVED | Noted: 2021-07-28 | Resolved: 2025-08-20

## 2025-08-20 PROBLEM — R13.19 ESOPHAGEAL DYSPHAGIA: Status: RESOLVED | Noted: 2025-08-20 | Resolved: 2025-08-20

## 2025-08-20 PROBLEM — K80.20 GALLSTONES: Status: RESOLVED | Noted: 2025-08-20 | Resolved: 2025-08-20

## 2025-08-20 PROBLEM — K92.1 HEMATOCHEZIA: Status: RESOLVED | Noted: 2025-08-20 | Resolved: 2025-08-20

## 2025-08-20 PROBLEM — K21.9 GASTRO-ESOPHAGEAL REFLUX DISEASE WITHOUT ESOPHAGITIS: Status: RESOLVED | Noted: 2025-08-20 | Resolved: 2025-08-20

## 2025-08-20 PROBLEM — K44.9 DIAPHRAGMATIC HERNIA WITHOUT OBSTRUCTION OR GANGRENE: Status: RESOLVED | Noted: 2024-07-26 | Resolved: 2025-08-20

## 2025-08-21 ENCOUNTER — TELEPHONE (OUTPATIENT)
Dept: MEDICAL GROUP | Facility: MEDICAL CENTER | Age: 65
End: 2025-08-21
Payer: MEDICARE

## 2025-08-25 ENCOUNTER — PATIENT OUTREACH (OUTPATIENT)
Dept: HEALTH INFORMATION MANAGEMENT | Facility: OTHER | Age: 65
End: 2025-08-25
Payer: MEDICARE

## 2025-08-25 DIAGNOSIS — M79.7 FIBROMYALGIA: ICD-10-CM

## 2025-08-25 DIAGNOSIS — E78.5 DYSLIPIDEMIA: ICD-10-CM

## 2025-08-25 DIAGNOSIS — I10 PRIMARY HYPERTENSION: Primary | ICD-10-CM

## 2025-08-25 RX ORDER — UBIDECARENONE 75 MG
CAPSULE ORAL DAILY
COMMUNITY

## 2025-08-25 RX ORDER — IBUPROFEN 600 MG/1
600 TABLET, FILM COATED ORAL
COMMUNITY

## 2025-08-25 ASSESSMENT — PATIENT HEALTH QUESTIONNAIRE - PHQ9: CLINICAL INTERPRETATION OF PHQ2 SCORE: 0

## 2025-08-25 ASSESSMENT — PAIN SCALES - GENERAL: PAINLEVEL: 4=SLIGHT-MODERATE PAIN

## (undated) DEVICE — GOWN WARMING STANDARD FLEX - (30/CA)

## (undated) DEVICE — SLEEVE VASO CALF MED - (10PR/CA)

## (undated) DEVICE — DRAPE COLUMN  BOX OF 20

## (undated) DEVICE — CANISTER SUCTION 3000ML MECHANICAL FILTER AUTO SHUTOFF MEDI-VAC NONSTERILE LF DISP  (40EA/CA)

## (undated) DEVICE — BRIEF STRETCH MATERNITY M/L - FITS 20-60IN (5EA/BG 20BG/CA)

## (undated) DEVICE — PAD SANITARY 11IN MAXI IND WRAPPED  (12EA/PK 24PK/CA)

## (undated) DEVICE — DRAPE LAPAROTOMY T SHEET - (12EA/CA)

## (undated) DEVICE — PACK LAP CHOLE OR - (2EA/CA)

## (undated) DEVICE — NEPTUNE 4 PORT MANIFOLD - (20/PK)

## (undated) DEVICE — TUBING CLEARLINK DUO-VENT - C-FLO (48EA/CA)

## (undated) DEVICE — TUBE CONNECT SUCTION CLEAR 120 X 1/4" (50EA/CA)"

## (undated) DEVICE — DRESSING TRANSPARENT FILM TEGADERM 2.375 X 2.75"  (100EA/BX)"

## (undated) DEVICE — SUTURE 4-0 PROLENE PS-2 18 (36PK/BX)"

## (undated) DEVICE — KIT  I.V. START (100EA/CA)

## (undated) DEVICE — TOWELS CLOTH SURGICAL - (4/PK 20PK/CA)

## (undated) DEVICE — PACK MINOR BASIN - (2EA/CA)

## (undated) DEVICE — TRANSDUCER ADULT DISP. SINGLE BONDED STERILE - (20EA/CA)

## (undated) DEVICE — SENSOR SPO2 NEO LNCS ADHESIVE (20/BX) SEE USER NOTES

## (undated) DEVICE — CHLORAPREP 26 ML APPLICATOR - ORANGE TINT(25/CA)

## (undated) DEVICE — GLOVE BIOGEL PI INDICATOR SZ 7.5 SURGICAL PF LF -(50/BX 4BX/CA)

## (undated) DEVICE — TROCAR Z THREAD11MM OPTICAL - NON BLADED(6/BX)

## (undated) DEVICE — BAG RETRIEVAL 10ML (10EA/BX)

## (undated) DEVICE — FORCEPS PROGRASP DA VINCI 10X'S REUSABLE

## (undated) DEVICE — DRAPE CHEST/BREAST - (12EA/CA)

## (undated) DEVICE — OBTURATOR BLADELESS STANDARD 8MM (6EA/BX)

## (undated) DEVICE — SUTURE 3-0 MONOCRYL PLUS PS-1 - 27 INCH (36/BX)

## (undated) DEVICE — PACK GYN DAVINCI (2EA/CA)

## (undated) DEVICE — ELECTRODE DUAL RETURN W/ CORD - (50/PK)

## (undated) DEVICE — SUTURE GENERAL

## (undated) DEVICE — GLOVE BIOGEL SZ 7 SURGICAL PF LTX - (50PR/BX 4BX/CA)

## (undated) DEVICE — MASK ANESTHESIA ADULT  - (100/CA)

## (undated) DEVICE — SUTURE 2-0 VICRYL PLUS CT-2 - 27 INCH (36/BX)

## (undated) DEVICE — SPONGE GAUZESTER 4 X 4 4PLY - (128PK/CA)

## (undated) DEVICE — BLADE SURGICAL CLIPPER - (50EA/CA)

## (undated) DEVICE — WATER IRRIGATION STERILE 1000ML (12EA/CA)

## (undated) DEVICE — SENSOR OXIMETER ADULT SPO2 RD SET (20EA/BX)

## (undated) DEVICE — SET LEADWIRE 5 LEAD BEDSIDE DISPOSABLE ECG (1SET OF 5/EA)

## (undated) DEVICE — SUCTION INSTRUMENT YANKAUER BULBOUS TIP W/O VENT (50EA/CA)

## (undated) DEVICE — SYRINGE 10 ML CONTROL LL (25EA/BX 4BX/CA)

## (undated) DEVICE — SEAL 5MM-8MM UNIVERSAL  BOX OF 10

## (undated) DEVICE — CLIP MED LG INTNL HRZN TI ESCP - (20/BX)

## (undated) DEVICE — COVER FOOT UNIVERSAL DISP. - (25EA/CA)

## (undated) DEVICE — CORDS BIPOLAR COAGULATION - 12FT STERILE DISP. (10EA/BX)

## (undated) DEVICE — LACTATED RINGERS INJ 1000 ML - (14EA/CA 60CA/PF)

## (undated) DEVICE — SPONGE GAUZESTER. 2X2 4-PL - (2/PK 50PK/BX 30BX/CS)

## (undated) DEVICE — PAD PREP 24 X 48 CUFFED - (100/CA)

## (undated) DEVICE — CANISTER SUCTION RIGID RED 1500CC (40EA/CA)

## (undated) DEVICE — STAPLER SKIN DISP - (6/BX 10BX/CA) VISISTAT

## (undated) DEVICE — TOWEL STOP TIMEOUT SAFETY FLAG (40EA/CA)

## (undated) DEVICE — DRAPE IOBAN II INCISE 23X17 - (10EA/BX 4BX/CA)

## (undated) DEVICE — TUBE NG SALEM SUMP 16FR (50EA/CA)

## (undated) DEVICE — DRESSING NON-ADHERING 8 X 3 - (50/BX)

## (undated) DEVICE — NEEDLE INSFL 120MM 14GA VRRS - (20/BX)

## (undated) DEVICE — SET EXTENSION WITH 2 PORTS (48EA/CA) ***PART #2C8610 IS A SUBSTITUTE*****

## (undated) DEVICE — GLOVE SZ 7 BIOGEL PI MICRO - PF LF (50PR/BX 4BX/CA)

## (undated) DEVICE — SLEEVE, VASO, THIGH, MED

## (undated) DEVICE — DRAPE ARM  BOX OF 20

## (undated) DEVICE — ARMREST CRADLE FOAM - (2PR/PK 12PR/CA)

## (undated) DEVICE — BAG SPONGE COUNT 10.25 X 32 - BLUE (250/CA)

## (undated) DEVICE — CLEANER ELECTRO-SURGICAL TIP - (25/BX 4BX/CA)

## (undated) DEVICE — DRAPESURG STERI-DRAPE LONG - (10/BX 4BX/CA)

## (undated) DEVICE — CANNULA W/SEAL 5X100 Z-THRE - ADED KII (12/BX)

## (undated) DEVICE — GLOVE BIOGEL M SZ 8 SURGICAL PF LTX - (50/BX 4BX/CA)

## (undated) DEVICE — BIPOLAR FORCE DA VINCI 12X'S REISABLE

## (undated) DEVICE — HEAD HOLDER JUNIOR/ADULT

## (undated) DEVICE — NEEDLE DRIVER MEGA SUTURECUT DA VINCI 15X'S REUSABLE

## (undated) DEVICE — SYRINGE ASEPTO - (50EA/CA

## (undated) DEVICE — TROCAR 5X100 NON BLADED Z-TH - READ KII (6/BX)

## (undated) DEVICE — SODIUM CHL IRRIGATION 0.9% 1000ML (12EA/CA)

## (undated) DEVICE — SET SUCTION/IRRIGATION WITH DISPOSABLE TIP (6/CA )PART #0250-070-520 IS A SUB

## (undated) DEVICE — PACK TRENGUARD 450 PROCEDURE (12EA/CA)

## (undated) DEVICE — PROTECTOR ULNA NERVE - (36PR/CA)

## (undated) DEVICE — SUTURE 4-0 MONOCRYL PLUS PS-2 - 27 INCH (36/BX)

## (undated) DEVICE — BLADE SURGICAL #15 - (50/BX 3BX/CA)

## (undated) DEVICE — SET TUBING PNEUMOCLEAR HIGH FLOW SMOKE EVACUATION (10EA/BX)

## (undated) DEVICE — CONNECTOR Y TBG CRTY 5 IN 1 STERILE (50EA/CA)

## (undated) DEVICE — CATHETER IV 20 GA X 1-1/4 ---SURG.& SDS ONLY--- (50EA/BX)

## (undated) DEVICE — MANIFOLD NEPTUNE 1 PORT (20/PK)

## (undated) DEVICE — GLOVE BIOGEL SZ 7.5 SURGICAL PF LTX - (50PR/BX 4BX/CA)

## (undated) DEVICE — BOVIE BLADE COATED - (50/PK)

## (undated) DEVICE — PENCIL ELECTSURG 10FT BTN SWH - (50/CA)

## (undated) DEVICE — SUTURE 4-0 VICRYL PLUS FS-2 - 27 INCH (36/BX)

## (undated) DEVICE — ELECTRODE 850 FOAM ADHESIVE - HYDROGEL RADIOTRNSPRNT (50/PK)

## (undated) DEVICE — GLOVE BIOGEL INDICATOR SZ 7SURGICAL PF LTX - (50/BX 4BX/CA)

## (undated) DEVICE — SUTURE 0 VICRYL PLUS CT-2 - 27 INCH (36/BX)

## (undated) DEVICE — BAG RETRIEVAL 5MM (10EA/BX)

## (undated) DEVICE — APPLICATOR COTTON TIP 6 IN - STERILE (2EA/PK 100PK/BX)

## (undated) DEVICE — PAD OR TABLE DA VINCI 2IN X 20IN X 72IN - (12EA/CA)

## (undated) DEVICE — KIT ANESTHESIA W/CIRCUIT & 3/LT BAG W/FILTER (20EA/CA)

## (undated) DEVICE — TROCAR 12MM THORACOPORT (6EA/BX)

## (undated) DEVICE — SOD. CHL. INJ. 0.9% 1000 ML - (14EA/CA 60CA/PF)

## (undated) DEVICE — GOWN SURGEONS X-LARGE - DISP. (30/CA)

## (undated) DEVICE — SPATULA PERMANENT CAUTERY DA VINCI 10X'S REUSABLE

## (undated) DEVICE — SEALER VESSEL EXTEND FROM DAVINCI ENERGY (6EA/BX)

## (undated) DEVICE — SUTURE 2-0 COATED VICRYL PLUS - 12 X 18 INCH (12/BX)

## (undated) DEVICE — TROCAR 5X100 BLADED Z-THREAD - KII (6/BX)

## (undated) DEVICE — TUBE CONNECTING SUCTION - CLEAR PLASTIC STERILE 72 IN (50EA/CA)

## (undated) DEVICE — GLOVE BIOGEL INDICATOR SZ 8 SURGICAL PF LTX - (50/BX 4BX/CA)

## (undated) DEVICE — SUTURE 3-0 VICRYL PLUS SH - 8X 18 INCH (12/BX)

## (undated) DEVICE — NEEDLE INSUFFLATION FOR STEP - (12/BX)

## (undated) DEVICE — GLOVE SZ 7.5 BIOGEL PI MICRO - PF LF (50PR/BX)

## (undated) DEVICE — SUTURE 0 PDS CT-1 CR 8 X 18 (12PK/BX)"

## (undated) DEVICE — ROBOTIC SURGERY SERVICES